# Patient Record
Sex: FEMALE | Race: WHITE | NOT HISPANIC OR LATINO | Employment: UNEMPLOYED | URBAN - METROPOLITAN AREA
[De-identification: names, ages, dates, MRNs, and addresses within clinical notes are randomized per-mention and may not be internally consistent; named-entity substitution may affect disease eponyms.]

---

## 2017-05-08 ENCOUNTER — ALLSCRIPTS OFFICE VISIT (OUTPATIENT)
Dept: OTHER | Facility: OTHER | Age: 57
End: 2017-05-08

## 2017-05-08 DIAGNOSIS — Z12.31 ENCOUNTER FOR SCREENING MAMMOGRAM FOR MALIGNANT NEOPLASM OF BREAST: ICD-10-CM

## 2017-05-08 DIAGNOSIS — R74.8 ABNORMAL LEVELS OF OTHER SERUM ENZYMES: ICD-10-CM

## 2017-05-08 DIAGNOSIS — R73.01 IMPAIRED FASTING GLUCOSE: ICD-10-CM

## 2017-05-09 ENCOUNTER — ALLSCRIPTS OFFICE VISIT (OUTPATIENT)
Dept: OTHER | Facility: OTHER | Age: 57
End: 2017-05-09

## 2017-05-11 ENCOUNTER — GENERIC CONVERSION - ENCOUNTER (OUTPATIENT)
Dept: OTHER | Facility: OTHER | Age: 57
End: 2017-05-11

## 2017-06-19 ENCOUNTER — HOSPITAL ENCOUNTER (OUTPATIENT)
Dept: MAMMOGRAPHY | Facility: MEDICAL CENTER | Age: 57
Discharge: HOME | End: 2017-06-19
Payer: COMMERCIAL

## 2017-06-19 DIAGNOSIS — Z12.31 ENCOUNTER FOR SCREENING MAMMOGRAM FOR MALIGNANT NEOPLASM OF BREAST: ICD-10-CM

## 2017-06-19 PROCEDURE — 77063 BREAST TOMOSYNTHESIS BI: CPT

## 2017-06-19 PROCEDURE — G0202 SCR MAMMO BI INCL CAD: HCPCS

## 2017-06-20 ENCOUNTER — GENERIC CONVERSION - ENCOUNTER (OUTPATIENT)
Dept: OTHER | Facility: OTHER | Age: 57
End: 2017-06-20

## 2017-06-29 ENCOUNTER — ALLSCRIPTS OFFICE VISIT (OUTPATIENT)
Dept: OTHER | Facility: OTHER | Age: 57
End: 2017-06-29

## 2017-08-08 ENCOUNTER — ALLSCRIPTS OFFICE VISIT (OUTPATIENT)
Dept: OTHER | Facility: OTHER | Age: 57
End: 2017-08-08

## 2017-08-08 DIAGNOSIS — R74.8 ABNORMAL LEVELS OF OTHER SERUM ENZYMES: ICD-10-CM

## 2017-08-08 DIAGNOSIS — Z11.59 ENCOUNTER FOR SCREENING FOR OTHER VIRAL DISEASES: ICD-10-CM

## 2017-09-21 ENCOUNTER — GENERIC CONVERSION - ENCOUNTER (OUTPATIENT)
Dept: OTHER | Facility: OTHER | Age: 57
End: 2017-09-21

## 2018-01-12 VITALS
DIASTOLIC BLOOD PRESSURE: 78 MMHG | SYSTOLIC BLOOD PRESSURE: 142 MMHG | HEIGHT: 64 IN | BODY MASS INDEX: 27.85 KG/M2 | WEIGHT: 163.13 LBS

## 2018-01-14 VITALS
HEIGHT: 64 IN | OXYGEN SATURATION: 98 % | SYSTOLIC BLOOD PRESSURE: 110 MMHG | HEART RATE: 64 BPM | BODY MASS INDEX: 27.87 KG/M2 | TEMPERATURE: 98.7 F | WEIGHT: 163.25 LBS | DIASTOLIC BLOOD PRESSURE: 78 MMHG

## 2018-01-15 VITALS
TEMPERATURE: 98.1 F | BODY MASS INDEX: 26.89 KG/M2 | HEIGHT: 64 IN | SYSTOLIC BLOOD PRESSURE: 128 MMHG | DIASTOLIC BLOOD PRESSURE: 82 MMHG | WEIGHT: 157.5 LBS | HEART RATE: 62 BPM | OXYGEN SATURATION: 98 %

## 2018-01-15 NOTE — MISCELLANEOUS
Message     Recorded as Task   Date: 09/20/2017 01:27 PM, Created By: Candi Bland   Task Name: Call Back   Assigned To: Zenobia Bri   Regarding Patient: Neena Parrish, Status: Active   Comment:    Aniyah Naqvi - 20 Sep 2017 1:27 PM     TASK CREATED  please call pt to go over labs that were done 09/12/2017  thanks!!    [phone] it is ok to leave a msg   Brooke Galvez - 21 Sep 2017 4:13 PM     TASK EDITED  I called and reviewed the hepatic function panel results with her and the non reactive hepatitis C results  She was informed that the liver testing results were all within normal limits and the hepatitis C testing was negative  She had no questions at the end of the call  Active Problems    1  BMI 28 0-28 9,adult (V85 24) (Z68 28)   2  Dacryocystitis of left lacrimal sac (375 30) (H04 302)   3  Diastolic dysfunction (683 8) (I51 9)   4  Elevated liver enzymes (790 5) (R74 8)   5  Exercise-induced asthma (493 81) (J45 990)   6  Impaired fasting glucose (790 21) (R73 01)   7  Low serum high density lipoprotein (HDL) (272 9) (R74 8)   8  Mastalgia (611 71) (N64 4)   9  Menopausal symptoms (627 2) (N95 1)   10  Need for hepatitis C screening test (V73 89) (Z11 59)   11  Need for influenza vaccination (V04 81) (Z23)   12  Nevus, atypical (216 9) (D22 9)   13  Positive depression screening (796 4) (Z13 89)   14  Systolic murmur (712 6) (O92 5)   15  Yeast dermatitis (112 3) (B37 2)    Current Meds   1  Calcium 600 MG Oral Tablet; take 1 tablet every other day; Therapy: 44VUV5670 to Recorded   2  Estrace 0 5 MG Oral Tablet; TAKE 1 TABLET DAILY AS DIRECTED; Therapy: 97XLI8842 to (Evaluate:23Jan2018)  Requested for: 78Snq9084; Last   Rx:06Zad2346 Ordered   3  Nystatin 773016 UNIT/GM External Powder; APPLY SPARINGLY TO AFFECTED   AREA(S) TWICE DAILY; Therapy: 13QBJ8656 to (Last Rx:13Jun2017)  Requested for: 13Jun2017 Ordered   4   Vitamin D3 5000 UNIT Oral Capsule; take 1 capsule daily; Therapy: 24THL9350 to Recorded    Allergies    1  Floxin TABS   2   Penicillins    Signatures   Electronically signed by : Shimon Funes DO; Sep 22 2017  8:59AM EST                       (Author)

## 2018-01-17 NOTE — RESULT NOTES
Verified Results  MAMMO SCREENING BILATERAL W 3D & CAD 19Jun2017 02:07PM Real [facility]o Order Number: [id number]    - Patient Instructions: To schedule this appointment, please contact Central Scheduling at [phone]  Do not wear any perfume, powder, lotion or deodorant on breast or underarm area  Please bring your doctors order, referral (if needed) and insurance information with you on the day of the test  Failure to bring this information may result in this test being rescheduled  Arrive 15 minutes prior to your appointment time to register  On the day of your test, please bring any prior mammogram or breast studies with you that were not performed at a [facility]  Failure to bring prior exams may result in your test needing to be rescheduled  Test Name Result Flag Reference   MAMMO SCREENING BILATERAL W 3D & CAD (Report)     Patient History:   Patient had tested negative for BRCA1  Family history of breast cancer at age 28 in mother, breast    cancer at age 54 in paternal aunt  2 benign excisional biopsies of the left breast    Patient has never smoked  Patient's BMI is 28 3  Reason for exam: screening, asymptomatic  Mammo Screening Bilateral W DBT and CAD: June 19, 2017 - Check In   #: [de-identified]   2D/3D Procedure   3D views: Bilateral MLO view(s) were taken  2D views: Bilateral CC view(s) were taken  Technologist: Nils Skiff, R T (R)(M)   Prior study comparison: April 20, 2016, mammo screening bilateral   W DBT and CAD performed at [address] [place] [address]  May 15, 2015, mammo screening bilateral, performed at    [facility]  June 16, 2014, mammo screening bilateral, performed at    [facility]  There are scattered fibroglandular densities  No dominant soft tissue mass, architectural distortion or    suspicious calcifications are noted in either breast  The skin    and nipple contours are within normal limits     No significant changes when compared with prior studies  ACR BI-RADSï¾® Assessments: BiRad:1 - Negative     Recommendation:   Routine screening mammogram of both breasts in 1 year  A    reminder letter will be scheduled  8-10% of cancers will be missed on mammography  Management of a    palpable abnormality must be based on clinical grounds  Patients    will be notified of their results via letter from our facility  Accredited by [employer] of [employer] and FDA       Transcription Location: [facility] 98: [id number]     Risk Value(s):   Tyrer-Cuzick 10 Year: 8 200%, Tyrer-Cuzick Lifetime: 25 600%,    Myriad Table: 2 6%, ELVIRA 5 Year: 3 7%, NCI Lifetime: 23 7%

## 2018-03-02 ENCOUNTER — OFFICE VISIT (OUTPATIENT)
Dept: INTERNAL MEDICINE CLINIC | Facility: CLINIC | Age: 58
End: 2018-03-02
Payer: COMMERCIAL

## 2018-03-02 VITALS
BODY MASS INDEX: 26.98 KG/M2 | DIASTOLIC BLOOD PRESSURE: 84 MMHG | HEART RATE: 68 BPM | OXYGEN SATURATION: 98 % | SYSTOLIC BLOOD PRESSURE: 138 MMHG | TEMPERATURE: 97.7 F | WEIGHT: 158 LBS | HEIGHT: 64 IN

## 2018-03-02 DIAGNOSIS — R35.0 FREQUENCY OF URINATION: Primary | ICD-10-CM

## 2018-03-02 DIAGNOSIS — M25.551 RIGHT HIP PAIN: ICD-10-CM

## 2018-03-02 PROBLEM — N30.00 ACUTE CYSTITIS: Status: ACTIVE | Noted: 2018-03-02

## 2018-03-02 LAB
SL AMB  POCT GLUCOSE, UA: NEGATIVE
SL AMB LEUKOCYTE ESTERASE,UA: NEGATIVE
SL AMB POCT BILIRUBIN,UA: NEGATIVE
SL AMB POCT BLOOD,UA: NEGATIVE
SL AMB POCT CLARITY,UA: CLEAR
SL AMB POCT COLOR,UA: YELLOW
SL AMB POCT KETONES,UA: NEGATIVE
SL AMB POCT NITRITE,UA: NEGATIVE
SL AMB POCT PH,UA: 6
SL AMB POCT SPECIFIC GRAVITY,UA: 1.02
SL AMB POCT URINE PROTEIN: NEGATIVE
SL AMB POCT UROBILINOGEN: 0.2

## 2018-03-02 PROCEDURE — 81003 URINALYSIS AUTO W/O SCOPE: CPT | Performed by: NURSE PRACTITIONER

## 2018-03-02 PROCEDURE — 99213 OFFICE O/P EST LOW 20 MIN: CPT | Performed by: NURSE PRACTITIONER

## 2018-03-02 RX ORDER — ESTRADIOL 0.5 MG/1
1 TABLET ORAL DAILY
COMMUNITY
Start: 2017-07-27 | End: 2018-05-10

## 2018-03-02 RX ORDER — NYSTATIN 100000 [USP'U]/G
POWDER TOPICAL 2 TIMES DAILY
COMMUNITY
Start: 2017-06-13 | End: 2018-05-10 | Stop reason: SDUPTHER

## 2018-03-02 RX ORDER — NITROFURANTOIN 25; 75 MG/1; MG/1
100 CAPSULE ORAL 2 TIMES DAILY
Qty: 10 CAPSULE | Refills: 0 | Status: CANCELLED | OUTPATIENT
Start: 2018-03-02 | End: 2018-03-07

## 2018-03-02 RX ORDER — BIOTIN 1 MG
1 TABLET ORAL EVERY MORNING
COMMUNITY
Start: 2016-03-23

## 2018-03-02 RX ORDER — PHENAZOPYRIDINE HYDROCHLORIDE 200 MG/1
200 TABLET, FILM COATED ORAL
Qty: 10 TABLET | Refills: 0 | Status: SHIPPED | OUTPATIENT
Start: 2018-03-02 | End: 2018-08-07 | Stop reason: ALTCHOICE

## 2018-03-02 RX ORDER — IBUPROFEN 200 MG
1 CAPSULE ORAL EVERY MORNING
COMMUNITY
Start: 2016-03-23

## 2018-03-02 NOTE — ASSESSMENT & PLAN NOTE
Referral to Physical therapy, continue to take ibuprofen as needed  Was offered Mobic but the patient did not want to take this medication, stating "I do not like to take medicine"

## 2018-03-02 NOTE — PATIENT INSTRUCTIONS
Will give you Pyridium for discomfort, if you wear contacts do not wear while taking this medication as it will turn them red, also your urine will be redish orange this is not of concern, however this may stain your clothing  Drink lots of water, can come back to the office if your symptoms worsen and/or you develop fevers  Will give you a script for Physical therapy for your back pain  Dysuria   WHAT YOU NEED TO KNOW:   Dysuria is difficulty urinating, or pain, burning, or discomfort with urination  Dysuria is usually a symptom of another problem  DISCHARGE INSTRUCTIONS:   Return to the emergency department if:   · You have severe back, side, or abdominal pain  · You have fever and shaking chills  · You vomit several times in a row  Contact your healthcare provider if:   · Your symptoms do not go away, even after treatment  · You have questions or concerns about your condition or care  Medicines:   · Medicines  may be given to help treat a bacterial infection or help decrease bladder spasms  · Take your medicine as directed  Contact your healthcare provider if you think your medicine is not helping or if you have side effects  Tell him of her if you are allergic to any medicine  Keep a list of the medicines, vitamins, and herbs you take  Include the amounts, and when and why you take them  Bring the list or the pill bottles to follow-up visits  Carry your medicine list with you in case of an emergency  Follow up with your healthcare provider as directed: Your healthcare provider may also refer you to a urologist or nephrologist to have additional testing  Write down your questions so you remember to ask them during your visits  Manage your dysuria:   · Drink more liquids  Liquids help flush out bacteria that may be causing an infection  Ask your healthcare provider how much liquid to drink each day and which liquids are best for you  · Take sitz baths as directed    Fill a bathtub with 4 to 6 inches of warm water  You may also use a sitz bath pan that fits over a toilet  Sit in the sitz bath for 20 minutes  Do this 2 to 3 times a day, or as directed  The warm water can help decrease pain and swelling  © 2017 [address] [employer] Information is for End User's use only and may not be sold, redistributed or otherwise used for commercial purposes  All illustrations and images included in CareNotes® are the copyrighted property of A D A M , Inc  or Rodrigo Campos  The above information is an  only  It is not intended as medical advice for individual conditions or treatments  Talk to your doctor, nurse or pharmacist before following any medical regimen to see if it is safe and effective for you

## 2018-03-02 NOTE — ASSESSMENT & PLAN NOTE
Will give you Pyridium for discomfort, if you wear contacts do not wear while taking this medication as it will turn them red, also your urine will be redish orange this is not of concern, however this may stain your clothing  Drink lots of water, can come back to the office if your symptoms worsen and/or you develop fevers

## 2018-03-02 NOTE — PROGRESS NOTES
Assessment/Plan:    Right hip pain  Referral to Physical therapy, continue to take ibuprofen as needed  Was offered Mobic but the patient did not want to take this medication, stating "I do not like to take medicine"  Frequency of urination  Will give you Pyridium for discomfort, if you wear contacts do not wear while taking this medication as it will turn them red, also your urine will be redish orange this is not of concern, however this may stain your clothing  Drink lots of water, can come back to the office if your symptoms worsen and/or you develop fevers  Diagnoses and all orders for this visit:    Frequency of urination  -     phenazopyridine (PYRIDIUM) 200 mg tablet; Take 1 tablet (200 mg total) by mouth 3 (three) times a day with meals    Right hip pain  -     Ambulatory referral to Physical Therapy; Future    Other orders  -     Cancel: nitrofurantoin (MACROBID) 100 mg capsule; Take 1 capsule (100 mg total) by mouth 2 (two) times a day for 5 days  -     Calcium 600 MG tablet; Take 1 tablet by mouth every other day  -     estradiol (ESTRACE) 0 5 MG tablet; Take 1 tablet by mouth daily  -     nystatin (MYCOSTATIN) powder; Apply topically 2 (two) times a day  -     Cholecalciferol (VITAMIN D3) 5000 units CAPS; Take 1 capsule by mouth daily          Subjective:      Patient ID: Burgess Arriaza is a 65 y o  female  Pt  Presents today with possiable UTI, with frequency and back pain (denies recent injury)  She is currently being treated for yeast infection under her breast, she was prescribed nystatin powder  Urinary Tract Infection    This is a new problem  The current episode started yesterday  The problem occurs every urination  The problem has been gradually worsening  Quality: no pain just frquency  The patient is experiencing no pain  There has been no fever  She is sexually active (no recent sexual intercourse)  There is no history of pyelonephritis (has had UTI before)  Associated symptoms include frequency, hesitancy (she has had this for a long time, she feels she does not always empty her bladder, has seen urologist years ago for this years ago) and urgency  Pertinent negatives include no chills, discharge, flank pain, hematuria, nausea, possible pregnancy or sweats  She has tried NSAIDs (ibuprofen to help with the back pain) for the symptoms  The treatment provided no relief  Her past medical history is significant for recurrent UTIs  Back Pain   This is a new problem  Episode onset: last evening  The problem occurs constantly  The problem is unchanged  Pain location: right hip, was at the chiropractor two weeks ago  The quality of the pain is described as aching  The pain radiates to the right knee and right thigh  The pain is at a severity of 3/10  The pain is mild  The pain is the same all the time  Exacerbated by: when she gets up and gets moving in the morning  Stiffness is present in the morning  Associated symptoms include dysuria  Pertinent negatives include no abdominal pain, bladder incontinence, bowel incontinence, chest pain, fever, headaches, leg pain, numbness, paresis, paresthesias, pelvic pain, perianal numbness, tingling, weakness or weight loss  She has tried chiropractic manipulation and NSAIDs for the symptoms  The treatment provided no relief  The following portions of the patient's history were reviewed and updated as appropriate: allergies, current medications, past family history, past medical history, past social history, past surgical history and problem list     Review of Systems   Constitutional: Negative for chills, fever and weight loss  Cardiovascular: Negative for chest pain  Gastrointestinal: Negative for abdominal pain, bowel incontinence and nausea     Genitourinary: Positive for dysuria, frequency, hesitancy (she has had this for a long time, she feels she does not always empty her bladder, has seen urologist years ago for this years ago) and urgency  Negative for bladder incontinence, decreased urine volume, difficulty urinating, flank pain, hematuria and pelvic pain  Musculoskeletal: Positive for back pain  Neurological: Negative for tingling, weakness, numbness, headaches and paresthesias  Past Medical History:   Diagnosis Date    Asthma     Cataract          Current Outpatient Prescriptions:     Calcium 600 MG tablet, Take 1 tablet by mouth every other day, Disp: , Rfl:     Cholecalciferol (VITAMIN D3) 5000 units CAPS, Take 1 capsule by mouth daily, Disp: , Rfl:     estradiol (ESTRACE) 0 5 MG tablet, Take 1 tablet by mouth daily, Disp: , Rfl:     nystatin (MYCOSTATIN) powder, Apply topically 2 (two) times a day, Disp: , Rfl:     phenazopyridine (PYRIDIUM) 200 mg tablet, Take 1 tablet (200 mg total) by mouth 3 (three) times a day with meals, Disp: 10 tablet, Rfl: 0    Allergies   Allergen Reactions    Ofloxacin Hives    Penicillins        Social History   Past Surgical History:   Procedure Laterality Date    BLADDER SURGERY      BREAST SURGERY      HYSTERECTOMY      MOUTH SURGERY      TUBAL LIGATION       Family History   Problem Relation Age of Onset    Hypertension Mother     Hypertension Father        Objective:  /84 (BP Location: Left arm, Patient Position: Sitting, Cuff Size: Adult)   Pulse 68   Temp 97 7 °F (36 5 °C) (Oral)   Ht 5' 4" (1 626 m)   Wt 71 7 kg (158 lb)   SpO2 98%   BMI 27 12 kg/m²     No results found for this or any previous visit (from the past 1344 hour(s))  Physical Exam   Constitutional: She is oriented to person, place, and time  She appears well-developed and well-nourished  No distress  HENT:   Head: Atraumatic  Right Ear: External ear normal    Left Ear: External ear normal    Nose: Nose normal    Mouth/Throat: Oropharynx is clear and moist  No oropharyngeal exudate  Eyes: Conjunctivae and EOM are normal  Pupils are equal, round, and reactive to light  Right eye exhibits no discharge  Left eye exhibits no discharge  Neck: Normal range of motion  Neck supple  No thyromegaly present  Cardiovascular: Normal rate, regular rhythm, normal heart sounds and intact distal pulses  Exam reveals no gallop and no friction rub  No murmur heard  Pulmonary/Chest: Effort normal and breath sounds normal  No stridor  No respiratory distress  She has no wheezes  She has no rales  She exhibits no tenderness  Abdominal: Soft  Bowel sounds are normal  She exhibits no distension  There is no tenderness  Musculoskeletal:        Arms:  Neurological: She is alert and oriented to person, place, and time  Skin: Skin is warm and dry  She is not diaphoretic  No erythema  Psychiatric: She has a normal mood and affect   Her behavior is normal  Judgment and thought content normal

## 2018-05-08 NOTE — PROGRESS NOTES
Assessment/Plan:    Calcium 1200-1500mg + 600-1000 IU Vit D daily  Annual mammogram  Arnold Renteria next year and encouraged to schedule colonoscopy this year  Monthly BSE  Exercise 150 minutes per week minimum  Kegels 20 times twice daily  Silicone based lubricant with sex  Estrace vaginal cream rx sent to pharmacy on file and instructions given to patient  Discontinue oral form of estrace  Black cohosh may be continued if desired  Neg Depression screen  Diagnoses and all orders for this visit:    Encntr for gyn exam (general) (routine) w/o abn findings    Encounter for screening mammogram for breast cancer  -     Mammo screening bilateral w 3d & cad; Future    Vaginal atrophy  -     estradiol (ESTRACE) 0 1 mg/g vaginal cream; Use 1 gm intravaginally nightly x 14 nights then twice weekly    BMI 26 0-26 9,adult    Screening for colon cancer  -     Ambulatory referral to Gastroenterology; Future    Yeast infection of the skin  -     nystatin (MYCOSTATIN) powder; Apply topically 2 (two) times a day    Other orders  -     Cholecalciferol (VITAMIN D3) 400 units CAPS; Take by mouth  -     Calcium Carb-Cholecalciferol (CALCIUM 1000 + D PO); Take by mouth          Subjective:      Patient ID: Anjali Dotson is a 65 y o  female  Patient here for annual visit  No current health concerns  Has a new sensitivity to eating spinach-causes vomiting and diarrhea  Denies vaginal bleeding  Hx of partial hysterectomy due to menorrhagia  Lost 6 lbs since last visit  Believes its due to dietary changes   had open heart surgery 4/17  He is doing really well  She walk and rides her bike regularly  Hx of osteopenia  Declines DEXA this year  Leaving next week for one month in [place] to staying with her mom and daughter  Will babysit most of the time for 3 yo granddaughter  Relieved that her son is doing well with his move to [place]   is 77years old  They have sex approx once per month  Denies lubrication concerns   Slight insertional dyspareunia that relieves with time  Hot flashes have resolved on HRT  Also taking black cohosh  The following portions of the patient's history were reviewed and updated as appropriate: allergies, current medications, past family history, past medical history, past social history, past surgical history and problem list     Review of Systems   Constitutional: Negative  Negative for activity change, appetite change, chills, diaphoresis, fatigue, fever and unexpected weight change  HENT: Negative for congestion, dental problem, sneezing, sore throat and trouble swallowing  Eyes: Negative for visual disturbance  Respiratory: Negative for chest tightness and shortness of breath  Cardiovascular: Negative for chest pain and leg swelling  Gastrointestinal: Negative for abdominal pain, constipation, diarrhea, nausea and vomiting  Genitourinary: Negative for difficulty urinating, dyspareunia, dysuria, frequency, hematuria, menstrual problem, pelvic pain, urgency, vaginal bleeding, vaginal discharge and vaginal pain  Musculoskeletal: Negative for back pain and neck pain  Skin: Negative  Allergic/Immunologic: Negative  Neurological: Negative for weakness and headaches  Hematological: Negative for adenopathy  Psychiatric/Behavioral: Negative  Objective: There were no vitals taken for this visit  Physical Exam   Constitutional: She is oriented to person, place, and time  She appears well-developed and well-nourished  HENT:   Head: Normocephalic and atraumatic  Eyes: Right eye exhibits no discharge  Left eye exhibits no discharge  Neck: Normal range of motion  Neck supple  Cardiovascular: Normal rate, regular rhythm, normal heart sounds and intact distal pulses  Pulmonary/Chest: Effort normal and breath sounds normal    Abdominal: Soft  Genitourinary: Vagina normal  Rectal exam shows no external hemorrhoid   No breast swelling, tenderness, discharge or bleeding  No labial fusion  There is no rash, tenderness, lesion or injury on the right labia  There is no rash, tenderness, lesion or injury on the left labia  Cervix exhibits no discharge  Right adnexum displays no mass (non tender non palpable), no tenderness and no fullness  Left adnexum displays no mass, no tenderness and no fullness  No erythema, tenderness or bleeding in the vagina  No foreign body in the vagina  No signs of injury around the vagina  No vaginal discharge found  Genitourinary Comments: Absent uterus and cervix   Musculoskeletal: Normal range of motion  Lymphadenopathy:     She has no cervical adenopathy  Right: No inguinal adenopathy present  Left: No inguinal adenopathy present  Neurological: She is alert and oriented to person, place, and time  Skin: Skin is warm and dry  Psychiatric: She has a normal mood and affect  Nursing note and vitals reviewed

## 2018-05-10 ENCOUNTER — ANNUAL EXAM (OUTPATIENT)
Dept: GYNECOLOGY | Facility: CLINIC | Age: 58
End: 2018-05-10
Payer: COMMERCIAL

## 2018-05-10 VITALS
BODY MASS INDEX: 26.8 KG/M2 | SYSTOLIC BLOOD PRESSURE: 132 MMHG | DIASTOLIC BLOOD PRESSURE: 78 MMHG | HEART RATE: 62 BPM | HEIGHT: 64 IN | WEIGHT: 157 LBS

## 2018-05-10 DIAGNOSIS — B37.2 YEAST INFECTION OF THE SKIN: ICD-10-CM

## 2018-05-10 DIAGNOSIS — Z12.31 ENCOUNTER FOR SCREENING MAMMOGRAM FOR BREAST CANCER: ICD-10-CM

## 2018-05-10 DIAGNOSIS — Z01.419 ENCNTR FOR GYN EXAM (GENERAL) (ROUTINE) W/O ABN FINDINGS: Primary | ICD-10-CM

## 2018-05-10 DIAGNOSIS — Z12.11 SCREENING FOR COLON CANCER: ICD-10-CM

## 2018-05-10 DIAGNOSIS — N95.2 VAGINAL ATROPHY: ICD-10-CM

## 2018-05-10 PROCEDURE — 3725F SCREEN DEPRESSION PERFORMED: CPT | Performed by: NURSE PRACTITIONER

## 2018-05-10 PROCEDURE — 99396 PREV VISIT EST AGE 40-64: CPT | Performed by: NURSE PRACTITIONER

## 2018-05-10 RX ORDER — ESTRADIOL 0.1 MG/G
CREAM VAGINAL
Qty: 42.5 G | Refills: 1 | Status: SHIPPED | OUTPATIENT
Start: 2018-05-10 | End: 2018-08-07 | Stop reason: ALTCHOICE

## 2018-05-10 RX ORDER — IBUPROFEN 800 MG
TABLET ORAL
COMMUNITY
End: 2018-08-07 | Stop reason: ALTCHOICE

## 2018-05-10 RX ORDER — NYSTATIN 100000 [USP'U]/G
POWDER TOPICAL 2 TIMES DAILY
Qty: 15 G | Refills: 1 | Status: SHIPPED | OUTPATIENT
Start: 2018-05-10 | End: 2019-11-12

## 2018-05-11 ENCOUNTER — TELEPHONE (OUTPATIENT)
Dept: GYNECOLOGY | Facility: CLINIC | Age: 58
End: 2018-05-11

## 2018-05-11 NOTE — TELEPHONE ENCOUNTER
Please ask her to call her insurance to find out what they cover and let me know  I'm happy to call something different in

## 2018-05-16 ENCOUNTER — TELEPHONE (OUTPATIENT)
Dept: GYNECOLOGY | Facility: CLINIC | Age: 58
End: 2018-05-16

## 2018-05-16 DIAGNOSIS — N95.2 ATROPHIC VAGINITIS: Primary | ICD-10-CM

## 2018-05-16 NOTE — TELEPHONE ENCOUNTER
Karen patient, was seen recently and the vaginal cream that Karen called in wasn't covered  The pharmacist said that Premarin Cream would be covered  Can this be put through?

## 2018-06-20 ENCOUNTER — HOSPITAL ENCOUNTER (OUTPATIENT)
Dept: MAMMOGRAPHY | Facility: MEDICAL CENTER | Age: 58
Discharge: HOME | End: 2018-06-20
Payer: COMMERCIAL

## 2018-06-20 DIAGNOSIS — Z12.31 ENCOUNTER FOR SCREENING MAMMOGRAM FOR BREAST CANCER: ICD-10-CM

## 2018-06-20 PROCEDURE — 77063 BREAST TOMOSYNTHESIS BI: CPT

## 2018-06-20 PROCEDURE — 77067 SCR MAMMO BI INCL CAD: CPT

## 2018-08-07 ENCOUNTER — OFFICE VISIT (OUTPATIENT)
Dept: INTERNAL MEDICINE CLINIC | Age: 58
End: 2018-08-07
Payer: COMMERCIAL

## 2018-08-07 VITALS
HEART RATE: 59 BPM | HEIGHT: 63 IN | OXYGEN SATURATION: 98 % | SYSTOLIC BLOOD PRESSURE: 120 MMHG | WEIGHT: 152.2 LBS | BODY MASS INDEX: 26.97 KG/M2 | TEMPERATURE: 97.6 F | DIASTOLIC BLOOD PRESSURE: 70 MMHG

## 2018-08-07 DIAGNOSIS — Z00.00 HEALTHCARE MAINTENANCE: ICD-10-CM

## 2018-08-07 DIAGNOSIS — Z13.220 SCREENING CHOLESTEROL LEVEL: ICD-10-CM

## 2018-08-07 DIAGNOSIS — E55.9 VITAMIN D DEFICIENCY: Primary | ICD-10-CM

## 2018-08-07 PROCEDURE — 99396 PREV VISIT EST AGE 40-64: CPT | Performed by: FAMILY MEDICINE

## 2018-08-07 NOTE — PROGRESS NOTES
Assessment/Plan:    No problem-specific Assessment & Plan notes found for this encounter  Problem List Items Addressed This Visit        Other    Screening cholesterol level    Relevant Orders    Lipid panel    Vitamin D deficiency - Primary    Relevant Orders    Vitamin D 25 hydroxy    Healthcare maintenance    Relevant Orders    CBC and differential    TSH baseline    Comprehensive metabolic panel    Lipid panel    Vitamin D 25 hydroxy            Subjective:  yearly f/up       Patient ID: Moose Yeboah is a 65 y o  female  HPI  No complaints or concerns, feeling well  UTD on mammo, colo, dental and eye exam   Pap UTD, preventative guidelines reviewed         The following portions of the patient's history were reviewed and updated as appropriate: allergies, current medications, past family history, past medical history, past social history, past surgical history and problem list     Review of Systems    Constitutional:  Denies fever or chills   Eyes:  Denies change in visual acuity   HENT:  Denies nasal congestion or sore throat   Respiratory:  Denies cough or shortness of breath or wheezing  Cardiovascular:  Denies palpitations or chest pain  GI:  Denies abdominal pain, nausea, or vomiting  Integument:  Denies rash   Neurologic:  Denies headache or focal weakness        Objective:      /70 (BP Location: Left arm, Patient Position: Sitting, Cuff Size: Standard)   Pulse 59   Temp 97 6 °F (36 4 °C) (Tympanic)   Ht 5' 3 19" (1 605 m)   Wt 69 kg (152 lb 3 2 oz)   SpO2 98%   BMI 26 80 kg/m²          Physical Exam      Constitutional:  Well developed, well nourished, no acute distress, non-toxic appearance   Eyes:  PERRL, conjunctiva normal , non icteric sclera  HENT:  Atraumatic, oropharynx moist  Neck-  supple   Respiratory:  CTA b/l, normal breath sounds, no rales, no wheezing   Cardiovascular:  RRR, no murmurs, no LE edema b/l  GI:  Soft, nondistended, normal bowel sounds x 4, nontender, no organomegaly, no mass, no rebound, no guarding   Neurologic:  no focal deficits noted   Psychiatric:  Speech and behavior appropriate , AAO x 3

## 2018-08-08 ENCOUNTER — CLINICAL SUPPORT (OUTPATIENT)
Dept: INTERNAL MEDICINE CLINIC | Facility: CLINIC | Age: 58
End: 2018-08-08
Payer: COMMERCIAL

## 2018-08-08 DIAGNOSIS — E55.9 VITAMIN D DEFICIENCY: Primary | ICD-10-CM

## 2018-08-08 DIAGNOSIS — Z00.00 HEALTHCARE MAINTENANCE: ICD-10-CM

## 2018-08-08 DIAGNOSIS — Z13.220 SCREENING CHOLESTEROL LEVEL: ICD-10-CM

## 2018-08-08 PROCEDURE — 36415 COLL VENOUS BLD VENIPUNCTURE: CPT

## 2018-08-09 LAB
25(OH)D3 SERPL-MCNC: 87 NG/ML (ref 30–100)
ALBUMIN SERPL-MCNC: 4.2 G/DL (ref 3.6–5.1)
ALBUMIN/GLOB SERPL: 1.4 (CALC) (ref 1–2.5)
ALP SERPL-CCNC: 90 U/L (ref 33–130)
ALT SERPL-CCNC: 13 U/L (ref 6–29)
AST SERPL-CCNC: 15 U/L (ref 10–35)
BASOPHILS # BLD AUTO: 38 CELLS/UL (ref 0–200)
BASOPHILS NFR BLD AUTO: 0.5 %
BILIRUB SERPL-MCNC: 1.3 MG/DL (ref 0.2–1.2)
BUN SERPL-MCNC: 15 MG/DL (ref 7–25)
BUN/CREAT SERPL: ABNORMAL (CALC) (ref 6–22)
CALCIUM SERPL-MCNC: 9.4 MG/DL (ref 8.6–10.4)
CHLORIDE SERPL-SCNC: 103 MMOL/L (ref 98–110)
CHOLEST SERPL-MCNC: 153 MG/DL
CHOLEST/HDLC SERPL: 2.5 (CALC)
CO2 SERPL-SCNC: 27 MMOL/L (ref 20–32)
CREAT SERPL-MCNC: 0.86 MG/DL (ref 0.5–1.05)
EOSINOPHIL # BLD AUTO: 122 CELLS/UL (ref 15–500)
EOSINOPHIL NFR BLD AUTO: 1.6 %
ERYTHROCYTE [DISTWIDTH] IN BLOOD BY AUTOMATED COUNT: 12.5 % (ref 11–15)
GLOBULIN SER CALC-MCNC: 3 G/DL (CALC) (ref 1.9–3.7)
GLUCOSE SERPL-MCNC: 93 MG/DL (ref 65–99)
HCT VFR BLD AUTO: 42.1 % (ref 35–45)
HDLC SERPL-MCNC: 61 MG/DL
HGB BLD-MCNC: 14 G/DL (ref 11.7–15.5)
LDLC SERPL CALC-MCNC: 76 MG/DL (CALC)
LYMPHOCYTES # BLD AUTO: 3063 CELLS/UL (ref 850–3900)
LYMPHOCYTES NFR BLD AUTO: 40.3 %
MCH RBC QN AUTO: 29.5 PG (ref 27–33)
MCHC RBC AUTO-ENTMCNC: 33.3 G/DL (ref 32–36)
MCV RBC AUTO: 88.6 FL (ref 80–100)
MONOCYTES # BLD AUTO: 486 CELLS/UL (ref 200–950)
MONOCYTES NFR BLD AUTO: 6.4 %
NEUTROPHILS # BLD AUTO: 3891 CELLS/UL (ref 1500–7800)
NEUTROPHILS NFR BLD AUTO: 51.2 %
NONHDLC SERPL-MCNC: 92 MG/DL (CALC)
PLATELET # BLD AUTO: 253 THOUSAND/UL (ref 140–400)
PMV BLD REES-ECKER: 9.3 FL (ref 7.5–12.5)
POTASSIUM SERPL-SCNC: 4.2 MMOL/L (ref 3.5–5.3)
PROT SERPL-MCNC: 7.2 G/DL (ref 6.1–8.1)
RBC # BLD AUTO: 4.75 MILLION/UL (ref 3.8–5.1)
SL AMB EGFR AFRICAN AMERICAN: 88 ML/MIN/1.73M2
SL AMB EGFR NON AFRICAN AMERICAN: 76 ML/MIN/1.73M2
SODIUM SERPL-SCNC: 139 MMOL/L (ref 135–146)
TRIGL SERPL-MCNC: 84 MG/DL
TSH SERPL-ACNC: 1.04 MIU/L (ref 0.4–4.5)
WBC # BLD AUTO: 7.6 THOUSAND/UL (ref 3.8–10.8)

## 2018-08-15 ENCOUNTER — TELEPHONE (OUTPATIENT)
Dept: INTERNAL MEDICINE CLINIC | Facility: CLINIC | Age: 58
End: 2018-08-15

## 2018-08-15 DIAGNOSIS — Z00.00 HEALTHCARE MAINTENANCE: Primary | ICD-10-CM

## 2018-08-15 NOTE — TELEPHONE ENCOUNTER
Patient stopped looking for a referral for ENT to have hearing test completed  [facility] cannot make appt until referral is entered  Thank you    Confirmed patient will be contacted by Mireille Merino once the referral is entered

## 2018-08-21 NOTE — TELEPHONE ENCOUNTER
Patient needs paper, EPIC referral for [facility] and is looking to review results of labs from 8/8/18     [phone]  Best phone to call for today    Please advise when referral is complete so I can contact patient if it is at a time other than when you call with the lab results      Thank you

## 2018-08-21 NOTE — TELEPHONE ENCOUNTER
Referral to ENT placed for this patient (this is for a hearing screening)  Reviewed her blood-work results with her, patient had no further questions  Please fax referral to [phone]

## 2018-09-08 ENCOUNTER — CLINICAL SUPPORT (OUTPATIENT)
Dept: INTERNAL MEDICINE CLINIC | Facility: CLINIC | Age: 58
End: 2018-09-08
Payer: COMMERCIAL

## 2018-09-08 DIAGNOSIS — Z23 NEED FOR INFLUENZA VACCINATION: Primary | ICD-10-CM

## 2018-09-08 PROCEDURE — 90471 IMMUNIZATION ADMIN: CPT

## 2018-09-08 PROCEDURE — 90686 IIV4 VACC NO PRSV 0.5 ML IM: CPT

## 2018-10-11 ENCOUNTER — OFFICE VISIT (OUTPATIENT)
Dept: OTOLARYNGOLOGY | Facility: CLINIC | Age: 58
End: 2018-10-11
Payer: COMMERCIAL

## 2018-10-11 VITALS
HEART RATE: 65 BPM | WEIGHT: 153.6 LBS | SYSTOLIC BLOOD PRESSURE: 142 MMHG | HEIGHT: 64 IN | BODY MASS INDEX: 26.22 KG/M2 | DIASTOLIC BLOOD PRESSURE: 85 MMHG

## 2018-10-11 DIAGNOSIS — Z86.69 HISTORY OF MENIERE'S DISEASE: Primary | ICD-10-CM

## 2018-10-11 PROCEDURE — 69209 REMOVE IMPACTED EAR WAX UNI: CPT | Performed by: OTOLARYNGOLOGY

## 2018-10-11 PROCEDURE — 99203 OFFICE O/P NEW LOW 30 MIN: CPT | Performed by: OTOLARYNGOLOGY

## 2018-10-11 NOTE — PROGRESS NOTES
Barbie Mesa 63 y o  female MRN: [id number]  Unit/Bed#:  Encounter: [id number]            History of Present Illness     Reason for Visit[de-identified]  Office visit  HPI: Barbie Mesa is a 63y o  year old female who presents with history of Meniere's back 25 years ago  At that point she was having recurrent episodes of vertigo  It has not been active in the last 20 years  Last audiometry was approximately 20 years ago  At that point her hearing was within normal limits according to the patient  Patient lived in [place] and those records are not available right now  Review of Systems  Revision of Systems:    Complete review done, only positive for the symptoms described in the H&P section above      Historical Information   Past Medical History:   Diagnosis Date    Cataract      Past Surgical History:   Procedure Laterality Date    BLADDER SURGERY      BREAST SURGERY      HYSTERECTOMY      MOUTH SURGERY      TUBAL LIGATION      WISDOM TOOTH EXTRACTION       Social History   History   Alcohol Use No     History   Drug Use No     History   Smoking Status    Never Smoker   Smokeless Tobacco    Never Used     Family History:   Family History   Problem Relation Age of Onset    Hypertension Mother     Heart disease Mother         cardiac disorder    Breast cancer Mother         dx age early 19's   Kat Rizvi Glaucoma Mother     Hypertension Father     Heart disease Father         cardiac disorder    Cancer Father     No Known Problems Sister     Multiple sclerosis Daughter    Kat Diones Melanoma Daughter     No Known Problems Son        Meds/Allergies   No current facility-administered medications for this visit  Allergies   Allergen Reactions    Ofloxacin Hives    Penicillins        Objective       Physical Exam   Blood pressure 142/85, pulse 65, height 5' 4" (1 626 m), weight 69 7 kg (153 lb 9 6 oz)  Constitutional: Oriented to person, place, and time   Well-developed and well-nourished, no apparent distress, non-toxic appearance  Cooperative, able to hear and answer questions without difficulty  Voice: Normal voice quality  Head: Normocephalic, atraumatic  No scars, masses or lesions  Face: Symmetric, no edema, no sinus tenderness  Eyes: Vision grossly intact, extra-ocular movement intact  Right Ear: External ear normal   Auditory canal with partial wax impaction, removed with suction until canal clear  Tympanic membrane well-appearing, without retraction or scarring  No fluid present  No post-auricular erythema or tenderness  Left Ear: External ear normal   Auditory canal clear  Tympanic membrane well-appearing, without retraction or scarring  No fluid present  No post-auricular erythema or tenderness  Nose: Septum with mild left deviation  Mucosa moist, turbinates normal size, no edema  No polyps or masses, no discharge evident  Oral cavity:  Lips normal, no mucosal lesions  Dentition intact, gingiva normal in appearance  Mucosa moist,  Tongue mobile, floor of mouth normal   Hard palate unremarkable  No masses or lesions  Oropharynx: Uvula is midline, sot palate normal   Unremarkable oropharyngeal inlet  Tonsils unremarkable, 2+  Posterior pharyngeal wall clear  No masses or lesions  Salivary glands:  Parotid glands and submandibular glands symmetric, no enlargement or tenderness  Neck: Normal laryngeal elevation with swallow  Trachea midline  No masses or lesions  No palpable adenopathy  Thyroid: normal in size, and consistency, unremarkable without tenderness or palpable nodules  Pulmonary/Chest: Normal effort and rate  No respiratory distress  Musculoskeletal: Normal range of motion  Neurological: Cranial nerves 2-12 intact  Skin: Skin is warm and dry  Psychiatric: Normal mood and affect        Lab Results: CBC: No results found for: WBC, HGB, HCT, MCV, PLT, ADJUSTEDWBC, MCH, MCHC, RDW, MPV, NRBC, CMP: No results found for: NA, K, CL, CO2, ANIONGAP, BUN, CREATININE, GLUCOSE, CALCIUM, AST, ALT, ALKPHOS, PROT, ALBUMIN, BILITOT, EGFR  Imaging Studies: I have personally reviewed images on the PACS system and :  Noncontributory  EKG, Pathology, and   Other Studies: I have personally reviewed pertinent reports and noncontributory    Assessment  patient with remote history of possible Meniere's  She also mentions that her dad had relatively it early in life hearing loss that required the use of hearing aids  Plan: Will request audiometry  Follow-up with results

## 2018-10-18 ENCOUNTER — OFFICE VISIT (OUTPATIENT)
Dept: AUDIOLOGY | Age: 58
End: 2018-10-18
Payer: COMMERCIAL

## 2018-10-18 DIAGNOSIS — H90.3 SENSORINEURAL HEARING LOSS, BILATERAL: Primary | ICD-10-CM

## 2018-10-18 PROCEDURE — 92567 TYMPANOMETRY: CPT | Performed by: AUDIOLOGIST

## 2018-10-18 PROCEDURE — 92557 COMPREHENSIVE HEARING TEST: CPT | Performed by: AUDIOLOGIST

## 2018-10-18 NOTE — LETTER
2018     Ashia Delaney [facility] [address]  [address]    Patient: Coral Stapleton   YOB: 1960   Date of Visit: 10/18/2018       Dear Dr Misty Damon: Thank you for referring Coral Stapleton to me for evaluation  Below are my notes for this consultation  If you have questions, please do not hesitate to call me  I look forward to following your patient along with you  Sincerely,        Bobbi        CC: No Recipients  Bobbi  10/18/2018 12:02 PM  Sign at close encounter  HEARING EVALUATION    Name:  Coral Stapleton  :  1960  Age:  63 y o  Date of Evaluation: 10/18/18     History: Tinnitus  Reason for visit: Coral Stapleton is being seen today at the request of Dr Misty Damon for an evaluation of hearing  Patient reports she was diagnosed with Meniere's Disease, noted her last episode was approximately 10 years ago  EVALUATION:    Otoscopic Evaluation:   Right Ear: Clear and healthy ear canal and tympanic membrane   Left Ear: Clear and healthy ear canal and tympanic membrane    Tympanometry:   Right: Type A - normal middle ear pressure and compliance   Left: Type A - normal middle ear pressure and compliance    Audiogram Results:  Pure tone testing revealed normal hearing sensitivity bilaterally  SRT and PTA are in agreement indicating good test reliability  Word recognition scores were  excellent bilaterally  *see attached audiogram      RECOMMENDATIONS:  Annual hearing eval and Copy to Patient/Caregiver    PATIENT EDUCATION:   Discussed results and recommendations with patient  Questions were addressed and the patient was encouraged to contact our department should concerns arise        Leola Martines , CCC-A  Clinical Audiologist

## 2018-10-18 NOTE — PROGRESS NOTES
HEARING EVALUATION    Name:  Jenette Apgar  :  1960  Age:  63 y o  Date of Evaluation: 10/18/18     History: Tinnitus  Reason for visit: Jenette Apgar is being seen today at the request of Dr Rosalina Patino for an evaluation of hearing  Patient reports she was diagnosed with Meniere's Disease, noted her last episode was approximately 10 years ago  EVALUATION:    Otoscopic Evaluation:   Right Ear: Clear and healthy ear canal and tympanic membrane   Left Ear: Clear and healthy ear canal and tympanic membrane    Tympanometry:   Right: Type A - normal middle ear pressure and compliance   Left: Type A - normal middle ear pressure and compliance    Audiogram Results:  Pure tone testing revealed normal hearing sensitivity bilaterally  SRT and PTA are in agreement indicating good test reliability  Word recognition scores were  excellent bilaterally  *see attached audiogram      RECOMMENDATIONS:  Annual hearing eval and Copy to Patient/Caregiver    PATIENT EDUCATION:   Discussed results and recommendations with patient  Questions were addressed and the patient was encouraged to contact our department should concerns arise        Leola Harris , CCC-A  Clinical Audiologist

## 2018-12-07 ENCOUNTER — OFFICE VISIT (OUTPATIENT)
Dept: INTERNAL MEDICINE CLINIC | Facility: CLINIC | Age: 58
End: 2018-12-07
Payer: COMMERCIAL

## 2018-12-07 VITALS
HEIGHT: 64 IN | OXYGEN SATURATION: 99 % | WEIGHT: 152.4 LBS | SYSTOLIC BLOOD PRESSURE: 138 MMHG | HEART RATE: 60 BPM | TEMPERATURE: 98.9 F | BODY MASS INDEX: 26.02 KG/M2 | DIASTOLIC BLOOD PRESSURE: 90 MMHG

## 2018-12-07 DIAGNOSIS — Z86.69 HISTORY OF MENIERE'S DISEASE: Primary | ICD-10-CM

## 2018-12-07 DIAGNOSIS — R03.0 ELEVATED BLOOD PRESSURE READING WITHOUT DIAGNOSIS OF HYPERTENSION: ICD-10-CM

## 2018-12-07 DIAGNOSIS — H81.11 BENIGN PAROXYSMAL VERTIGO OF RIGHT EAR: ICD-10-CM

## 2018-12-07 PROBLEM — Z12.31 ENCOUNTER FOR SCREENING MAMMOGRAM FOR BREAST CANCER: Status: RESOLVED | Noted: 2018-05-10 | Resolved: 2018-12-07

## 2018-12-07 PROBLEM — R35.0 FREQUENCY OF URINATION: Status: RESOLVED | Noted: 2018-03-02 | Resolved: 2018-12-07

## 2018-12-07 PROBLEM — Z12.11 SCREENING FOR COLON CANCER: Status: RESOLVED | Noted: 2018-05-10 | Resolved: 2018-12-07

## 2018-12-07 PROBLEM — N95.1 MENOPAUSAL SYMPTOMS: Status: ACTIVE | Noted: 2017-07-27

## 2018-12-07 PROBLEM — Z01.419 ENCNTR FOR GYN EXAM (GENERAL) (ROUTINE) W/O ABN FINDINGS: Status: RESOLVED | Noted: 2018-05-10 | Resolved: 2018-12-07

## 2018-12-07 PROBLEM — Z13.220 SCREENING CHOLESTEROL LEVEL: Status: RESOLVED | Noted: 2018-08-07 | Resolved: 2018-12-07

## 2018-12-07 PROBLEM — D22.9 NEVUS, ATYPICAL: Status: ACTIVE | Noted: 2017-05-08

## 2018-12-07 PROBLEM — Z13.31 POSITIVE DEPRESSION SCREENING: Status: ACTIVE | Noted: 2017-08-08

## 2018-12-07 PROBLEM — H81.10 BENIGN PAROXYSMAL VERTIGO: Status: ACTIVE | Noted: 2018-12-07

## 2018-12-07 PROCEDURE — 99213 OFFICE O/P EST LOW 20 MIN: CPT | Performed by: INTERNAL MEDICINE

## 2018-12-07 PROCEDURE — 3008F BODY MASS INDEX DOCD: CPT | Performed by: INTERNAL MEDICINE

## 2018-12-07 RX ORDER — CHLORPHENIRAMINE MALEATE 4 MG/1
4 TABLET ORAL EVERY 6 HOURS PRN
Qty: 30 TABLET | Refills: 0 | Status: SHIPPED | OUTPATIENT
Start: 2018-12-07 | End: 2019-06-13 | Stop reason: ALTCHOICE

## 2018-12-07 RX ORDER — MECLIZINE HYDROCHLORIDE 25 MG/1
25 TABLET ORAL 3 TIMES DAILY PRN
Qty: 60 TABLET | Refills: 2 | Status: SHIPPED | OUTPATIENT
Start: 2018-12-07 | End: 2019-05-30 | Stop reason: ALTCHOICE

## 2018-12-07 NOTE — ASSESSMENT & PLAN NOTE
Nausea without vomiting associated with her vertigo  This does not seem consistent with many years disease as the onset does not seem as abrupt or severe  Will treat conservatively and re-evaluate as needed

## 2018-12-07 NOTE — ASSESSMENT & PLAN NOTE
Blood pressure is mildly elevated on repeat 150/82 within normal cuff while the patient with sitting  Recheck in 3 to 4 months

## 2018-12-07 NOTE — PROGRESS NOTES
Assessment/Plan:    History of Meniere's disease  Nausea without vomiting associated with her vertigo  This does not seem consistent with many years disease as the onset does not seem as abrupt or severe  Will treat conservatively and re-evaluate as needed  Elevated blood pressure reading without diagnosis of hypertension  Blood pressure is mildly elevated on repeat 150/82 within normal cuff while the patient with sitting  Recheck in 3 to 4 months  Benign paroxysmal vertigo  Will treat conservatively meclizine 25 mg t i d  Along with chlorpheniramine t i d  Diagnoses and all orders for this visit:    History of Meniere's disease  -     meclizine (ANTIVERT) 25 mg tablet; Take 1 tablet (25 mg total) by mouth 3 (three) times a day as needed for dizziness for up to 20 days  -     chlorpheniramine (CHLOR-TRIMETON) 4 MG tablet; Take 1 tablet (4 mg total) by mouth every 6 (six) hours as needed for allergies    Benign paroxysmal vertigo of right ear  -     meclizine (ANTIVERT) 25 mg tablet; Take 1 tablet (25 mg total) by mouth 3 (three) times a day as needed for dizziness for up to 20 days  -     chlorpheniramine (CHLOR-TRIMETON) 4 MG tablet; Take 1 tablet (4 mg total) by mouth every 6 (six) hours as needed for allergies    Elevated blood pressure reading without diagnosis of hypertension          Subjective:      Patient ID: Madhuri Cowart is a 63 y o  female  Patient presents to the office with recent onset of some sudden vertigo  Episodes occur in paroxysms and her usually associated with some mild nausea but no vomiting  She denies any headaches  Episodes do not seem to be precipitated by any head motions but symptoms are made worse by positioning her head  Usually if she lies down for several hours the episodes will resolve  She denies any visual symptoms  She has had some nausea but no vomiting as previously noted    She has not had any recent headaches, she denies any recent upper respiratory infections  She has had no earaches        Family History   Problem Relation Age of Onset    Hypertension Mother     Heart disease Mother         cardiac disorder    Breast cancer Mother         dx age early 19's   Mallika Mare Glaucoma Mother     Hypertension Father     Heart disease Father         cardiac disorder    Cancer Father     No Known Problems Sister     Multiple sclerosis Daughter    Mallika Mare Melanoma Daughter     No Known Problems Son      Social History     Social History    Marital status: /Civil Union     Spouse name: N/A    Number of children: 2    Years of education: N/A     Occupational History    Retired      Social History Main Topics    Smoking status: Never Smoker    Smokeless tobacco: Never Used    Alcohol use No    Drug use: No    Sexual activity: Yes     Partners: Male      Comment:  x 9 years, Olen August     Other Topics Concern    Not on file     Social History Narrative    Daily caffeine consumption, 1 serving a day    Exercises 3-4 times per week     x 8 years         Past Medical History:   Diagnosis Date    Cataract     Exercise-induced asthma 11/3/2016       Current Outpatient Prescriptions:     Calcium 600 MG tablet, Take 1 tablet by mouth every other day, Disp: , Rfl:     Cholecalciferol (VITAMIN D3) 1000 units CAPS, Take 1 capsule by mouth daily, Disp: , Rfl:     nystatin (MYCOSTATIN) powder, Apply topically 2 (two) times a day, Disp: 15 g, Rfl: 1    BLACK COHOSH PO, Take 1 tablet by mouth daily, Disp: , Rfl:     chlorpheniramine (CHLOR-TRIMETON) 4 MG tablet, Take 1 tablet (4 mg total) by mouth every 6 (six) hours as needed for allergies, Disp: 30 tablet, Rfl: 0    conjugated estrogens (PREMARIN) vaginal cream, Insert 0 5 g into the vagina 2 (two) times a week for 90 days Initially use nightly for 2 weeks  , Disp: 30 g, Rfl: 3    meclizine (ANTIVERT) 25 mg tablet, Take 1 tablet (25 mg total) by mouth 3 (three) times a day as needed for dizziness for up to 20 days, Disp: 60 tablet, Rfl: 2  Allergies   Allergen Reactions    Ofloxacin Hives    Penicillins      Past Surgical History:   Procedure Laterality Date    BLADDER SURGERY      BREAST SURGERY      HYSTERECTOMY      MOUTH SURGERY      TUBAL LIGATION      WISDOM TOOTH EXTRACTION           Review of Systems   Constitutional: Positive for activity change (Because of the dizziness)  Negative for appetite change, chills, diaphoresis, fatigue and fever  HENT: Negative for congestion, ear discharge, ear pain, hearing loss, postnasal drip, sinus pain, sinus pressure, tinnitus and voice change  Eyes: Negative  Respiratory: Negative  Cardiovascular: Negative  Gastrointestinal: Positive for nausea  Negative for diarrhea and vomiting  Endocrine: Negative  Genitourinary: Negative  Musculoskeletal: Negative  Skin: Negative  Neurological: Negative  Hematological: Negative  Psychiatric/Behavioral: Negative  Objective:      /90 (BP Location: Left arm, Patient Position: Sitting, Cuff Size: Adult)   Pulse 60   Temp 98 9 °F (37 2 °C) (Oral)   Ht 5' 3 5" (1 613 m)   Wt 69 1 kg (152 lb 6 4 oz)   SpO2 99% Comment: rooma ir  BMI 26 57 kg/m²          Physical Exam   Constitutional: She is oriented to person, place, and time  She appears well-developed and well-nourished  HENT:   Head: Normocephalic and atraumatic  Right Ear: External ear normal    Left Ear: External ear normal    Nose: Nose normal    Mouth/Throat: Oropharynx is clear and moist  No oropharyngeal exudate  Eyes: Pupils are equal, round, and reactive to light  Conjunctivae are normal  Right eye exhibits no discharge  Left eye exhibits no discharge  No scleral icterus  Neck: Normal range of motion  Neck supple  No JVD present  No tracheal deviation present  Cardiovascular: Normal rate, regular rhythm and normal heart sounds      Pulmonary/Chest: Effort normal and breath sounds normal  No respiratory distress  She has no wheezes  She has no rales  Abdominal: She exhibits no distension  Musculoskeletal: Normal range of motion  She exhibits no edema or deformity  Lymphadenopathy:     She has no cervical adenopathy  Neurological: She is alert and oriented to person, place, and time  No cranial nerve deficit  Coordination normal    Grossly, no focal motor deficits   Skin: Skin is warm and dry  She is not diaphoretic  Psychiatric: She has a normal mood and affect  Thought content normal    Vitals reviewed

## 2019-02-02 ENCOUNTER — OFFICE VISIT (OUTPATIENT)
Dept: URGENT CARE | Facility: CLINIC | Age: 59
End: 2019-02-02
Payer: COMMERCIAL

## 2019-02-02 VITALS
DIASTOLIC BLOOD PRESSURE: 72 MMHG | WEIGHT: 152 LBS | TEMPERATURE: 98.6 F | HEART RATE: 86 BPM | BODY MASS INDEX: 25.95 KG/M2 | OXYGEN SATURATION: 100 % | SYSTOLIC BLOOD PRESSURE: 140 MMHG | RESPIRATION RATE: 16 BRPM | HEIGHT: 64 IN

## 2019-02-02 DIAGNOSIS — J01.00 ACUTE MAXILLARY SINUSITIS, RECURRENCE NOT SPECIFIED: Primary | ICD-10-CM

## 2019-02-02 PROCEDURE — S9088 SERVICES PROVIDED IN URGENT: HCPCS | Performed by: PHYSICIAN ASSISTANT

## 2019-02-02 PROCEDURE — 99213 OFFICE O/P EST LOW 20 MIN: CPT | Performed by: PHYSICIAN ASSISTANT

## 2019-02-02 RX ORDER — METHYLPREDNISOLONE 4 MG/1
TABLET ORAL
Qty: 21 TABLET | Refills: 0 | Status: SHIPPED | OUTPATIENT
Start: 2019-02-02 | End: 2019-02-05

## 2019-02-02 NOTE — PROGRESS NOTES
[address]           NAME: Mervat Cole is a 63 y o  female  : 1960    MRN: [id number]  DATE: 2019  TIME: 4:52 PM    Assessment and Plan   Acute maxillary sinusitis, recurrence not specified [J01 00]  1  Acute maxillary sinusitis, recurrence not specified  methylprednisolone (MEDROL) 4 mg tablet       Patient Instructions   Infection appears viral   Recommend symptomatic treatment  Can take ibuprofen or tylenol as needed for pain or fever  Over the counter cough and cold medications to help with symptoms  Use salt water gargles for sore throat and throat lozenges  Sudafed and nasal sprays  Cough drops as needed  Wash hands frequently to prevent the spread of infection  If not improving over the next 7-10 days, follow up with PCP  Symptoms may persist for 10-14 days  To present to the ER if symptoms worsen  Chief Complaint     Chief Complaint   Patient presents with    Sinusitis     x 3 days         History of Present Illness   Mervat Cole presents to the clinic c/o    Sinusitis   This is a new problem  The current episode started in the past 7 days  The problem is unchanged  There has been no fever  The pain is moderate  Associated symptoms include congestion, ear pain, headaches, sinus pressure, a sore throat and swollen glands  Pertinent negatives include no chills, coughing, diaphoresis, hoarse voice, neck pain, shortness of breath or sneezing  Past treatments include oral decongestants  The treatment provided mild relief  Review of Systems   Review of Systems   Constitutional: Negative for activity change, appetite change, chills, diaphoresis, fatigue and fever  HENT: Positive for congestion, ear pain, sinus pressure and sore throat  Negative for ear discharge, facial swelling, hoarse voice, rhinorrhea, sinus pain and sneezing  Eyes: Negative for photophobia, pain, discharge, redness, itching and visual disturbance     Respiratory: Negative for apnea, cough, chest tightness, shortness of breath and wheezing  Cardiovascular: Negative for chest pain  Gastrointestinal: Negative for abdominal distention, abdominal pain, anal bleeding, blood in stool, constipation, diarrhea, nausea and vomiting  Genitourinary: Negative for dysuria, flank pain, frequency, hematuria and urgency  Musculoskeletal: Negative for arthralgias, back pain, gait problem, joint swelling, myalgias, neck pain and neck stiffness  Skin: Negative for color change, rash and wound  Allergic/Immunologic: Negative for immunocompromised state  Neurological: Positive for headaches  Negative for dizziness and facial asymmetry  Hematological: Negative for adenopathy  Psychiatric/Behavioral: Negative for confusion and decreased concentration  Current Medications     Long-Term Prescriptions   Medication Sig Dispense Refill    Calcium 600 MG tablet Take 1 tablet by mouth every other day      chlorpheniramine (CHLOR-TRIMETON) 4 MG tablet Take 1 tablet (4 mg total) by mouth every 6 (six) hours as needed for allergies 30 tablet 0    Cholecalciferol (VITAMIN D3) 1000 units CAPS Take 1 capsule by mouth daily      conjugated estrogens (PREMARIN) 0 45 mg tablet Take 0 45 mg by mouth daily Take daily for 21 days then do not take for 7 days   conjugated estrogens (PREMARIN) vaginal cream Insert 0 5 g into the vagina 2 (two) times a week for 90 days Initially use nightly for 2 weeks   30 g 3    meclizine (ANTIVERT) 25 mg tablet Take 1 tablet (25 mg total) by mouth 3 (three) times a day as needed for dizziness for up to 20 days 60 tablet 2    nystatin (MYCOSTATIN) powder Apply topically 2 (two) times a day 15 g 1    [DISCONTINUED] estradiol (ESTRACE) 0 5 MG tablet Take 1 tablet by mouth daily         Current Allergies     Allergies as of 02/02/2019 - Reviewed 02/02/2019   Allergen Reaction Noted    Ofloxacin Hives 03/23/2016    Penicillins  03/23/2016            The following portions of the patient's history were reviewed and updated as appropriate: allergies, current medications, past family history, past medical history, past social history, past surgical history and problem list   Past Medical History:   Diagnosis Date    Cataract     Exercise-induced asthma 11/3/2016     Past Surgical History:   Procedure Laterality Date    BLADDER SURGERY      BREAST SURGERY      HYSTERECTOMY      MOUTH SURGERY      TUBAL LIGATION      WISDOM TOOTH EXTRACTION       Social History     Social History    Marital status: /Civil Union     Spouse name: N/A    Number of children: 2    Years of education: N/A     Occupational History    Retired      Social History Main Topics    Smoking status: Never Smoker    Smokeless tobacco: Never Used    Alcohol use No    Drug use: No    Sexual activity: Yes     Partners: Male      Comment:  x 9 years, Conway Cola     Other Topics Concern    Not on file     Social History Narrative    Daily caffeine consumption, 1 serving a day    Exercises 3-4 times per week     x 8 years           Objective   /72   Pulse 86   Temp 98 6 °F (37 °C)   Resp 16   Ht 5' 4" (1 626 m)   Wt 68 9 kg (152 lb)   SpO2 100%   BMI 26 09 kg/m²      Physical Exam     Physical Exam   Constitutional: She is oriented to person, place, and time  She appears well-developed and well-nourished  No distress  HENT:   Head: Normocephalic and atraumatic  Right Ear: Tympanic membrane and external ear normal    Left Ear: Tympanic membrane and external ear normal    Nose: Right sinus exhibits maxillary sinus tenderness  Right sinus exhibits no frontal sinus tenderness  Left sinus exhibits maxillary sinus tenderness  Left sinus exhibits no frontal sinus tenderness  Mouth/Throat: Oropharynx is clear and moist  No oropharyngeal exudate or posterior oropharyngeal erythema  Eyes: Pupils are equal, round, and reactive to light   Conjunctivae and EOM are normal  Right eye exhibits no discharge  Left eye exhibits no discharge  No scleral icterus  Neck: Normal range of motion  Neck supple  No JVD present  No tracheal deviation present  No thyromegaly present  Cardiovascular: Normal rate, regular rhythm and normal heart sounds  Exam reveals no gallop and no friction rub  No murmur heard  Pulmonary/Chest: Effort normal and breath sounds normal  No stridor  No respiratory distress  She has no decreased breath sounds  She has no wheezes  She has no rhonchi  She has no rales  She exhibits no tenderness  Musculoskeletal: Normal range of motion  She exhibits no tenderness or deformity  Lymphadenopathy:     She has no cervical adenopathy  Neurological: She is alert and oriented to person, place, and time  She has normal reflexes  Coordination normal    Skin: Skin is warm and dry  No rash noted  She is not diaphoretic  No erythema  No pallor  Psychiatric: She has a normal mood and affect  Her behavior is normal  Judgment and thought content normal    Nursing note and vitals reviewed        Greg Banks PA-C

## 2019-02-05 ENCOUNTER — APPOINTMENT (OUTPATIENT)
Dept: RADIOLOGY | Facility: CLINIC | Age: 59
End: 2019-02-05
Payer: COMMERCIAL

## 2019-02-05 ENCOUNTER — OFFICE VISIT (OUTPATIENT)
Dept: URGENT CARE | Facility: CLINIC | Age: 59
End: 2019-02-05
Payer: COMMERCIAL

## 2019-02-05 VITALS
HEART RATE: 114 BPM | RESPIRATION RATE: 16 BRPM | SYSTOLIC BLOOD PRESSURE: 162 MMHG | HEIGHT: 64 IN | TEMPERATURE: 101.2 F | OXYGEN SATURATION: 99 % | BODY MASS INDEX: 25.95 KG/M2 | DIASTOLIC BLOOD PRESSURE: 72 MMHG | WEIGHT: 152 LBS

## 2019-02-05 DIAGNOSIS — R05.9 COUGH: ICD-10-CM

## 2019-02-05 DIAGNOSIS — J20.8 ACUTE BACTERIAL BRONCHITIS: Primary | ICD-10-CM

## 2019-02-05 DIAGNOSIS — B96.89 ACUTE BACTERIAL BRONCHITIS: Primary | ICD-10-CM

## 2019-02-05 PROCEDURE — S9088 SERVICES PROVIDED IN URGENT: HCPCS | Performed by: PHYSICIAN ASSISTANT

## 2019-02-05 PROCEDURE — 94640 AIRWAY INHALATION TREATMENT: CPT | Performed by: PHYSICIAN ASSISTANT

## 2019-02-05 PROCEDURE — 99214 OFFICE O/P EST MOD 30 MIN: CPT | Performed by: PHYSICIAN ASSISTANT

## 2019-02-05 PROCEDURE — 71046 X-RAY EXAM CHEST 2 VIEWS: CPT

## 2019-02-05 RX ORDER — PROMETHAZINE HYDROCHLORIDE AND CODEINE PHOSPHATE 6.25; 1 MG/5ML; MG/5ML
5 SYRUP ORAL EVERY 4 HOURS PRN
Qty: 120 ML | Refills: 0 | Status: SHIPPED | OUTPATIENT
Start: 2019-02-05 | End: 2019-06-13 | Stop reason: ALTCHOICE

## 2019-02-05 RX ORDER — PREDNISONE 20 MG/1
TABLET ORAL
Qty: 12 TABLET | Refills: 0 | Status: SHIPPED | OUTPATIENT
Start: 2019-02-05 | End: 2019-06-13 | Stop reason: ALTCHOICE

## 2019-02-05 RX ORDER — ALBUTEROL SULFATE 90 UG/1
2 AEROSOL, METERED RESPIRATORY (INHALATION) EVERY 4 HOURS PRN
Qty: 18 G | Refills: 0 | Status: SHIPPED | OUTPATIENT
Start: 2019-02-05 | End: 2019-11-12

## 2019-02-05 RX ORDER — IPRATROPIUM BROMIDE AND ALBUTEROL SULFATE 2.5; .5 MG/3ML; MG/3ML
3 SOLUTION RESPIRATORY (INHALATION) ONCE
Status: COMPLETED | OUTPATIENT
Start: 2019-02-05 | End: 2019-02-05

## 2019-02-05 RX ORDER — AZITHROMYCIN 250 MG/1
TABLET, FILM COATED ORAL
Qty: 6 TABLET | Refills: 0 | Status: SHIPPED | OUTPATIENT
Start: 2019-02-05 | End: 2019-02-10

## 2019-02-05 RX ADMIN — IPRATROPIUM BROMIDE AND ALBUTEROL SULFATE 3 ML: 2.5; .5 SOLUTION RESPIRATORY (INHALATION) at 18:48

## 2019-02-05 NOTE — PROGRESS NOTES
[employer] [facility]    NAME: Liana Mayer is a 63 y o  female  : 1960    MRN: [id number]  DATE: 2019  TIME: 7:23 PM    Assessment and Plan   Acute bacterial bronchitis [J20 8, B96 89]  1  Acute bacterial bronchitis  azithromycin (ZITHROMAX) 250 mg tablet    albuterol (VENTOLIN HFA) 90 mcg/act inhaler    predniSONE 20 mg tablet    promethazine-codeine (PHENERGAN WITH CODEINE) 6 25-10 mg/5 mL syrup   2  Cough  ipratropium-albuterol (DUO-NEB) 0 5-2 5 mg/3 mL inhalation solution 3 mL    XR chest pa & lateral   Mini neb  Performed by: Eduarda Mckee  Authorized by: Eduarda Mckee     Number of treatments:  1  Treatment 1:   Pre-Procedure     Symptoms:  Shortness of breath and cough    Lung Sounds:  CTA    HR:  114    RR:  18    SP02:  99    Medication Administered:  Duoneb - Albuterol 2 5 mg/Atrovent 0 5 mg  Post-Procedure     Symptoms:  Cough    Lung sounds:  CTA    HR:  114    RR:  18    SP02:  99        Patient Instructions   Patient Instructions   I have prescribed an antibiotic for the infection  Please take the antibiotic as prescribed and finish the entire prescription  I recommend that the patient takes an over the counter probiotic or eats yogurt with live cultures in it [place]) to keep good bacteria in the gut and help prevent diarrhea  Wash hands frequently to prevent the spread of infection  Can use over the counter cough and cold medications to help with symptoms  Ibuprofen and/or tylenol as needed for pain or fever  If not improving over the next 7-10 days, follow up with PCP  Use albuterol inhaler 4-6 hours as needed for chest tightness, wheezing or cough  Recommend patient start prednisone and take as prescribed  If worsening go to the ER  Chief Complaint     Chief Complaint   Patient presents with    Cough     x 1 week    Fever       History of Present Illness   52-year-old female here with complaint of cough and chest tightness    Symptoms have been present for the last week  Has been getting progressively worse  Had more sinus congestion before now it seems to have moved to her chest   Has had fever and chills  Review of Systems   Review of Systems   Constitutional: Positive for chills and fever  Negative for activity change, appetite change, diaphoresis, fatigue and unexpected weight change  HENT: Positive for congestion  Negative for dental problem, hearing loss, sinus pressure, sneezing, sore throat, tinnitus, trouble swallowing and voice change  Eyes: Negative for photophobia, redness and visual disturbance  Respiratory: Positive for cough and chest tightness  Negative for apnea, shortness of breath, wheezing and stridor  Cardiovascular: Negative for chest pain, palpitations and leg swelling  Gastrointestinal: Negative for abdominal distention, abdominal pain, blood in stool, constipation, diarrhea, nausea and vomiting  Endocrine: Negative for cold intolerance, heat intolerance, polydipsia, polyphagia and polyuria  Genitourinary: Negative for difficulty urinating, dysuria, flank pain, frequency, hematuria and urgency  Musculoskeletal: Negative for arthralgias, back pain, gait problem, joint swelling, myalgias, neck pain and neck stiffness  Skin: Negative for pallor, rash and wound  Neurological: Negative for dizziness, tremors, seizures, speech difficulty, weakness and headaches  Hematological: Negative for adenopathy  Does not bruise/bleed easily  Psychiatric/Behavioral: Negative for agitation, confusion, dysphoric mood and sleep disturbance  The patient is not nervous/anxious  All other systems reviewed and are negative        Current Medications     Current Outpatient Prescriptions:     BLACK COHOSH PO, Take 1 tablet by mouth daily, Disp: , Rfl:     Calcium 600 MG tablet, Take 1 tablet by mouth every other day, Disp: , Rfl:     chlorpheniramine (CHLOR-TRIMETON) 4 MG tablet, Take 1 tablet (4 mg total) by mouth every 6 (six) hours as needed for allergies, Disp: 30 tablet, Rfl: 0    Cholecalciferol (VITAMIN D3) 1000 units CAPS, Take 1 capsule by mouth daily, Disp: , Rfl:     conjugated estrogens (PREMARIN) 0 45 mg tablet, Take 0 45 mg by mouth daily Take daily for 21 days then do not take for 7 days  , Disp: , Rfl:     nystatin (MYCOSTATIN) powder, Apply topically 2 (two) times a day, Disp: 15 g, Rfl: 1    albuterol (VENTOLIN HFA) 90 mcg/act inhaler, Inhale 2 puffs every 4 (four) hours as needed for wheezing or shortness of breath, Disp: 18 g, Rfl: 0    azithromycin (ZITHROMAX) 250 mg tablet, Take 2 tablets today then 1 tablet daily for 4 days, Disp: 6 tablet, Rfl: 0    conjugated estrogens (PREMARIN) vaginal cream, Insert 0 5 g into the vagina 2 (two) times a week for 90 days Initially use nightly for 2 weeks  , Disp: 30 g, Rfl: 3    meclizine (ANTIVERT) 25 mg tablet, Take 1 tablet (25 mg total) by mouth 3 (three) times a day as needed for dizziness for up to 20 days, Disp: 60 tablet, Rfl: 2    predniSONE 20 mg tablet, Take 3 tabs daily for 2 days then 2 tabs daily for 2 days then 1 tab daily for 2 days, Disp: 12 tablet, Rfl: 0    promethazine-codeine (PHENERGAN WITH CODEINE) 6 25-10 mg/5 mL syrup, Take 5 mL by mouth every 4 (four) hours as needed for cough, Disp: 120 mL, Rfl: 0  No current facility-administered medications for this visit       Current Allergies     Allergies as of 02/05/2019 - Reviewed 02/05/2019   Allergen Reaction Noted    Ofloxacin Hives 03/23/2016    Penicillins  03/23/2016          The following portions of the patient's history were reviewed and updated as appropriate: allergies, current medications, past family history, past medical history, past social history, past surgical history and problem list    Past Medical History:   Diagnosis Date    Cataract     Exercise-induced asthma 11/3/2016     Past Surgical History:   Procedure Laterality Date    BLADDER SURGERY      BREAST SURGERY  HYSTERECTOMY      MOUTH SURGERY      TUBAL LIGATION      WISDOM TOOTH EXTRACTION       Family History   Problem Relation Age of Onset    Hypertension Mother     Heart disease Mother         cardiac disorder    Breast cancer Mother         dx age early 19's   [facility] Glaucoma Mother     Hypertension Father     Heart disease Father         cardiac disorder    Cancer Father     No Known Problems Sister     Multiple sclerosis Daughter    [facility] Melanoma Daughter     No Known Problems Son      Social History     Social History    Marital status: /Civil Union     Spouse name: N/A    Number of children: 2    Years of education: N/A     Occupational History    Retired      Social History Main Topics    Smoking status: Never Smoker    Smokeless tobacco: Never Used    Alcohol use No    Drug use: No    Sexual activity: Yes     Partners: Male      Comment:  x 9 years, Evaline Huge     Other Topics Concern    Not on file     Social History Narrative    Daily caffeine consumption, 1 serving a day    Exercises 3-4 times per week     x 8 years         Medications have been verified  Objective   /72   Pulse (!) 114   Temp (!) 101 2 °F (38 4 °C)   Resp 16   Ht 5' 4" (1 626 m)   Wt 68 9 kg (152 lb)   SpO2 99%   BMI 26 09 kg/m²      Physical Exam   Physical Exam   Constitutional: She appears well-developed and well-nourished  No distress  HENT:   Head: Normocephalic  Right Ear: Tympanic membrane and external ear normal    Left Ear: Tympanic membrane and external ear normal    Nose: Mucosal edema present  Mouth/Throat: Posterior oropharyngeal erythema present  No oropharyngeal exudate  Neck: Normal range of motion  Neck supple  Cardiovascular: Normal rate, regular rhythm and normal heart sounds  No murmur heard  Pulmonary/Chest: Effort normal  No respiratory distress  She has decreased breath sounds  She has no wheezes  She has no rhonchi  She has no rales  Abdominal: Soft  Bowel sounds are normal  There is no tenderness  Musculoskeletal: Normal range of motion  Lymphadenopathy:     She has no cervical adenopathy  Skin: Skin is warm  No rash noted  Nursing note and vitals reviewed

## 2019-02-06 NOTE — PATIENT INSTRUCTIONS
I have prescribed an antibiotic for the infection  Please take the antibiotic as prescribed and finish the entire prescription  I recommend that the patient takes an over the counter probiotic or eats yogurt with live cultures in it [place]) to keep good bacteria in the gut and help prevent diarrhea  Wash hands frequently to prevent the spread of infection  Can use over the counter cough and cold medications to help with symptoms  Ibuprofen and/or tylenol as needed for pain or fever  If not improving over the next 7-10 days, follow up with PCP  Use albuterol inhaler 4-6 hours as needed for chest tightness, wheezing or cough  Recommend patient start prednisone and take as prescribed  If worsening go to the ER

## 2019-05-30 ENCOUNTER — ANNUAL EXAM (OUTPATIENT)
Dept: GYNECOLOGY | Facility: CLINIC | Age: 59
End: 2019-05-30
Payer: COMMERCIAL

## 2019-05-30 VITALS
WEIGHT: 156.4 LBS | HEART RATE: 76 BPM | SYSTOLIC BLOOD PRESSURE: 120 MMHG | DIASTOLIC BLOOD PRESSURE: 78 MMHG | BODY MASS INDEX: 26.7 KG/M2 | HEIGHT: 64 IN

## 2019-05-30 DIAGNOSIS — N95.1 MENOPAUSAL SYMPTOMS: ICD-10-CM

## 2019-05-30 DIAGNOSIS — Z12.31 ENCOUNTER FOR SCREENING MAMMOGRAM FOR BREAST CANCER: ICD-10-CM

## 2019-05-30 DIAGNOSIS — Z91.89 AT HIGH RISK FOR BREAST CANCER: ICD-10-CM

## 2019-05-30 DIAGNOSIS — N95.2 VAGINAL ATROPHY: ICD-10-CM

## 2019-05-30 DIAGNOSIS — N39.3 SUI (STRESS URINARY INCONTINENCE, FEMALE): ICD-10-CM

## 2019-05-30 DIAGNOSIS — M85.80 OSTEOPENIA, UNSPECIFIED LOCATION: ICD-10-CM

## 2019-05-30 DIAGNOSIS — Z01.411 ENCNTR FOR GYN EXAM (GENERAL) (ROUTINE) W ABNORMAL FINDINGS: Primary | ICD-10-CM

## 2019-05-30 DIAGNOSIS — N94.11 INTROITAL DYSPAREUNIA: ICD-10-CM

## 2019-05-30 PROCEDURE — 99396 PREV VISIT EST AGE 40-64: CPT | Performed by: NURSE PRACTITIONER

## 2019-06-13 ENCOUNTER — OFFICE VISIT (OUTPATIENT)
Dept: FAMILY MEDICINE CLINIC | Facility: CLINIC | Age: 59
End: 2019-06-13
Payer: COMMERCIAL

## 2019-06-13 VITALS
DIASTOLIC BLOOD PRESSURE: 82 MMHG | SYSTOLIC BLOOD PRESSURE: 124 MMHG | HEIGHT: 64 IN | OXYGEN SATURATION: 100 % | BODY MASS INDEX: 26.67 KG/M2 | HEART RATE: 60 BPM | WEIGHT: 156.2 LBS | TEMPERATURE: 96.9 F | RESPIRATION RATE: 16 BRPM

## 2019-06-13 DIAGNOSIS — Z13.29 SCREENING FOR THYROID DISORDER: ICD-10-CM

## 2019-06-13 DIAGNOSIS — M85.80 OSTEOPENIA, UNSPECIFIED LOCATION: ICD-10-CM

## 2019-06-13 DIAGNOSIS — Z12.11 SCREENING FOR COLON CANCER: ICD-10-CM

## 2019-06-13 DIAGNOSIS — Z00.00 HEALTHCARE MAINTENANCE: ICD-10-CM

## 2019-06-13 DIAGNOSIS — Z13.220 SCREENING FOR LIPID DISORDERS: ICD-10-CM

## 2019-06-13 DIAGNOSIS — Z23 NEED FOR TDAP VACCINATION: ICD-10-CM

## 2019-06-13 DIAGNOSIS — Z76.89 ENCOUNTER TO ESTABLISH CARE: Primary | ICD-10-CM

## 2019-06-13 PROBLEM — R03.0 ELEVATED BLOOD PRESSURE READING WITHOUT DIAGNOSIS OF HYPERTENSION: Status: RESOLVED | Noted: 2018-12-07 | Resolved: 2019-06-13

## 2019-06-13 PROCEDURE — 90723 DTAP-HEP B-IPV VACCINE IM: CPT

## 2019-06-13 PROCEDURE — 99204 OFFICE O/P NEW MOD 45 MIN: CPT | Performed by: FAMILY MEDICINE

## 2019-06-13 PROCEDURE — 90471 IMMUNIZATION ADMIN: CPT | Performed by: FAMILY MEDICINE

## 2019-06-17 DIAGNOSIS — Z00.00 HEALTHCARE MAINTENANCE: ICD-10-CM

## 2019-06-17 DIAGNOSIS — M85.80 OSTEOPENIA, UNSPECIFIED LOCATION: ICD-10-CM

## 2019-06-17 DIAGNOSIS — Z76.89 ENCOUNTER TO ESTABLISH CARE: ICD-10-CM

## 2019-06-17 LAB
ALBUMIN SERPL BCP-MCNC: 3.9 G/DL (ref 3.5–5)
ALP SERPL-CCNC: 101 U/L (ref 46–116)
ALT SERPL W P-5'-P-CCNC: 26 U/L (ref 12–78)
ANION GAP SERPL CALCULATED.3IONS-SCNC: 2 MMOL/L (ref 4–13)
AST SERPL W P-5'-P-CCNC: 17 U/L (ref 5–45)
BILIRUB SERPL-MCNC: 1.11 MG/DL (ref 0.2–1)
BUN SERPL-MCNC: 12 MG/DL (ref 5–25)
CALCIUM SERPL-MCNC: 9.4 MG/DL (ref 8.3–10.1)
CHLORIDE SERPL-SCNC: 107 MMOL/L (ref 100–108)
CHOLEST SERPL-MCNC: 146 MG/DL (ref 50–200)
CO2 SERPL-SCNC: 31 MMOL/L (ref 21–32)
CREAT SERPL-MCNC: 0.82 MG/DL (ref 0.6–1.3)
GFR SERPL CREATININE-BSD FRML MDRD: 80 ML/MIN/1.73SQ M
GLUCOSE P FAST SERPL-MCNC: 94 MG/DL (ref 65–99)
HDLC SERPL-MCNC: 65 MG/DL (ref 40–60)
LDLC SERPL CALC-MCNC: 66 MG/DL (ref 0–100)
POTASSIUM SERPL-SCNC: 4.3 MMOL/L (ref 3.5–5.3)
PROT SERPL-MCNC: 7.7 G/DL (ref 6.4–8.2)
SODIUM SERPL-SCNC: 140 MMOL/L (ref 136–145)
TRIGL SERPL-MCNC: 75 MG/DL

## 2019-06-17 PROCEDURE — 80061 LIPID PANEL: CPT | Performed by: FAMILY MEDICINE

## 2019-06-17 PROCEDURE — 80053 COMPREHEN METABOLIC PANEL: CPT | Performed by: FAMILY MEDICINE

## 2019-06-24 ENCOUNTER — TELEPHONE (OUTPATIENT)
Dept: GASTROENTEROLOGY | Facility: CLINIC | Age: 59
End: 2019-06-24

## 2019-06-25 ENCOUNTER — TELEPHONE (OUTPATIENT)
Dept: FAMILY MEDICINE CLINIC | Facility: CLINIC | Age: 59
End: 2019-06-25

## 2019-06-25 ENCOUNTER — HOSPITAL ENCOUNTER (OUTPATIENT)
Dept: BONE DENSITY | Facility: HOSPITAL | Age: 59
Discharge: HOME | End: 2019-06-25
Payer: COMMERCIAL

## 2019-06-25 ENCOUNTER — HOSPITAL ENCOUNTER (OUTPATIENT)
Dept: MAMMOGRAPHY | Facility: HOSPITAL | Age: 59
Discharge: HOME | End: 2019-06-25
Payer: COMMERCIAL

## 2019-06-25 VITALS — HEIGHT: 64 IN | BODY MASS INDEX: 26.46 KG/M2 | WEIGHT: 155 LBS

## 2019-06-25 DIAGNOSIS — Z91.89 AT HIGH RISK FOR BREAST CANCER: ICD-10-CM

## 2019-06-25 DIAGNOSIS — Z12.31 ENCOUNTER FOR SCREENING MAMMOGRAM FOR BREAST CANCER: ICD-10-CM

## 2019-06-25 DIAGNOSIS — M85.80 OSTEOPENIA, UNSPECIFIED LOCATION: ICD-10-CM

## 2019-06-25 PROCEDURE — 77080 DXA BONE DENSITY AXIAL: CPT

## 2019-06-25 PROCEDURE — 77067 SCR MAMMO BI INCL CAD: CPT

## 2019-06-25 PROCEDURE — 77063 BREAST TOMOSYNTHESIS BI: CPT

## 2019-06-26 DIAGNOSIS — Z80.0 FAMILY HISTORY OF COLON CANCER: Primary | ICD-10-CM

## 2019-06-27 ENCOUNTER — TELEPHONE (OUTPATIENT)
Dept: GYNECOLOGY | Facility: CLINIC | Age: 59
End: 2019-06-27

## 2019-06-28 ENCOUNTER — TELEPHONE (OUTPATIENT)
Dept: GASTROENTEROLOGY | Facility: AMBULARY SURGERY CENTER | Age: 59
End: 2019-06-28

## 2019-07-01 DIAGNOSIS — R17 SERUM TOTAL BILIRUBIN ELEVATED: Primary | ICD-10-CM

## 2019-07-09 ENCOUNTER — HOSPITAL ENCOUNTER (EMERGENCY)
Facility: HOSPITAL | Age: 59
Discharge: HOME | End: 2019-07-09
Attending: EMERGENCY MEDICINE | Admitting: EMERGENCY MEDICINE
Payer: COMMERCIAL

## 2019-07-09 ENCOUNTER — APPOINTMENT (EMERGENCY)
Dept: RADIOLOGY | Facility: HOSPITAL | Age: 59
End: 2019-07-09
Payer: COMMERCIAL

## 2019-07-09 ENCOUNTER — APPOINTMENT (EMERGENCY)
Dept: CT IMAGING | Facility: HOSPITAL | Age: 59
End: 2019-07-09
Payer: COMMERCIAL

## 2019-07-09 VITALS
OXYGEN SATURATION: 99 % | TEMPERATURE: 97.8 F | DIASTOLIC BLOOD PRESSURE: 83 MMHG | RESPIRATION RATE: 16 BRPM | SYSTOLIC BLOOD PRESSURE: 180 MMHG | HEART RATE: 75 BPM

## 2019-07-09 DIAGNOSIS — R07.9 CHEST PAIN, UNSPECIFIED TYPE: Primary | ICD-10-CM

## 2019-07-09 LAB
ALBUMIN SERPL BCP-MCNC: 4.3 G/DL (ref 3.5–5.7)
ALP SERPL-CCNC: 90 U/L (ref 40–150)
ALT SERPL W P-5'-P-CCNC: 19 U/L (ref 7–52)
ANION GAP SERPL CALCULATED.3IONS-SCNC: 8 MMOL/L (ref 4–13)
APTT PPP: 33 SECONDS (ref 23–37)
AST SERPL W P-5'-P-CCNC: 19 U/L (ref 13–39)
ATRIAL RATE: 72 BPM
BASOPHILS # BLD AUTO: 0 THOUSANDS/ΜL (ref 0–0.1)
BASOPHILS NFR BLD AUTO: 1 % (ref 0–2)
BILIRUB SERPL-MCNC: 1 MG/DL (ref 0.2–1)
BILIRUB UR QL STRIP: NEGATIVE
BNP SERPL-MCNC: 18 PG/ML (ref 1–100)
BUN SERPL-MCNC: 12 MG/DL (ref 7–25)
CALCIUM SERPL-MCNC: 10.1 MG/DL (ref 8.6–10.5)
CHLORIDE SERPL-SCNC: 103 MMOL/L (ref 98–107)
CK SERPL-CCNC: 38 U/L (ref 30–192)
CLARITY UR: CLEAR
CO2 SERPL-SCNC: 28 MMOL/L (ref 21–31)
COLOR UR: YELLOW
CREAT SERPL-MCNC: 0.86 MG/DL (ref 0.6–1.2)
DEPRECATED D DIMER PPP: <150 NG/ML (FEU)
EOSINOPHIL # BLD AUTO: 0.1 THOUSAND/ΜL (ref 0–0.61)
EOSINOPHIL NFR BLD AUTO: 1 % (ref 0–5)
ERYTHROCYTE [DISTWIDTH] IN BLOOD BY AUTOMATED COUNT: 13.6 % (ref 11.5–14.5)
GFR SERPL CREATININE-BSD FRML MDRD: 75 ML/MIN/1.73SQ M
GLUCOSE SERPL-MCNC: 206 MG/DL (ref 65–99)
GLUCOSE UR STRIP-MCNC: ABNORMAL MG/DL
HCT VFR BLD AUTO: 40.9 % (ref 42–47)
HGB BLD-MCNC: 13.9 G/DL (ref 12–16)
HGB UR QL STRIP.AUTO: NEGATIVE
INR PPP: 1.06 (ref 0.9–1.5)
KETONES UR STRIP-MCNC: NEGATIVE MG/DL
LEUKOCYTE ESTERASE UR QL STRIP: NEGATIVE
LIPASE SERPL-CCNC: 28 U/L (ref 11–82)
LYMPHOCYTES # BLD AUTO: 2.1 THOUSANDS/ΜL (ref 0.6–4.47)
LYMPHOCYTES NFR BLD AUTO: 30 % (ref 21–51)
MAGNESIUM SERPL-MCNC: 2.1 MG/DL (ref 1.9–2.7)
MCH RBC QN AUTO: 29.5 PG (ref 26–34)
MCHC RBC AUTO-ENTMCNC: 34.1 G/DL (ref 31–37)
MCV RBC AUTO: 87 FL (ref 81–99)
MONOCYTES # BLD AUTO: 0.3 THOUSAND/ΜL (ref 0.17–1.22)
MONOCYTES NFR BLD AUTO: 5 % (ref 2–12)
NEUTROPHILS # BLD AUTO: 4.4 THOUSANDS/ΜL (ref 1.4–6.5)
NEUTS SEG NFR BLD AUTO: 63 % (ref 42–75)
NITRITE UR QL STRIP: NEGATIVE
P AXIS: 57 DEGREES
PH UR STRIP.AUTO: 7 [PH]
PLATELET # BLD AUTO: 233 THOUSANDS/UL (ref 149–390)
PMV BLD AUTO: 7.5 FL (ref 8.6–11.7)
POTASSIUM SERPL-SCNC: 3.5 MMOL/L (ref 3.5–5.5)
PR INTERVAL: 156 MS
PROT SERPL-MCNC: 7.5 G/DL (ref 6.4–8.9)
PROT UR STRIP-MCNC: NEGATIVE MG/DL
PROTHROMBIN TIME: 12.3 SECONDS (ref 10.2–13)
QRS AXIS: 23 DEGREES
QRSD INTERVAL: 70 MS
QT INTERVAL: 378 MS
QTC INTERVAL: 413 MS
RBC # BLD AUTO: 4.72 MILLION/UL (ref 3.9–5.2)
SODIUM SERPL-SCNC: 139 MMOL/L (ref 134–143)
SP GR UR STRIP.AUTO: 1.01 (ref 1–1.03)
T WAVE AXIS: 45 DEGREES
TROPONIN I SERPL-MCNC: <0.03 NG/ML
TROPONIN I SERPL-MCNC: <0.03 NG/ML
TSH SERPL DL<=0.05 MIU/L-ACNC: 0.66 UIU/ML (ref 0.45–5.33)
UROBILINOGEN UR QL STRIP.AUTO: 0.2 E.U./DL
VENTRICULAR RATE: 72 BPM
WBC # BLD AUTO: 7 THOUSAND/UL (ref 4.8–10.8)

## 2019-07-09 PROCEDURE — 85379 FIBRIN DEGRADATION QUANT: CPT | Performed by: EMERGENCY MEDICINE

## 2019-07-09 PROCEDURE — 96375 TX/PRO/DX INJ NEW DRUG ADDON: CPT

## 2019-07-09 PROCEDURE — 85610 PROTHROMBIN TIME: CPT | Performed by: EMERGENCY MEDICINE

## 2019-07-09 PROCEDURE — 93010 ELECTROCARDIOGRAM REPORT: CPT | Performed by: INTERNAL MEDICINE

## 2019-07-09 PROCEDURE — 96374 THER/PROPH/DIAG INJ IV PUSH: CPT

## 2019-07-09 PROCEDURE — 85730 THROMBOPLASTIN TIME PARTIAL: CPT | Performed by: EMERGENCY MEDICINE

## 2019-07-09 PROCEDURE — 83735 ASSAY OF MAGNESIUM: CPT | Performed by: EMERGENCY MEDICINE

## 2019-07-09 PROCEDURE — 83880 ASSAY OF NATRIURETIC PEPTIDE: CPT | Performed by: EMERGENCY MEDICINE

## 2019-07-09 PROCEDURE — 96361 HYDRATE IV INFUSION ADD-ON: CPT

## 2019-07-09 PROCEDURE — 74177 CT ABD & PELVIS W/CONTRAST: CPT

## 2019-07-09 PROCEDURE — 93005 ELECTROCARDIOGRAM TRACING: CPT

## 2019-07-09 PROCEDURE — 84443 ASSAY THYROID STIM HORMONE: CPT | Performed by: EMERGENCY MEDICINE

## 2019-07-09 PROCEDURE — 80053 COMPREHEN METABOLIC PANEL: CPT | Performed by: EMERGENCY MEDICINE

## 2019-07-09 PROCEDURE — 82550 ASSAY OF CK (CPK): CPT | Performed by: EMERGENCY MEDICINE

## 2019-07-09 PROCEDURE — 99285 EMERGENCY DEPT VISIT HI MDM: CPT

## 2019-07-09 PROCEDURE — 84484 ASSAY OF TROPONIN QUANT: CPT | Performed by: EMERGENCY MEDICINE

## 2019-07-09 PROCEDURE — 83690 ASSAY OF LIPASE: CPT | Performed by: EMERGENCY MEDICINE

## 2019-07-09 PROCEDURE — 71046 X-RAY EXAM CHEST 2 VIEWS: CPT

## 2019-07-09 PROCEDURE — 81003 URINALYSIS AUTO W/O SCOPE: CPT | Performed by: EMERGENCY MEDICINE

## 2019-07-09 PROCEDURE — 36415 COLL VENOUS BLD VENIPUNCTURE: CPT | Performed by: EMERGENCY MEDICINE

## 2019-07-09 PROCEDURE — 85025 COMPLETE CBC W/AUTO DIFF WBC: CPT | Performed by: EMERGENCY MEDICINE

## 2019-07-09 RX ORDER — ONDANSETRON 2 MG/ML
4 INJECTION INTRAMUSCULAR; INTRAVENOUS ONCE
Status: COMPLETED | OUTPATIENT
Start: 2019-07-09 | End: 2019-07-09

## 2019-07-09 RX ORDER — KETOROLAC TROMETHAMINE 30 MG/ML
15 INJECTION, SOLUTION INTRAMUSCULAR; INTRAVENOUS ONCE
Status: COMPLETED | OUTPATIENT
Start: 2019-07-09 | End: 2019-07-09

## 2019-07-09 RX ADMIN — IOHEXOL 100 ML: 350 INJECTION, SOLUTION INTRAVENOUS at 15:44

## 2019-07-09 RX ADMIN — KETOROLAC TROMETHAMINE 15 MG: 30 INJECTION, SOLUTION INTRAMUSCULAR; INTRAVENOUS at 15:03

## 2019-07-09 RX ADMIN — FAMOTIDINE 20 MG: 10 INJECTION, SOLUTION INTRAVENOUS at 15:05

## 2019-07-09 RX ADMIN — SODIUM CHLORIDE 1000 ML: 0.9 INJECTION, SOLUTION INTRAVENOUS at 14:59

## 2019-07-09 RX ADMIN — ONDANSETRON 4 MG: 2 INJECTION INTRAMUSCULAR; INTRAVENOUS at 15:02

## 2019-07-09 NOTE — ED PROVIDER NOTES
History  Chief Complaint   Patient presents with    Chest Pain     started with abdominal pain last night  52-year-old female presents for evaluation of abdominal pain and chest pain starting this a m  Sherre Soulier Pain initially began in the a m  As epigastric abdominal pain, this is now migrated to the anterior chest wall with radiation to the back  Pain is described as pressure, currently 5/10 with no aggravating or relieving factors  Patient reports mild nausea but denies vomiting and diarrhea  Otherwise patient no fevers, chills, shortness of breath, dysuria or discharge  Patient with no recent travel or history of DVT or PE  History provided by:  Patient   used: No    Chest Pain   Associated symptoms: abdominal pain and nausea    Associated symptoms: no back pain, no cough, no dizziness, no fever, no headache, no shortness of breath and not vomiting        Prior to Admission Medications   Prescriptions Last Dose Informant Patient Reported? Taking?    Calcium 600 MG tablet 7/9/2019 at Unknown time Self Yes Yes   Sig: Take 1 tablet by mouth every other day   Cholecalciferol (VITAMIN D3) 1000 units CAPS 7/9/2019 at Unknown time Self Yes Yes   Sig: Take 1 capsule by mouth daily   albuterol (VENTOLIN HFA) 90 mcg/act inhaler Not Taking at Unknown time  No No   Sig: Inhale 2 puffs every 4 (four) hours as needed for wheezing or shortness of breath   Patient not taking: Reported on 7/9/2019   conjugated estrogens (PREMARIN) vaginal cream   No No   Sig: Insert 0 5 g into the vagina 2 (two) times a week   nystatin (MYCOSTATIN) powder   No No   Sig: Apply topically 2 (two) times a day      Facility-Administered Medications: None       Past Medical History:   Diagnosis Date    BRCA1 negative     Cataract     Exercise-induced asthma 11/3/2016       Past Surgical History:   Procedure Laterality Date    BREAST CYST EXCISION Left     benign    BREAST CYST EXCISION Left     benign    BREAST SURGERY  CHOLECYSTECTOMY      HYSTERECTOMY      MOUTH SURGERY      TUBAL LIGATION      WISDOM TOOTH EXTRACTION         Family History   Problem Relation Age of Onset    Hypertension Mother     Heart disease Mother         cardiac disorder    Breast cancer Mother         dx age early 19's    Glaucoma Mother     Hypertension Father     Heart disease Father         cardiac disorder    Cancer Father     No Known Problems Sister     Multiple sclerosis Daughter    Kenny Jorge Melanoma Daughter     No Known Problems Son     No Known Problems Paternal Aunt      I have reviewed and agree with the history as documented  Social History     Tobacco Use    Smoking status: Never Smoker    Smokeless tobacco: Never Used   Substance Use Topics    Alcohol use: No    Drug use: No        Review of Systems   Constitutional: Negative for chills and fever  HENT: Negative for rhinorrhea and sore throat  Eyes: Negative for visual disturbance  Respiratory: Negative for cough and shortness of breath  Cardiovascular: Positive for chest pain  Negative for leg swelling  Gastrointestinal: Positive for abdominal pain, constipation and nausea  Negative for diarrhea and vomiting  Genitourinary: Negative for dysuria  Musculoskeletal: Negative for back pain and myalgias  Skin: Negative for rash  Neurological: Negative for dizziness and headaches  Psychiatric/Behavioral: Negative for confusion  All other systems reviewed and are negative  Physical Exam  Physical Exam   Constitutional: She is oriented to person, place, and time  She appears well-developed and well-nourished  HENT:   Nose: Nose normal    Mouth/Throat: Oropharynx is clear and moist  No oropharyngeal exudate  Eyes: Pupils are equal, round, and reactive to light  Conjunctivae and EOM are normal  No scleral icterus  Neck: Normal range of motion  Neck supple  No JVD present  No tracheal deviation present     Cardiovascular: Normal rate, regular rhythm and normal heart sounds  No murmur heard  Pulmonary/Chest: Effort normal and breath sounds normal  No respiratory distress  She has no wheezes  She has no rales  Abdominal: Soft  Bowel sounds are normal  There is tenderness in the epigastric area  There is no rigidity, no rebound, no guarding, no CVA tenderness, no tenderness at McBurney's point and negative Meng's sign  Musculoskeletal: Normal range of motion  She exhibits no edema or tenderness  Neurological: She is alert and oriented to person, place, and time  No cranial nerve deficit or sensory deficit  She exhibits normal muscle tone  5/5 motor, nl sens   Skin: Skin is warm and dry  Psychiatric: She has a normal mood and affect  Her behavior is normal    Nursing note and vitals reviewed        Vital Signs  ED Triage Vitals [07/09/19 1413]   Temperature Pulse Respirations Blood Pressure SpO2   97 8 °F (36 6 °C) 86 18 (!) 180/83 100 %      Temp Source Heart Rate Source Patient Position - Orthostatic VS BP Location FiO2 (%)   Temporal Monitor Lying Left arm --      Pain Score       5           Vitals:    07/09/19 1413 07/09/19 1445   BP: (!) 180/83    Pulse: 86 75   Patient Position - Orthostatic VS: Lying          Visual Acuity      ED Medications  Medications   sodium chloride 0 9 % bolus 1,000 mL (0 mL Intravenous Stopped 7/9/19 1654)   ondansetron (ZOFRAN) injection 4 mg (4 mg Intravenous Given 7/9/19 1502)   famotidine (PEPCID) injection 20 mg (20 mg Intravenous Given 7/9/19 1505)   ketorolac (TORADOL) injection 15 mg (15 mg Intravenous Given 7/9/19 1503)   iohexol (OMNIPAQUE) 350 MG/ML injection (MULTI-DOSE) 100 mL (100 mL Intravenous Given 7/9/19 1544)       Diagnostic Studies  Results Reviewed     Procedure Component Value Units Date/Time    Troponin I [[id number]]  (Normal) Collected:  07/09/19 1653    Lab Status:  Final result Specimen:  Blood from Arm, Left Updated:  07/09/19 1753     Troponin I <0 03 ng/mL     UA w Reflex to Microscopic w Reflex to Culture [[id number]]  (Abnormal) Collected:  07/09/19 1508    Lab Status:  Final result Specimen:  Urine, Clean Catch Updated:  07/09/19 1521     Color, UA Yellow     Clarity, UA Clear     Specific Gravity, UA 1 010     pH, UA 7 0     Leukocytes, UA Negative     Nitrite, UA Negative     Protein, UA Negative mg/dl      Glucose, UA 1+ mg/dl      Ketones, UA Negative mg/dl      Urobilinogen, UA 0 2 E U /dl      Bilirubin, UA Negative     Blood, UA Negative    TSH [[id number]]  (Normal) Collected:  07/09/19 1427    Lab Status:  Final result Specimen:  Blood from Arm, Left Updated:  07/09/19 1520     TSH 3RD GENERATON 0 660 uIU/mL     Narrative:       Patients undergoing fluorescein dye angiography may retain small amounts of fluorescein in the body for 48-72 hours post procedure  Samples containing fluorescein can produce falsely depressed TSH values  If the patient had this procedure,a specimen should be resubmitted post fluorescein clearance        B-Type Natriuretic Peptide [facility] ONLY) [[id number]]  (Normal) Collected:  07/09/19 1427    Lab Status:  Final result Specimen:  Blood from Arm, Left Updated:  07/09/19 1504     BNP 18 pg/mL     Troponin I [[id number]]  (Normal) Collected:  07/09/19 1427    Lab Status:  Final result Specimen:  Blood from Arm, Left Updated:  07/09/19 1504     Troponin I <0 03 ng/mL     Magnesium [[id number]]  (Normal) Collected:  07/09/19 1427    Lab Status:  Final result Specimen:  Blood from Arm, Left Updated:  07/09/19 1504     Magnesium 2 1 mg/dL     Lipase [[id number]]  (Normal) Collected:  07/09/19 1427    Lab Status:  Final result Specimen:  Blood from Arm, Left Updated:  07/09/19 1504     Lipase 28 u/L     CK Total with Reflex CKMB [[id number]]  (Normal) Collected:  07/09/19 1427    Lab Status:  Final result Specimen:  Blood from Arm, Left Updated:  07/09/19 1504     Total CK 38 U/L     Comprehensive metabolic panel [[id number]]  (Abnormal) Collected:  07/09/19 1427 Lab Status:  Final result Specimen:  Blood from Arm, Left Updated:  07/09/19 1504     Sodium 139 mmol/L      Potassium 3 5 mmol/L      Chloride 103 mmol/L      CO2 28 mmol/L      ANION GAP 8 mmol/L      BUN 12 mg/dL      Creatinine 0 86 mg/dL      Glucose 206 mg/dL      Calcium 10 1 mg/dL      AST 19 U/L      ALT 19 U/L      Alkaline Phosphatase 90 U/L      Total Protein 7 5 g/dL      Albumin 4 3 g/dL      Total Bilirubin 1 00 mg/dL      eGFR 75 ml/min/1 73sq m     Narrative:       [facility] guidelines for Chronic Kidney Disease (CKD):     Stage 1 with normal or high GFR (GFR > 90 mL/min/1 73 square meters)    Stage 2 Mild CKD (GFR = 60-89 mL/min/1 73 square meters)    Stage 3A Moderate CKD (GFR = 45-59 mL/min/1 73 square meters)    Stage 3B Moderate CKD (GFR = 30-44 mL/min/1 73 square meters)    Stage 4 Severe CKD (GFR = 15-29 mL/min/1 73 square meters)    Stage 5 End Stage CKD (GFR <15 mL/min/1 73 square meters)  Note: GFR calculation is accurate only with a steady state creatinine    Protime-INR [[id number]]  (Normal) Collected:  07/09/19 1427    Lab Status:  Final result Specimen:  Blood from Arm, Left Updated:  07/09/19 1447     Protime 12 3 seconds      INR 1 06    APTT [[id number]]  (Normal) Collected:  07/09/19 1427    Lab Status:  Final result Specimen:  Blood from Arm, Left Updated:  07/09/19 1447     PTT 33 seconds     D-Dimer [[id number]]  (Normal) Collected:  07/09/19 1427    Lab Status:  Final result Specimen:  Blood from Arm, Left Updated:  07/09/19 1447     D-Dimer, Quant <150 ng/ml (FEU)     CBC and differential [[id number]]  (Abnormal) Collected:  07/09/19 1427    Lab Status:  Final result Specimen:  Blood from Arm, Left Updated:  07/09/19 1446     WBC 7 00 Thousand/uL      RBC 4 72 Million/uL      Hemoglobin 13 9 g/dL      Hematocrit 40 9 %      MCV 87 fL      MCH 29 5 pg      MCHC 34 1 g/dL      RDW 13 6 %      MPV 7 5 fL      Platelets 407 Thousands/uL      Neutrophils Relative 63 %      Lymphocytes Relative 30 %      Monocytes Relative 5 %      Eosinophils Relative 1 %      Basophils Relative 1 %      Neutrophils Absolute 4 40 Thousands/µL      Lymphocytes Absolute 2 10 Thousands/µL      Monocytes Absolute 0 30 Thousand/µL      Eosinophils Absolute 0 10 Thousand/µL      Basophils Absolute 0 00 Thousands/µL                  XR chest 2 views   ED Interpretation by Morena Nieto DO (07/09 1609)   No consolidation no effusion      Final Result by Alissa Whitmore MD (07/09 1635)      No acute cardiopulmonary disease  Workstation performed: [id number]         CT abdomen pelvis with contrast   Final Result by Carlos Garcaí MD (07/09 1708)      1  No acute findings with a normal appendix  2  Mild retroperitoneal and left iliac chain lymphadenopathy, nonspecific  Workstation performed: [id number]                    Procedures  ECG 12 Lead Documentation Only  Date/Time: 7/9/2019 2:17 PM  Performed by: Morena Nieto DO  Authorized by: Morena Nieto DO     Indications / Diagnosis:    Chest pain  ECG reviewed by me, the ED Provider: yes    Patient location:  ED  Previous ECG:     Previous ECG:  Unavailable    Comparison to cardiac monitor: Yes    Interpretation:     Interpretation: normal    Rate:     ECG rate:   72 beats per minute    ECG rate assessment: normal    Rhythm:     Rhythm: sinus rhythm    Ectopy:     Ectopy: none    QRS:     QRS axis:  Normal    QRS intervals:  Normal  Conduction:     Conduction: normal    ST segments:     ST segments:  Normal  T waves:     T waves: normal    Q waves:     Q waves:  AVR and V1  Comments:       No prior EKG for comparison           ED Course  ED Course as of Jul 09 1757   Tue Jul 09, 2019   1415  Patient seen and examined at bedside  Labs, imaging and EKG ordered  EKG  with no ischemic changes  1533 Initial labs reviewed, D-dimer and troponin negative    No other acute abilities noted      [phone] Discussed results of labs and imaging with patient  Imaging with no acute abnormalities  Plan to obtain 2nd troponin prior to final disposition  1754 Repeat troponin negative  Discussed with patient discharge plan and instructions  Patient to follow up with primary care physician as an outpatient  Patient to return to ED if any change or worsening condition: increased pain, new onset fever or bleeding  HEART Risk Score      Most Recent Value   History  0 Filed at: 07/09/2019 1548   ECG  0 Filed at: 07/09/2019 1548   Age  1 Filed at: 07/09/2019 1548   Risk Factors  0 Filed at: 07/09/2019 1548   Troponin  0 Filed at: 07/09/2019 1548   Heart Score Risk Calculator   History  0 Filed at: 07/09/2019 1548   ECG  0 Filed at: 07/09/2019 1548   Age  1 Filed at: 07/09/2019 1548   Risk Factors  0 Filed at: 07/09/2019 1548   Troponin  0 Filed at: 07/09/2019 1548   HEART Score  1 Filed at: 07/09/2019 1548   HEART Score  1 Filed at: 07/09/2019 1548              MDM    Disposition  Final diagnoses:   Chest pain, unspecified type     Time reflects when diagnosis was documented in both MDM as applicable and the Disposition within this note     Time User Action Codes Description Comment    7/9/2019  5:55 PM Wayne Lanes [R07 9] Chest pain, unspecified type       ED Disposition     ED Disposition Condition Date/Time Comment    Discharge Stable Tue Jul 9, 2019  5:55 PM Alissa Beal discharge to home/self care  Follow-up Information     Follow up With Specialties Details Why [address] SHRAVAN Yousif Physician Assistant In 3 days  [address] [facility] [address]  [address] [place] [address]  Emergency Department Emergency Medicine  As needed, If symptoms worsen [address] [facility] [address] [phone]          Patient's Medications   Discharge Prescriptions    No medications on file     No discharge procedures on file      ED Provider  Electronically Signed by           Sandi Escobedo,   07/09/19 6401 PRASHANT Yousif, DO  07/09/19 2460

## 2019-08-09 DIAGNOSIS — D22.9 NUMEROUS MOLES: Primary | ICD-10-CM

## 2019-09-24 ENCOUNTER — TELEPHONE (OUTPATIENT)
Dept: GASTROENTEROLOGY | Facility: MEDICAL CENTER | Age: 59
End: 2019-09-24

## 2019-09-24 NOTE — TELEPHONE ENCOUNTER
Pt [facility] colon [facility] dr [facility] she had a [facility] emergency and will call back to reschedule

## 2019-10-22 ENCOUNTER — OFFICE VISIT (OUTPATIENT)
Dept: URGENT CARE | Facility: CLINIC | Age: 59
End: 2019-10-22
Payer: COMMERCIAL

## 2019-10-22 VITALS
HEIGHT: 64 IN | RESPIRATION RATE: 18 BRPM | SYSTOLIC BLOOD PRESSURE: 128 MMHG | HEART RATE: 78 BPM | TEMPERATURE: 97.7 F | OXYGEN SATURATION: 100 % | WEIGHT: 155 LBS | DIASTOLIC BLOOD PRESSURE: 88 MMHG | BODY MASS INDEX: 26.46 KG/M2

## 2019-10-22 DIAGNOSIS — N30.01 ACUTE CYSTITIS WITH HEMATURIA: Primary | ICD-10-CM

## 2019-10-22 DIAGNOSIS — R39.9 LOWER URINARY TRACT SYMPTOMS: ICD-10-CM

## 2019-10-22 LAB
SL AMB  POCT GLUCOSE, UA: ABNORMAL
SL AMB LEUKOCYTE ESTERASE,UA: ABNORMAL
SL AMB POCT BILIRUBIN,UA: ABNORMAL
SL AMB POCT BLOOD,UA: ABNORMAL
SL AMB POCT CLARITY,UA: ABNORMAL
SL AMB POCT COLOR,UA: YELLOW
SL AMB POCT KETONES,UA: ABNORMAL
SL AMB POCT NITRITE,UA: ABNORMAL
SL AMB POCT PH,UA: 6
SL AMB POCT SPECIFIC GRAVITY,UA: 1.02
SL AMB POCT URINE PROTEIN: ABNORMAL
SL AMB POCT UROBILINOGEN: 0.2

## 2019-10-22 PROCEDURE — 99213 OFFICE O/P EST LOW 20 MIN: CPT | Performed by: PHYSICIAN ASSISTANT

## 2019-10-22 PROCEDURE — 87077 CULTURE AEROBIC IDENTIFY: CPT | Performed by: PHYSICIAN ASSISTANT

## 2019-10-22 PROCEDURE — S9088 SERVICES PROVIDED IN URGENT: HCPCS | Performed by: PHYSICIAN ASSISTANT

## 2019-10-22 PROCEDURE — 81002 URINALYSIS NONAUTO W/O SCOPE: CPT | Performed by: PHYSICIAN ASSISTANT

## 2019-10-22 PROCEDURE — 87086 URINE CULTURE/COLONY COUNT: CPT | Performed by: PHYSICIAN ASSISTANT

## 2019-10-22 PROCEDURE — 87186 SC STD MICRODIL/AGAR DIL: CPT | Performed by: PHYSICIAN ASSISTANT

## 2019-10-22 RX ORDER — NITROFURANTOIN 25; 75 MG/1; MG/1
100 CAPSULE ORAL 2 TIMES DAILY
Qty: 14 CAPSULE | Refills: 0 | Status: SHIPPED | OUTPATIENT
Start: 2019-10-22 | End: 2019-10-29

## 2019-10-22 NOTE — PROGRESS NOTES
[facility]        NAME: Obdulio Law is a 63 y o  female  : 1960    MRN: [id number]  DATE: 2019  TIME: 2:20 PM    Assessment and Plan   Acute cystitis with hematuria [N30 01]  1  Acute cystitis with hematuria  nitrofurantoin (MACROBID) 100 mg capsule   2  Lower urinary tract symptoms  Urine culture    POCT urine dip         Patient Instructions     POCT urine positive for leukocytes - will treat with antibiotics and send urine for culture  Also recommend increasing fluids and may use OTC Azo for symptomatic relief  Follow up with PCP in 3-5 days  Proceed to  ER if symptoms worsen  Chief Complaint     Chief Complaint   Patient presents with    Possible UTI     C/O frequency of urination, blood noted in urine and pressure on the left side today  History of Present Illness       64 y/o F presents for eval of dysuria onset today with associated hematuria, LLQ pain/spasm, urinary urgency and frequency  Pt has had h/o UTI in the past and states this feels similar  No fever, chills, N/V, back pain  Has not taken anything OTC for this  Review of Systems   Review of Systems   All other systems reviewed and are negative          Current Medications       Current Outpatient Medications:     Calcium 600 MG tablet, Take 1 tablet by mouth every other day, Disp: , Rfl:     Cholecalciferol (VITAMIN D3) 1000 units CAPS, Take 1 capsule by mouth daily, Disp: , Rfl:     nystatin (MYCOSTATIN) powder, Apply topically 2 (two) times a day, Disp: 15 g, Rfl: 1    albuterol (VENTOLIN HFA) 90 mcg/act inhaler, Inhale 2 puffs every 4 (four) hours as needed for wheezing or shortness of breath (Patient not taking: Reported on 2019), Disp: 18 g, Rfl: 0    conjugated estrogens (PREMARIN) vaginal cream, Insert 0 5 g into the vagina 2 (two) times a week (Patient not taking: Reported on 10/22/2019), Disp: 30 g, Rfl: 1    nitrofurantoin (MACROBID) 100 mg capsule, Take 1 capsule (100 mg total) by mouth 2 (two) times a day for 7 days, Disp: 14 capsule, Rfl: 0    Current Allergies     Allergies as of 10/22/2019 - Reviewed 10/22/2019   Allergen Reaction Noted    Ofloxacin Hives 03/23/2016    Penicillins  03/23/2016            The following portions of the patient's history were reviewed and updated as appropriate: allergies, current medications, past family history, past medical history, past social history, past surgical history and problem list      Past Medical History:   Diagnosis Date    BRCA1 negative     Cataract     Exercise-induced asthma 11/3/2016       Past Surgical History:   Procedure Laterality Date    BREAST CYST EXCISION Left     benign    BREAST CYST EXCISION Left     benign    BREAST SURGERY      CHOLECYSTECTOMY      HYSTERECTOMY      MOUTH SURGERY      TUBAL LIGATION      WISDOM TOOTH EXTRACTION         Family History   Problem Relation Age of Onset    Hypertension Mother     Heart disease Mother         cardiac disorder    Breast cancer Mother         dx age early 19's   Edward Spinner Glaucoma Mother     Hypertension Father     Heart disease Father         cardiac disorder    Cancer Father     No Known Problems Sister     Multiple sclerosis Daughter     Melanoma Daughter     No Known Problems Son     No Known Problems Paternal Aunt          Medications have been verified  Objective   /88   Pulse 78   Temp 97 7 °F (36 5 °C) (Tympanic)   Resp 18   Ht 5' 4" (1 626 m)   Wt 70 3 kg (155 lb)   LMP  (LMP Unknown)   SpO2 100%   BMI 26 61 kg/m²        Physical Exam     Physical Exam   Constitutional: She is oriented to person, place, and time  She appears well-developed and well-nourished  No distress  Cardiovascular: Normal rate, regular rhythm and normal heart sounds  Exam reveals no gallop and no friction rub  No murmur heard  Pulmonary/Chest: Effort normal and breath sounds normal  No respiratory distress  She has no wheezes  She has no rales  Abdominal: Soft  Normal appearance and bowel sounds are normal  There is no tenderness  There is no rigidity, no rebound, no guarding and no CVA tenderness  Neurological: She is alert and oriented to person, place, and time  Skin: Skin is warm and dry  She is not diaphoretic  Psychiatric: She has a normal mood and affect   Her behavior is normal

## 2019-10-24 LAB — BACTERIA UR CULT: ABNORMAL

## 2019-10-28 ENCOUNTER — OFFICE VISIT (OUTPATIENT)
Dept: URGENT CARE | Facility: CLINIC | Age: 59
End: 2019-10-28
Payer: COMMERCIAL

## 2019-10-28 VITALS
OXYGEN SATURATION: 96 % | DIASTOLIC BLOOD PRESSURE: 60 MMHG | HEART RATE: 65 BPM | HEIGHT: 64 IN | BODY MASS INDEX: 26.46 KG/M2 | SYSTOLIC BLOOD PRESSURE: 125 MMHG | RESPIRATION RATE: 18 BRPM | TEMPERATURE: 98 F | WEIGHT: 155 LBS

## 2019-10-28 DIAGNOSIS — H10.31 ACUTE CONJUNCTIVITIS OF RIGHT EYE, UNSPECIFIED ACUTE CONJUNCTIVITIS TYPE: Primary | ICD-10-CM

## 2019-10-28 PROCEDURE — S9088 SERVICES PROVIDED IN URGENT: HCPCS | Performed by: PHYSICIAN ASSISTANT

## 2019-10-28 PROCEDURE — 99213 OFFICE O/P EST LOW 20 MIN: CPT | Performed by: PHYSICIAN ASSISTANT

## 2019-10-28 RX ORDER — TOBRAMYCIN 3 MG/ML
1 SOLUTION/ DROPS OPHTHALMIC
Qty: 2.5 ML | Refills: 0 | Status: SHIPPED | OUTPATIENT
Start: 2019-10-28 | End: 2019-11-07

## 2019-10-28 NOTE — PROGRESS NOTES
[facility]        NAME: Barby Biggs is a 63 y o  female  : 1960    MRN: [id number]  DATE: 2019  TIME: 9:01 AM    Assessment and Plan   Acute conjunctivitis of right eye, unspecified acute conjunctivitis type [H10 31]  1  Acute conjunctivitis of right eye, unspecified acute conjunctivitis type  tobramycin (TOBREX) 0 3 % SOLN         Patient Instructions       Follow up with PCP in 3-5 days  Proceed to  ER if symptoms worsen  Chief Complaint     Chief Complaint   Patient presents with    Eye Problem     ight eye redness and discharge started this am         History of Present Illness       64 y/o F presents for eval of R eye erythema, itching, purulent discharge onset this AM upon waking  She denies cold or flu sxs, no fever, no vision changes  Review of Systems   Review of Systems   All other systems reviewed and are negative          Current Medications       Current Outpatient Medications:     Calcium 600 MG tablet, Take 1 tablet by mouth every other day, Disp: , Rfl:     Cholecalciferol (VITAMIN D3) 1000 units CAPS, Take 1 capsule by mouth daily, Disp: , Rfl:     nitrofurantoin (MACROBID) 100 mg capsule, Take 1 capsule (100 mg total) by mouth 2 (two) times a day for 7 days, Disp: 14 capsule, Rfl: 0    albuterol (VENTOLIN HFA) 90 mcg/act inhaler, Inhale 2 puffs every 4 (four) hours as needed for wheezing or shortness of breath (Patient not taking: Reported on 2019), Disp: 18 g, Rfl: 0    conjugated estrogens (PREMARIN) vaginal cream, Insert 0 5 g into the vagina 2 (two) times a week (Patient not taking: Reported on 10/22/2019), Disp: 30 g, Rfl: 1    nystatin (MYCOSTATIN) powder, Apply topically 2 (two) times a day (Patient not taking: Reported on 10/28/2019), Disp: 15 g, Rfl: 1    tobramycin (TOBREX) 0 3 % SOLN, Administer 1 drop to the right eye every 4 (four) hours while awake for 10 days, Disp: 2 5 mL, Rfl: 0    Current Allergies     Allergies as of 10/28/2019 - Reviewed 10/28/2019   Allergen Reaction Noted    Ofloxacin Hives 03/23/2016    Penicillins  03/23/2016            The following portions of the patient's history were reviewed and updated as appropriate: allergies, current medications, past family history, past medical history, past social history, past surgical history and problem list      Past Medical History:   Diagnosis Date    BRCA1 negative     Cataract     Exercise-induced asthma 11/3/2016       Past Surgical History:   Procedure Laterality Date    BREAST CYST EXCISION Left     benign    BREAST CYST EXCISION Left     benign    BREAST SURGERY      CHOLECYSTECTOMY      HYSTERECTOMY      MOUTH SURGERY      TUBAL LIGATION      WISDOM TOOTH EXTRACTION         Family History   Problem Relation Age of Onset    Hypertension Mother     Heart disease Mother         cardiac disorder    Breast cancer Mother         dx age early 19's   Mariela Lazar Glaucoma Mother     Hypertension Father     Heart disease Father         cardiac disorder    Cancer Father     No Known Problems Sister     Multiple sclerosis Daughter     Melanoma Daughter     No Known Problems Son     No Known Problems Paternal Aunt          Medications have been verified  Objective   /60   Pulse 65   Temp 98 °F (36 7 °C) (Tympanic)   Resp 18   Ht 5' 4" (1 626 m)   Wt 70 3 kg (155 lb)   LMP  (LMP Unknown)   SpO2 96%   BMI 26 61 kg/m²        Physical Exam     Physical Exam   Constitutional: She is oriented to person, place, and time  She appears well-developed and well-nourished  No distress  HENT:   Head: Normocephalic and atraumatic  Eyes: Pupils are equal, round, and reactive to light  EOM and lids are normal  Right eye exhibits discharge  Right conjunctiva is injected  Left conjunctiva is not injected  Cardiovascular: Normal rate, regular rhythm and normal heart sounds  Exam reveals no gallop and no friction rub  No murmur heard    Pulmonary/Chest: Effort normal and breath sounds normal  She has no wheezes  She has no rales  Neurological: She is alert and oriented to person, place, and time  Psychiatric: She has a normal mood and affect  Vitals reviewed

## 2019-11-07 ENCOUNTER — TELEPHONE (OUTPATIENT)
Dept: GASTROENTEROLOGY | Facility: CLINIC | Age: 59
End: 2019-11-07

## 2019-11-12 ENCOUNTER — ANESTHESIA EVENT (OUTPATIENT)
Dept: PERIOP | Facility: HOSPITAL | Age: 59
End: 2019-11-12

## 2019-11-12 ENCOUNTER — HOSPITAL ENCOUNTER (OUTPATIENT)
Dept: PERIOP | Facility: HOSPITAL | Age: 59
Setting detail: OUTPATIENT SURGERY
Discharge: HOME | End: 2019-11-12
Attending: INTERNAL MEDICINE
Payer: COMMERCIAL

## 2019-11-12 ENCOUNTER — ANESTHESIA (OUTPATIENT)
Dept: PERIOP | Facility: HOSPITAL | Age: 59
End: 2019-11-12

## 2019-11-12 VITALS
TEMPERATURE: 97.6 F | SYSTOLIC BLOOD PRESSURE: 133 MMHG | RESPIRATION RATE: 15 BRPM | HEART RATE: 62 BPM | DIASTOLIC BLOOD PRESSURE: 67 MMHG | OXYGEN SATURATION: 99 %

## 2019-11-12 DIAGNOSIS — Z12.11 COLON CANCER SCREENING: ICD-10-CM

## 2019-11-12 PROCEDURE — G0121 COLON CA SCRN NOT HI RSK IND: HCPCS | Performed by: INTERNAL MEDICINE

## 2019-11-12 RX ORDER — LIDOCAINE HYDROCHLORIDE 20 MG/ML
INJECTION, SOLUTION EPIDURAL; INFILTRATION; INTRACAUDAL; PERINEURAL AS NEEDED
Status: DISCONTINUED | OUTPATIENT
Start: 2019-11-12 | End: 2019-11-12 | Stop reason: SURG

## 2019-11-12 RX ORDER — SODIUM CHLORIDE, SODIUM LACTATE, POTASSIUM CHLORIDE, CALCIUM CHLORIDE 600; 310; 30; 20 MG/100ML; MG/100ML; MG/100ML; MG/100ML
125 INJECTION, SOLUTION INTRAVENOUS CONTINUOUS
Status: DISCONTINUED | OUTPATIENT
Start: 2019-11-12 | End: 2019-11-16 | Stop reason: HOSPADM

## 2019-11-12 RX ORDER — PROPOFOL 10 MG/ML
INJECTION, EMULSION INTRAVENOUS AS NEEDED
Status: DISCONTINUED | OUTPATIENT
Start: 2019-11-12 | End: 2019-11-12 | Stop reason: SURG

## 2019-11-12 RX ADMIN — LIDOCAINE HYDROCHLORIDE 100 MG: 20 INJECTION, SOLUTION EPIDURAL; INFILTRATION; INTRACAUDAL; PERINEURAL at 12:01

## 2019-11-12 RX ADMIN — PROPOFOL 50 MG: 10 INJECTION, EMULSION INTRAVENOUS at 12:09

## 2019-11-12 RX ADMIN — PROPOFOL 120 MG: 10 INJECTION, EMULSION INTRAVENOUS at 12:01

## 2019-11-12 RX ADMIN — SODIUM CHLORIDE, SODIUM LACTATE, POTASSIUM CHLORIDE, AND CALCIUM CHLORIDE 125 ML/HR: .6; .31; .03; .02 INJECTION, SOLUTION INTRAVENOUS at 10:30

## 2019-11-12 NOTE — ANESTHESIA POSTPROCEDURE EVALUATION
Post-Op Assessment Note    CV Status:  Stable  Pain Score: 0    Pain management: adequate     Mental Status:  Sleepy and arousable   Hydration Status:  Stable   PONV Controlled:  None   Airway Patency:  Patent    Staff: Anesthesiologist, with CRNAs           BP      Temp     Pulse     Resp      SpO2

## 2019-11-12 NOTE — H&P
History and Physical -  [facility]  Andrew Quevedo 63 y o  female MRN: [id number]                  HPI: Andrew Quevedo is a 63y o  year old female who presents for colonoscopy      REVIEW OF SYSTEMS: Per the HPI, and otherwise unremarkable  Historical Information   Past Medical History:   Diagnosis Date    BRCA1 negative     Cataract     Exercise-induced asthma 11/3/2016     Past Surgical History:   Procedure Laterality Date    BREAST CYST EXCISION Left     benign    BREAST CYST EXCISION Left     benign    BREAST SURGERY      CHOLECYSTECTOMY      HYSTERECTOMY      MOUTH SURGERY      TUBAL LIGATION      WISDOM TOOTH EXTRACTION       Social History   Social History     Substance and Sexual Activity   Alcohol Use No     Social History     Substance and Sexual Activity   Drug Use No     Social History     Tobacco Use   Smoking Status Never Smoker   Smokeless Tobacco Never Used     Family History   Problem Relation Age of Onset    Hypertension Mother     Heart disease Mother         cardiac disorder    Breast cancer Mother         dx age early 19's    Glaucoma Mother     Hypertension Father     Heart disease Father         cardiac disorder    Cancer Father     No Known Problems Sister     Multiple sclerosis Daughter    Bell Eduardo Melanoma Daughter     No Known Problems Son     No Known Problems Paternal Aunt        Meds/Allergies       (Not in a hospital admission)    Allergies   Allergen Reactions    Ofloxacin Hives    Penicillins Hives       Objective     /65   Pulse 74   Temp (!) 97 °F (36 1 °C) (Tympanic)   Resp 18   LMP  (LMP Unknown)   SpO2 100%       PHYSICAL EXAM    Gen: NAD  CV: RRR  CHEST: Clear  ABD: soft, NT/ND  EXT: no edema      ASSESSMENT/PLAN:  This is a 63y o  year old female here for colonoscopy, and she is stable and optimized for her procedure

## 2020-03-21 NOTE — ANESTHESIA PREPROCEDURE EVALUATION
Review of Systems/Medical History  Patient summary reviewed  Chart reviewed  No history of anesthetic complications     Cardiovascular  Exercise tolerance (METS): >4,     Pulmonary  Asthma , seasonal/exercise induced Last rescue: > 1 year ago ,        GI/Hepatic            Endo/Other     GYN       Hematology   Musculoskeletal       Neurology   Psychology           Physical Exam    Airway    Mallampati score: II  TM Distance: >3 FB  Neck ROM: full     Dental   No notable dental hx     Cardiovascular      Pulmonary      Other Findings        Anesthesia Plan  ASA Score- 2     Anesthesia Type- IV sedation with anesthesia with ASA Monitors  Additional Monitors:   Airway Plan:         Plan Factors-    Induction- intravenous  Postoperative Plan-     Informed Consent- Anesthetic plan and risks discussed with patient  I personally reviewed this patient with the CRNA  Discussed and agreed on the Anesthesia Plan with the CRNA  Brock Chowdhury
[id number] [facility]

## 2020-04-11 ENCOUNTER — OFFICE VISIT (OUTPATIENT)
Dept: URGENT CARE | Facility: CLINIC | Age: 60
End: 2020-04-11
Payer: COMMERCIAL

## 2020-04-11 VITALS
DIASTOLIC BLOOD PRESSURE: 72 MMHG | WEIGHT: 155 LBS | RESPIRATION RATE: 16 BRPM | BODY MASS INDEX: 26.46 KG/M2 | TEMPERATURE: 97.4 F | OXYGEN SATURATION: 100 % | SYSTOLIC BLOOD PRESSURE: 168 MMHG | HEIGHT: 64 IN | HEART RATE: 74 BPM

## 2020-04-11 DIAGNOSIS — N39.0 URINARY TRACT INFECTION WITH HEMATURIA, SITE UNSPECIFIED: Primary | ICD-10-CM

## 2020-04-11 DIAGNOSIS — R31.9 URINARY TRACT INFECTION WITH HEMATURIA, SITE UNSPECIFIED: Primary | ICD-10-CM

## 2020-04-11 DIAGNOSIS — R30.0 DYSURIA: ICD-10-CM

## 2020-04-11 LAB
SL AMB  POCT GLUCOSE, UA: ABNORMAL
SL AMB LEUKOCYTE ESTERASE,UA: ABNORMAL
SL AMB POCT BILIRUBIN,UA: ABNORMAL
SL AMB POCT BLOOD,UA: ABNORMAL
SL AMB POCT CLARITY,UA: CLEAR
SL AMB POCT COLOR,UA: YELLOW
SL AMB POCT KETONES,UA: ABNORMAL
SL AMB POCT NITRITE,UA: ABNORMAL
SL AMB POCT PH,UA: 8.5
SL AMB POCT SPECIFIC GRAVITY,UA: 1.01
SL AMB POCT URINE PROTEIN: ABNORMAL
SL AMB POCT UROBILINOGEN: 0.2

## 2020-04-11 PROCEDURE — 87186 SC STD MICRODIL/AGAR DIL: CPT | Performed by: NURSE PRACTITIONER

## 2020-04-11 PROCEDURE — 99213 OFFICE O/P EST LOW 20 MIN: CPT | Performed by: NURSE PRACTITIONER

## 2020-04-11 PROCEDURE — 87077 CULTURE AEROBIC IDENTIFY: CPT | Performed by: NURSE PRACTITIONER

## 2020-04-11 PROCEDURE — 87086 URINE CULTURE/COLONY COUNT: CPT | Performed by: NURSE PRACTITIONER

## 2020-04-11 RX ORDER — LIDOCAINE HCL 4 %
1 CREAM (GRAM) TOPICAL DAILY
Qty: 90 EACH | Refills: 0 | Status: SHIPPED | OUTPATIENT
Start: 2020-04-11 | End: 2020-07-10

## 2020-04-11 RX ORDER — NITROFURANTOIN 25; 75 MG/1; MG/1
100 CAPSULE ORAL 2 TIMES DAILY
Qty: 14 CAPSULE | Refills: 0 | Status: SHIPPED | OUTPATIENT
Start: 2020-04-11 | End: 2020-04-18

## 2020-04-14 LAB — BACTERIA UR CULT: ABNORMAL

## 2020-07-02 ENCOUNTER — OFFICE VISIT (OUTPATIENT)
Dept: URGENT CARE | Facility: CLINIC | Age: 60
End: 2020-07-02
Payer: COMMERCIAL

## 2020-07-02 VITALS
DIASTOLIC BLOOD PRESSURE: 84 MMHG | HEART RATE: 71 BPM | BODY MASS INDEX: 26.46 KG/M2 | WEIGHT: 155 LBS | SYSTOLIC BLOOD PRESSURE: 136 MMHG | OXYGEN SATURATION: 100 % | RESPIRATION RATE: 18 BRPM | HEIGHT: 64 IN | TEMPERATURE: 98 F

## 2020-07-02 DIAGNOSIS — R30.0 DYSURIA: Primary | ICD-10-CM

## 2020-07-02 LAB
SL AMB  POCT GLUCOSE, UA: ABNORMAL
SL AMB LEUKOCYTE ESTERASE,UA: ABNORMAL
SL AMB POCT BILIRUBIN,UA: ABNORMAL
SL AMB POCT BLOOD,UA: ABNORMAL
SL AMB POCT CLARITY,UA: ABNORMAL
SL AMB POCT COLOR,UA: YELLOW
SL AMB POCT KETONES,UA: ABNORMAL
SL AMB POCT NITRITE,UA: ABNORMAL
SL AMB POCT PH,UA: 8.5
SL AMB POCT SPECIFIC GRAVITY,UA: 1
SL AMB POCT URINE PROTEIN: 30
SL AMB POCT UROBILINOGEN: 0.2

## 2020-07-02 PROCEDURE — 99213 OFFICE O/P EST LOW 20 MIN: CPT | Performed by: PHYSICIAN ASSISTANT

## 2020-07-02 PROCEDURE — 81002 URINALYSIS NONAUTO W/O SCOPE: CPT | Performed by: PHYSICIAN ASSISTANT

## 2020-07-02 PROCEDURE — 87086 URINE CULTURE/COLONY COUNT: CPT | Performed by: PHYSICIAN ASSISTANT

## 2020-07-02 RX ORDER — NITROFURANTOIN 25; 75 MG/1; MG/1
100 CAPSULE ORAL 2 TIMES DAILY
Qty: 14 CAPSULE | Refills: 0 | Status: SHIPPED | OUTPATIENT
Start: 2020-07-02 | End: 2020-07-09

## 2020-07-02 NOTE — PROGRESS NOTES
[facility]        NAME: Kandy Dewitt is a 63 y o  female  : 1960    MRN: [id number]  DATE: 2020  TIME: 3:29 PM    Assessment and Plan   Dysuria [R30 0]  1  Dysuria  POCT urine dip    Urine culture    nitrofurantoin (MACROBID) 100 mg capsule         Patient Instructions     Patient Instructions   Dysuria   WHAT YOU NEED TO KNOW:   Dysuria is difficulty urinating, or pain, burning, or discomfort with urination  Dysuria is usually a symptom of another problem  DISCHARGE INSTRUCTIONS:   Return to the emergency department if:   · You have severe back, side, or abdominal pain  · You have fever and shaking chills  · You vomit several times in a row  Contact your healthcare provider if:   · Your symptoms do not go away, even after treatment  · You have questions or concerns about your condition or care  Medicines:   · Medicines  may be given to help treat a bacterial infection or help decrease bladder spasms  · Take your medicine as directed  Contact your healthcare provider if you think your medicine is not helping or if you have side effects  Tell him of her if you are allergic to any medicine  Keep a list of the medicines, vitamins, and herbs you take  Include the amounts, and when and why you take them  Bring the list or the pill bottles to follow-up visits  Carry your medicine list with you in case of an emergency  Follow up with your healthcare provider as directed: Your healthcare provider may also refer you to a urologist or nephrologist to have additional testing  Write down your questions so you remember to ask them during your visits  Manage your dysuria:   · Drink more liquids  Liquids help flush out bacteria that may be causing an infection  Ask your healthcare provider how much liquid to drink each day and which liquids are best for you  · Take sitz baths as directed  Fill a bathtub with 4 to 6 inches of warm water   You may also use a sitz bath pan that fits over a toilet  Sit in the sitz bath for 20 minutes  Do this 2 to 3 times a day, or as directed  The warm water can help decrease pain and swelling  © 2017 [employer] Information is for End User's use only and may not be sold, redistributed or otherwise used for commercial purposes  All illustrations and images included in CareNotes® are the copyrighted property of [employer]  or [employer] Campos  The above information is an  only  It is not intended as medical advice for individual conditions or treatments  Talk to your doctor, nurse or pharmacist before following any medical regimen to see if it is safe and effective for you  Follow up with PCP in 3-5 days  Proceed to  ER if symptoms worsen  Chief Complaint     Chief Complaint   Patient presents with    Possible UTI     burning and frequency of urination since am         History of Present Illness         64-year-old female complains of 1 day of urinary burning and frequency  She has some urgency and feels like she has to go soon after she voids  History of frequent UTIs  No nausea vomiting  No back pain  No fever  No blood in her urine  No meds over-the-counter for this  Review of Systems   Review of Systems   Constitutional: Negative for chills, fatigue and fever  HENT: Negative  Eyes: Negative for visual disturbance  Respiratory: Negative for chest tightness and shortness of breath  Cardiovascular: Negative for chest pain and palpitations  Gastrointestinal: Negative for diarrhea, nausea and vomiting  Genitourinary: Positive for dysuria, frequency and urgency  Negative for flank pain and hematuria  Neurological: Negative for dizziness           Current Medications       Current Outpatient Medications:     Calcium 600 MG tablet, Take 1 tablet by mouth every other day, Disp: , Rfl:     Cholecalciferol (VITAMIN D3) 1000 units CAPS, Take 1 capsule by mouth daily, Disp: , Rfl:     Cranberry-Vitamin C (AZO Cranberry Urinary Tract) 250-60 MG CAPS, Take 1 caplet by mouth daily, Disp: 90 each, Rfl: 0    nitrofurantoin (MACROBID) 100 mg capsule, Take 1 capsule (100 mg total) by mouth 2 (two) times a day for 7 days, Disp: 14 capsule, Rfl: 0    Current Allergies     Allergies as of 07/02/2020 - Reviewed 07/02/2020   Allergen Reaction Noted    Ofloxacin Hives 03/23/2016    Penicillins Hives 03/23/2016            The following portions of the patient's history were reviewed and updated as appropriate: allergies, current medications, past family history, past medical history, past social history, past surgical history and problem list      Past Medical History:   Diagnosis Date    BRCA1 negative     Cataract     Exercise-induced asthma 11/3/2016       Past Surgical History:   Procedure Laterality Date    BREAST CYST EXCISION Left     benign    BREAST CYST EXCISION Left     benign    BREAST SURGERY      CHOLECYSTECTOMY      HYSTERECTOMY      MOUTH SURGERY      TUBAL LIGATION      WISDOM TOOTH EXTRACTION         Family History   Problem Relation Age of Onset    Hypertension Mother     Heart disease Mother         cardiac disorder    Breast cancer Mother         dx age early 19's   Ghosh Glaucoma Mother     Hypertension Father     Heart disease Father         cardiac disorder    Cancer Father     No Known Problems Sister     Multiple sclerosis Daughter     Melanoma Daughter     No Known Problems Son     No Known Problems Paternal Aunt          Medications have been verified  Objective   /84   Pulse 71   Temp 98 °F (36 7 °C) (Tympanic)   Resp 18   Ht 5' 4" (1 626 m)   Wt 70 3 kg (155 lb)   LMP  (LMP Unknown)   SpO2 100%   BMI 26 61 kg/m²        Physical Exam     Physical Exam   Constitutional: She is oriented to person, place, and time  She appears well-developed and well-nourished  Cardiovascular: Normal rate and regular rhythm     Pulmonary/Chest: Effort normal and breath sounds normal    Abdominal: Soft  Bowel sounds are normal  She exhibits no distension  There is tenderness in the suprapubic area  There is no rigidity, no rebound, no guarding and no CVA tenderness  Neurological: She is alert and oriented to person, place, and time  Skin: Skin is warm and dry

## 2020-07-02 NOTE — PATIENT INSTRUCTIONS

## 2020-07-03 LAB — BACTERIA UR CULT: NORMAL

## 2020-08-07 ENCOUNTER — OFFICE VISIT (OUTPATIENT)
Dept: OBGYN CLINIC | Facility: CLINIC | Age: 60
End: 2020-08-07
Payer: COMMERCIAL

## 2020-08-07 VITALS — DIASTOLIC BLOOD PRESSURE: 70 MMHG | SYSTOLIC BLOOD PRESSURE: 120 MMHG | TEMPERATURE: 97.7 F

## 2020-08-07 DIAGNOSIS — Z01.419 ENCOUNTER FOR GYNECOLOGICAL EXAMINATION (GENERAL) (ROUTINE) WITHOUT ABNORMAL FINDINGS: Primary | ICD-10-CM

## 2020-08-07 DIAGNOSIS — Z12.31 ENCOUNTER FOR SCREENING MAMMOGRAM FOR MALIGNANT NEOPLASM OF BREAST: ICD-10-CM

## 2020-08-07 PROCEDURE — S0610 ANNUAL GYNECOLOGICAL EXAMINA: HCPCS | Performed by: OBSTETRICS & GYNECOLOGY

## 2020-08-07 RX ORDER — NYSTATIN 100000 [USP'U]/G
POWDER TOPICAL 4 TIMES DAILY PRN
COMMUNITY
End: 2020-10-26 | Stop reason: ALTCHOICE

## 2020-08-07 NOTE — PATIENT INSTRUCTIONS
Thank you for your confidence in our team    We appreciate you and welcome your feedback  If you receive a survey from us, please take a few moments to let us know how we are doing     Sincerely,   Galdino Zavala MD

## 2020-08-07 NOTE — PROGRESS NOTES
ASSESSMENT & PLAN: Niecy Meek was seen today for gynecologic exam     Diagnoses and all orders for this visit:    Encounter for gynecological examination (general) (routine) without abnormal findings    Encounter for screening mammogram for malignant neoplasm of breast  -     Mammo screening bilateral w 3d & cad; Future    Other orders  -     Cancel: Liquid-based pap, screening        1  Routine well woman exam done today  2  Pap and HPV:  The patient's pap is up to date  Pap and cotesting was not done today  Current ASCCP Guidelines reviewed  3   Mammogram was ordered  4  Colorectal cancer screening is up to date  4  The following were reviewed in today's visit: adequate intake of calcium and vitamin D, exercise and healthy diet  5  F/u 1 year for next routine gyn exam       CC:  Annual Gynecologic Examination    HPI: Mervat Cole is a 63 y o  who presents for annual gynecologic examination  She has the following concerns:  Pt c/o vaginal dryness and uses Premarin cream   Pt reports that it was gotten very expensive  Is using KY jelly instead  Health Maintenance:    Patient describes her health as excellent  Patient does not have weight concerns  She exercises 3 days per week with water aerobics, walking and kayaking  She does wear her seatbelt routinely  She does not perform regular monthly self breast exams  She does feel safe at home  Patients does follow a no red meat or no salt diet  Patient gets 1 servings of dairy or calcium rich foods a day      Last pap: n/a  Last mammogram: 2019  Last colorectal cancer screenin  Last DEXA: 2019    Patient Active Problem List   Diagnosis    Acute cystitis    Right hip pain    Vaginal atrophy    BMI 26 0-26 9,adult    Vitamin D deficiency    Healthcare maintenance    History of Meniere's disease    Exercise-induced asthma    Impaired fasting glucose    Low serum high density lipoprotein (HDL)    Menopausal symptoms    Nevus, atypical    Positive depression screening    Systolic murmur    Benign paroxysmal vertigo    Encntr for gyn exam (general) (routine) w abnormal findings    At high risk for breast cancer    Encounter for screening mammogram for breast cancer    Introital dyspareunia    KEIKO (stress urinary incontinence, female)    Osteopenia       Past Medical History:   Diagnosis Date    BRCA1 negative     Cataract     Exercise-induced asthma 11/3/2016       Past Surgical History:   Procedure Laterality Date    BREAST CYST EXCISION Left     benign    BREAST CYST EXCISION Left     benign    BREAST SURGERY      CHOLECYSTECTOMY      HYSTERECTOMY      MOUTH SURGERY      TUBAL LIGATION      WISDOM TOOTH EXTRACTION         Past OB/Gyn History:    History of abnormal pap smears: No    Patient is currently sexually active  heterosexual  Patient's family planning method is status post hysterectomy and post menopausal status      Family History   Problem Relation Age of Onset    Hypertension Mother     Heart disease Mother         cardiac disorder    Breast cancer Mother         dx age early 19's   Adal Abt Glaucoma Mother     Hypertension Father     Heart disease Father         cardiac disorder    Cancer Father     No Known Problems Sister     Multiple sclerosis Daughter    Adal Abt Melanoma Daughter     No Known Problems Son     Breast cancer Paternal Aunt        Social History:  Social History     Socioeconomic History    Marital status: /Civil Union     Spouse name: Not on file    Number of children: 2    Years of education: Not on file    Highest education level: Not on file   Occupational History    Occupation: Retired   Social Needs    Financial resource strain: Not on file    Food insecurity     Worry: Not on file     Inability: Not on file   [employer] needs     Medical: Not on file     Non-medical: Not on file   Tobacco Use    Smoking status: Never Smoker    Smokeless tobacco: Never Used Substance and Sexual Activity    Alcohol use: No    Drug use: No    Sexual activity: Yes     Partners: Male     Birth control/protection: Female Sterilization     Comment:  x 9 years, Ollie   Lifestyle    Physical activity     Days per week: Not on file     Minutes per session: Not on file    Stress: Not on file   Relationships    Social connections     Talks on phone: Not on file     Gets together: Not on file     Attends [employer] service: Not on file     Active member of club or organization: Not on file     Attends meetings of clubs or organizations: Not on file     Relationship status: Not on file    Intimate partner violence     Fear of current or ex partner: Not on file     Emotionally abused: Not on file     Physically abused: Not on file     Forced sexual activity: Not on file   Other Topics Concern    Not on file   Social History Narrative    Daily caffeine consumption, 1 serving a day    Exercises 3-4 times per week     x 8 years     Presently lives with   Patient is currently retired  Allergies   Allergen Reactions    Ofloxacin Hives    Penicillins Hives         Current Outpatient Medications:     Calcium 600 MG tablet, Take 1 tablet by mouth every other day, Disp: , Rfl:     Cholecalciferol (VITAMIN D3) 1000 units CAPS, Take 1 capsule by mouth daily, Disp: , Rfl:     Cranberry 1000 MG CAPS, Take by mouth, Disp: , Rfl:     nystatin (MYCOSTATIN) powder, Apply topically 4 (four) times a day, Disp: , Rfl:     Review of Systems  Constitutional :no fever, feels well, no tiredness, no recent weight gain or loss  ENT: no ear ache, no loss of hearing, no nosebleeds or nasal discharge, no sore throat or hoarseness  Cardiovascular: no complaints of slow or fast heart beat, no chest pain, no palpitations, no leg claudication or lower extremity edema    Respiratory: no complaints of shortness of shortness of breath, no NAYAK  Breasts:no complaints of breast pain, breast lump, or nipple discharge  Gastrointestinal: no complaints of abdominal pain, constipation, nausea, vomiting, or diarrhea or bloody stools  Genitourinary : no complaints of dysuria, incontinence, pelvic pain, no dysmenorrhea, vaginal discharge or abnormal vaginal bleeding and as noted in HPI  Musculoskeletal: no complaints of arthralgia, no myalgia, no joint swelling or stiffness, no limb pain or swelling  Integumentary: no complaints of skin rash or lesion, itching or dry skin  Neurological: no complaints of headache, no confusion, no numbness or tingling, no dizziness or fainting    Physical Exam:     /70   Temp 97 7 °F (36 5 °C)   LMP  (LMP Unknown)     General: appears stated age, cooperative, alert normal mood and affect   Psychiatric oriented to person, place and time  Mood and affect normal   Neck: normal, supple,trachea midline, no masses  Thyroid: normal, no thyromegaly   Heart: regular rate and rhythm, S1, S2 normal, no murmur, click, rub or gallop   Lungs: clear to auscultation bilaterally, no increased work of breathing or signs of respiratory distress   Breasts: normal, no dimpling or skin changes noted   Abdomen: soft, non-tender, without masses or organomegaly   Vulva: normal , no lesions   Vagina: normal , no lesions or atrophy   Urethra: normal   Urethal meatus normal   Bladder Normal, soft, non-tender and no prolapse or masses appreciated   Cervix: Surgically absent    Uterus: Surgically absent   Adnexa: normal, non-tender without fullness or masses   Lymphatic Palpation of lymph nodes in neck, axilla, groin and/or other locations: no lymphadenopathy or masses noted   Skin Normal skin turgor and no rashes    Palpation of skin and subcutaneous tissue normal

## 2020-08-12 ENCOUNTER — HOSPITAL ENCOUNTER (OUTPATIENT)
Dept: MAMMOGRAPHY | Facility: HOSPITAL | Age: 60
Discharge: HOME | End: 2020-08-12
Payer: COMMERCIAL

## 2020-08-12 VITALS — WEIGHT: 155 LBS | BODY MASS INDEX: 26.46 KG/M2 | HEIGHT: 64 IN

## 2020-08-12 DIAGNOSIS — Z12.31 ENCOUNTER FOR SCREENING MAMMOGRAM FOR MALIGNANT NEOPLASM OF BREAST: ICD-10-CM

## 2020-08-12 PROCEDURE — 77067 SCR MAMMO BI INCL CAD: CPT

## 2020-08-12 PROCEDURE — 77063 BREAST TOMOSYNTHESIS BI: CPT

## 2020-09-04 ENCOUNTER — HOSPITAL ENCOUNTER (OUTPATIENT)
Dept: ULTRASOUND IMAGING | Facility: HOSPITAL | Age: 60
Discharge: HOME | End: 2020-09-04
Payer: COMMERCIAL

## 2020-09-04 ENCOUNTER — HOSPITAL ENCOUNTER (OUTPATIENT)
Dept: MAMMOGRAPHY | Facility: HOSPITAL | Age: 60
Discharge: HOME | End: 2020-09-04
Payer: COMMERCIAL

## 2020-09-04 DIAGNOSIS — R92.8 ABNORMAL MAMMOGRAM: ICD-10-CM

## 2020-09-04 PROCEDURE — G0279 TOMOSYNTHESIS, MAMMO: HCPCS

## 2020-09-04 PROCEDURE — 77065 DX MAMMO INCL CAD UNI: CPT

## 2020-09-04 PROCEDURE — 76642 ULTRASOUND BREAST LIMITED: CPT

## 2020-09-08 NOTE — PROGRESS NOTES
Call placed to patient regarding recommendation for;    _____ RIGHT ___X___LEFT      __X___Ultrasound guided  ______Stereotactic breast biopsy  Pt states that procedure was explained to her, additional questions answered at this time    __X___Verbalized understanding        Blood thinners:  _____yes __X___no    Date stopped: ___N/A____    Biopsy teaching sheet given:  _____yes __X__no (All teaching points discussed during call, pt with no questions at this time, pt adv to arrive at 0800 for 0830 biopsy)    Pt given name/# for any further questions/needs    Pt agreeable to a post procedure call and states we can give her biopsy results to her over the phone

## 2020-09-11 ENCOUNTER — HOSPITAL ENCOUNTER (OUTPATIENT)
Dept: MAMMOGRAPHY | Facility: HOSPITAL | Age: 60
Discharge: HOME | End: 2020-09-11

## 2020-09-11 ENCOUNTER — HOSPITAL ENCOUNTER (OUTPATIENT)
Dept: ULTRASOUND IMAGING | Facility: HOSPITAL | Age: 60
Discharge: HOME | End: 2020-09-11
Admitting: RADIOLOGY
Payer: COMMERCIAL

## 2020-09-11 VITALS — SYSTOLIC BLOOD PRESSURE: 145 MMHG | DIASTOLIC BLOOD PRESSURE: 71 MMHG | HEART RATE: 67 BPM | OXYGEN SATURATION: 100 %

## 2020-09-11 VITALS — BODY MASS INDEX: 26.46 KG/M2 | HEIGHT: 64 IN | WEIGHT: 155 LBS

## 2020-09-11 DIAGNOSIS — R92.8 ABNORMAL MAMMOGRAM: ICD-10-CM

## 2020-09-11 PROCEDURE — 88305 TISSUE EXAM BY PATHOLOGIST: CPT | Performed by: PATHOLOGY

## 2020-09-11 PROCEDURE — 88361 TUMOR IMMUNOHISTOCHEM/COMPUT: CPT | Performed by: PATHOLOGY

## 2020-09-11 PROCEDURE — 19083 BX BREAST 1ST LESION US IMAG: CPT

## 2020-09-11 RX ORDER — LIDOCAINE HYDROCHLORIDE 10 MG/ML
5 INJECTION, SOLUTION EPIDURAL; INFILTRATION; INTRACAUDAL; PERINEURAL ONCE
Status: COMPLETED | OUTPATIENT
Start: 2020-09-11 | End: 2020-09-11

## 2020-09-11 RX ADMIN — LIDOCAINE HYDROCHLORIDE 5 ML: 10 INJECTION, SOLUTION EPIDURAL; INFILTRATION; INTRACAUDAL; PERINEURAL at 08:59

## 2020-09-11 NOTE — PROGRESS NOTES
Procedure type:  X  _____ultrasound guided _____stereotactic    Breast:    __X___Left _____Right    Location: 1 OCLOCK 12 CMFN    Needle: 12 G  CARMEN    # of passes: 5    Clip: RIBBON    Performed by: DR SELINA SIMS    Pressure held for 5 minutes by: Charmaine MOE    Steri Strips:    ___X__yes _____no    Band aid:    __X___yes_____no    Tolerated procedure:    __X___yes _____no

## 2020-09-15 ENCOUNTER — TELEPHONE (OUTPATIENT)
Dept: MAMMOGRAPHY | Facility: CLINIC | Age: 60
End: 2020-09-15

## 2020-09-15 NOTE — TELEPHONE ENCOUNTER
Call made to patient, adv appt made with Dr Ti Magana's office on Wednesday September 16th at 0800, arrive by 0745, directions given to office, adv to call from parking lot to check in, pt with no questions at this time, pt has name/# for contact, adv to contact me anytime for questions/concerns/help

## 2020-09-15 NOTE — TELEPHONE ENCOUNTER
Call placed to patient after pt given biopsy results from radiologist, Dr Cindi Colon, questions answered, adv next step is to set patient up with surgeon, options discussed and patient wanted to have appt made with Meg Cordova, determined pt availability and adv pt I would make appt for her and call her back

## 2020-09-16 ENCOUNTER — OFFICE VISIT (OUTPATIENT)
Dept: SURGERY | Facility: CLINIC | Age: 60
End: 2020-09-16
Payer: COMMERCIAL

## 2020-09-16 VITALS
WEIGHT: 162 LBS | HEIGHT: 64 IN | RESPIRATION RATE: 18 BRPM | SYSTOLIC BLOOD PRESSURE: 132 MMHG | BODY MASS INDEX: 27.66 KG/M2 | HEART RATE: 75 BPM | DIASTOLIC BLOOD PRESSURE: 70 MMHG | TEMPERATURE: 98 F

## 2020-09-16 DIAGNOSIS — C50.412 PRIMARY CANCER OF UPPER OUTER QUADRANT OF LEFT FEMALE BREAST (HCC): Primary | ICD-10-CM

## 2020-09-16 PROCEDURE — 99204 OFFICE O/P NEW MOD 45 MIN: CPT | Performed by: SURGERY

## 2020-09-16 NOTE — ASSESSMENT & PLAN NOTE
The patient is a pleasant 71-year-old female with a family history for breast cancer presenting with a newly diagnosed upper outer quadrant left breast invasive mammary carcinoma for surgical treatment recommendations  Patient is a pleasant 71-year-old female presenting with a strong family history for breast carcinoma in her mother who is still living but originally diagnosed with breast cancer at the age of 23  The patient has been bracket tested and is negative  The patient reports 2 previous breast biopsies for benign disease most recently in 2004  She undergoes yearly mammography  Most recently she underwent mammography followed by ultrasound and subsequent percutaneous biopsy which confirmed the presence of an invasive mammary carcinoma in the left breast 1 o'clock position 12 cm from nipple  Ultrasound of the axilla is negative for lymph nodes suspicious for metastatic disease  Hormone markers are pending  Menarche:  Age 7  First child:  Age 35  Menopause:  Age 40    On physical examination the patient is well-appearing female  She is in no acute distress  Awake alert oriented  Pleasant competent reliable as a historian  She has no cervical supraclavicular or axillary lymphadenopathy bilaterally  The breasts are symmetric and pendulous  No dominant lumps masses skin changes or nipple retraction bilaterally  She has postoperative changes with skin bruising and ecchymosis on the upper outer quadrant of the left breast with a well-healed incision post percutaneous biopsy  The diagnosis of invasive mammary carcinoma of no special type of the left breast upper outer quadrant was explained  Standard treatment recommendations including breast conserving therapy with postoperative radiation was discussed  Additionally we will present the patient's case at the next multi disciplinary breast tumor conference  Finally we will order Mammaprint testing      Look for to seeing her back in a week's time to formulated definitive treatment plan

## 2020-09-16 NOTE — PROGRESS NOTES
Assessment/Plan:    Primary cancer of upper outer quadrant of left female breast [facility])  The patient is a pleasant 67-year-old female with a family history for breast cancer presenting with a newly diagnosed upper outer quadrant left breast invasive mammary carcinoma for surgical treatment recommendations  Patient is a pleasant 67-year-old female presenting with a strong family history for breast carcinoma in her mother who is still living but originally diagnosed with breast cancer at the age of 23  The patient has been bracket tested and is negative  The patient reports 2 previous breast biopsies for benign disease most recently in 2004  She undergoes yearly mammography  Most recently she underwent mammography followed by ultrasound and subsequent percutaneous biopsy which confirmed the presence of an invasive mammary carcinoma in the left breast 1 o'clock position 12 cm from nipple  Ultrasound of the axilla is negative for lymph nodes suspicious for metastatic disease  Hormone markers are pending  Menarche:  Age 7  First child:  Age 35  Menopause:  Age 40    On physical examination the patient is well-appearing female  She is in no acute distress  Awake alert oriented  Pleasant competent reliable as a historian  She has no cervical supraclavicular or axillary lymphadenopathy bilaterally  The breasts are symmetric and pendulous  No dominant lumps masses skin changes or nipple retraction bilaterally  She has postoperative changes with skin bruising and ecchymosis on the upper outer quadrant of the left breast with a well-healed incision post percutaneous biopsy  The diagnosis of invasive mammary carcinoma of no special type of the left breast upper outer quadrant was explained  Standard treatment recommendations including breast conserving therapy with postoperative radiation was discussed      Additionally we will present the patient's case at the next multi disciplinary breast tumor conference  Finally we will order Mammaprint testing  Look for to seeing her back in a week's time to formulated definitive treatment plan  Subjective:      Patient ID: Barbie Mesa is a 62 y o  female  Patient is here today for left invasive ductal breast carcinoma left breast   Patient denies bilateral breast pain, lumps or nipple discharge  Lashonda Thomson MA          Review of Systems   Constitutional: Negative  HENT: Negative  Eyes: Negative  Respiratory: Negative  Cardiovascular: Negative  Gastrointestinal: Negative  Endocrine: Negative  Genitourinary: Negative  Musculoskeletal: Negative  Skin: Negative  Allergic/Immunologic: Negative  Neurological: Negative  Hematological: Negative  Psychiatric/Behavioral: Negative  Objective:      /70 (BP Location: Left arm, Patient Position: Sitting, Cuff Size: Standard)   Pulse 75   Temp 98 °F (36 7 °C) (Temporal)   Resp 18   Ht 5' 4" (1 626 m)   Wt 73 5 kg (162 lb)   LMP  (LMP Unknown)   BMI 27 81 kg/m²          Physical Exam  Constitutional:       Appearance: She is well-developed  HENT:      Head: Normocephalic and atraumatic  Eyes:      Conjunctiva/sclera: Conjunctivae normal       Pupils: Pupils are equal, round, and reactive to light  Neck:      Musculoskeletal: Normal range of motion and neck supple  Cardiovascular:      Rate and Rhythm: Normal rate and regular rhythm  Pulmonary:      Effort: Pulmonary effort is normal       Breath sounds: Normal breath sounds  Abdominal:      General: Bowel sounds are normal       Palpations: Abdomen is soft  Musculoskeletal: Normal range of motion  Skin:     General: Skin is warm and dry  Comments: Except as described above   Neurological:      Mental Status: She is alert and oriented to person, place, and time  Psychiatric:         Behavior: Behavior normal          Thought Content:  Thought content normal  Judgment: Judgment normal

## 2020-09-18 ENCOUNTER — TELEPHONE (OUTPATIENT)
Dept: SURGERY | Facility: CLINIC | Age: 60
End: 2020-09-18

## 2020-09-21 ENCOUNTER — APPOINTMENT (OUTPATIENT)
Dept: RADIOLOGY | Facility: CLINIC | Age: 60
End: 2020-09-21
Payer: COMMERCIAL

## 2020-09-21 ENCOUNTER — OFFICE VISIT (OUTPATIENT)
Dept: SURGERY | Facility: CLINIC | Age: 60
End: 2020-09-21
Payer: COMMERCIAL

## 2020-09-21 ENCOUNTER — CLINICAL SUPPORT (OUTPATIENT)
Dept: URGENT CARE | Facility: CLINIC | Age: 60
End: 2020-09-21
Payer: COMMERCIAL

## 2020-09-21 ENCOUNTER — LAB (OUTPATIENT)
Dept: LAB | Facility: CLINIC | Age: 60
End: 2020-09-21
Payer: COMMERCIAL

## 2020-09-21 VITALS
BODY MASS INDEX: 27.66 KG/M2 | WEIGHT: 162 LBS | DIASTOLIC BLOOD PRESSURE: 90 MMHG | HEART RATE: 76 BPM | TEMPERATURE: 97.6 F | HEIGHT: 64 IN | RESPIRATION RATE: 18 BRPM | SYSTOLIC BLOOD PRESSURE: 160 MMHG

## 2020-09-21 DIAGNOSIS — C50.412 PRIMARY CANCER OF UPPER OUTER QUADRANT OF LEFT FEMALE BREAST (HCC): Primary | ICD-10-CM

## 2020-09-21 DIAGNOSIS — C50.412 PRIMARY CANCER OF UPPER OUTER QUADRANT OF LEFT FEMALE BREAST (HCC): ICD-10-CM

## 2020-09-21 LAB
ANION GAP SERPL CALCULATED.3IONS-SCNC: 4 MMOL/L (ref 4–13)
BUN SERPL-MCNC: 11 MG/DL (ref 5–25)
CALCIUM SERPL-MCNC: 10.2 MG/DL (ref 8.3–10.1)
CHLORIDE SERPL-SCNC: 105 MMOL/L (ref 100–108)
CO2 SERPL-SCNC: 29 MMOL/L (ref 21–32)
CREAT SERPL-MCNC: 0.79 MG/DL (ref 0.6–1.3)
ERYTHROCYTE [DISTWIDTH] IN BLOOD BY AUTOMATED COUNT: 13.2 % (ref 11.6–15.1)
GFR SERPL CREATININE-BSD FRML MDRD: 82 ML/MIN/1.73SQ M
GLUCOSE SERPL-MCNC: 124 MG/DL (ref 65–140)
HCT VFR BLD AUTO: 44.7 % (ref 34.8–46.1)
HGB BLD-MCNC: 14.1 G/DL (ref 11.5–15.4)
MCH RBC QN AUTO: 29.2 PG (ref 26.8–34.3)
MCHC RBC AUTO-ENTMCNC: 31.5 G/DL (ref 31.4–37.4)
MCV RBC AUTO: 93 FL (ref 82–98)
PLATELET # BLD AUTO: 269 THOUSANDS/UL (ref 149–390)
PMV BLD AUTO: 9.9 FL (ref 8.9–12.7)
POTASSIUM SERPL-SCNC: 4.1 MMOL/L (ref 3.5–5.3)
RBC # BLD AUTO: 4.83 MILLION/UL (ref 3.81–5.12)
SODIUM SERPL-SCNC: 138 MMOL/L (ref 136–145)
WBC # BLD AUTO: 8.79 THOUSAND/UL (ref 4.31–10.16)

## 2020-09-21 PROCEDURE — 36415 COLL VENOUS BLD VENIPUNCTURE: CPT

## 2020-09-21 PROCEDURE — 93005 ELECTROCARDIOGRAM TRACING: CPT

## 2020-09-21 PROCEDURE — 80048 BASIC METABOLIC PNL TOTAL CA: CPT

## 2020-09-21 PROCEDURE — 85027 COMPLETE CBC AUTOMATED: CPT

## 2020-09-21 PROCEDURE — 71046 X-RAY EXAM CHEST 2 VIEWS: CPT

## 2020-09-21 PROCEDURE — 99214 OFFICE O/P EST MOD 30 MIN: CPT | Performed by: SURGERY

## 2020-09-21 RX ORDER — ONDANSETRON 2 MG/ML
4 INJECTION INTRAMUSCULAR; INTRAVENOUS ONCE
Status: CANCELLED | OUTPATIENT
Start: 2020-09-21 | End: 2020-09-21

## 2020-09-21 RX ORDER — SODIUM CHLORIDE, SODIUM LACTATE, POTASSIUM CHLORIDE, CALCIUM CHLORIDE 600; 310; 30; 20 MG/100ML; MG/100ML; MG/100ML; MG/100ML
125 INJECTION, SOLUTION INTRAVENOUS CONTINUOUS
Status: CANCELLED | OUTPATIENT
Start: 2020-09-21

## 2020-09-21 NOTE — PROGRESS NOTES
Assessment/Plan:    Primary cancer of upper outer quadrant of left female breast [facility])  The patient returns to the office formalized her surgical treatment plan following the completed pathology report  I was happy to report to the patient that she is hormone receptive positive and HER2 negative  With this added information I have advised moving forward with definitive surgical treatment of her upper outer quadrant left breast carcinoma by needle-localization excisional left breast biopsy and left axillary sentinel lymph node biopsy be followed by adjuvant radiation therapy and hormonal supplementation  The technical details of the surgery as well as the benefits and risks were reviewed with the patient the presence of her   Specific risks related to anesthesia, bleeding, infection, neurovascular injury and 5-8% risk of postoperative lymphedema were all explained  All questions answered to the satisfaction of the patient and informed consent taken to proceed          Subjective:      Patient ID: Karmen Koeing is a 62 y o  female  Patient is here today for in follow left invasive ductal breast cancer, pre op  Patient reports that she is doing well, however has some questions for Dr Ceci Thomson MA          Review of Systems   Constitutional: Negative  HENT: Negative  Eyes: Negative  Respiratory: Negative  Cardiovascular: Negative  Gastrointestinal: Negative  Endocrine: Negative  Genitourinary: Negative  Musculoskeletal: Negative  Skin: Negative  Allergic/Immunologic: Negative  Neurological: Negative  Hematological: Negative  Psychiatric/Behavioral: Negative            Objective:      /90 (BP Location: Left arm, Patient Position: Sitting, Cuff Size: Standard)   Pulse 76   Temp 97 6 °F (36 4 °C) (Temporal)   Resp 18   Ht 5' 4" (1 626 m)   Wt 73 5 kg (162 lb)   LMP  (LMP Unknown)   BMI 27 81 kg/m²          Physical Exam  Constitutional:       Appearance: She is well-developed  HENT:      Head: Normocephalic and atraumatic  Eyes:      Conjunctiva/sclera: Conjunctivae normal       Pupils: Pupils are equal, round, and reactive to light  Neck:      Musculoskeletal: Normal range of motion and neck supple  Cardiovascular:      Rate and Rhythm: Normal rate and regular rhythm  Pulmonary:      Effort: Pulmonary effort is normal       Breath sounds: Normal breath sounds  Abdominal:      General: Bowel sounds are normal       Palpations: Abdomen is soft  Musculoskeletal: Normal range of motion  Skin:     General: Skin is warm and dry  Comments: The patient has no palpable cervical, supraclavicular, or axillary lymphadenopathy bilaterally  The breasts are symmetric  There are no dominant lumps, masses, skin changes or nipple retraction bilaterally  Neurological:      Mental Status: She is alert and oriented to person, place, and time  Psychiatric:         Behavior: Behavior normal          Thought Content:  Thought content normal          Judgment: Judgment normal

## 2020-09-21 NOTE — ASSESSMENT & PLAN NOTE
The patient returns to the office formalized her surgical treatment plan following the completed pathology report  I was happy to report to the patient that she is hormone receptive positive and HER2 negative  With this added information I have advised moving forward with definitive surgical treatment of her upper outer quadrant left breast carcinoma by needle-localization excisional left breast biopsy and left axillary sentinel lymph node biopsy be followed by adjuvant radiation therapy and hormonal supplementation  The technical details of the surgery as well as the benefits and risks were reviewed with the patient the presence of her   Specific risks related to anesthesia, bleeding, infection, neurovascular injury and 5-8% risk of postoperative lymphedema were all explained    All questions answered to the satisfaction of the patient and informed consent taken to proceed

## 2020-09-21 NOTE — H&P
Assessment/Plan:    Primary cancer of upper outer quadrant of left female breast [facility])  The patient returns to the office formalized her surgical treatment plan following the completed pathology report  I was happy to report to the patient that she is hormone receptive positive and HER2 negative  With this added information I have advised moving forward with definitive surgical treatment of her upper outer quadrant left breast carcinoma by needle-localization excisional left breast biopsy and left axillary sentinel lymph node biopsy be followed by adjuvant radiation therapy and hormonal supplementation  The technical details of the surgery as well as the benefits and risks were reviewed with the patient the presence of her   Specific risks related to anesthesia, bleeding, infection, neurovascular injury and 5-8% risk of postoperative lymphedema were all explained  All questions answered to the satisfaction of the patient and informed consent taken to proceed          Subjective:      Patient ID: Derek Vivar is a 62 y o  female  Patient is here today for in follow left invasive ductal breast cancer, pre op  Patient reports that she is doing well, however has some questions for Dr Marlen Thomson MA          Review of Systems   Constitutional: Negative  HENT: Negative  Eyes: Negative  Respiratory: Negative  Cardiovascular: Negative  Gastrointestinal: Negative  Endocrine: Negative  Genitourinary: Negative  Musculoskeletal: Negative  Skin: Negative  Allergic/Immunologic: Negative  Neurological: Negative  Hematological: Negative  Psychiatric/Behavioral: Negative            Objective:      /90 (BP Location: Left arm, Patient Position: Sitting, Cuff Size: Standard)   Pulse 76   Temp 97 6 °F (36 4 °C) (Temporal)   Resp 18   Ht 5' 4" (1 626 m)   Wt 73 5 kg (162 lb)   LMP  (LMP Unknown)   BMI 27 81 kg/m²          Physical Exam  Constitutional:       Appearance: She is well-developed  HENT:      Head: Normocephalic and atraumatic  Eyes:      Conjunctiva/sclera: Conjunctivae normal       Pupils: Pupils are equal, round, and reactive to light  Neck:      Musculoskeletal: Normal range of motion and neck supple  Cardiovascular:      Rate and Rhythm: Normal rate and regular rhythm  Pulmonary:      Effort: Pulmonary effort is normal       Breath sounds: Normal breath sounds  Abdominal:      General: Bowel sounds are normal       Palpations: Abdomen is soft  Musculoskeletal: Normal range of motion  Skin:     General: Skin is warm and dry  Comments: The patient has no palpable cervical, supraclavicular, or axillary lymphadenopathy bilaterally  The breasts are symmetric  There are no dominant lumps, masses, skin changes or nipple retraction bilaterally  Neurological:      Mental Status: She is alert and oriented to person, place, and time  Psychiatric:         Behavior: Behavior normal          Thought Content:  Thought content normal          Judgment: Judgment normal

## 2020-09-22 ENCOUNTER — TELEPHONE (OUTPATIENT)
Dept: SURGERY | Facility: CLINIC | Age: 60
End: 2020-09-22

## 2020-09-22 LAB
ATRIAL RATE: 67 BPM
P AXIS: 32 DEGREES
PR INTERVAL: 144 MS
QRS AXIS: 32 DEGREES
QRSD INTERVAL: 66 MS
QT INTERVAL: 416 MS
QTC INTERVAL: 439 MS
T WAVE AXIS: 44 DEGREES
VENTRICULAR RATE: 67 BPM

## 2020-09-22 PROCEDURE — 93010 ELECTROCARDIOGRAM REPORT: CPT | Performed by: INTERNAL MEDICINE

## 2020-09-22 NOTE — TELEPHONE ENCOUNTER
Winsome Guzman with [facility] called regarding there is not enough specimen to accommodate the test for Agendia for the [facility]  I did [facility] Dr Brayan Wilder and he stated that we will resend the test after her excision on 9-  Winsome Guzman is aware and is calling [facility] to also make them aware of the plans

## 2020-09-28 ENCOUNTER — DOCUMENTATION (OUTPATIENT)
Dept: SURGICAL ONCOLOGY | Facility: CLINIC | Age: 60
End: 2020-09-28

## 2020-09-28 DIAGNOSIS — C50.412 PRIMARY CANCER OF UPPER OUTER QUADRANT OF LEFT FEMALE BREAST (HCC): Primary | ICD-10-CM

## 2020-09-28 NOTE — PROGRESS NOTES
On the recommendation of the [facility] multi disciplinary breast tumor conference a referral is being made to genetic counseling

## 2020-09-29 NOTE — PROGRESS NOTES
09/28/2020 Breast Working Group Recommended Treatment Plan to be considered:    Lumpectomy with SLNB followed by post operative radiation therapy      Genetic testing consult

## 2020-09-30 ENCOUNTER — TELEPHONE (OUTPATIENT)
Dept: SURGERY | Facility: CLINIC | Age: 60
End: 2020-09-30

## 2020-09-30 NOTE — TELEPHONE ENCOUNTER
Spoke with the patient that Dr Lula Long stated that he would like the [facility] 1 & 2 testing repeated and still would like her to see the genetic counselor this per recommendations at tumor board  Karlos Brito

## 2020-10-02 ENCOUNTER — HOSPITAL ENCOUNTER (OUTPATIENT)
Dept: MAMMOGRAPHY | Facility: HOSPITAL | Age: 60
Discharge: HOME | End: 2020-10-02
Payer: COMMERCIAL

## 2020-10-02 ENCOUNTER — ANESTHESIA (OUTPATIENT)
Dept: PERIOP | Facility: HOSPITAL | Age: 60
End: 2020-10-02
Payer: COMMERCIAL

## 2020-10-02 ENCOUNTER — HOSPITAL ENCOUNTER (OUTPATIENT)
Dept: NUCLEAR MEDICINE | Facility: HOSPITAL | Age: 60
Discharge: HOME | End: 2020-10-02
Attending: SURGERY
Payer: COMMERCIAL

## 2020-10-02 ENCOUNTER — HOSPITAL ENCOUNTER (OUTPATIENT)
Facility: HOSPITAL | Age: 60
Setting detail: OUTPATIENT SURGERY
Discharge: HOME | End: 2020-10-02
Attending: SURGERY | Admitting: SURGERY
Payer: COMMERCIAL

## 2020-10-02 ENCOUNTER — APPOINTMENT (OUTPATIENT)
Dept: MAMMOGRAPHY | Facility: HOSPITAL | Age: 60
End: 2020-10-02
Payer: COMMERCIAL

## 2020-10-02 ENCOUNTER — ANESTHESIA EVENT (OUTPATIENT)
Dept: PERIOP | Facility: HOSPITAL | Age: 60
End: 2020-10-02
Payer: COMMERCIAL

## 2020-10-02 VITALS
WEIGHT: 162 LBS | HEIGHT: 64 IN | HEART RATE: 71 BPM | TEMPERATURE: 96.8 F | BODY MASS INDEX: 27.66 KG/M2 | SYSTOLIC BLOOD PRESSURE: 151 MMHG | DIASTOLIC BLOOD PRESSURE: 77 MMHG | RESPIRATION RATE: 16 BRPM | OXYGEN SATURATION: 100 %

## 2020-10-02 VITALS — HEART RATE: 90 BPM

## 2020-10-02 DIAGNOSIS — C50.412 PRIMARY CANCER OF UPPER OUTER QUADRANT OF LEFT FEMALE BREAST (HCC): ICD-10-CM

## 2020-10-02 PROCEDURE — A9541 TC99M SULFUR COLLOID: HCPCS

## 2020-10-02 PROCEDURE — 19125 EXCISION BREAST LESION: CPT | Performed by: SURGERY

## 2020-10-02 PROCEDURE — 88342 IMHCHEM/IMCYTCHM 1ST ANTB: CPT | Performed by: PATHOLOGY

## 2020-10-02 PROCEDURE — 88341 IMHCHEM/IMCYTCHM EA ADD ANTB: CPT | Performed by: PATHOLOGY

## 2020-10-02 PROCEDURE — 88307 TISSUE EXAM BY PATHOLOGIST: CPT | Performed by: PATHOLOGY

## 2020-10-02 PROCEDURE — 19281 PERQ DEVICE BREAST 1ST IMAG: CPT

## 2020-10-02 PROCEDURE — 76098 X-RAY EXAM SURGICAL SPECIMEN: CPT

## 2020-10-02 PROCEDURE — G1004 CDSM NDSC: HCPCS

## 2020-10-02 PROCEDURE — NC001 PR NO CHARGE: Performed by: PHYSICIAN ASSISTANT

## 2020-10-02 PROCEDURE — 78195 LYMPH SYSTEM IMAGING: CPT

## 2020-10-02 PROCEDURE — 38525 BIOPSY/REMOVAL LYMPH NODES: CPT | Performed by: SURGERY

## 2020-10-02 RX ORDER — MIDAZOLAM HYDROCHLORIDE 2 MG/2ML
INJECTION, SOLUTION INTRAMUSCULAR; INTRAVENOUS AS NEEDED
Status: DISCONTINUED | OUTPATIENT
Start: 2020-10-02 | End: 2020-10-02

## 2020-10-02 RX ORDER — LIDOCAINE HYDROCHLORIDE 10 MG/ML
INJECTION, SOLUTION EPIDURAL; INFILTRATION; INTRACAUDAL; PERINEURAL AS NEEDED
Status: DISCONTINUED | OUTPATIENT
Start: 2020-10-02 | End: 2020-10-02

## 2020-10-02 RX ORDER — HYDROMORPHONE HCL/PF 1 MG/ML
0.5 SYRINGE (ML) INJECTION
Status: DISCONTINUED | OUTPATIENT
Start: 2020-10-02 | End: 2020-10-02 | Stop reason: HOSPADM

## 2020-10-02 RX ORDER — MAGNESIUM HYDROXIDE 1200 MG/15ML
LIQUID ORAL AS NEEDED
Status: DISCONTINUED | OUTPATIENT
Start: 2020-10-02 | End: 2020-10-02 | Stop reason: HOSPADM

## 2020-10-02 RX ORDER — METOCLOPRAMIDE HYDROCHLORIDE 5 MG/ML
10 INJECTION INTRAMUSCULAR; INTRAVENOUS ONCE AS NEEDED
Status: DISCONTINUED | OUTPATIENT
Start: 2020-10-02 | End: 2020-10-02 | Stop reason: HOSPADM

## 2020-10-02 RX ORDER — OXYCODONE HYDROCHLORIDE AND ACETAMINOPHEN 5; 325 MG/1; MG/1
2 TABLET ORAL EVERY 6 HOURS PRN
Status: DISCONTINUED | OUTPATIENT
Start: 2020-10-02 | End: 2020-10-02 | Stop reason: HOSPADM

## 2020-10-02 RX ORDER — CALCIUM CARBONATE 200(500)MG
500 TABLET,CHEWABLE ORAL 2 TIMES DAILY PRN
Status: DISCONTINUED | OUTPATIENT
Start: 2020-10-02 | End: 2020-10-02 | Stop reason: HOSPADM

## 2020-10-02 RX ORDER — ONDANSETRON 2 MG/ML
4 INJECTION INTRAMUSCULAR; INTRAVENOUS EVERY 6 HOURS PRN
Status: DISCONTINUED | OUTPATIENT
Start: 2020-10-02 | End: 2020-10-02 | Stop reason: HOSPADM

## 2020-10-02 RX ORDER — SODIUM CHLORIDE, SODIUM LACTATE, POTASSIUM CHLORIDE, CALCIUM CHLORIDE 600; 310; 30; 20 MG/100ML; MG/100ML; MG/100ML; MG/100ML
100 INJECTION, SOLUTION INTRAVENOUS CONTINUOUS
Status: DISCONTINUED | OUTPATIENT
Start: 2020-10-02 | End: 2020-10-02 | Stop reason: HOSPADM

## 2020-10-02 RX ORDER — SODIUM CHLORIDE, SODIUM LACTATE, POTASSIUM CHLORIDE, CALCIUM CHLORIDE 600; 310; 30; 20 MG/100ML; MG/100ML; MG/100ML; MG/100ML
125 INJECTION, SOLUTION INTRAVENOUS CONTINUOUS
Status: DISCONTINUED | OUTPATIENT
Start: 2020-10-02 | End: 2020-10-02 | Stop reason: HOSPADM

## 2020-10-02 RX ORDER — PROPOFOL 10 MG/ML
INJECTION, EMULSION INTRAVENOUS AS NEEDED
Status: DISCONTINUED | OUTPATIENT
Start: 2020-10-02 | End: 2020-10-02

## 2020-10-02 RX ORDER — ACETAMINOPHEN 325 MG/1
650 TABLET ORAL EVERY 6 HOURS PRN
Status: DISCONTINUED | OUTPATIENT
Start: 2020-10-02 | End: 2020-10-02 | Stop reason: HOSPADM

## 2020-10-02 RX ORDER — LIDOCAINE HYDROCHLORIDE AND EPINEPHRINE 10; 10 MG/ML; UG/ML
INJECTION, SOLUTION INFILTRATION; PERINEURAL AS NEEDED
Status: DISCONTINUED | OUTPATIENT
Start: 2020-10-02 | End: 2020-10-02 | Stop reason: HOSPADM

## 2020-10-02 RX ORDER — DEXAMETHASONE SODIUM PHOSPHATE 10 MG/ML
INJECTION, SOLUTION INTRAMUSCULAR; INTRAVENOUS AS NEEDED
Status: DISCONTINUED | OUTPATIENT
Start: 2020-10-02 | End: 2020-10-02

## 2020-10-02 RX ORDER — CLINDAMYCIN PHOSPHATE 600 MG/50ML
600 INJECTION INTRAVENOUS ONCE
Status: DISCONTINUED | OUTPATIENT
Start: 2020-10-02 | End: 2020-10-02

## 2020-10-02 RX ORDER — IBUPROFEN 600 MG/1
600 TABLET ORAL
Qty: 15 TABLET | Refills: 0 | Status: SHIPPED | OUTPATIENT
Start: 2020-10-02 | End: 2020-10-23

## 2020-10-02 RX ORDER — PROMETHAZINE HYDROCHLORIDE 25 MG/ML
12.5 INJECTION, SOLUTION INTRAMUSCULAR; INTRAVENOUS
Status: DISCONTINUED | OUTPATIENT
Start: 2020-10-02 | End: 2020-10-02 | Stop reason: HOSPADM

## 2020-10-02 RX ORDER — ONDANSETRON 2 MG/ML
4 INJECTION INTRAMUSCULAR; INTRAVENOUS ONCE
Status: DISCONTINUED | OUTPATIENT
Start: 2020-10-02 | End: 2020-10-02

## 2020-10-02 RX ORDER — BUPIVACAINE HYDROCHLORIDE 5 MG/ML
INJECTION, SOLUTION PERINEURAL AS NEEDED
Status: DISCONTINUED | OUTPATIENT
Start: 2020-10-02 | End: 2020-10-02 | Stop reason: HOSPADM

## 2020-10-02 RX ORDER — FENTANYL CITRATE/PF 50 MCG/ML
50 SYRINGE (ML) INJECTION
Status: DISCONTINUED | OUTPATIENT
Start: 2020-10-02 | End: 2020-10-02 | Stop reason: HOSPADM

## 2020-10-02 RX ORDER — OXYCODONE HYDROCHLORIDE AND ACETAMINOPHEN 5; 325 MG/1; MG/1
1 TABLET ORAL EVERY 6 HOURS PRN
Status: DISCONTINUED | OUTPATIENT
Start: 2020-10-02 | End: 2020-10-02 | Stop reason: HOSPADM

## 2020-10-02 RX ORDER — EPHEDRINE SULFATE 50 MG/ML
INJECTION INTRAVENOUS AS NEEDED
Status: DISCONTINUED | OUTPATIENT
Start: 2020-10-02 | End: 2020-10-02

## 2020-10-02 RX ORDER — LIDOCAINE HYDROCHLORIDE 10 MG/ML
10 INJECTION, SOLUTION EPIDURAL; INFILTRATION; INTRACAUDAL; PERINEURAL ONCE
Status: COMPLETED | OUTPATIENT
Start: 2020-10-02 | End: 2020-10-02

## 2020-10-02 RX ORDER — FENTANYL CITRATE 50 UG/ML
INJECTION, SOLUTION INTRAMUSCULAR; INTRAVENOUS AS NEEDED
Status: DISCONTINUED | OUTPATIENT
Start: 2020-10-02 | End: 2020-10-02

## 2020-10-02 RX ORDER — ONDANSETRON 2 MG/ML
INJECTION INTRAMUSCULAR; INTRAVENOUS AS NEEDED
Status: DISCONTINUED | OUTPATIENT
Start: 2020-10-02 | End: 2020-10-02

## 2020-10-02 RX ORDER — LEVALBUTEROL INHALATION SOLUTION 0.63 MG/3ML
0.63 SOLUTION RESPIRATORY (INHALATION) EVERY 8 HOURS PRN
Status: DISCONTINUED | OUTPATIENT
Start: 2020-10-02 | End: 2020-10-02 | Stop reason: HOSPADM

## 2020-10-02 RX ORDER — ONDANSETRON 2 MG/ML
4 INJECTION INTRAMUSCULAR; INTRAVENOUS ONCE AS NEEDED
Status: DISCONTINUED | OUTPATIENT
Start: 2020-10-02 | End: 2020-10-02 | Stop reason: HOSPADM

## 2020-10-02 RX ORDER — SODIUM CHLORIDE, SODIUM LACTATE, POTASSIUM CHLORIDE, CALCIUM CHLORIDE 600; 310; 30; 20 MG/100ML; MG/100ML; MG/100ML; MG/100ML
75 INJECTION, SOLUTION INTRAVENOUS CONTINUOUS
Status: DISCONTINUED | OUTPATIENT
Start: 2020-10-02 | End: 2020-10-02 | Stop reason: HOSPADM

## 2020-10-02 RX ORDER — KETOROLAC TROMETHAMINE 30 MG/ML
INJECTION, SOLUTION INTRAMUSCULAR; INTRAVENOUS AS NEEDED
Status: DISCONTINUED | OUTPATIENT
Start: 2020-10-02 | End: 2020-10-02

## 2020-10-02 RX ORDER — OXYCODONE HYDROCHLORIDE AND ACETAMINOPHEN 5; 325 MG/1; MG/1
1 TABLET ORAL EVERY 6 HOURS PRN
Qty: 15 TABLET | Refills: 0 | Status: SHIPPED | OUTPATIENT
Start: 2020-10-02 | End: 2020-10-12

## 2020-10-02 RX ORDER — CLINDAMYCIN PHOSPHATE 600 MG/50ML
INJECTION INTRAVENOUS AS NEEDED
Status: DISCONTINUED | OUTPATIENT
Start: 2020-10-02 | End: 2020-10-02

## 2020-10-02 RX ADMIN — ONDANSETRON 4 MG: 2 INJECTION INTRAMUSCULAR; INTRAVENOUS at 12:40

## 2020-10-02 RX ADMIN — EPHEDRINE SULFATE 5 MG: 50 INJECTION, SOLUTION INTRAVENOUS at 12:33

## 2020-10-02 RX ADMIN — KETOROLAC TROMETHAMINE 30 MG: 30 INJECTION, SOLUTION INTRAMUSCULAR at 13:28

## 2020-10-02 RX ADMIN — FENTANYL CITRATE 25 MCG: 50 INJECTION INTRAMUSCULAR; INTRAVENOUS at 12:29

## 2020-10-02 RX ADMIN — MIDAZOLAM HYDROCHLORIDE 2 MG: 1 INJECTION, SOLUTION INTRAMUSCULAR; INTRAVENOUS at 12:15

## 2020-10-02 RX ADMIN — LIDOCAINE HYDROCHLORIDE 10 ML: 10 INJECTION, SOLUTION EPIDURAL; INFILTRATION; INTRACAUDAL; PERINEURAL at 08:24

## 2020-10-02 RX ADMIN — CLINDAMYCIN IN 5 PERCENT DEXTROSE 600 MG: 12 INJECTION, SOLUTION INTRAVENOUS at 12:13

## 2020-10-02 RX ADMIN — LIDOCAINE HYDROCHLORIDE 100 MG: 10 INJECTION, SOLUTION EPIDURAL; INFILTRATION; INTRACAUDAL; PERINEURAL at 12:21

## 2020-10-02 RX ADMIN — FENTANYL CITRATE 25 MCG: 50 INJECTION INTRAMUSCULAR; INTRAVENOUS at 12:21

## 2020-10-02 RX ADMIN — SODIUM CHLORIDE, SODIUM LACTATE, POTASSIUM CHLORIDE, AND CALCIUM CHLORIDE: .6; .31; .03; .02 INJECTION, SOLUTION INTRAVENOUS at 12:13

## 2020-10-02 RX ADMIN — FENTANYL CITRATE 50 MCG: 50 INJECTION INTRAMUSCULAR; INTRAVENOUS at 13:25

## 2020-10-02 RX ADMIN — FENTANYL CITRATE 50 MCG: 50 INJECTION INTRAMUSCULAR; INTRAVENOUS at 12:35

## 2020-10-02 RX ADMIN — PROPOFOL 150 MG: 10 INJECTION, EMULSION INTRAVENOUS at 12:21

## 2020-10-02 RX ADMIN — DEXAMETHASONE SODIUM PHOSPHATE 4 MG: 10 INJECTION, SOLUTION INTRAMUSCULAR; INTRAVENOUS at 12:40

## 2020-10-07 ENCOUNTER — TELEPHONE (OUTPATIENT)
Dept: HEMATOLOGY ONCOLOGY | Facility: CLINIC | Age: 60
End: 2020-10-07

## 2020-10-09 ENCOUNTER — TELEPHONE (OUTPATIENT)
Dept: SURGICAL ONCOLOGY | Facility: CLINIC | Age: 60
End: 2020-10-09

## 2020-10-09 ENCOUNTER — OFFICE VISIT (OUTPATIENT)
Dept: SURGERY | Facility: CLINIC | Age: 60
End: 2020-10-09

## 2020-10-09 VITALS — TEMPERATURE: 97.6 F

## 2020-10-09 DIAGNOSIS — C50.412 PRIMARY CANCER OF UPPER OUTER QUADRANT OF LEFT FEMALE BREAST (HCC): Primary | ICD-10-CM

## 2020-10-09 PROCEDURE — 99024 POSTOP FOLLOW-UP VISIT: CPT | Performed by: SURGERY

## 2020-10-12 ENCOUNTER — DOCUMENTATION (OUTPATIENT)
Dept: HEMATOLOGY ONCOLOGY | Facility: CLINIC | Age: 60
End: 2020-10-12

## 2020-10-12 ENCOUNTER — PREP FOR PROCEDURE (OUTPATIENT)
Dept: INTERVENTIONAL RADIOLOGY/VASCULAR | Facility: CLINIC | Age: 60
End: 2020-10-12

## 2020-10-12 ENCOUNTER — CONSULT (OUTPATIENT)
Dept: HEMATOLOGY ONCOLOGY | Facility: CLINIC | Age: 60
End: 2020-10-12
Payer: COMMERCIAL

## 2020-10-12 VITALS
WEIGHT: 161.2 LBS | DIASTOLIC BLOOD PRESSURE: 86 MMHG | OXYGEN SATURATION: 98 % | HEIGHT: 64 IN | BODY MASS INDEX: 27.52 KG/M2 | SYSTOLIC BLOOD PRESSURE: 130 MMHG | TEMPERATURE: 96.9 F | HEART RATE: 86 BPM | RESPIRATION RATE: 16 BRPM

## 2020-10-12 DIAGNOSIS — D70.1 CHEMOTHERAPY INDUCED NEUTROPENIA (HCC): ICD-10-CM

## 2020-10-12 DIAGNOSIS — C50.919 CARCINOMA OF FEMALE BREAST, UNSPECIFIED ESTROGEN RECEPTOR STATUS, UNSPECIFIED LATERALITY, UNSPECIFIED SITE OF BREAST (HCC): Primary | ICD-10-CM

## 2020-10-12 DIAGNOSIS — T45.1X5A CHEMOTHERAPY INDUCED NEUTROPENIA (HCC): ICD-10-CM

## 2020-10-12 DIAGNOSIS — C50.412 PRIMARY CANCER OF UPPER OUTER QUADRANT OF LEFT FEMALE BREAST (HCC): Primary | ICD-10-CM

## 2020-10-12 PROCEDURE — 99245 OFF/OP CONSLTJ NEW/EST HI 55: CPT | Performed by: INTERNAL MEDICINE

## 2020-10-12 RX ORDER — ASCORBIC ACID 500 MG
500 TABLET ORAL DAILY
COMMUNITY
End: 2021-02-04 | Stop reason: ALTCHOICE

## 2020-10-12 RX ORDER — SODIUM CHLORIDE 9 MG/ML
75 INJECTION, SOLUTION INTRAVENOUS CONTINUOUS
Status: CANCELLED | OUTPATIENT
Start: 2020-10-12

## 2020-10-12 RX ORDER — ONDANSETRON HYDROCHLORIDE 8 MG/1
8 TABLET, FILM COATED ORAL EVERY 8 HOURS PRN
Qty: 20 TABLET | Refills: 0 | Status: SHIPPED | OUTPATIENT
Start: 2020-10-12 | End: 2020-11-09

## 2020-10-13 ENCOUNTER — DOCUMENTATION (OUTPATIENT)
Dept: HEMATOLOGY ONCOLOGY | Facility: CLINIC | Age: 60
End: 2020-10-13

## 2020-10-13 ENCOUNTER — TELEPHONE (OUTPATIENT)
Dept: INTERVENTIONAL RADIOLOGY/VASCULAR | Facility: HOSPITAL | Age: 60
End: 2020-10-13

## 2020-10-13 ENCOUNTER — TELEPHONE (OUTPATIENT)
Dept: HEMATOLOGY ONCOLOGY | Facility: CLINIC | Age: 60
End: 2020-10-13

## 2020-10-14 ENCOUNTER — TELEMEDICINE (OUTPATIENT)
Dept: RADIATION ONCOLOGY | Facility: HOSPITAL | Age: 60
End: 2020-10-14
Attending: RADIOLOGY

## 2020-10-14 ENCOUNTER — CLINICAL SUPPORT (OUTPATIENT)
Dept: RADIATION ONCOLOGY | Facility: HOSPITAL | Age: 60
End: 2020-10-14
Attending: RADIOLOGY

## 2020-10-14 DIAGNOSIS — C50.412 PRIMARY CANCER OF UPPER OUTER QUADRANT OF LEFT FEMALE BREAST (HCC): Primary | ICD-10-CM

## 2020-10-16 ENCOUNTER — TELEPHONE (OUTPATIENT)
Dept: HEMATOLOGY ONCOLOGY | Facility: CLINIC | Age: 60
End: 2020-10-16

## 2020-10-19 ENCOUNTER — TELEPHONE (OUTPATIENT)
Dept: HEMATOLOGY ONCOLOGY | Facility: CLINIC | Age: 60
End: 2020-10-19

## 2020-10-19 DIAGNOSIS — C50.412 PRIMARY CANCER OF UPPER OUTER QUADRANT OF LEFT FEMALE BREAST (HCC): Primary | ICD-10-CM

## 2020-10-20 ENCOUNTER — TELEPHONE (OUTPATIENT)
Dept: HEMATOLOGY ONCOLOGY | Facility: CLINIC | Age: 60
End: 2020-10-20

## 2020-10-20 DIAGNOSIS — C50.412 PRIMARY CANCER OF UPPER OUTER QUADRANT OF LEFT FEMALE BREAST (HCC): Primary | ICD-10-CM

## 2020-10-20 DIAGNOSIS — L65.9 ALOPECIA: Primary | ICD-10-CM

## 2020-10-20 DIAGNOSIS — L65.9 ALOPECIA: ICD-10-CM

## 2020-10-23 ENCOUNTER — CONSULT (OUTPATIENT)
Dept: GYNECOLOGIC ONCOLOGY | Facility: CLINIC | Age: 60
End: 2020-10-23

## 2020-10-23 ENCOUNTER — OFFICE VISIT (OUTPATIENT)
Dept: SURGERY | Facility: CLINIC | Age: 60
End: 2020-10-23
Payer: COMMERCIAL

## 2020-10-23 VITALS — TEMPERATURE: 97.6 F

## 2020-10-23 DIAGNOSIS — D48.5 NEOPLASM OF UNCERTAIN BEHAVIOR OF SKIN OF NECK: ICD-10-CM

## 2020-10-23 DIAGNOSIS — C50.412 PRIMARY CANCER OF UPPER OUTER QUADRANT OF LEFT FEMALE BREAST (HCC): Primary | ICD-10-CM

## 2020-10-23 DIAGNOSIS — Z80.3 FAMILY HISTORY OF BREAST CANCER: ICD-10-CM

## 2020-10-23 PROCEDURE — 88305 TISSUE EXAM BY PATHOLOGIST: CPT | Performed by: PATHOLOGY

## 2020-10-23 PROCEDURE — 11421 EXC H-F-NK-SP B9+MARG 0.6-1: CPT | Performed by: SURGERY

## 2020-10-23 PROCEDURE — 99213 OFFICE O/P EST LOW 20 MIN: CPT | Performed by: SURGERY

## 2020-10-23 PROCEDURE — NC001 PR NO CHARGE: Performed by: GENETIC COUNSELOR, MS

## 2020-10-26 ENCOUNTER — HOSPITAL ENCOUNTER (OUTPATIENT)
Dept: INTERVENTIONAL RADIOLOGY/VASCULAR | Facility: HOSPITAL | Age: 60
Discharge: HOME | End: 2020-10-26
Attending: RADIOLOGY
Payer: COMMERCIAL

## 2020-10-26 VITALS
SYSTOLIC BLOOD PRESSURE: 124 MMHG | RESPIRATION RATE: 18 BRPM | WEIGHT: 161 LBS | BODY MASS INDEX: 27.49 KG/M2 | HEIGHT: 64 IN | HEART RATE: 83 BPM | TEMPERATURE: 96 F | OXYGEN SATURATION: 98 % | DIASTOLIC BLOOD PRESSURE: 58 MMHG

## 2020-10-26 DIAGNOSIS — C50.919 CARCINOMA OF FEMALE BREAST, UNSPECIFIED ESTROGEN RECEPTOR STATUS, UNSPECIFIED LATERALITY, UNSPECIFIED SITE OF BREAST (HCC): ICD-10-CM

## 2020-10-26 DIAGNOSIS — C50.919 CARCINOMA OF FEMALE BREAST, UNSPECIFIED ESTROGEN RECEPTOR STATUS, UNSPECIFIED LATERALITY, UNSPECIFIED SITE OF BREAST (HCC): Primary | ICD-10-CM

## 2020-10-26 LAB
ALBUMIN SERPL BCP-MCNC: 3.9 G/DL (ref 3.5–5.7)
ALP SERPL-CCNC: 75 U/L (ref 40–150)
ALT SERPL W P-5'-P-CCNC: 16 U/L (ref 7–52)
ANION GAP SERPL CALCULATED.3IONS-SCNC: 5 MMOL/L (ref 4–13)
AST SERPL W P-5'-P-CCNC: 15 U/L (ref 13–39)
BASOPHILS # BLD AUTO: 0 THOUSANDS/ΜL (ref 0–0.1)
BASOPHILS NFR BLD AUTO: 1 % (ref 0–2)
BILIRUB SERPL-MCNC: 0.8 MG/DL (ref 0.2–1)
BUN SERPL-MCNC: 12 MG/DL (ref 7–25)
CALCIUM SERPL-MCNC: 8.5 MG/DL (ref 8.6–10.5)
CHLORIDE SERPL-SCNC: 106 MMOL/L (ref 98–107)
CO2 SERPL-SCNC: 29 MMOL/L (ref 21–31)
CREAT SERPL-MCNC: 0.76 MG/DL (ref 0.6–1.2)
EOSINOPHIL # BLD AUTO: 0.1 THOUSAND/ΜL (ref 0–0.61)
EOSINOPHIL NFR BLD AUTO: 2 % (ref 0–5)
ERYTHROCYTE [DISTWIDTH] IN BLOOD BY AUTOMATED COUNT: 13.8 % (ref 11.5–14.5)
GFR SERPL CREATININE-BSD FRML MDRD: 86 ML/MIN/1.73SQ M
GLUCOSE P FAST SERPL-MCNC: 100 MG/DL (ref 65–99)
GLUCOSE SERPL-MCNC: 100 MG/DL (ref 65–99)
HCT VFR BLD AUTO: 36.7 % (ref 42–47)
HGB BLD-MCNC: 12.5 G/DL (ref 12–16)
LYMPHOCYTES # BLD AUTO: 2.9 THOUSANDS/ΜL (ref 0.6–4.47)
LYMPHOCYTES NFR BLD AUTO: 33 % (ref 21–51)
MCH RBC QN AUTO: 29.7 PG (ref 26–34)
MCHC RBC AUTO-ENTMCNC: 34.1 G/DL (ref 31–37)
MCV RBC AUTO: 87 FL (ref 81–99)
MONOCYTES # BLD AUTO: 0.5 THOUSAND/ΜL (ref 0.17–1.22)
MONOCYTES NFR BLD AUTO: 6 % (ref 2–12)
NEUTROPHILS # BLD AUTO: 5 THOUSANDS/ΜL (ref 1.4–6.5)
NEUTS SEG NFR BLD AUTO: 58 % (ref 42–75)
PLATELET # BLD AUTO: 237 THOUSANDS/UL (ref 149–390)
PMV BLD AUTO: 7.7 FL (ref 8.6–11.7)
POTASSIUM SERPL-SCNC: 3.8 MMOL/L (ref 3.5–5.5)
PROT SERPL-MCNC: 6.4 G/DL (ref 6.4–8.9)
RBC # BLD AUTO: 4.21 MILLION/UL (ref 3.9–5.2)
SODIUM SERPL-SCNC: 140 MMOL/L (ref 134–143)
WBC # BLD AUTO: 8.5 THOUSAND/UL (ref 4.8–10.8)

## 2020-10-26 PROCEDURE — 99152 MOD SED SAME PHYS/QHP 5/>YRS: CPT

## 2020-10-26 PROCEDURE — 85025 COMPLETE CBC W/AUTO DIFF WBC: CPT | Performed by: INTERNAL MEDICINE

## 2020-10-26 PROCEDURE — 99152 MOD SED SAME PHYS/QHP 5/>YRS: CPT | Performed by: RADIOLOGY

## 2020-10-26 PROCEDURE — 77001 FLUOROGUIDE FOR VEIN DEVICE: CPT | Performed by: RADIOLOGY

## 2020-10-26 PROCEDURE — 36561 INSERT TUNNELED CV CATH: CPT

## 2020-10-26 PROCEDURE — 77001 FLUOROGUIDE FOR VEIN DEVICE: CPT

## 2020-10-26 PROCEDURE — 99153 MOD SED SAME PHYS/QHP EA: CPT

## 2020-10-26 PROCEDURE — 36561 INSERT TUNNELED CV CATH: CPT | Performed by: RADIOLOGY

## 2020-10-26 PROCEDURE — 76937 US GUIDE VASCULAR ACCESS: CPT

## 2020-10-26 PROCEDURE — 76937 US GUIDE VASCULAR ACCESS: CPT | Performed by: RADIOLOGY

## 2020-10-26 PROCEDURE — C1788 PORT, INDWELLING, IMP: HCPCS

## 2020-10-26 PROCEDURE — 80053 COMPREHEN METABOLIC PANEL: CPT | Performed by: INTERNAL MEDICINE

## 2020-10-26 RX ORDER — LIDOCAINE HYDROCHLORIDE AND EPINEPHRINE 10; 10 MG/ML; UG/ML
INJECTION, SOLUTION INFILTRATION; PERINEURAL CODE/TRAUMA/SEDATION MEDICATION
Status: COMPLETED | OUTPATIENT
Start: 2020-10-26 | End: 2020-10-26

## 2020-10-26 RX ORDER — CEFAZOLIN SODIUM 1 G/50ML
1000 SOLUTION INTRAVENOUS ONCE
Status: COMPLETED | OUTPATIENT
Start: 2020-10-26 | End: 2020-10-26

## 2020-10-26 RX ORDER — MIDAZOLAM HYDROCHLORIDE 2 MG/2ML
INJECTION, SOLUTION INTRAMUSCULAR; INTRAVENOUS CODE/TRAUMA/SEDATION MEDICATION
Status: COMPLETED | OUTPATIENT
Start: 2020-10-26 | End: 2020-10-26

## 2020-10-26 RX ORDER — VANCOMYCIN HYDROCHLORIDE 1 G/200ML
1000 INJECTION, SOLUTION INTRAVENOUS ONCE
Status: DISCONTINUED | OUTPATIENT
Start: 2020-10-26 | End: 2020-10-26

## 2020-10-26 RX ORDER — HEPARIN SODIUM (PORCINE) LOCK FLUSH IV SOLN 100 UNIT/ML 100 UNIT/ML
SOLUTION INTRAVENOUS CODE/TRAUMA/SEDATION MEDICATION
Status: COMPLETED | OUTPATIENT
Start: 2020-10-26 | End: 2020-10-26

## 2020-10-26 RX ORDER — FENTANYL CITRATE 50 UG/ML
INJECTION, SOLUTION INTRAMUSCULAR; INTRAVENOUS CODE/TRAUMA/SEDATION MEDICATION
Status: COMPLETED | OUTPATIENT
Start: 2020-10-26 | End: 2020-10-26

## 2020-10-26 RX ORDER — SODIUM CHLORIDE 9 MG/ML
75 INJECTION, SOLUTION INTRAVENOUS CONTINUOUS
Status: DISCONTINUED | OUTPATIENT
Start: 2020-10-26 | End: 2020-10-30 | Stop reason: HOSPADM

## 2020-10-26 RX ORDER — LIDOCAINE HYDROCHLORIDE 10 MG/ML
INJECTION, SOLUTION EPIDURAL; INFILTRATION; INTRACAUDAL; PERINEURAL CODE/TRAUMA/SEDATION MEDICATION
Status: COMPLETED | OUTPATIENT
Start: 2020-10-26 | End: 2020-10-26

## 2020-10-26 RX ADMIN — CEFAZOLIN SODIUM 1000 MG: 1 SOLUTION INTRAVENOUS at 10:57

## 2020-10-26 RX ADMIN — FENTANYL CITRATE 50 MCG: 50 INJECTION INTRAMUSCULAR; INTRAVENOUS at 11:36

## 2020-10-26 RX ADMIN — FENTANYL CITRATE 50 MCG: 50 INJECTION INTRAMUSCULAR; INTRAVENOUS at 11:57

## 2020-10-26 RX ADMIN — MIDAZOLAM HYDROCHLORIDE 1 MG: 1 INJECTION, SOLUTION INTRAMUSCULAR; INTRAVENOUS at 11:50

## 2020-10-26 RX ADMIN — MIDAZOLAM HYDROCHLORIDE 1 MG: 1 INJECTION, SOLUTION INTRAMUSCULAR; INTRAVENOUS at 11:36

## 2020-10-26 RX ADMIN — FENTANYL CITRATE 50 MCG: 50 INJECTION INTRAMUSCULAR; INTRAVENOUS at 11:26

## 2020-10-26 RX ADMIN — SODIUM CHLORIDE 75 ML/HR: 9 INJECTION, SOLUTION INTRAVENOUS at 10:47

## 2020-10-26 RX ADMIN — HEPARIN SODIUM (PORCINE) LOCK FLUSH IV SOLN 100 UNIT/ML 500 UNITS: 100 SOLUTION at 12:08

## 2020-10-26 RX ADMIN — LIDOCAINE HYDROCHLORIDE 10 ML: 10 INJECTION, SOLUTION EPIDURAL; INFILTRATION; INTRACAUDAL; PERINEURAL at 11:53

## 2020-10-26 RX ADMIN — LIDOCAINE HYDROCHLORIDE AND EPINEPHRINE 10 ML: 10; 10 INJECTION, SOLUTION INFILTRATION; PERINEURAL at 11:57

## 2020-10-26 RX ADMIN — LIDOCAINE HYDROCHLORIDE AND EPINEPHRINE 10 ML: 10; 10 INJECTION, SOLUTION INFILTRATION; PERINEURAL at 12:03

## 2020-10-26 RX ADMIN — FENTANYL CITRATE 50 MCG: 50 INJECTION INTRAMUSCULAR; INTRAVENOUS at 11:50

## 2020-10-26 RX ADMIN — MIDAZOLAM HYDROCHLORIDE 1 MG: 1 INJECTION, SOLUTION INTRAMUSCULAR; INTRAVENOUS at 11:56

## 2020-10-26 RX ADMIN — MIDAZOLAM HYDROCHLORIDE 1 MG: 1 INJECTION, SOLUTION INTRAMUSCULAR; INTRAVENOUS at 11:26

## 2020-10-27 ENCOUNTER — TELEPHONE (OUTPATIENT)
Dept: HEMATOLOGY ONCOLOGY | Facility: CLINIC | Age: 60
End: 2020-10-27

## 2020-10-28 ENCOUNTER — TELEPHONE (OUTPATIENT)
Dept: SURGERY | Facility: CLINIC | Age: 60
End: 2020-10-28

## 2020-10-30 PROBLEM — T45.1X5A CHEMOTHERAPY INDUCED NEUTROPENIA (HCC): Status: ACTIVE | Noted: 2020-10-30

## 2020-10-30 PROBLEM — D70.1 CHEMOTHERAPY INDUCED NEUTROPENIA (HCC): Status: ACTIVE | Noted: 2020-10-30

## 2020-10-30 LAB — SCAN RESULT: NORMAL

## 2020-10-30 RX ORDER — SODIUM CHLORIDE 9 MG/ML
20 INJECTION, SOLUTION INTRAVENOUS ONCE
Status: CANCELLED | OUTPATIENT
Start: 2020-11-04

## 2020-10-31 ENCOUNTER — TELEPHONE (OUTPATIENT)
Dept: INTERVENTIONAL RADIOLOGY/VASCULAR | Facility: CLINIC | Age: 60
End: 2020-10-31

## 2020-10-31 ENCOUNTER — TELEPHONE (OUTPATIENT)
Dept: OTHER | Facility: OTHER | Age: 60
End: 2020-10-31

## 2020-10-31 DIAGNOSIS — R21 RASH AT APPLICATION SITE: Primary | ICD-10-CM

## 2020-10-31 RX ORDER — METHYLPREDNISOLONE 4 MG/1
TABLET ORAL
Qty: 1 EACH | Refills: 0 | Status: SHIPPED | OUTPATIENT
Start: 2020-10-31 | End: 2021-01-12 | Stop reason: ALTCHOICE

## 2020-11-02 ENCOUNTER — OFFICE VISIT (OUTPATIENT)
Dept: SURGERY | Facility: CLINIC | Age: 60
End: 2020-11-02

## 2020-11-02 VITALS — TEMPERATURE: 96.8 F

## 2020-11-02 DIAGNOSIS — L82.0 KERATOSIS, INFLAMED SEBORRHEIC: Primary | ICD-10-CM

## 2020-11-02 PROCEDURE — 99024 POSTOP FOLLOW-UP VISIT: CPT | Performed by: SURGERY

## 2020-11-04 ENCOUNTER — TELEPHONE (OUTPATIENT)
Dept: HEMATOLOGY ONCOLOGY | Facility: CLINIC | Age: 60
End: 2020-11-04

## 2020-11-04 ENCOUNTER — HOSPITAL ENCOUNTER (OUTPATIENT)
Dept: INFUSION CENTER | Facility: HOSPITAL | Age: 60
Discharge: HOME | End: 2020-11-04
Attending: INTERNAL MEDICINE
Payer: COMMERCIAL

## 2020-11-04 VITALS
HEART RATE: 72 BPM | RESPIRATION RATE: 16 BRPM | WEIGHT: 160.94 LBS | DIASTOLIC BLOOD PRESSURE: 66 MMHG | BODY MASS INDEX: 27.48 KG/M2 | TEMPERATURE: 97.7 F | OXYGEN SATURATION: 97 % | HEIGHT: 64 IN | SYSTOLIC BLOOD PRESSURE: 144 MMHG

## 2020-11-04 DIAGNOSIS — D70.1 CHEMOTHERAPY INDUCED NEUTROPENIA (HCC): Primary | ICD-10-CM

## 2020-11-04 DIAGNOSIS — C50.412 PRIMARY CANCER OF UPPER OUTER QUADRANT OF LEFT FEMALE BREAST (HCC): ICD-10-CM

## 2020-11-04 DIAGNOSIS — T45.1X5A CHEMOTHERAPY INDUCED NEUTROPENIA (HCC): Primary | ICD-10-CM

## 2020-11-04 PROCEDURE — 96413 CHEMO IV INFUSION 1 HR: CPT

## 2020-11-04 PROCEDURE — 96417 CHEMO IV INFUS EACH ADDL SEQ: CPT

## 2020-11-04 PROCEDURE — 96367 TX/PROPH/DG ADDL SEQ IV INF: CPT

## 2020-11-04 RX ORDER — SODIUM CHLORIDE 9 MG/ML
20 INJECTION, SOLUTION INTRAVENOUS ONCE
Status: COMPLETED | OUTPATIENT
Start: 2020-11-04 | End: 2020-11-04

## 2020-11-04 RX ADMIN — CYCLOPHOSPHAMIDE 1068 MG: 500 INJECTION, POWDER, FOR SOLUTION INTRAVENOUS; ORAL at 10:50

## 2020-11-04 RX ADMIN — SODIUM CHLORIDE 20 ML/HR: 0.9 INJECTION, SOLUTION INTRAVENOUS at 08:21

## 2020-11-04 RX ADMIN — DEXAMETHASONE SODIUM PHOSPHATE: 10 INJECTION, SOLUTION INTRAMUSCULAR; INTRAVENOUS at 08:52

## 2020-11-04 RX ADMIN — DOCETAXEL 140 MG: 20 INJECTION, SOLUTION, CONCENTRATE INTRAVENOUS at 09:40

## 2020-11-05 ENCOUNTER — OFFICE VISIT (OUTPATIENT)
Dept: URGENT CARE | Facility: CLINIC | Age: 60
End: 2020-11-05
Payer: COMMERCIAL

## 2020-11-05 ENCOUNTER — HOSPITAL ENCOUNTER (OUTPATIENT)
Dept: INFUSION CENTER | Facility: HOSPITAL | Age: 60
Discharge: HOME | End: 2020-11-05
Attending: INTERNAL MEDICINE
Payer: COMMERCIAL

## 2020-11-05 VITALS
HEIGHT: 64 IN | DIASTOLIC BLOOD PRESSURE: 72 MMHG | OXYGEN SATURATION: 99 % | RESPIRATION RATE: 18 BRPM | SYSTOLIC BLOOD PRESSURE: 165 MMHG | TEMPERATURE: 97.7 F | WEIGHT: 160 LBS | HEART RATE: 68 BPM | BODY MASS INDEX: 27.31 KG/M2

## 2020-11-05 VITALS — TEMPERATURE: 98.1 F

## 2020-11-05 DIAGNOSIS — R31.9 URINARY TRACT INFECTION WITH HEMATURIA, SITE UNSPECIFIED: Primary | ICD-10-CM

## 2020-11-05 DIAGNOSIS — R30.0 DYSURIA: Primary | ICD-10-CM

## 2020-11-05 DIAGNOSIS — C50.412 PRIMARY CANCER OF UPPER OUTER QUADRANT OF LEFT FEMALE BREAST (HCC): ICD-10-CM

## 2020-11-05 DIAGNOSIS — T45.1X5A CHEMOTHERAPY INDUCED NEUTROPENIA (HCC): Primary | ICD-10-CM

## 2020-11-05 DIAGNOSIS — N39.0 URINARY TRACT INFECTION WITH HEMATURIA, SITE UNSPECIFIED: Primary | ICD-10-CM

## 2020-11-05 DIAGNOSIS — R30.0 DYSURIA: ICD-10-CM

## 2020-11-05 DIAGNOSIS — D70.1 CHEMOTHERAPY INDUCED NEUTROPENIA (HCC): Primary | ICD-10-CM

## 2020-11-05 LAB
BACTERIA UR QL AUTO: ABNORMAL /HPF
BILIRUB UR QL STRIP: NEGATIVE
CLARITY UR: ABNORMAL
COLOR UR: ABNORMAL
GLUCOSE UR STRIP-MCNC: NEGATIVE MG/DL
HGB UR QL STRIP.AUTO: ABNORMAL
KETONES UR STRIP-MCNC: NEGATIVE MG/DL
LEUKOCYTE ESTERASE UR QL STRIP: ABNORMAL
NITRITE UR QL STRIP: NEGATIVE
NON-SQ EPI CELLS URNS QL MICRO: ABNORMAL /HPF
PH UR STRIP.AUTO: 7 [PH]
PROT UR STRIP-MCNC: ABNORMAL MG/DL
RBC #/AREA URNS AUTO: ABNORMAL /HPF
SL AMB  POCT GLUCOSE, UA: ABNORMAL
SL AMB LEUKOCYTE ESTERASE,UA: ABNORMAL
SL AMB POCT BILIRUBIN,UA: ABNORMAL
SL AMB POCT BLOOD,UA: ABNORMAL
SL AMB POCT CLARITY,UA: ABNORMAL
SL AMB POCT COLOR,UA: ABNORMAL
SL AMB POCT KETONES,UA: ABNORMAL
SL AMB POCT NITRITE,UA: ABNORMAL
SL AMB POCT PH,UA: 6.5
SL AMB POCT SPECIFIC GRAVITY,UA: 1.01
SL AMB POCT URINE PROTEIN: ABNORMAL
SL AMB POCT UROBILINOGEN: 0.2
SP GR UR STRIP.AUTO: 1.02 (ref 1–1.03)
UROBILINOGEN UR QL STRIP.AUTO: 0.2 E.U./DL
WBC #/AREA URNS AUTO: ABNORMAL /HPF

## 2020-11-05 PROCEDURE — 81002 URINALYSIS NONAUTO W/O SCOPE: CPT | Performed by: PHYSICIAN ASSISTANT

## 2020-11-05 PROCEDURE — 99213 OFFICE O/P EST LOW 20 MIN: CPT | Performed by: PHYSICIAN ASSISTANT

## 2020-11-05 PROCEDURE — 81003 URINALYSIS AUTO W/O SCOPE: CPT | Performed by: INTERNAL MEDICINE

## 2020-11-05 PROCEDURE — 87086 URINE CULTURE/COLONY COUNT: CPT | Performed by: PHYSICIAN ASSISTANT

## 2020-11-05 PROCEDURE — 96372 THER/PROPH/DIAG INJ SC/IM: CPT

## 2020-11-05 PROCEDURE — 87186 SC STD MICRODIL/AGAR DIL: CPT | Performed by: PHYSICIAN ASSISTANT

## 2020-11-05 PROCEDURE — 87077 CULTURE AEROBIC IDENTIFY: CPT | Performed by: PHYSICIAN ASSISTANT

## 2020-11-05 PROCEDURE — 81001 URINALYSIS AUTO W/SCOPE: CPT | Performed by: INTERNAL MEDICINE

## 2020-11-05 RX ORDER — SULFAMETHOXAZOLE AND TRIMETHOPRIM 800; 160 MG/1; MG/1
1 TABLET ORAL 2 TIMES DAILY
Qty: 14 TABLET | Refills: 0 | Status: SHIPPED | OUTPATIENT
Start: 2020-11-05 | End: 2020-11-12

## 2020-11-05 RX ADMIN — PEGFILGRASTIM 6 MG: 6 INJECTION SUBCUTANEOUS at 13:45

## 2020-11-08 LAB — BACTERIA UR CULT: ABNORMAL

## 2020-11-09 DIAGNOSIS — C50.412 PRIMARY CANCER OF UPPER OUTER QUADRANT OF LEFT FEMALE BREAST (HCC): ICD-10-CM

## 2020-11-09 RX ORDER — ONDANSETRON HYDROCHLORIDE 8 MG/1
TABLET, FILM COATED ORAL
Qty: 20 TABLET | Refills: 0 | Status: SHIPPED | OUTPATIENT
Start: 2020-11-09 | End: 2020-11-30

## 2020-11-17 ENCOUNTER — PATIENT OUTREACH (OUTPATIENT)
Dept: SURGICAL ONCOLOGY | Facility: CLINIC | Age: 60
End: 2020-11-17

## 2020-11-18 ENCOUNTER — OFFICE VISIT (OUTPATIENT)
Dept: HEMATOLOGY ONCOLOGY | Facility: CLINIC | Age: 60
End: 2020-11-18
Payer: COMMERCIAL

## 2020-11-18 VITALS
OXYGEN SATURATION: 98 % | DIASTOLIC BLOOD PRESSURE: 84 MMHG | TEMPERATURE: 98.6 F | HEIGHT: 64 IN | WEIGHT: 162.6 LBS | BODY MASS INDEX: 27.76 KG/M2 | HEART RATE: 84 BPM | SYSTOLIC BLOOD PRESSURE: 144 MMHG | RESPIRATION RATE: 16 BRPM

## 2020-11-18 DIAGNOSIS — C50.412 PRIMARY CANCER OF UPPER OUTER QUADRANT OF LEFT FEMALE BREAST (HCC): Primary | ICD-10-CM

## 2020-11-18 DIAGNOSIS — T45.1X5A CHEMOTHERAPY INDUCED NEUTROPENIA (HCC): ICD-10-CM

## 2020-11-18 DIAGNOSIS — C50.412 PRIMARY CANCER OF UPPER OUTER QUADRANT OF LEFT FEMALE BREAST (HCC): ICD-10-CM

## 2020-11-18 DIAGNOSIS — Z51.11 ENCOUNTER FOR CHEMOTHERAPY MANAGEMENT: Primary | ICD-10-CM

## 2020-11-18 DIAGNOSIS — D70.1 CHEMOTHERAPY INDUCED NEUTROPENIA (HCC): ICD-10-CM

## 2020-11-18 PROCEDURE — 99214 OFFICE O/P EST MOD 30 MIN: CPT | Performed by: INTERNAL MEDICINE

## 2020-11-18 RX ORDER — SODIUM CHLORIDE 9 MG/ML
20 INJECTION, SOLUTION INTRAVENOUS ONCE
Status: CANCELLED | OUTPATIENT
Start: 2020-11-24

## 2020-11-18 RX ORDER — DEXAMETHASONE 4 MG/1
8 TABLET ORAL 2 TIMES DAILY WITH MEALS
Qty: 12 TABLET | Refills: 2 | Status: ON HOLD | OUTPATIENT
Start: 2020-11-18 | End: 2021-02-23 | Stop reason: ALTCHOICE

## 2020-11-19 ENCOUNTER — PATIENT OUTREACH (OUTPATIENT)
Dept: CASE MANAGEMENT | Facility: HOSPITAL | Age: 60
End: 2020-11-19

## 2020-11-23 ENCOUNTER — TRANSCRIBE ORDERS (OUTPATIENT)
Dept: LAB | Facility: CLINIC | Age: 60
End: 2020-11-23

## 2020-11-23 ENCOUNTER — LAB (OUTPATIENT)
Dept: LAB | Facility: CLINIC | Age: 60
End: 2020-11-23
Payer: COMMERCIAL

## 2020-11-23 ENCOUNTER — TELEPHONE (OUTPATIENT)
Dept: GENETICS | Facility: CLINIC | Age: 60
End: 2020-11-23

## 2020-11-23 DIAGNOSIS — C50.412 PRIMARY CANCER OF UPPER OUTER QUADRANT OF LEFT FEMALE BREAST (HCC): ICD-10-CM

## 2020-11-23 LAB
ALBUMIN SERPL BCP-MCNC: 3.9 G/DL (ref 3.5–5)
ALP SERPL-CCNC: 103 U/L (ref 46–116)
ALT SERPL W P-5'-P-CCNC: 29 U/L (ref 12–78)
ANION GAP SERPL CALCULATED.3IONS-SCNC: 4 MMOL/L (ref 4–13)
AST SERPL W P-5'-P-CCNC: 17 U/L (ref 5–45)
BASOPHILS # BLD AUTO: 0.06 THOUSANDS/ΜL (ref 0–0.1)
BASOPHILS NFR BLD AUTO: 1 % (ref 0–1)
BILIRUB SERPL-MCNC: 0.52 MG/DL (ref 0.2–1)
BUN SERPL-MCNC: 15 MG/DL (ref 5–25)
CALCIUM SERPL-MCNC: 9.2 MG/DL (ref 8.3–10.1)
CHLORIDE SERPL-SCNC: 111 MMOL/L (ref 100–108)
CO2 SERPL-SCNC: 27 MMOL/L (ref 21–32)
CREAT SERPL-MCNC: 0.73 MG/DL (ref 0.6–1.3)
EOSINOPHIL # BLD AUTO: 0 THOUSAND/ΜL (ref 0–0.61)
EOSINOPHIL NFR BLD AUTO: 0 % (ref 0–6)
ERYTHROCYTE [DISTWIDTH] IN BLOOD BY AUTOMATED COUNT: 14 % (ref 11.6–15.1)
GFR SERPL CREATININE-BSD FRML MDRD: 90 ML/MIN/1.73SQ M
GLUCOSE SERPL-MCNC: 119 MG/DL (ref 65–140)
HCT VFR BLD AUTO: 40.3 % (ref 34.8–46.1)
HGB BLD-MCNC: 12.6 G/DL (ref 11.5–15.4)
IMM GRANULOCYTES # BLD AUTO: 0.03 THOUSAND/UL (ref 0–0.2)
IMM GRANULOCYTES NFR BLD AUTO: 0 % (ref 0–2)
LYMPHOCYTES # BLD AUTO: 0.73 THOUSANDS/ΜL (ref 0.6–4.47)
LYMPHOCYTES NFR BLD AUTO: 8 % (ref 14–44)
MAGNESIUM SERPL-MCNC: 2.5 MG/DL (ref 1.6–2.6)
MCH RBC QN AUTO: 28.8 PG (ref 26.8–34.3)
MCHC RBC AUTO-ENTMCNC: 31.3 G/DL (ref 31.4–37.4)
MCV RBC AUTO: 92 FL (ref 82–98)
MONOCYTES # BLD AUTO: 0.13 THOUSAND/ΜL (ref 0.17–1.22)
MONOCYTES NFR BLD AUTO: 2 % (ref 4–12)
NEUTROPHILS # BLD AUTO: 7.71 THOUSANDS/ΜL (ref 1.85–7.62)
NEUTS SEG NFR BLD AUTO: 89 % (ref 43–75)
NRBC BLD AUTO-RTO: 0 /100 WBCS
PLATELET # BLD AUTO: 292 THOUSANDS/UL (ref 149–390)
PMV BLD AUTO: 9.5 FL (ref 8.9–12.7)
POTASSIUM SERPL-SCNC: 4.2 MMOL/L (ref 3.5–5.3)
PROT SERPL-MCNC: 7.4 G/DL (ref 6.4–8.2)
RBC # BLD AUTO: 4.38 MILLION/UL (ref 3.81–5.12)
SODIUM SERPL-SCNC: 142 MMOL/L (ref 136–145)
WBC # BLD AUTO: 8.66 THOUSAND/UL (ref 4.31–10.16)

## 2020-11-23 PROCEDURE — 80053 COMPREHEN METABOLIC PANEL: CPT

## 2020-11-23 PROCEDURE — 36415 COLL VENOUS BLD VENIPUNCTURE: CPT

## 2020-11-23 PROCEDURE — 85025 COMPLETE CBC W/AUTO DIFF WBC: CPT

## 2020-11-23 PROCEDURE — 83735 ASSAY OF MAGNESIUM: CPT

## 2020-11-24 ENCOUNTER — HOSPITAL ENCOUNTER (OUTPATIENT)
Dept: INFUSION CENTER | Facility: HOSPITAL | Age: 60
Discharge: HOME | End: 2020-11-24
Attending: INTERNAL MEDICINE
Payer: COMMERCIAL

## 2020-11-24 VITALS
HEIGHT: 64 IN | OXYGEN SATURATION: 99 % | HEART RATE: 76 BPM | BODY MASS INDEX: 27.59 KG/M2 | DIASTOLIC BLOOD PRESSURE: 63 MMHG | RESPIRATION RATE: 18 BRPM | WEIGHT: 161.6 LBS | TEMPERATURE: 98 F | SYSTOLIC BLOOD PRESSURE: 131 MMHG

## 2020-11-24 DIAGNOSIS — T45.1X5A CHEMOTHERAPY INDUCED NEUTROPENIA (HCC): Primary | ICD-10-CM

## 2020-11-24 DIAGNOSIS — C50.412 PRIMARY CANCER OF UPPER OUTER QUADRANT OF LEFT FEMALE BREAST (HCC): ICD-10-CM

## 2020-11-24 DIAGNOSIS — D70.1 CHEMOTHERAPY INDUCED NEUTROPENIA (HCC): Primary | ICD-10-CM

## 2020-11-24 PROCEDURE — 96415 CHEMO IV INFUSION ADDL HR: CPT

## 2020-11-24 PROCEDURE — 96375 TX/PRO/DX INJ NEW DRUG ADDON: CPT

## 2020-11-24 PROCEDURE — 96361 HYDRATE IV INFUSION ADD-ON: CPT

## 2020-11-24 PROCEDURE — 96417 CHEMO IV INFUS EACH ADDL SEQ: CPT

## 2020-11-24 PROCEDURE — 96367 TX/PROPH/DG ADDL SEQ IV INF: CPT

## 2020-11-24 PROCEDURE — 96413 CHEMO IV INFUSION 1 HR: CPT

## 2020-11-24 RX ORDER — SODIUM CHLORIDE 9 MG/ML
20 INJECTION, SOLUTION INTRAVENOUS ONCE
Status: COMPLETED | OUTPATIENT
Start: 2020-11-24 | End: 2020-11-24

## 2020-11-24 RX ORDER — FAMOTIDINE 20 MG/50ML
20 INJECTION, SOLUTION INTRAVENOUS ONCE
Status: COMPLETED | OUTPATIENT
Start: 2020-11-24 | End: 2020-11-24

## 2020-11-24 RX ORDER — DIPHENHYDRAMINE HYDROCHLORIDE 50 MG/ML
25 INJECTION INTRAMUSCULAR; INTRAVENOUS
Status: COMPLETED | OUTPATIENT
Start: 2020-11-24 | End: 2020-11-24

## 2020-11-24 RX ADMIN — HYDROCORTISONE SODIUM SUCCINATE 50 MG: 100 INJECTION, POWDER, FOR SOLUTION INTRAMUSCULAR; INTRAVENOUS at 11:31

## 2020-11-24 RX ADMIN — HYDROCORTISONE SODIUM SUCCINATE 50 MG: 100 INJECTION, POWDER, FOR SOLUTION INTRAMUSCULAR; INTRAVENOUS at 11:23

## 2020-11-24 RX ADMIN — DIPHENHYDRAMINE HYDROCHLORIDE 25 MG: 50 INJECTION INTRAMUSCULAR; INTRAVENOUS at 11:30

## 2020-11-24 RX ADMIN — SODIUM CHLORIDE 500 ML: 0.9 INJECTION, SOLUTION INTRAVENOUS at 11:25

## 2020-11-24 RX ADMIN — DOCETAXEL 140 MG: 20 INJECTION INTRAVENOUS at 11:09

## 2020-11-24 RX ADMIN — SODIUM CHLORIDE 20 ML/HR: 0.9 INJECTION, SOLUTION INTRAVENOUS at 10:33

## 2020-11-24 RX ADMIN — DIPHENHYDRAMINE HYDROCHLORIDE 25 MG: 50 INJECTION INTRAMUSCULAR; INTRAVENOUS at 11:22

## 2020-11-24 RX ADMIN — DEXAMETHASONE SODIUM PHOSPHATE: 10 INJECTION, SOLUTION INTRAMUSCULAR; INTRAVENOUS at 10:33

## 2020-11-24 RX ADMIN — FAMOTIDINE 20 MG: 20 INJECTION, SOLUTION INTRAVENOUS at 11:24

## 2020-11-24 RX ADMIN — CYCLOPHOSPHAMIDE 1068 MG: 500 INJECTION, POWDER, FOR SOLUTION INTRAVENOUS; ORAL at 15:03

## 2020-11-25 ENCOUNTER — HOSPITAL ENCOUNTER (OUTPATIENT)
Dept: INFUSION CENTER | Facility: HOSPITAL | Age: 60
Discharge: HOME | End: 2020-11-25
Attending: INTERNAL MEDICINE
Payer: COMMERCIAL

## 2020-11-25 VITALS — TEMPERATURE: 98.2 F

## 2020-11-25 DIAGNOSIS — T45.1X5A CHEMOTHERAPY INDUCED NEUTROPENIA (HCC): Primary | ICD-10-CM

## 2020-11-25 DIAGNOSIS — C50.412 PRIMARY CANCER OF UPPER OUTER QUADRANT OF LEFT FEMALE BREAST (HCC): ICD-10-CM

## 2020-11-25 DIAGNOSIS — D70.1 CHEMOTHERAPY INDUCED NEUTROPENIA (HCC): Primary | ICD-10-CM

## 2020-11-25 PROCEDURE — 96372 THER/PROPH/DIAG INJ SC/IM: CPT

## 2020-11-25 RX ADMIN — PEGFILGRASTIM 6 MG: 6 INJECTION SUBCUTANEOUS at 15:26

## 2020-11-28 ENCOUNTER — DOCUMENTATION (OUTPATIENT)
Dept: HEMATOLOGY ONCOLOGY | Facility: CLINIC | Age: 60
End: 2020-11-28

## 2020-11-28 DIAGNOSIS — C50.412 PRIMARY CANCER OF UPPER OUTER QUADRANT OF LEFT FEMALE BREAST (HCC): ICD-10-CM

## 2020-11-28 RX ORDER — ONDANSETRON HYDROCHLORIDE 8 MG/1
8 TABLET, FILM COATED ORAL EVERY 8 HOURS PRN
Qty: 30 TABLET | Refills: 2 | Status: CANCELLED | OUTPATIENT
Start: 2020-11-28

## 2020-11-30 DIAGNOSIS — Z51.11 ENCOUNTER FOR CHEMOTHERAPY MANAGEMENT: Primary | ICD-10-CM

## 2020-11-30 DIAGNOSIS — C50.412 PRIMARY CANCER OF UPPER OUTER QUADRANT OF LEFT FEMALE BREAST (HCC): ICD-10-CM

## 2020-11-30 RX ORDER — ONDANSETRON HYDROCHLORIDE 8 MG/1
8 TABLET, FILM COATED ORAL EVERY 8 HOURS PRN
Qty: 20 TABLET | Refills: 3 | Status: CANCELLED | OUTPATIENT
Start: 2020-11-30

## 2020-11-30 RX ORDER — ONDANSETRON HYDROCHLORIDE 8 MG/1
TABLET, FILM COATED ORAL
Qty: 20 TABLET | Refills: 0 | Status: ON HOLD | OUTPATIENT
Start: 2020-11-30 | End: 2021-02-23 | Stop reason: ALTCHOICE

## 2020-12-09 RX ORDER — SODIUM CHLORIDE 9 MG/ML
20 INJECTION, SOLUTION INTRAVENOUS ONCE
Status: CANCELLED | OUTPATIENT
Start: 2020-12-15

## 2020-12-14 ENCOUNTER — TRANSCRIBE ORDERS (OUTPATIENT)
Dept: LAB | Facility: CLINIC | Age: 60
End: 2020-12-14

## 2020-12-14 ENCOUNTER — LAB (OUTPATIENT)
Dept: LAB | Facility: CLINIC | Age: 60
End: 2020-12-14
Payer: COMMERCIAL

## 2020-12-14 ENCOUNTER — TELEPHONE (OUTPATIENT)
Dept: HEMATOLOGY ONCOLOGY | Facility: CLINIC | Age: 60
End: 2020-12-14

## 2020-12-14 DIAGNOSIS — C50.412 PRIMARY CANCER OF UPPER OUTER QUADRANT OF LEFT FEMALE BREAST (HCC): ICD-10-CM

## 2020-12-14 LAB
ALBUMIN SERPL BCP-MCNC: 3.8 G/DL (ref 3.5–5)
ALP SERPL-CCNC: 104 U/L (ref 46–116)
ALT SERPL W P-5'-P-CCNC: 30 U/L (ref 12–78)
ANION GAP SERPL CALCULATED.3IONS-SCNC: 8 MMOL/L (ref 4–13)
ANISOCYTOSIS BLD QL SMEAR: PRESENT
AST SERPL W P-5'-P-CCNC: 16 U/L (ref 5–45)
BASOPHILS # BLD MANUAL: 0 THOUSAND/UL (ref 0–0.1)
BASOPHILS NFR MAR MANUAL: 0 % (ref 0–1)
BILIRUB SERPL-MCNC: 0.76 MG/DL (ref 0.2–1)
BUN SERPL-MCNC: 16 MG/DL (ref 5–25)
CALCIUM SERPL-MCNC: 9.6 MG/DL (ref 8.3–10.1)
CHLORIDE SERPL-SCNC: 110 MMOL/L (ref 100–108)
CO2 SERPL-SCNC: 25 MMOL/L (ref 21–32)
CREAT SERPL-MCNC: 0.76 MG/DL (ref 0.6–1.3)
EOSINOPHIL # BLD MANUAL: 0 THOUSAND/UL (ref 0–0.4)
EOSINOPHIL NFR BLD MANUAL: 0 % (ref 0–6)
ERYTHROCYTE [DISTWIDTH] IN BLOOD BY AUTOMATED COUNT: 15.7 % (ref 11.6–15.1)
GFR SERPL CREATININE-BSD FRML MDRD: 86 ML/MIN/1.73SQ M
GLUCOSE SERPL-MCNC: 136 MG/DL (ref 65–140)
HCT VFR BLD AUTO: 39 % (ref 34.8–46.1)
HGB BLD-MCNC: 12.7 G/DL (ref 11.5–15.4)
LYMPHOCYTES # BLD AUTO: 0.64 THOUSAND/UL (ref 0.6–4.47)
LYMPHOCYTES # BLD AUTO: 6 % (ref 14–44)
MAGNESIUM SERPL-MCNC: 2.6 MG/DL (ref 1.6–2.6)
MCH RBC QN AUTO: 29.4 PG (ref 26.8–34.3)
MCHC RBC AUTO-ENTMCNC: 32.6 G/DL (ref 31.4–37.4)
MCV RBC AUTO: 90 FL (ref 82–98)
METAMYELOCYTES NFR BLD MANUAL: 1 % (ref 0–1)
MICROCYTES BLD QL AUTO: PRESENT
MONOCYTES # BLD AUTO: 0.42 THOUSAND/UL (ref 0–1.22)
MONOCYTES NFR BLD: 4 % (ref 4–12)
NEUTROPHILS # BLD MANUAL: 9.11 THOUSAND/UL (ref 1.85–7.62)
NEUTS SEG NFR BLD AUTO: 86 % (ref 43–75)
NRBC BLD AUTO-RTO: 0 /100 WBCS
PLATELET # BLD AUTO: 325 THOUSANDS/UL (ref 149–390)
PLATELET BLD QL SMEAR: ADEQUATE
PMV BLD AUTO: 9.5 FL (ref 8.9–12.7)
POLYCHROMASIA BLD QL SMEAR: PRESENT
POTASSIUM SERPL-SCNC: 4.4 MMOL/L (ref 3.5–5.3)
PROT SERPL-MCNC: 7.4 G/DL (ref 6.4–8.2)
RBC # BLD AUTO: 4.32 MILLION/UL (ref 3.81–5.12)
RBC MORPH BLD: PRESENT
SODIUM SERPL-SCNC: 143 MMOL/L (ref 136–145)
VARIANT LYMPHS # BLD AUTO: 3 %
WBC # BLD AUTO: 10.59 THOUSAND/UL (ref 4.31–10.16)

## 2020-12-14 PROCEDURE — 83735 ASSAY OF MAGNESIUM: CPT

## 2020-12-14 PROCEDURE — 80053 COMPREHEN METABOLIC PANEL: CPT

## 2020-12-14 PROCEDURE — 85007 BL SMEAR W/DIFF WBC COUNT: CPT

## 2020-12-14 PROCEDURE — 85027 COMPLETE CBC AUTOMATED: CPT

## 2020-12-14 PROCEDURE — 36415 COLL VENOUS BLD VENIPUNCTURE: CPT

## 2020-12-15 ENCOUNTER — TELEPHONE (OUTPATIENT)
Dept: HEMATOLOGY ONCOLOGY | Facility: CLINIC | Age: 60
End: 2020-12-15

## 2020-12-15 ENCOUNTER — OFFICE VISIT (OUTPATIENT)
Dept: HEMATOLOGY ONCOLOGY | Facility: CLINIC | Age: 60
End: 2020-12-15
Payer: COMMERCIAL

## 2020-12-15 ENCOUNTER — HOSPITAL ENCOUNTER (OUTPATIENT)
Dept: INFUSION CENTER | Facility: HOSPITAL | Age: 60
Discharge: HOME | End: 2020-12-15
Attending: INTERNAL MEDICINE
Payer: COMMERCIAL

## 2020-12-15 VITALS — BODY MASS INDEX: 28.15 KG/M2 | WEIGHT: 164.9 LBS | HEIGHT: 64 IN

## 2020-12-15 VITALS
HEART RATE: 74 BPM | DIASTOLIC BLOOD PRESSURE: 80 MMHG | RESPIRATION RATE: 16 BRPM | OXYGEN SATURATION: 99 % | SYSTOLIC BLOOD PRESSURE: 128 MMHG | WEIGHT: 165 LBS | HEIGHT: 64 IN | BODY MASS INDEX: 28.17 KG/M2 | TEMPERATURE: 97.5 F

## 2020-12-15 DIAGNOSIS — T45.1X5A CHEMOTHERAPY INDUCED NEUTROPENIA (HCC): ICD-10-CM

## 2020-12-15 DIAGNOSIS — C50.412 PRIMARY CANCER OF UPPER OUTER QUADRANT OF LEFT FEMALE BREAST (HCC): ICD-10-CM

## 2020-12-15 DIAGNOSIS — T45.1X5A CHEMOTHERAPY INDUCED NEUTROPENIA (HCC): Primary | ICD-10-CM

## 2020-12-15 DIAGNOSIS — D70.1 CHEMOTHERAPY INDUCED NEUTROPENIA (HCC): Primary | ICD-10-CM

## 2020-12-15 DIAGNOSIS — C50.412 PRIMARY CANCER OF UPPER OUTER QUADRANT OF LEFT FEMALE BREAST (HCC): Primary | ICD-10-CM

## 2020-12-15 DIAGNOSIS — D70.1 CHEMOTHERAPY INDUCED NEUTROPENIA (HCC): ICD-10-CM

## 2020-12-15 PROCEDURE — 96417 CHEMO IV INFUS EACH ADDL SEQ: CPT

## 2020-12-15 PROCEDURE — 96413 CHEMO IV INFUSION 1 HR: CPT

## 2020-12-15 PROCEDURE — 96367 TX/PROPH/DG ADDL SEQ IV INF: CPT

## 2020-12-15 PROCEDURE — 99214 OFFICE O/P EST MOD 30 MIN: CPT | Performed by: INTERNAL MEDICINE

## 2020-12-15 PROCEDURE — 96377 APPLICATON ON-BODY INJECTOR: CPT

## 2020-12-15 RX ORDER — SODIUM CHLORIDE 9 MG/ML
20 INJECTION, SOLUTION INTRAVENOUS ONCE
Status: COMPLETED | OUTPATIENT
Start: 2020-12-15 | End: 2020-12-15

## 2020-12-15 RX ADMIN — SODIUM CHLORIDE 20 ML/HR: 0.9 INJECTION, SOLUTION INTRAVENOUS at 10:19

## 2020-12-15 RX ADMIN — CYCLOPHOSPHAMIDE 1068 MG: 500 INJECTION, POWDER, FOR SOLUTION INTRAVENOUS; ORAL at 12:11

## 2020-12-15 RX ADMIN — PEGFILGRASTIM 6 MG: KIT SUBCUTANEOUS at 12:43

## 2020-12-15 RX ADMIN — DOCETAXEL 140 MG: 20 INJECTION INTRAVENOUS at 11:02

## 2020-12-15 RX ADMIN — DEXAMETHASONE SODIUM PHOSPHATE: 10 INJECTION, SOLUTION INTRAMUSCULAR; INTRAVENOUS at 10:19

## 2020-12-16 ENCOUNTER — HOSPITAL ENCOUNTER (OUTPATIENT)
Dept: INFUSION CENTER | Facility: HOSPITAL | Age: 60
Discharge: HOME | End: 2020-12-16
Attending: INTERNAL MEDICINE

## 2020-12-28 ENCOUNTER — TELEPHONE (OUTPATIENT)
Dept: HEMATOLOGY ONCOLOGY | Facility: CLINIC | Age: 60
End: 2020-12-28

## 2020-12-30 RX ORDER — SODIUM CHLORIDE 9 MG/ML
20 INJECTION, SOLUTION INTRAVENOUS ONCE
Status: CANCELLED | OUTPATIENT
Start: 2021-01-05

## 2020-12-31 ENCOUNTER — TELEPHONE (OUTPATIENT)
Dept: HEMATOLOGY ONCOLOGY | Facility: CLINIC | Age: 60
End: 2020-12-31

## 2021-01-04 ENCOUNTER — LAB (OUTPATIENT)
Dept: LAB | Facility: CLINIC | Age: 61
End: 2021-01-04
Payer: COMMERCIAL

## 2021-01-04 DIAGNOSIS — C50.412 PRIMARY CANCER OF UPPER OUTER QUADRANT OF LEFT FEMALE BREAST (HCC): ICD-10-CM

## 2021-01-04 LAB
ALBUMIN SERPL BCP-MCNC: 3.6 G/DL (ref 3.5–5)
ALP SERPL-CCNC: 97 U/L (ref 46–116)
ALT SERPL W P-5'-P-CCNC: 41 U/L (ref 12–78)
ANION GAP SERPL CALCULATED.3IONS-SCNC: 5 MMOL/L (ref 4–13)
AST SERPL W P-5'-P-CCNC: 23 U/L (ref 5–45)
BASOPHILS # BLD AUTO: 0.04 THOUSANDS/ΜL (ref 0–0.1)
BASOPHILS NFR BLD AUTO: 1 % (ref 0–1)
BILIRUB SERPL-MCNC: 0.62 MG/DL (ref 0.2–1)
BUN SERPL-MCNC: 11 MG/DL (ref 5–25)
CALCIUM SERPL-MCNC: 9.5 MG/DL (ref 8.3–10.1)
CHLORIDE SERPL-SCNC: 112 MMOL/L (ref 100–108)
CO2 SERPL-SCNC: 26 MMOL/L (ref 21–32)
CREAT SERPL-MCNC: 0.74 MG/DL (ref 0.6–1.3)
EOSINOPHIL # BLD AUTO: 0 THOUSAND/ΜL (ref 0–0.61)
EOSINOPHIL NFR BLD AUTO: 0 % (ref 0–6)
ERYTHROCYTE [DISTWIDTH] IN BLOOD BY AUTOMATED COUNT: 17.6 % (ref 11.6–15.1)
GFR SERPL CREATININE-BSD FRML MDRD: 89 ML/MIN/1.73SQ M
GLUCOSE SERPL-MCNC: 160 MG/DL (ref 65–140)
HCT VFR BLD AUTO: 37.1 % (ref 34.8–46.1)
HGB BLD-MCNC: 11.9 G/DL (ref 11.5–15.4)
IMM GRANULOCYTES # BLD AUTO: 0.03 THOUSAND/UL (ref 0–0.2)
IMM GRANULOCYTES NFR BLD AUTO: 0 % (ref 0–2)
LYMPHOCYTES # BLD AUTO: 0.4 THOUSANDS/ΜL (ref 0.6–4.47)
LYMPHOCYTES NFR BLD AUTO: 5 % (ref 14–44)
MAGNESIUM SERPL-MCNC: 2.2 MG/DL (ref 1.6–2.6)
MCH RBC QN AUTO: 29.5 PG (ref 26.8–34.3)
MCHC RBC AUTO-ENTMCNC: 32.1 G/DL (ref 31.4–37.4)
MCV RBC AUTO: 92 FL (ref 82–98)
MONOCYTES # BLD AUTO: 0.07 THOUSAND/ΜL (ref 0.17–1.22)
MONOCYTES NFR BLD AUTO: 1 % (ref 4–12)
NEUTROPHILS # BLD AUTO: 7.09 THOUSANDS/ΜL (ref 1.85–7.62)
NEUTS SEG NFR BLD AUTO: 93 % (ref 43–75)
NRBC BLD AUTO-RTO: 0 /100 WBCS
PLATELET # BLD AUTO: 277 THOUSANDS/UL (ref 149–390)
PMV BLD AUTO: 9.3 FL (ref 8.9–12.7)
POTASSIUM SERPL-SCNC: 4.3 MMOL/L (ref 3.5–5.3)
PROT SERPL-MCNC: 6.7 G/DL (ref 6.4–8.2)
RBC # BLD AUTO: 4.03 MILLION/UL (ref 3.81–5.12)
SODIUM SERPL-SCNC: 143 MMOL/L (ref 136–145)
WBC # BLD AUTO: 7.63 THOUSAND/UL (ref 4.31–10.16)

## 2021-01-04 PROCEDURE — 80053 COMPREHEN METABOLIC PANEL: CPT

## 2021-01-04 PROCEDURE — 36415 COLL VENOUS BLD VENIPUNCTURE: CPT

## 2021-01-04 PROCEDURE — 85025 COMPLETE CBC W/AUTO DIFF WBC: CPT

## 2021-01-04 PROCEDURE — 83735 ASSAY OF MAGNESIUM: CPT

## 2021-01-05 ENCOUNTER — OFFICE VISIT (OUTPATIENT)
Dept: HEMATOLOGY ONCOLOGY | Facility: CLINIC | Age: 61
End: 2021-01-05
Payer: COMMERCIAL

## 2021-01-05 ENCOUNTER — HOSPITAL ENCOUNTER (OUTPATIENT)
Dept: INFUSION CENTER | Facility: HOSPITAL | Age: 61
Discharge: HOME | End: 2021-01-05
Attending: INTERNAL MEDICINE
Payer: COMMERCIAL

## 2021-01-05 VITALS
TEMPERATURE: 97.9 F | HEART RATE: 104 BPM | DIASTOLIC BLOOD PRESSURE: 92 MMHG | OXYGEN SATURATION: 97 % | RESPIRATION RATE: 16 BRPM | HEIGHT: 64 IN | BODY MASS INDEX: 28.71 KG/M2 | WEIGHT: 168.2 LBS | SYSTOLIC BLOOD PRESSURE: 168 MMHG

## 2021-01-05 VITALS
DIASTOLIC BLOOD PRESSURE: 82 MMHG | BODY MASS INDEX: 28.57 KG/M2 | TEMPERATURE: 99 F | HEART RATE: 96 BPM | SYSTOLIC BLOOD PRESSURE: 148 MMHG | RESPIRATION RATE: 16 BRPM | WEIGHT: 167.33 LBS | HEIGHT: 64 IN

## 2021-01-05 DIAGNOSIS — C50.412 PRIMARY CANCER OF UPPER OUTER QUADRANT OF LEFT FEMALE BREAST (HCC): Primary | ICD-10-CM

## 2021-01-05 DIAGNOSIS — C50.412 PRIMARY CANCER OF UPPER OUTER QUADRANT OF LEFT FEMALE BREAST (HCC): ICD-10-CM

## 2021-01-05 DIAGNOSIS — D70.1 CHEMOTHERAPY INDUCED NEUTROPENIA (HCC): Primary | ICD-10-CM

## 2021-01-05 DIAGNOSIS — T45.1X5A CHEMOTHERAPY INDUCED NEUTROPENIA (HCC): Primary | ICD-10-CM

## 2021-01-05 PROCEDURE — 96413 CHEMO IV INFUSION 1 HR: CPT

## 2021-01-05 PROCEDURE — 99214 OFFICE O/P EST MOD 30 MIN: CPT | Performed by: NURSE PRACTITIONER

## 2021-01-05 PROCEDURE — 96417 CHEMO IV INFUS EACH ADDL SEQ: CPT

## 2021-01-05 PROCEDURE — 96367 TX/PROPH/DG ADDL SEQ IV INF: CPT

## 2021-01-05 RX ORDER — SODIUM CHLORIDE 9 MG/ML
20 INJECTION, SOLUTION INTRAVENOUS ONCE
Status: COMPLETED | OUTPATIENT
Start: 2021-01-05 | End: 2021-01-05

## 2021-01-05 RX ADMIN — SODIUM CHLORIDE 20 ML/HR: 0.9 INJECTION, SOLUTION INTRAVENOUS at 09:30

## 2021-01-05 RX ADMIN — DOCETAXEL 140 MG: 20 INJECTION INTRAVENOUS at 10:16

## 2021-01-05 RX ADMIN — CYCLOPHOSPHAMIDE 1068 MG: 2 INJECTION, POWDER, FOR SOLUTION INTRAVENOUS; ORAL at 11:29

## 2021-01-05 RX ADMIN — DEXAMETHASONE SODIUM PHOSPHATE: 10 INJECTION, SOLUTION INTRAMUSCULAR; INTRAVENOUS at 09:30

## 2021-01-05 NOTE — PROGRESS NOTES
Hematology/Oncology Outpatient Follow-up  Obdulio Law 62 y o  female 1960 [id number]    Date:  1/5/2021      Assessment and Plan:  1  Primary cancer of upper outer quadrant of left female breast [facility])  The patient will be completing her adjuvant chemotherapy with Taxotere and Cytoxan today #4, she will continue to receive G-CSF support with the Neulasta on pro with her final dose  The patient is interested in having her Port-A-Cath removed as soon as possible we will schedule her with the IR department for removal in about 3 weeks after recovering from her final treatment  She is already scheduled to see radiation oncology 01/12/2020 to start planning for her adjuvant radiation  The patient will follow up again in about 4 weeks with repeat labs to ensure she is recovered from her adjuvant chemotherapy  Will also likely start her on her endocrine treatment at that time with an aromatase inhibitor  She seems to have pre-existing osteopenia according to her DEXA scan from June 2019 and will likely benefit from Prolia injections verses oral bisphosphonate  We did briefly discuss this as well the endocrine treatment and common adverse effects      - CBC and differential; Future  - Comprehensive metabolic panel; Future  - Magnesium; Future  - Vitamin D 25 hydroxy; Future  - Iron Panel (Includes Ferritin, Iron Sat%, Iron, and TIBC); Future  - Ferritin; Future  - Vitamin B12; Future  - Folate; Future  - Ambulatory referral to Interventional Radiology; Future  - Lipid panel; Future    HPI:  Patient presents today for a follow-up visit accompanied by her   She states that she tolerated her 3rd cycle of TC much better than the 1st 2  She has no new complaints today and is doing well  States that she already has follow-up appointment with radiation oncology 01/12/2020 to discuss/plan her adjuvant radiation       Her laboratory studies from yesterday 01/04/2021 showed normal WBC 7 63, she has low normal H&H 11 9/37 1, MCV 92 with platelet count 551  Glucose 160 remaining metabolic panel is appropriate for patient  Oncology History Overview Note   Patient with a family history of breast cancer in her mother  She had two previous benign breast biopsies  She was going for yearly mammograms and most recent in August showed focal asymmetry in the upper outer posterior left breast  A biopsy on 9/11/20 revealed left invasive ductal breast carcinoma  She saw General Surgeon, Dr Panda Camilo in September for surgical evaluation  8/12/20 Screening Mammogram bilateral w 3d & cad   IMPRESSION:  1  Upper outer posterior left breast focal asymmetry  Diagnostic mammography, with possible ultrasound, recommended  2  Stable right mammogram      9/4/20 Mammo diagnostic left w 3d & cad   US breast left limited (diagnostic)   FINDINGS:   LEFT  1) MASS  Mammo diagnostic left w 3d & cad: There is a 43 mm transverse x 26 mm AP x 23 mm craniocaudal high density, irregularly shaped mass seen in the upper outer quadrant of the left breast at 1 o'clock in the posterior depth  There is a possible satellite lesions slightly inferior to the mass  If measured as 1 finding, it measures 32 mm craniocaudal   The mass correlates with the prior mammogram finding  US breast left limited (diagnostic): There is and irregularly shaped, hypoechoic mass with indistinct margins with shadowing seen in the upper outer quadrant of the left breast at 1 o'clock in the posterior depth  The mass measures at least 23 x 11 x 10 mm and the size may be underestimated on ultrasound  The mass correlates with the prior mammogram finding  This is highly suspicious and ultrasound-guided core needle biopsy is recommended  Targeted ultrasound of the left axilla was performed  At least 1 morphologically benign lymph node was visualized  No morphologically abnormal lymph nodes were visualized       ASSESSMENT/BI-RADS CATEGORY:  Left: 4C - High Suspicion for Malignancy  Overall: 4 - Suspicious     9/11/20 Left breast US guided biopsy, at 1:00 12cmfn  Invasive mammary carcinoma of no special type Grade 3  ER/IA 90-95% positive, HER2 negative  Lymphovascular invasion present     9/28/20 Breast working group recommended treatment plan: Lumpectomy with SLNB followed by adjuvant radiation therapy  Genetic testing consult  10/2/20 Left breast Lumpectomy  Invasive breast carcinoma of no special type, 21 mm focus  Grade 3  DCIS present in 2 out of 17 blocks  Margins negative for invasive carcinoma, 1 mm from the closest posterior margin  Negative for DCIS 1 mm from the closest anterior margin  Lymph-vascular invasion present     Lymph nodes:  Left sentinel lymph node excision: 1 lymph node positive for carcinoma  Left axillary LN excision: 1 reactive lymph node          Primary cancer of upper outer quadrant of left female breast ([facility] [address] )   9/2020 Initial Diagnosis    Primary cancer of upper outer quadrant of left female breast ([facility] [address] )  Stage IIB     9/11/2020 Biopsy    Breast, left at 1:00 12cmfn, US guided biopsy, 5 passes:  - Invasive mammary carcinoma of no special type (ductal, not otherwise specified)  -- [place] histologic grade 3 of 3 (total score: 8 of 9)        * Glandular (acinar) Tubular Differentiation < 10%, score 3        * Nuclear Pleomorphism 3 of 3, score 3        * Mitotic Rate 3-4/mm2, score 2     -- Invasive carcinoma involves 5 of 5 submitted core biopsies, max  Dimension= 14 mm     -- Estrogen, Progesterone 90-95% positive HER2 negative  - Ductal carcinoma in-situ (DCIS): Not identified  - Lymphovascular invasion: Present  - Microcalcifications: Absent  - Best representative tumor block:  A2    -- Sufficient tumor present for        [place]end[place] Mammaprint/Blueprint (1 cm2 of invasive tumor in aggregate): No         MI Profile/Foundation One (at least 5 x 5 mm of tumor): Yes  10/2/2020 Surgery    A   Breast, Left, Lumpectomy:  - Invasive breast carcinoma of no special type (ductal NST/invasive ductal carcinoma), 21 mm focus  * [place] grade 3 of 3 (total score: 9 of 9)    -- tubule formation < 10%, score 3    -- nuclear grade 3 of 3, score 3    -- mitoses, score 3    * Ductal carcinoma in situ (DCIS): Present in 2 out of 17 blocks  -- [place], solid and cribriform architectural pattern, nuclear grade 3 of 3     * Margins: Negative for invasive carcinoma, 1 mm from the closest posterior margin; Negative for DCIS, 1 mm from the closest anterior margin  * Skin: Negative for carcinoma  * Skeletal muscle: Negative for carcinoma  * Lymph-vascular invasion: Present  * Pathologic stage (AJCC 8th ed ): pT2, pN1a (sn)  * See synoptic report  B  [place] Lymph Node, Left # 1, Excision:  - One lymph node positive for carcinoma (1/1)  C  Axillary contents, Left, Excision:  - One reactive lymph node (0/1)       10/2/2020 -  Cancer Staged    Staging form: Breast, AJCC 8th Edition  - Clinical stage from 10/2/2020: Stage IIB (cT2, cN1(sn), cM0, G3, ER+, AR+, HER2-) - Signed by Coretta Robbins MD on 10/9/2020  Stage prefix: Initial diagnosis  Laterality: Left  Tumor size (mm): 21  Method of lymph node assessment: [place] lymph node biopsy  Nuclear grade: G3  Mitotic count score: Score 3  Percentage of tumor with tubule formation: 10  Tubule formation score: Score 3  Scarff-Bloom-Cevallos score: 9  Histologic grading system: 3 grade system  Lymph-vascular invasion (LVI): LVI present/identified, NOS  Specimen type: Excision  National guidelines used in treatment planning: Yes  Type of national guideline used in treatment planning: NCCN       11/4/2020 -  Chemotherapy    pegfilgrastim (NEULASTA) subcutaneous injection 6 mg, 6 mg, Subcutaneous, Once, 2 of 3 cycles  Administration: 6 mg (11/5/2020), 6 mg (11/25/2020)  pegfilgrastim (NEULASTA ONPRO) subcutaneous injection kit 6 mg, 6 mg, Subcutaneous, Once, 1 of 1 cycle  Administration: 6 mg (12/15/2020)  cyclophosphamide (CYTOXAN) 1,068 mg in sodium chloride 0 9 % 250 mL IVPB, 600 mg/m2 = 1,068 mg, Intravenous, Once, 3 of 4 cycles  Administration: 1,068 mg (11/4/2020), 1,068 mg (11/24/2020), 1,068 mg (12/15/2020)  DOCEtaxel (TAXOTERE) 140 mg in sodium chloride 0 9 % 250 mL chemo infusion, 133 6 mg, Intravenous, Once, 3 of 4 cycles  Administration: 140 mg (11/4/2020), 140 mg (11/24/2020), 140 mg (12/15/2020)     11/2020 Genetic Testing    Patient has genetic testing done for breast cancer  Results revealed patient has the following mutation(s):  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), Heterozygous         Interval history:    ROS: Review of Systems   Constitutional: Positive for fatigue (mild)  Negative for activity change, appetite change, chills, fever and unexpected weight change  Reports hot flashes typically 1 per day   HENT: Negative for congestion, mouth sores, nosebleeds, sore throat and trouble swallowing  Eyes: Negative  Respiratory: Negative for cough, chest tightness and shortness of breath  Cardiovascular: Negative for chest pain, palpitations and leg swelling  Gastrointestinal: Negative for abdominal distention, abdominal pain, blood in stool, constipation, diarrhea, nausea and vomiting  Genitourinary: Negative for difficulty urinating, dysuria, frequency, hematuria and urgency  Musculoskeletal: Negative for arthralgias, back pain, gait problem, joint swelling and myalgias  Skin: Negative for color change, pallor and rash  Neurological: Positive for numbness (mild)  Negative for dizziness, weakness, light-headedness and headaches  Hematological: Negative for adenopathy  Does not bruise/bleed easily  Psychiatric/Behavioral: Positive for dysphoric mood  Negative for sleep disturbance         Past Medical History:   Diagnosis Date    Asthma     has not been treated for seveeral years    BRCA1 negative     Breast cancer ([facility] [place] [address] )     [facility]     breast  9/20    Exercise-induced asthma 11/3/2016       Past Surgical History:   Procedure Laterality Date    BREAST CYST EXCISION Left     benign    BREAST CYST EXCISION Left     benign    BREAST SURGERY      CHOLECYSTECTOMY      HYSTERECTOMY      IR PORT PLACEMENT  10/26/2020    MAMMO NEEDLE LOCALIZATION LEFT (ALL INC) Left 10/2/2020    MOUTH SURGERY      MA MASTECTOMY, PARTIAL Left 10/2/2020    Procedure: BREAST NEEDLE LOCALIZED LUMPECTOMY, SENTINEL LYMPH NODE BIOPSY (Bracketed U/S guided needle loc @ 8:00, INJECT AT 1045);   Surgeon: Arti Beebe MD;  Location: [address] [place] [address] OR;  Service: General    TUBAL LIGATION      US GUIDED BREAST BIOPSY LEFT COMPLETE Left 9/11/2020    WISDOM TOOTH EXTRACTION         Social History     Socioeconomic History    Marital status: /Civil Union     Spouse name: None    Number of children: 2    Years of education: None    Highest education level: None   Occupational History    Occupation: Retired   Social Needs    Financial resource strain: None    Food insecurity     Worry: None     Inability: None    Transportation needs     Medical: None     Non-medical: None   Tobacco Use    Smoking status: Never Smoker    Smokeless tobacco: Never Used   Substance and Sexual Activity    Alcohol use: No    Drug use: No    Sexual activity: Yes     Partners: Male     Birth control/protection: Female Sterilization     Comment:  x 9 years, [address]    Physical activity     Days per week: None     Minutes per session: None    Stress: None   Relationships    Social connections     Talks on phone: None     Gets together: None     Attends Voodoo service: None     Active member of club or organization: None     Attends meetings of clubs or organizations: None     Relationship status: None    Intimate partner violence     Fear of current or ex partner: None     Emotionally abused: None     Physically abused: None     Forced sexual activity: None   Other Topics Concern  None   Social History Narrative    Daily caffeine consumption, 1 serving a day    Exercises 3-4 times per week     x 8 years       Family History   Problem Relation Age of Onset    Hypertension Mother     Heart disease Mother         cardiac disorder    Breast cancer Mother         dx age early 19's   Ghosh Glaucoma Mother     Hypertension Father     Heart disease Father         cardiac disorder    Cancer Father     No Known Problems Sister     Multiple sclerosis Daughter     Melanoma Daughter     No Known Problems Son     Breast cancer Paternal Aunt        Allergies   Allergen Reactions    Ofloxacin Hives    Penicillins Hives    Chlorhexidine Rash         Current Outpatient Medications:     ascorbic acid (VITAMIN C) 500 mg tablet, Take 500 mg by mouth daily, Disp: , Rfl:     Calcium 600 MG tablet, Take 1 tablet by mouth daily , Disp: , Rfl:     Cholecalciferol (VITAMIN D3) 1000 units CAPS, Take 1 capsule by mouth daily, Disp: , Rfl:     Cranberry 1000 MG CAPS, Take 1 capsule by mouth , Disp: , Rfl:     dexamethasone (DECADRON) 4 mg tablet, Take 2 tablets (8 mg total) by mouth 2 (two) times a day with meals Day before chemo day of chemo day after chemo, Disp: 12 tablet, Rfl: 2    ondansetron (ZOFRAN) 8 mg tablet, take 1 tablet by mouth every 8 hours if needed for nausea and vomiting, Disp: 20 tablet, Rfl: 0    methylPREDNISolone 4 MG tablet therapy pack, Use as directed on package (Patient not taking: Reported on 1/5/2021), Disp: 1 each, Rfl: 0      Physical Exam:  /92 (BP Location: Left arm, Patient Position: Sitting, Cuff Size: Standard)   Pulse 104   Temp 97 9 °F (36 6 °C) (Tympanic)   Resp 16   Ht 5' 4" (1 626 m)   Wt 76 3 kg (168 lb 3 2 oz)   LMP  (LMP Unknown)   SpO2 97%   BMI 28 87 kg/m²     Physical Exam  Vitals signs reviewed  Constitutional:       General: She is not in acute distress  Appearance: She is well-developed  She is not diaphoretic     HENT: Head: Normocephalic and atraumatic  Eyes:      General: No scleral icterus  Conjunctiva/sclera: Conjunctivae normal       Pupils: Pupils are equal, round, and reactive to light  Neck:      Musculoskeletal: Normal range of motion and neck supple  Thyroid: No thyromegaly  Cardiovascular:      Rate and Rhythm: Normal rate and regular rhythm  Heart sounds: Normal heart sounds  No murmur  Pulmonary:      Effort: Pulmonary effort is normal  No respiratory distress  Breath sounds: Normal breath sounds  Abdominal:      General: There is no distension  Palpations: Abdomen is soft  There is no hepatomegaly or splenomegaly  Tenderness: There is no abdominal tenderness  Musculoskeletal: Normal range of motion  General: No swelling  Lymphadenopathy:      Cervical: No cervical adenopathy  Upper Body:      Right upper body: No axillary adenopathy  Left upper body: No axillary adenopathy  Skin:     General: Skin is warm and dry  Findings: No erythema or rash  Neurological:      General: No focal deficit present  Mental Status: She is alert and oriented to person, place, and time  Psychiatric:         Mood and Affect: Mood and affect normal          Behavior: Behavior normal  Behavior is cooperative  Thought Content: Thought content normal          Judgment: Judgment normal            Labs:  Lab Results   Component Value Date    WBC 7 63 01/04/2021    HGB 11 9 01/04/2021    HCT 37 1 01/04/2021    MCV 92 01/04/2021     01/04/2021     Lab Results   Component Value Date    K 4 3 01/04/2021     (H) 01/04/2021    CO2 26 01/04/2021    BUN 11 01/04/2021    CREATININE 0 74 01/04/2021    GLUF 100 (H) 10/26/2020    CALCIUM 9 5 01/04/2021    AST 23 01/04/2021    ALT 41 01/04/2021    ALKPHOS 97 01/04/2021    EGFR 89 01/04/2021     Patient voiced understanding and agreement in the above discussion   Aware to contact our office with questions/symptoms in the interim  This note has been generated by voice recognition software system  Therefore, there may be spelling, grammar, and or syntax errors  Please contact if questions arise

## 2021-01-05 NOTE — PROGRESS NOTES
Patient to the unit for chemotherapy  Tolerated Taxotere and Cytoxan infusions without adverse reaction  AVS given, aware of future appointments  Voices no questions or concerns at discharge

## 2021-01-05 NOTE — PLAN OF CARE
Problem: PAIN - ADULT  Goal: Verbalizes/displays adequate comfort level or baseline comfort level  Description: Interventions:  - Encourage patient to monitor pain and request assistance  - Assess pain using appropriate pain scale  - Administer analgesics based on type and severity of pain and evaluate response  - Implement non-pharmacological measures as appropriate and evaluate response  - Consider cultural and social influences on pain and pain management  - Notify physician/advanced practitioner if interventions unsuccessful or patient reports new pain  Outcome: Progressing     Problem: SAFETY ADULT  Goal: Patient will remain free of falls  Description: INTERVENTIONS:  - Assess patient frequently for physical needs  -  Identify cognitive and physical deficits and behaviors that affect risk of falls    -  [place] fall precautions as indicated by assessment   - Educate patient/family on patient safety including physical limitations  - Instruct patient to call for assistance with activity based on assessment  - Modify environment to reduce risk of injury  - Consider OT/PT consult to assist with strengthening/mobility  Outcome: Progressing     Problem: DISCHARGE PLANNING  Goal: Discharge to home or other facility with appropriate resources  Description: INTERVENTIONS:  - Identify barriers to discharge w/patient and caregiver  - Arrange for needed discharge resources and transportation as appropriate  - Identify discharge learning needs (meds, wound care, etc )  - Arrange for interpretive services to assist at discharge as needed  - Refer to Case Management Department for coordinating discharge planning if the patient needs post-hospital services based on physician/advanced practitioner order or complex needs related to functional status, cognitive ability, or social support system  Outcome: Progressing

## 2021-01-06 ENCOUNTER — HOSPITAL ENCOUNTER (OUTPATIENT)
Dept: INFUSION CENTER | Facility: HOSPITAL | Age: 61
Discharge: HOME | End: 2021-01-06
Attending: INTERNAL MEDICINE
Payer: COMMERCIAL

## 2021-01-06 VITALS — TEMPERATURE: 98.3 F

## 2021-01-06 DIAGNOSIS — T45.1X5A CHEMOTHERAPY INDUCED NEUTROPENIA (HCC): Primary | ICD-10-CM

## 2021-01-06 DIAGNOSIS — D70.1 CHEMOTHERAPY INDUCED NEUTROPENIA (HCC): Primary | ICD-10-CM

## 2021-01-06 DIAGNOSIS — C50.412 PRIMARY CANCER OF UPPER OUTER QUADRANT OF LEFT FEMALE BREAST (HCC): ICD-10-CM

## 2021-01-06 PROCEDURE — 96372 THER/PROPH/DIAG INJ SC/IM: CPT

## 2021-01-06 RX ADMIN — PEGFILGRASTIM 6 MG: 6 INJECTION SUBCUTANEOUS at 13:04

## 2021-01-06 NOTE — PROGRESS NOTES
Pt arrived to department today afebrile  Tolerated neulasta injection to left arm without incident   Discharged ambulatory with avs

## 2021-01-07 ENCOUNTER — TELEPHONE (OUTPATIENT)
Dept: INTERVENTIONAL RADIOLOGY/VASCULAR | Facility: HOSPITAL | Age: 61
End: 2021-01-07

## 2021-01-07 NOTE — PROGRESS NOTES
Spoke with patient to set up appointment to have port removed  Appointment was made for 2/1/20 @ the [email or web address]  Plan to arrive for 0900, have a ride to and from, and NPO after midnight the night prior

## 2021-01-12 ENCOUNTER — APPOINTMENT (OUTPATIENT)
Dept: RADIATION ONCOLOGY | Facility: CLINIC | Age: 61
End: 2021-01-12
Attending: RADIOLOGY
Payer: COMMERCIAL

## 2021-01-12 VITALS
HEIGHT: 64 IN | TEMPERATURE: 97.8 F | BODY MASS INDEX: 28.51 KG/M2 | SYSTOLIC BLOOD PRESSURE: 122 MMHG | DIASTOLIC BLOOD PRESSURE: 80 MMHG | WEIGHT: 167 LBS | HEART RATE: 96 BPM | RESPIRATION RATE: 16 BRPM

## 2021-01-12 DIAGNOSIS — C50.412 PRIMARY CANCER OF UPPER OUTER QUADRANT OF LEFT FEMALE BREAST (HCC): Primary | ICD-10-CM

## 2021-01-12 PROCEDURE — 99211 OFF/OP EST MAY X REQ PHY/QHP: CPT | Performed by: RADIOLOGY

## 2021-01-12 PROCEDURE — 77290 THER RAD SIMULAJ FIELD CPLX: CPT | Performed by: RADIOLOGY

## 2021-01-12 PROCEDURE — 77334 RADIATION TREATMENT AID(S): CPT | Performed by: RADIOLOGY

## 2021-01-12 NOTE — PROGRESS NOTES
Ede Philip 1960 is a 62 y o  female     Follow up visit     Oncology History Overview Note   Phone consult 10/14/20   Following completion of adjuvant systemic therapy, we would recommend adjuvant radiation to the entire left breast and regional lymphatics, delivered over the course of approximately 6 weeks  11/4/20 Chemotherapy initiated (Cytoxan/Taxotere) at 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), Heterozygous, Pathogenic, low penetrance     The c 470T>C (p [id number]) variant in the CHEK2 gene is a  allele present in the [facility] population  It is classified as a pathogenic variant with low penetrance in its association with CHEK2-associated cancers  This variant is described as pathogenic with low penetrance because it does not confer the same level of cancer risk as other CHEK2 pathogenic variants  12/15/20 Jareth Polanco MD  discussed the need for adjuvant radiation of the left breast after the chemotherapy is completed and for endocrine therapy with 1 of the aromatase inhibitor subsequently  1/5/21 Final chemotherapy    1/29/21 Jodi Avelar MD  2/1/21 IR port removal  2/3/21 Jareth Polanco MD    Tolerated chemo well except for bone aches from Neulasta     Primary cancer of upper outer quadrant of left female breast ([facility] [address] )   9/2020 Initial Diagnosis    Primary cancer of upper outer quadrant of left female breast ([facility] [address] )  Stage IIB     9/11/2020 Biopsy    Breast, left at 1:00 12cmfn, US guided biopsy, 5 passes:  - Invasive mammary carcinoma of no special type (ductal, not otherwise specified)  -- Ysabel histologic grade 3 of 3 (total score: 8 of 9)       -- Invasive carcinoma involves 5 of 5 submitted core biopsies, max  Dimension= 14 mm     -- Estrogen, Progesterone 90-95% positive HER2 negative  - Lymphovascular invasion: Present  10/2/2020 Surgery    A   Breast, Left, Lumpectomy:  - Invasive breast carcinoma of no special type (ductal NST/invasive ductal carcinoma), 21 mm focus  * Ysabel grade 3 of 3 (total score: 9 of 9)   * Ductal carcinoma in situ (DCIS): Present in 2 out of 17 blocks  -- Comedo, solid and cribriform architectural pattern, nuclear grade 3 of 3     * Margins: Negative for invasive carcinoma, 1 mm from the closest posterior margin; Negative for DCIS, 1 mm from the closest anterior margin  * Skin: Negative for carcinoma  * Skeletal muscle: Negative for carcinoma  * Lymph-vascular invasion: Present  * Pathologic stage (AJCC 8th ed ): pT2, pN1a (sn)  B  [place] Lymph Node, Left # 1, Excision:  - One lymph node positive for carcinoma (1/1)  C  Axillary contents, Left, Excision:  - One reactive lymph node (0/1)       10/2/2020 -  Cancer Staged    Staging form: Breast, AJCC 8th Edition  - Clinical stage from 10/2/2020: Stage IIB (cT2, cN1(sn), cM0, G3, ER+, [place]+, HER2-) - Signed by Gayle Bower MD on 10/9/2020  Stage prefix: Initial diagnosis  Laterality: Left  Tumor size (mm): 21  Method of lymph node assessment: [place] lymph node biopsy  Nuclear grade: G3  Mitotic count score: Score 3  Percentage of tumor with tubule formation: 10  Tubule formation score: Score 3  Scarff-Bloom-Cevallos score: 9  Histologic grading system: 3 grade system  Lymph-vascular invasion (LVI): LVI present/identified, NOS  Specimen type: Excision  National guidelines used in treatment planning: Yes  Type of national guideline used in treatment planning: NCCN       11/4/2020 -  Chemotherapy    pegfilgrastim (NEULASTA) subcutaneous injection 6 mg, 6 mg, Subcutaneous, Once, 3 of 3 cycles  Administration: 6 mg (11/5/2020), 6 mg (11/25/2020), 6 mg (1/6/2021)  pegfilgrastim (NEULASTA ONPRO) subcutaneous injection kit 6 mg, 6 mg, Subcutaneous, Once, 1 of 1 cycle  Administration: 6 mg (12/15/2020)  cyclophosphamide (CYTOXAN) 1,068 mg in sodium chloride 0 9 % 250 mL IVPB, 600 mg/m2 = 1,068 mg, Intravenous, Once, 4 of 4 cycles  Administration: 1,068 mg (11/4/2020), 1,068 mg (11/24/2020), 1,068 mg (12/15/2020), 1,068 mg (1/5/2021)  DOCEtaxel (TAXOTERE) 140 mg in sodium chloride 0 9 % 250 mL chemo infusion, 133 6 mg, Intravenous, Once, 4 of 4 cycles  Administration: 140 mg (11/4/2020), 140 mg (11/24/2020), 140 mg (12/15/2020), 140 mg (1/5/2021)     11/2020 Genetic Testing    Patient has genetic testing done for breast cancer    Results revealed patient has the following mutation(s):  CHEK2 c 470T>C (p [id number]), Heterozygous       Clinical Trial: no    Health Maintenance   Topic Date Due    Hepatitis C Screening  1960    Pneumococcal Vaccine: Pediatrics (0 to 5 Years) and At-Risk Patients (6 to 59 Years) (1 of 3 - PCV13) 08/21/1967    HIV Screening  08/21/1976    BMI: Followup Plan  08/21/1979    Depression Screening PHQ  05/30/2020    Annual Physical  05/30/2020    Influenza Vaccine (1) 09/01/2020    MAMMOGRAM  09/04/2021    BMI: Adult  01/05/2022    DTaP,Tdap,and Td Vaccines (2 - Td) 06/13/2029    Colorectal Cancer Screening  11/12/2029    HIB Vaccine  Aged Out    Hepatitis B Vaccine  Aged Out    IPV Vaccine  Aged Out    Hepatitis A Vaccine  Aged Out    Meningococcal ACWY Vaccine  Aged Out    HPV Vaccine  Aged Out       Patient Active Problem List   Diagnosis    Acute cystitis    Right hip pain    Vaginal atrophy    BMI 26 0-26 9,adult    Vitamin D deficiency    Healthcare maintenance    History of Meniere's disease    Exercise-induced asthma    Impaired fasting glucose    Low serum high density lipoprotein (HDL)    Menopausal symptoms    Nevus, atypical    Positive depression screening    Systolic murmur    Benign paroxysmal vertigo    Encntr for gyn exam (general) (routine) w abnormal findings    At high risk for breast cancer    Encounter for screening mammogram for breast cancer    Introital dyspareunia    KEIKO (stress urinary incontinence, female)    Osteopenia    Primary cancer of upper outer quadrant of left female breast ([facility] [address] )    Keratosis, inflamed seborrheic    Chemotherapy induced neutropenia (HCC)     Past Medical History:   Diagnosis Date    Asthma     has not been treated for seveeral years    BRCA1 negative     Breast cancer ([facility] [address] [facility]     Cancer ([facility] [address] )     breast  9/20    Exercise-induced asthma 11/3/2016     Past Surgical History:   Procedure Laterality Date    BREAST CYST EXCISION Left     benign    BREAST CYST EXCISION Left     benign    BREAST SURGERY      CHOLECYSTECTOMY      HYSTERECTOMY      IR PORT PLACEMENT  10/26/2020    MAMMO NEEDLE LOCALIZATION LEFT (ALL INC) Left 10/2/2020    MOUTH SURGERY      RI MASTECTOMY, PARTIAL Left 10/2/2020    Procedure: BREAST NEEDLE LOCALIZED LUMPECTOMY, SENTINEL LYMPH NODE BIOPSY (Bracketed U/S guided needle loc @ 8:00, INJECT AT 1045);   Surgeon: Narciso Bourne MD;  Location: [address] [place] [address] MAIN OR;  Service: General    TUBAL LIGATION      US GUIDED BREAST BIOPSY LEFT COMPLETE Left 9/11/2020    WISDOM TOOTH EXTRACTION       Family History   Problem Relation Age of Onset    Hypertension Mother     Heart disease Mother         cardiac disorder    Breast cancer Mother         dx age early 19's   Emaline Folds Glaucoma Mother     Hypertension Father     Heart disease Father         cardiac disorder    Cancer Father     No Known Problems Sister     Multiple sclerosis Daughter    Emaline Folds Melanoma Daughter     No Known Problems Son     Breast cancer Paternal Aunt      Social History     Socioeconomic History    Marital status: /Civil Union     Spouse name: Not on file    Number of children: 2    Years of education: Not on file    Highest education level: Not on file   Occupational History    Occupation: Retired   Social Needs    Financial resource strain: Not on file    Food insecurity     Worry: Not on file     Inability: Not on file   [employer] needs     Medical: Not on file     Non-medical: Not on file   Tobacco Use    Smoking status: Never Smoker    Smokeless tobacco: Never Used   Substance and Sexual Activity    Alcohol use: No    Drug use: No    Sexual activity: Yes     Partners: Male     Birth control/protection: Female Sterilization     Comment:  x 9 years, Ollie   Lifestyle    Physical activity     Days per week: Not on file     Minutes per session: Not on file    Stress: Not on file   Relationships    Social connections     Talks on phone: Not on file     Gets together: Not on file     Attends Restorationist service: Not on file     Active member of club or organization: Not on file     Attends meetings of clubs or organizations: Not on file     Relationship status: Not on file    Intimate partner violence     Fear of current or ex partner: Not on file     Emotionally abused: Not on file     Physically abused: Not on file     Forced sexual activity: Not on file   Other Topics Concern    Not on file   Social History Narrative    Daily caffeine consumption, 1 serving a day    Exercises 3-4 times per week     x 8 years       Current Outpatient Medications:     ascorbic acid (VITAMIN C) 500 mg tablet, Take 500 mg by mouth daily, Disp: , Rfl:     Calcium 600 MG tablet, Take 1 tablet by mouth daily , Disp: , Rfl:     Cholecalciferol (VITAMIN D3) 1000 units CAPS, Take 1 capsule by mouth daily, Disp: , Rfl:     Cranberry 1000 MG CAPS, Take 1 capsule by mouth , Disp: , Rfl:     dexamethasone (DECADRON) 4 mg tablet, Take 2 tablets (8 mg total) by mouth 2 (two) times a day with meals Day before chemo day of chemo day after chemo (Patient not taking: Reported on 1/12/2021), Disp: 12 tablet, Rfl: 2    ondansetron (ZOFRAN) 8 mg tablet, take 1 tablet by mouth every 8 hours if needed for nausea and vomiting (Patient not taking: Reported on 1/12/2021), Disp: 20 tablet, Rfl: 0  Allergies   Allergen Reactions    Ofloxacin Hives    Penicillins Hives    Chlorhexidine Rash       Review of Systems:  Review of Systems Constitutional: Negative  HENT: Negative  Eyes: Negative  Respiratory: Negative  Cardiovascular: Negative  Gastrointestinal: Negative  Endocrine: Negative  Genitourinary: Negative  Musculoskeletal: Negative  Skin: Negative  Allergic/Immunologic: Negative  Neurological: Negative  Tingling toes and fingers   Hematological: Negative  Psychiatric/Behavioral: Negative  Vitals:    01/12/21 0943   BP: 122/80   Pulse: 96   Resp: 16   Temp: 97 8 °F (36 6 °C)   Weight: 75 8 kg (167 lb)   Height: 5' 4 02" (1 626 m)    Oxygen 98%    Pain Score: 0-No pain    Imaging:No results found      Teaching

## 2021-01-12 NOTE — PROGRESS NOTES
Follow-up - Radiation Oncology   Florence Liao 1960 62 y o  female [id number]      History of Present Illness   Cancer Staging  Primary cancer of upper outer quadrant of left female breast ([facility] [address] )  Staging form: Breast, AJCC 8th Edition  - Clinical stage from 10/2/2020: Stage IIB (cT2, cN1(sn), cM0, G3, ER+, [place]+, HER2-) - Signed by Killian Mcclelland MD on 10/9/2020  Stage prefix: Initial diagnosis  Method of lymph node assessment: [place] lymph node biopsy  Nuclear grade: G3  Mitotic count score: Score 3  Percentage of tumor with tubule formation: 10  Tubule formation score: Score 3  Scarff-Bloom-Cevallos score: 9  Histologic grading system: 3 grade system  Laterality: Left  Tumor size (mm): 21  Lymph-vascular invasion (LVI): LVI present/identified, NOS  Specimen type: Excision  National guidelines used in treatment planning: Yes  Type of national guideline used in treatment planning: NCCN      Florence Liao has now completed systemic therapy and presents today in order to proceed with adjuvant radiation to the left breast and regional lymphatics  Overall she feels well  She tolerated chemotherapy well  She denies any issues related to the left breast       Interval History:  Phone consult 10/14/20   Following completion of adjuvant systemic therapy, we would recommend adjuvant radiation to the entire left breast and regional lymphatics, delivered over the course of approximately 6 weeks  11/4/20 Chemotherapy initiated (Cytoxan/Taxotere) at [facility]    11/23/20 [facility]  Result: Positive - CHEK2 c 470T>C (p [id number]), Heterozygous, Pathogenic, low penetrance     The c 470T>C (p [id number]) variant in the CHEK2 gene is a  allele present in the [place] population  It is classified as a pathogenic variant with low penetrance in its association with CHEK2-associated cancers    This variant is described as pathogenic with low penetrance because it does not confer the same level of cancer risk as other CHEK2 pathogenic variants  12/15/20 Ruben Mena MD  discussed the need for adjuvant radiation of the left breast after the chemotherapy is completed and for endocrine therapy with 1 of the aromatase inhibitor subsequently  1/5/21 Final chemotherapy    1/29/21 Dayday Camargo MD  2/1/21 IR port removal  2/3/21 Ruben Mena MD    Tolerated chemo well except for bone aches from [id number] [facility]   Oncology History   Primary cancer of upper outer quadrant of left female breast ([facility] [address] )   9/2020 Initial Diagnosis    Primary cancer of upper outer quadrant of left female breast ([facility] [address] )  Stage IIB     9/11/2020 Biopsy    Breast, left at 1:00 12cmfn, US guided biopsy, 5 passes:  - Invasive mammary carcinoma of no special type (ductal, not otherwise specified)  -- Ysabel histologic grade 3 of 3 (total score: 8 of 9)       -- Invasive carcinoma involves 5 of 5 submitted core biopsies, max  Dimension= 14 mm     -- Estrogen, Progesterone 90-95% positive HER2 negative  - Lymphovascular invasion: Present  10/2/2020 Surgery    A  Breast, Left, Lumpectomy:  - Invasive breast carcinoma of no special type (ductal NST/invasive ductal carcinoma), 21 mm focus  * [place] grade 3 of 3 (total score: 9 of 9)   * Ductal carcinoma in situ (DCIS): Present in 2 out of 17 blocks  -- Comedo, solid and cribriform architectural pattern, nuclear grade 3 of 3     * Margins: Negative for invasive carcinoma, 1 mm from the closest posterior margin; Negative for DCIS, 1 mm from the closest anterior margin  * Skin: Negative for carcinoma  * Skeletal muscle: Negative for carcinoma  * Lymph-vascular invasion: Present  * Pathologic stage (AJCC 8th ed ): pT2, pN1a (sn)  B  [place] Lymph Node, Left # 1, Excision:  - One lymph node positive for carcinoma (1/1)  C  Axillary contents, Left, Excision:  - One reactive lymph node (0/1)       10/2/2020 -  Cancer Staged    Staging form: Breast, AJCC 8th Edition  - Clinical stage from 10/2/2020: Stage IIB (cT2, cN1(sn), cM0, G3, ER+, [place]+, HER2-) - Signed by Richa Luz MD on 10/9/2020  Stage prefix: Initial diagnosis  Laterality: Left  Tumor size (mm): 21  Method of lymph node assessment: [place] lymph node biopsy  Nuclear grade: G3  Mitotic count score: Score 3  Percentage of tumor with tubule formation: 10  Tubule formation score: Score 3  Scarff-Bloom-Cevallos score: 9  Histologic grading system: 3 grade system  Lymph-vascular invasion (LVI): LVI present/identified, NOS  Specimen type: Excision  National guidelines used in treatment planning: Yes  Type of national guideline used in treatment planning: NCCN       11/4/2020 -  Chemotherapy    pegfilgrastim (NEULASTA) subcutaneous injection 6 mg, 6 mg, Subcutaneous, Once, 3 of 3 cycles  Administration: 6 mg (11/5/2020), 6 mg (11/25/2020), 6 mg (1/6/2021)  pegfilgrastim (NEULASTA ONPRO) subcutaneous injection kit 6 mg, 6 mg, Subcutaneous, Once, 1 of 1 cycle  Administration: 6 mg (12/15/2020)  cyclophosphamide (CYTOXAN) 1,068 mg in sodium chloride 0 9 % 250 mL IVPB, 600 mg/m2 = 1,068 mg, Intravenous, Once, 4 of 4 cycles  Administration: 1,068 mg (11/4/2020), 1,068 mg (11/24/2020), 1,068 mg (12/15/2020), 1,068 mg (1/5/2021)  DOCEtaxel (TAXOTERE) 140 mg in sodium chloride 0 9 % 250 mL chemo infusion, 133 6 mg, Intravenous, Once, 4 of 4 cycles  Administration: 140 mg (11/4/2020), 140 mg (11/24/2020), 140 mg (12/15/2020), 140 mg (1/5/2021)     11/2020 Genetic Testing    Patient has genetic testing done for breast cancer    Results revealed patient has the following mutation(s):  CHEK2 c 470T>C (p [id number]), Heterozygous         Past Medical History:   Diagnosis Date    Asthma     has not been treated for seveeral years    BRCA1 negative     Breast cancer ([facility] )     Cancer ([facility] )     breast  9/20    Exercise-induced asthma 11/3/2016     Past Surgical History:   Procedure Laterality Date  BREAST CYST EXCISION Left     benign    BREAST CYST EXCISION Left     benign    BREAST SURGERY      CHOLECYSTECTOMY      HYSTERECTOMY      IR PORT PLACEMENT  10/26/2020    MAMMO NEEDLE LOCALIZATION LEFT (ALL INC) Left 10/2/2020    MOUTH SURGERY      [place] MASTECTOMY, PARTIAL Left 10/2/2020    Procedure: BREAST NEEDLE LOCALIZED LUMPECTOMY, SENTINEL LYMPH NODE BIOPSY (Bracketed U/S guided needle loc @ 8:00, INJECT AT 1045); Surgeon: Cornelia Stokes MD;  Location: [facility] MAIN OR;  Service: General    TUBAL LIGATION      US GUIDED BREAST BIOPSY LEFT COMPLETE Left 9/11/2020    WISDOM TOOTH EXTRACTION         Social History   Social History     Substance and Sexual Activity   Alcohol Use No     Social History     Substance and Sexual Activity   Drug Use No     Social History     Tobacco Use   Smoking Status Never Smoker   Smokeless Tobacco Never Used         Meds/Allergies     Current Outpatient Medications:     ascorbic acid (VITAMIN C) 500 mg tablet, Take 500 mg by mouth daily, Disp: , Rfl:     Calcium 600 MG tablet, Take 1 tablet by mouth daily , Disp: , Rfl:     Cholecalciferol (VITAMIN D3) 1000 units CAPS, Take 1 capsule by mouth daily, Disp: , Rfl:     Cranberry 1000 MG CAPS, Take 1 capsule by mouth , Disp: , Rfl:     dexamethasone (DECADRON) 4 mg tablet, Take 2 tablets (8 mg total) by mouth 2 (two) times a day with meals Day before chemo day of chemo day after chemo (Patient not taking: Reported on 1/12/2021), Disp: 12 tablet, Rfl: 2    ondansetron (ZOFRAN) 8 mg tablet, take 1 tablet by mouth every 8 hours if needed for nausea and vomiting (Patient not taking: Reported on 1/12/2021), Disp: 20 tablet, Rfl: 0  Allergies   Allergen Reactions    Ofloxacin Hives    Penicillins Hives    Chlorhexidine Rash         Review of Systems   Review of Systems   Constitutional: Negative  HENT: Negative  Eyes: Negative  Respiratory: Negative  Cardiovascular: Negative  Gastrointestinal: Negative  Endocrine: Negative  Genitourinary: Negative  Musculoskeletal: Negative  Skin: Negative  Allergic/Immunologic: Negative  Neurological: Negative  Tingling toes and fingers   Hematological: Negative  Psychiatric/Behavioral: Negative  OBJECTIVE:   /80   Pulse 96   Temp 97 8 °F (36 6 °C)   Resp 16   Ht 5' 4 02" (1 626 m)   Wt 75 8 kg (167 lb)   LMP  (LMP Unknown)   BMI 28 65 kg/m²   Pain Assessment:  0  Karnofsky: 90 - Able to carry on normal activity; minor signs or symptoms of disease     Physical Exam   The patient presents today no apparent distress  Sclera anicteric  No palpable cervical or supraclavicular lymphadenopathy  Lungs clear to auscultation bilaterally  Normal S1-S2 regular rate and rhythm although the patient does report a history of a mild heart murmur which was not appreciated during today's examination  The right breast within normal limits  The left breast is soft, nontender, with no visible or palpable suspicious findings or persistent seroma  No axillary lymphadenopathy  No lymphedema  Normal speech  Normal affect          RESULTS    Lab Results:   Recent Results (from the past 672 hour(s))   CBC and differential    Collection Time: 01/04/21  9:03 AM   Result Value Ref Range    WBC 7 63 4 31 - 10 16 Thousand/uL    RBC 4 03 3 81 - 5 12 Million/uL    Hemoglobin 11 9 11 5 - 15 4 g/dL    Hematocrit 37 1 34 8 - 46 1 %    MCV 92 82 - 98 fL    MCH 29 5 26 8 - 34 3 pg    MCHC 32 1 31 4 - 37 4 g/dL    RDW 17 6 (H) 11 6 - 15 1 %    MPV 9 3 8 9 - 12 7 fL    Platelets 229 740 - 659 Thousands/uL    nRBC 0 /100 WBCs    Neutrophils Relative 93 (H) 43 - 75 %    Immat GRANS % 0 0 - 2 %    Lymphocytes Relative 5 (L) 14 - 44 %    Monocytes Relative 1 (L) 4 - 12 %    Eosinophils Relative 0 0 - 6 %    Basophils Relative 1 0 - 1 %    Neutrophils Absolute 7 09 1 85 - 7 62 Thousands/µL    Immature Grans Absolute 0 03 0 00 - 0 20 Thousand/uL    Lymphocytes Absolute 0 40 (L) 0 60 - 4 47 Thousands/µL    Monocytes Absolute 0 07 (L) 0 17 - 1 22 Thousand/µL    Eosinophils Absolute 0 00 0 00 - 0 61 Thousand/µL    Basophils Absolute 0 04 0 00 - 0 10 Thousands/µL   Comprehensive metabolic panel    Collection Time: 01/04/21  9:03 AM   Result Value Ref Range    Sodium 143 136 - 145 mmol/L    Potassium 4 3 3 5 - 5 3 mmol/L    Chloride 112 (H) 100 - 108 mmol/L    CO2 26 21 - 32 mmol/L    ANION GAP 5 4 - 13 mmol/L    BUN 11 5 - 25 mg/dL    Creatinine 0 74 0 60 - 1 30 mg/dL    Glucose 160 (H) 65 - 140 mg/dL    Calcium 9 5 8 3 - 10 1 mg/dL    AST 23 5 - 45 U/L    ALT 41 12 - 78 U/L    Alkaline Phosphatase 97 46 - 116 U/L    Total Protein 6 7 6 4 - 8 2 g/dL    Albumin 3 6 3 5 - 5 0 g/dL    Total Bilirubin 0 62 0 20 - 1 00 mg/dL    eGFR 89 ml/min/1 73sq m   Magnesium    Collection Time: 01/04/21  9:03 AM   Result Value Ref Range    Magnesium 2 2 1 6 - 2 6 mg/dL       Imaging Studies:No results found  Assessment/Plan:  No orders of the defined types were placed in this encounter  Sowmya Moore is a 62y o  year old female with recently diagnosed invasive mammary carcinoma of the left breast status post lumpectomy and sentinel node biopsy with negative margins, Stage IIB (cT2, cN1(sn), cM0, G3, ER+, [place]+, HER2-)  She has now completed adjuvant chemotherapy  She will now proceed with CT planning for adjuvant radiation which will be delivered over the course of 6 weeks directed at the breast and regional lymphatics  The associated risks and toxicities of treatment were again discussed with the patient in detail including, but not limited to, fatigue, erythema, hyperpigmentation, desquamation, subcutaneous fibrosis, rib fracture, pneumonitis, lymphedema, secondary malignancy, and cardiac toxicity  Treatment will begin approximately 4 weeks status post completion of chemotherapy      Elizabeth Marley MD  1/12/2021,10:36 AM    Portions of the record may have been created with voice recognition software   Occasional wrong word or "sound a like" substitutions may have occurred due to the inherent limitations of voice recognition software   Read the chart carefully and recognize, using context, where substitutions have occurred

## 2021-01-21 PROCEDURE — 77300 RADIATION THERAPY DOSE PLAN: CPT | Performed by: RADIOLOGY

## 2021-01-21 PROCEDURE — 77334 RADIATION TREATMENT AID(S): CPT | Performed by: RADIOLOGY

## 2021-01-21 PROCEDURE — 77295 3-D RADIOTHERAPY PLAN: CPT | Performed by: RADIOLOGY

## 2021-01-29 ENCOUNTER — TELEPHONE (OUTPATIENT)
Dept: SURGERY | Facility: HOSPITAL | Age: 61
End: 2021-01-29

## 2021-01-29 ENCOUNTER — OFFICE VISIT (OUTPATIENT)
Dept: SURGERY | Facility: CLINIC | Age: 61
End: 2021-01-29
Payer: COMMERCIAL

## 2021-01-29 VITALS
HEIGHT: 64 IN | RESPIRATION RATE: 16 BRPM | WEIGHT: 170 LBS | BODY MASS INDEX: 29.02 KG/M2 | TEMPERATURE: 98.2 F | HEART RATE: 87 BPM

## 2021-01-29 DIAGNOSIS — C50.412 PRIMARY CANCER OF UPPER OUTER QUADRANT OF LEFT FEMALE BREAST (HCC): Primary | ICD-10-CM

## 2021-01-29 PROCEDURE — 99213 OFFICE O/P EST LOW 20 MIN: CPT | Performed by: SURGERY

## 2021-01-29 NOTE — PROGRESS NOTES
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 status post breast conserving therapy on October 2, 2020  Today in the office patient denies all complaints referable to her breasts  She is happy to have completed her chemotherapy  She will be starting her radiation therapy within the next week  Today on physical examination the patient is well appearing  She is in no acute distress  Awake alert oriented  Competent reliable as a historian  She has no cervical supraclavicular or axillary lymphadenopathy bilaterally  The breasts are symmetric  There are no dominant lumps masses skin changes or nipple retraction bilaterally  She has a well-healed scar in the upper outer quadrant of the left breast       The patient appears clinically stable with regards to her history of stage II B left breast carcinoma status post breast conserving therapy in the fall of 2020  I look for to seeing her back in the office after bilateral mammogram scheduled for after August 12, 2021  All questions answered to the satisfaction of the patient who is in agreement with the treatment plan as outlined above  Subjective:      Patient ID: Andrew Quevedo is a 62 y o  female  Patient is here today for a 3 month s/p needle localization,excisional left breast biopsy with left axiallry sln bx 10-  Stated that she is doing well and is done with her chemotherapy  Denied new problems or concerns  Lashonda Thomson MA        Review of Systems   Constitutional: Negative for chills and fever  HENT: Negative for ear pain and sore throat  Eyes: Negative for pain and visual disturbance  Respiratory: Negative for cough and shortness of breath      Cardiovascular: Negative for chest pain and palpitations  Gastrointestinal: Negative for abdominal pain and vomiting  Genitourinary: Negative for dysuria and hematuria  Musculoskeletal: Negative for arthralgias and back pain  Skin: Negative for color change and rash  Neurological: Negative for seizures and syncope  All other systems reviewed and are negative  Objective:      Pulse 87   Temp 98 2 °F (36 8 °C) (Tympanic)   Resp 16   Ht 5' 4" (1 626 m)   Wt 77 1 kg (170 lb)   LMP  (LMP Unknown)   BMI 29 18 kg/m²          Physical Exam  Constitutional:       Appearance: She is well-developed  HENT:      Head: Normocephalic and atraumatic  Eyes:      Conjunctiva/sclera: Conjunctivae normal       Pupils: Pupils are equal, round, and reactive to light  Neck:      Musculoskeletal: Normal range of motion and neck supple  Cardiovascular:      Rate and Rhythm: Normal rate and regular rhythm  Pulmonary:      Effort: Pulmonary effort is normal       Breath sounds: Normal breath sounds  Abdominal:      General: Bowel sounds are normal       Palpations: Abdomen is soft  Musculoskeletal: Normal range of motion  Skin:     General: Skin is warm and dry  Comments: The patient has no palpable cervical, supraclavicular, or axillary lymphadenopathy bilaterally  The breasts are symmetric  There are no dominant lumps, masses, skin changes or nipple retraction bilaterally  Well-healed surgical scar upper outer quadrant left breast without signs of locally recurrent disease  Neurological:      Mental Status: She is alert and oriented to person, place, and time  Psychiatric:         Behavior: Behavior normal          Thought Content:  Thought content normal          Judgment: Judgment normal

## 2021-01-29 NOTE — ASSESSMENT & PLAN NOTE
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 status post breast conserving therapy on October 2, 2020  Today in the office patient denies all complaints referable to her breasts  She is happy to have completed her chemotherapy  She will be starting her radiation therapy within the next week  Today on physical examination the patient is well appearing  She is in no acute distress  Awake alert oriented  Competent reliable as a historian  She has no cervical supraclavicular or axillary lymphadenopathy bilaterally  The breasts are symmetric  There are no dominant lumps masses skin changes or nipple retraction bilaterally  She has a well-healed scar in the upper outer quadrant of the left breast       The patient appears clinically stable with regards to her history of stage II B left breast carcinoma status post breast conserving therapy in the fall of 2020  I look for to seeing her back in the office after bilateral mammogram scheduled for after August 12, 2021  All questions answered to the satisfaction of the patient who is in agreement with the treatment plan as outlined above

## 2021-02-01 PROBLEM — Z45.2 ENCOUNTER FOR CENTRAL LINE CARE: Status: ACTIVE | Noted: 2021-02-01

## 2021-02-02 ENCOUNTER — TELEPHONE (OUTPATIENT)
Dept: HEMATOLOGY ONCOLOGY | Facility: CLINIC | Age: 61
End: 2021-02-02

## 2021-02-02 NOTE — TELEPHONE ENCOUNTER
Reschedule Appointment     Who is calling in Patient    Doctor Appointment Scheduled with Dr Jayce Dorantes date and time 02/03 at 8:40am    New date and time 02/09 at 10am   Location [facility]   Patient verbalized understanding    Yes

## 2021-02-03 ENCOUNTER — TELEPHONE (OUTPATIENT)
Dept: SURGERY | Facility: HOSPITAL | Age: 61
End: 2021-02-03

## 2021-02-04 ENCOUNTER — TELEPHONE (OUTPATIENT)
Dept: SURGERY | Facility: HOSPITAL | Age: 61
End: 2021-02-04

## 2021-02-04 ENCOUNTER — TELEPHONE (OUTPATIENT)
Dept: HEMATOLOGY ONCOLOGY | Facility: CLINIC | Age: 61
End: 2021-02-04

## 2021-02-04 ENCOUNTER — HOSPITAL ENCOUNTER (OUTPATIENT)
Dept: INTERVENTIONAL RADIOLOGY/VASCULAR | Facility: HOSPITAL | Age: 61
Discharge: HOME | End: 2021-02-04
Admitting: RADIOLOGY
Payer: COMMERCIAL

## 2021-02-04 VITALS
SYSTOLIC BLOOD PRESSURE: 116 MMHG | HEIGHT: 64 IN | RESPIRATION RATE: 18 BRPM | HEART RATE: 85 BPM | BODY MASS INDEX: 27.31 KG/M2 | OXYGEN SATURATION: 97 % | DIASTOLIC BLOOD PRESSURE: 59 MMHG | WEIGHT: 160 LBS | TEMPERATURE: 98 F

## 2021-02-04 DIAGNOSIS — C50.412 PRIMARY CANCER OF UPPER OUTER QUADRANT OF LEFT FEMALE BREAST (HCC): ICD-10-CM

## 2021-02-04 LAB
25(OH)D3 SERPL-MCNC: 88.3 NG/ML (ref 30–100)
ALBUMIN SERPL BCP-MCNC: 3.8 G/DL (ref 3.5–5.7)
ALP SERPL-CCNC: 59 U/L (ref 40–150)
ALT SERPL W P-5'-P-CCNC: 19 U/L (ref 7–52)
ANION GAP SERPL CALCULATED.3IONS-SCNC: 6 MMOL/L (ref 4–13)
AST SERPL W P-5'-P-CCNC: 18 U/L (ref 13–39)
BASOPHILS # BLD AUTO: 0.1 THOUSANDS/ΜL (ref 0–0.1)
BASOPHILS NFR BLD AUTO: 1 % (ref 0–2)
BILIRUB SERPL-MCNC: 1.1 MG/DL (ref 0.2–1)
BUN SERPL-MCNC: 13 MG/DL (ref 7–25)
CALCIUM SERPL-MCNC: 8.7 MG/DL (ref 8.6–10.5)
CHLORIDE SERPL-SCNC: 107 MMOL/L (ref 98–107)
CHOLEST SERPL-MCNC: 161 MG/DL (ref 0–200)
CO2 SERPL-SCNC: 26 MMOL/L (ref 21–31)
CREAT SERPL-MCNC: 0.62 MG/DL (ref 0.6–1.2)
EOSINOPHIL # BLD AUTO: 0.2 THOUSAND/ΜL (ref 0–0.61)
EOSINOPHIL NFR BLD AUTO: 3 % (ref 0–5)
ERYTHROCYTE [DISTWIDTH] IN BLOOD BY AUTOMATED COUNT: 17.2 % (ref 11.5–14.5)
FERRITIN SERPL-MCNC: 99 NG/ML (ref 8–388)
FOLATE SERPL-MCNC: 12.8 NG/ML (ref 3.1–17.5)
GFR SERPL CREATININE-BSD FRML MDRD: 99 ML/MIN/1.73SQ M
GLUCOSE P FAST SERPL-MCNC: 103 MG/DL (ref 65–99)
GLUCOSE SERPL-MCNC: 103 MG/DL (ref 65–99)
HCT VFR BLD AUTO: 34.1 % (ref 42–47)
HDLC SERPL-MCNC: 41 MG/DL
HGB BLD-MCNC: 11.3 G/DL (ref 12–16)
IRON SATN MFR SERPL: 13 %
IRON SERPL-MCNC: 37 UG/DL (ref 50–170)
LDLC SERPL CALC-MCNC: 98 MG/DL (ref 0–100)
LYMPHOCYTES # BLD AUTO: 1.3 THOUSANDS/ΜL (ref 0.6–4.47)
LYMPHOCYTES NFR BLD AUTO: 22 % (ref 21–51)
MAGNESIUM SERPL-MCNC: 2 MG/DL (ref 1.9–2.7)
MCH RBC QN AUTO: 29.7 PG (ref 26–34)
MCHC RBC AUTO-ENTMCNC: 33.1 G/DL (ref 31–37)
MCV RBC AUTO: 90 FL (ref 81–99)
MONOCYTES # BLD AUTO: 0.5 THOUSAND/ΜL (ref 0.17–1.22)
MONOCYTES NFR BLD AUTO: 9 % (ref 2–12)
NEUTROPHILS # BLD AUTO: 3.9 THOUSANDS/ΜL (ref 1.4–6.5)
NEUTS SEG NFR BLD AUTO: 65 % (ref 42–75)
NONHDLC SERPL-MCNC: 120 MG/DL
PLATELET # BLD AUTO: 209 THOUSANDS/UL (ref 149–390)
PMV BLD AUTO: 7 FL (ref 8.6–11.7)
POTASSIUM SERPL-SCNC: 3.7 MMOL/L (ref 3.5–5.5)
PROT SERPL-MCNC: 6 G/DL (ref 6.4–8.9)
RBC # BLD AUTO: 3.8 MILLION/UL (ref 3.9–5.2)
SODIUM SERPL-SCNC: 139 MMOL/L (ref 134–143)
TIBC SERPL-MCNC: 293 UG/DL (ref 250–450)
TRIGL SERPL-MCNC: 108 MG/DL (ref 44–166)
VIT B12 SERPL-MCNC: 1178 PG/ML (ref 100–900)
WBC # BLD AUTO: 6 THOUSAND/UL (ref 4.8–10.8)

## 2021-02-04 PROCEDURE — 36590 REMOVAL TUNNELED CV CATH: CPT | Performed by: RADIOLOGY

## 2021-02-04 PROCEDURE — 99152 MOD SED SAME PHYS/QHP 5/>YRS: CPT | Performed by: RADIOLOGY

## 2021-02-04 PROCEDURE — 83540 ASSAY OF IRON: CPT | Performed by: NURSE PRACTITIONER

## 2021-02-04 PROCEDURE — 82746 ASSAY OF FOLIC ACID SERUM: CPT | Performed by: NURSE PRACTITIONER

## 2021-02-04 PROCEDURE — 77001 FLUOROGUIDE FOR VEIN DEVICE: CPT | Performed by: RADIOLOGY

## 2021-02-04 PROCEDURE — 85025 COMPLETE CBC W/AUTO DIFF WBC: CPT | Performed by: NURSE PRACTITIONER

## 2021-02-04 PROCEDURE — 99152 MOD SED SAME PHYS/QHP 5/>YRS: CPT

## 2021-02-04 PROCEDURE — 82306 VITAMIN D 25 HYDROXY: CPT | Performed by: NURSE PRACTITIONER

## 2021-02-04 PROCEDURE — 36590 REMOVAL TUNNELED CV CATH: CPT

## 2021-02-04 PROCEDURE — 99153 MOD SED SAME PHYS/QHP EA: CPT

## 2021-02-04 PROCEDURE — 83735 ASSAY OF MAGNESIUM: CPT | Performed by: NURSE PRACTITIONER

## 2021-02-04 PROCEDURE — 80061 LIPID PANEL: CPT | Performed by: NURSE PRACTITIONER

## 2021-02-04 PROCEDURE — 80053 COMPREHEN METABOLIC PANEL: CPT | Performed by: NURSE PRACTITIONER

## 2021-02-04 PROCEDURE — 82607 VITAMIN B-12: CPT | Performed by: NURSE PRACTITIONER

## 2021-02-04 PROCEDURE — 83550 IRON BINDING TEST: CPT | Performed by: NURSE PRACTITIONER

## 2021-02-04 PROCEDURE — 82728 ASSAY OF FERRITIN: CPT | Performed by: NURSE PRACTITIONER

## 2021-02-04 RX ORDER — MIDAZOLAM HYDROCHLORIDE 2 MG/2ML
INJECTION, SOLUTION INTRAMUSCULAR; INTRAVENOUS CODE/TRAUMA/SEDATION MEDICATION
Status: COMPLETED | OUTPATIENT
Start: 2021-02-04 | End: 2021-02-04

## 2021-02-04 RX ORDER — FENTANYL CITRATE 50 UG/ML
25 INJECTION, SOLUTION INTRAMUSCULAR; INTRAVENOUS EVERY 2 HOUR PRN
Status: CANCELLED | OUTPATIENT
Start: 2021-02-04 | End: 2021-02-06

## 2021-02-04 RX ORDER — FENTANYL CITRATE 50 UG/ML
INJECTION, SOLUTION INTRAMUSCULAR; INTRAVENOUS CODE/TRAUMA/SEDATION MEDICATION
Status: COMPLETED | OUTPATIENT
Start: 2021-02-04 | End: 2021-02-04

## 2021-02-04 RX ORDER — OXYCODONE HYDROCHLORIDE 5 MG/1
10 TABLET ORAL EVERY 4 HOURS PRN
Status: DISCONTINUED | OUTPATIENT
Start: 2021-02-04 | End: 2021-02-08 | Stop reason: HOSPADM

## 2021-02-04 RX ORDER — IBUPROFEN 600 MG/1
600 TABLET ORAL EVERY 6 HOURS PRN
Status: DISCONTINUED | OUTPATIENT
Start: 2021-02-04 | End: 2021-02-08 | Stop reason: HOSPADM

## 2021-02-04 RX ORDER — SODIUM CHLORIDE 9 MG/ML
50 INJECTION, SOLUTION INTRAVENOUS CONTINUOUS
Status: DISCONTINUED | OUTPATIENT
Start: 2021-02-04 | End: 2021-02-08 | Stop reason: HOSPADM

## 2021-02-04 RX ORDER — SODIUM CHLORIDE 9 MG/ML
50 INJECTION, SOLUTION INTRAVENOUS CONTINUOUS
Status: CANCELLED | OUTPATIENT
Start: 2021-02-04

## 2021-02-04 RX ORDER — LIDOCAINE HYDROCHLORIDE AND EPINEPHRINE 10; 10 MG/ML; UG/ML
INJECTION, SOLUTION INFILTRATION; PERINEURAL CODE/TRAUMA/SEDATION MEDICATION
Status: COMPLETED | OUTPATIENT
Start: 2021-02-04 | End: 2021-02-04

## 2021-02-04 RX ORDER — OXYCODONE HYDROCHLORIDE 5 MG/1
5 TABLET ORAL EVERY 4 HOURS PRN
Status: DISCONTINUED | OUTPATIENT
Start: 2021-02-04 | End: 2021-02-08 | Stop reason: HOSPADM

## 2021-02-04 RX ADMIN — LIDOCAINE HYDROCHLORIDE AND EPINEPHRINE 10 ML: 10; 10 INJECTION, SOLUTION INFILTRATION; PERINEURAL at 10:28

## 2021-02-04 RX ADMIN — LIDOCAINE HYDROCHLORIDE AND EPINEPHRINE 10 ML: 10; 10 INJECTION, SOLUTION INFILTRATION; PERINEURAL at 10:32

## 2021-02-04 RX ADMIN — MIDAZOLAM HYDROCHLORIDE 1 MG: 1 INJECTION, SOLUTION INTRAMUSCULAR; INTRAVENOUS at 10:20

## 2021-02-04 RX ADMIN — MIDAZOLAM HYDROCHLORIDE 1 MG: 1 INJECTION, SOLUTION INTRAMUSCULAR; INTRAVENOUS at 10:32

## 2021-02-04 RX ADMIN — FENTANYL CITRATE 50 MCG: 50 INJECTION INTRAMUSCULAR; INTRAVENOUS at 10:32

## 2021-02-04 RX ADMIN — FENTANYL CITRATE 50 MCG: 50 INJECTION INTRAMUSCULAR; INTRAVENOUS at 10:20

## 2021-02-04 RX ADMIN — LIDOCAINE HYDROCHLORIDE AND EPINEPHRINE 10 ML: 10; 10 INJECTION, SOLUTION INFILTRATION; PERINEURAL at 10:30

## 2021-02-04 RX ADMIN — SODIUM CHLORIDE 50 ML/HR: 0.9 INJECTION, SOLUTION INTRAVENOUS at 09:56

## 2021-02-04 RX ADMIN — MIDAZOLAM HYDROCHLORIDE 1 MG: 1 INJECTION, SOLUTION INTRAMUSCULAR; INTRAVENOUS at 10:29

## 2021-02-04 NOTE — DISCHARGE INSTRUCTIONS
[facility]  Interventional Radiology  Dr [address]: [phone] (M-F 7:30am - 4:00pm)  Off hours or no answer: [phone] (Ask for IR on call)      Implanted Venous Access Port Removal    WHAT YOU NEED TO KNOW:   An implanted venous access port is a device used to give treatments and take blood  It may also be called a central venous access device (CVAD)  The port is a small container that is placed under your skin, usually in your upper chest  A port can also be placed in your arm or abdomen  The port is attached to a catheter that enters a large vein  DISCHARGE INSTRUCTIONS:   Resume your normal diet  Small sips of flat soda will help with mild nausea  Prevent an infection:     Wash your hands often  Use soap and water  Clean your hands before and  after you care for your incision  Check your skin for infection every day  Look for redness, swelling, or fluid oozing from the incision site  Dressing may come off in 24 hours  Medical glue will peel off on its own in 5 to 10 days  You may shower 24 hours after procedure  Follow up with your healthcare provider as directed  Write down your questions so you remember to ask them during your visits  Activity:  You may return to your daily activities when the area heals  Contact Interventional Radiology at [phone] [place] PATIENTS: Contact Interventional Radiology at [phone]) Jace Viveros PATIENTS: Contact Interventional Radiology at [phone]) if:     You have a fever  You have persistent nausea  Your inciscion site is red, swollen, or draining pus  You have questions or concerns about your condition or care  Seek care immediately or call 911 if:  Blood soaks through your bandage  The skin over or around your incision breaks open  Your heart is jumping or fluttering  You have a headache, blurred vision, and feel confused      You have pain in your arm, neck, shoulder, or chest     You have trouble breathing that is getting worse over time

## 2021-02-05 ENCOUNTER — APPOINTMENT (OUTPATIENT)
Dept: RADIATION ONCOLOGY | Facility: CLINIC | Age: 61
End: 2021-02-05
Attending: RADIOLOGY
Payer: COMMERCIAL

## 2021-02-05 PROCEDURE — 77280 THER RAD SIMULAJ FIELD SMPL: CPT | Performed by: RADIOLOGY

## 2021-02-06 ENCOUNTER — APPOINTMENT (OUTPATIENT)
Dept: RADIATION ONCOLOGY | Facility: CLINIC | Age: 61
End: 2021-02-06
Payer: COMMERCIAL

## 2021-02-06 PROCEDURE — 77412 RADIATION TX DELIVERY LVL 3: CPT | Performed by: RADIOLOGY

## 2021-02-06 PROCEDURE — 77387 GUIDANCE FOR RADJ TX DLVR: CPT | Performed by: RADIOLOGY

## 2021-02-06 PROCEDURE — 77417 THER RADIOLOGY PORT IMAGE(S): CPT | Performed by: RADIOLOGY

## 2021-02-08 ENCOUNTER — APPOINTMENT (OUTPATIENT)
Dept: RADIATION ONCOLOGY | Facility: CLINIC | Age: 61
End: 2021-02-08
Attending: RADIOLOGY
Payer: COMMERCIAL

## 2021-02-08 DIAGNOSIS — C50.412 PRIMARY CANCER OF UPPER OUTER QUADRANT OF LEFT FEMALE BREAST (HCC): Primary | ICD-10-CM

## 2021-02-08 PROCEDURE — 77387 GUIDANCE FOR RADJ TX DLVR: CPT | Performed by: RADIOLOGY

## 2021-02-08 PROCEDURE — 77412 RADIATION TX DELIVERY LVL 3: CPT | Performed by: RADIOLOGY

## 2021-02-08 PROCEDURE — 77331 SPECIAL RADIATION DOSIMETRY: CPT | Performed by: RADIOLOGY

## 2021-02-09 ENCOUNTER — APPOINTMENT (OUTPATIENT)
Dept: RADIATION ONCOLOGY | Facility: CLINIC | Age: 61
End: 2021-02-09
Attending: RADIOLOGY
Payer: COMMERCIAL

## 2021-02-09 ENCOUNTER — TELEMEDICINE (OUTPATIENT)
Dept: HEMATOLOGY ONCOLOGY | Facility: CLINIC | Age: 61
End: 2021-02-09
Payer: COMMERCIAL

## 2021-02-09 ENCOUNTER — TELEPHONE (OUTPATIENT)
Dept: HEMATOLOGY ONCOLOGY | Facility: CLINIC | Age: 61
End: 2021-02-09

## 2021-02-09 DIAGNOSIS — M85.852 OSTEOPENIA OF LEFT HIP: Primary | ICD-10-CM

## 2021-02-09 DIAGNOSIS — Z79.810 LONG TERM CURRENT USE OF SELECTIVE ESTROGEN RECEPTOR MODULATORS (SERMS): ICD-10-CM

## 2021-02-09 DIAGNOSIS — Z79.811 AROMATASE INHIBITOR USE: ICD-10-CM

## 2021-02-09 DIAGNOSIS — M85.80 OSTEOPENIA, UNSPECIFIED LOCATION: ICD-10-CM

## 2021-02-09 DIAGNOSIS — C50.412 PRIMARY CANCER OF UPPER OUTER QUADRANT OF LEFT FEMALE BREAST (HCC): ICD-10-CM

## 2021-02-09 DIAGNOSIS — C50.412 PRIMARY CANCER OF UPPER OUTER QUADRANT OF LEFT FEMALE BREAST (HCC): Primary | ICD-10-CM

## 2021-02-09 PROCEDURE — 77412 RADIATION TX DELIVERY LVL 3: CPT | Performed by: RADIOLOGY

## 2021-02-09 PROCEDURE — 77387 GUIDANCE FOR RADJ TX DLVR: CPT | Performed by: RADIOLOGY

## 2021-02-09 PROCEDURE — 99214 OFFICE O/P EST MOD 30 MIN: CPT | Performed by: INTERNAL MEDICINE

## 2021-02-09 RX ORDER — ANASTROZOLE 1 MG/1
1 TABLET ORAL DAILY
Qty: 30 TABLET | Refills: 4 | Status: SHIPPED | OUTPATIENT
Start: 2021-02-09 | End: 2021-07-05

## 2021-02-09 NOTE — PROGRESS NOTES
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 [facility])    The patient completed the adjuvant chemotherapy with 4 cycles of TC with out significant side effects  We had an extensive discussion regarding the potential benefit of adjuvant endocrine therapy with 1 of the aromatase inhibitor since she is postmenopausal   We discussed anastrozole in the potential side effects including arthralgia, hot flashes and decreased bone density among other potential side effects  The patient seems to be interested in getting the anastrozole started as soon as possible which is appropriate  We will see her in couple of months from now to see how she is tolerating it     - anastrozole (ARIMIDEX) 1 mg tablet; Take 1 tablet (1 mg total) by mouth daily  Dispense: 30 tablet; Refill: 4  - CBC and differential; Future  - Comprehensive metabolic panel; Future  - Magnesium; Future    2  Osteopenia, unspecified location    We discussed also pursuing Prolia injections since she has osteopenia  The Prolia injection will be started at the next visit  She will be also due for another bone density around the end of June of this year  3  Aromatase inhibitor use    She seems to be taking vitamin-D supplements on a daily basis her vitamin-D was 88  I did advise her also to exercise on regular basis as well  - anastrozole (ARIMIDEX) 1 mg tablet; Take 1 tablet (1 mg total) by mouth daily  Dispense: 30 tablet;  Refill: 4    Problem List Items Addressed This Visit        Musculoskeletal and Integument    Osteopenia       Other    Primary cancer of upper 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 [address] ) - Primary    Relevant Medications    anastrozole (ARIMIDEX) 1 mg tablet    Other Relevant Orders    CBC and differential    Comprehensive metabolic panel    Magnesium      Other Visit Diagnoses     Aromatase inhibitor use        Relevant Medications    anastrozole (ARIMIDEX) 1 mg tablet Reason for visit is   Chief Complaint   Patient presents with    Virtual Brief Visit        Encounter provider Radha Wilhelm MD    Provider located at [address] [address] [phone]    Recent Visits  Date Type Provider Dept   02/04/21 Telephone Gloria Jefferson [facility] [place] [facility]   Showing recent visits within past 7 days and meeting all other requirements     Today's Visits  Date Type Provider Dept   02/09/21 Telephone Brittany Molina [facility]   02/09/21 Sai Barahona MD [facility] [place]   Showing today's visits and meeting all other requirements     Future Appointments  No visits were found meeting these conditions  Showing future appointments within next 150 days and meeting all other requirements        After connecting through telephone, the patient was identified by name and date of birth  Brandon Brooks was informed that this is a telemedicine visit and that the visit is being conducted through telephone  My office door was closed  No one else was in the room  She acknowledged consent and understanding of privacy and security of the platform  The patient has agreed to participate and understands she can discontinue the visit at any time  Patient is aware this is a billable service  Subjective    Brandon Brooks is a 62 y o  female  HPI     The patient completed the adjuvant chemotherapy with 4 cycles of Cytoxan  And Taxotere around the beginning of January  She is currently still getting adjuvant chemotherapy to the left breast which she is tolerating it relatively well  She had blood work on 02/04/2021 which showed hemoglobin of 11 3 with normal white cells and platelets  Creatinine 0 6 with normal liver enzymes  Vitamin-D 88 3  Ferritin 99 with saturation of 13%  Vitamin B12 1178   Lipid panel was normal   Past Medical History:   Diagnosis Date    Asthma     has not been treated for seveeral years  exercise induced    BRCA1 negative     [facility] cancer ([facility] [address] )     Cancer ([facility] [address] )     breast  9/20    Exercise-induced asthma 11/3/2016       Past Surgical History:   Procedure Laterality Date    BREAST CYST EXCISION Left     benign    BREAST CYST EXCISION Left     benign    BREAST SURGERY      CHOLECYSTECTOMY      HYSTERECTOMY      IR PORT PLACEMENT  10/26/2020    IR PORT REMOVAL  2/4/2021    MAMMO NEEDLE LOCALIZATION LEFT (ALL INC) Left 10/2/2020    MOUTH SURGERY      UT MASTECTOMY, PARTIAL Left 10/2/2020    Procedure: BREAST NEEDLE LOCALIZED LUMPECTOMY, SENTINEL LYMPH NODE BIOPSY (Bracketed U/S guided needle loc @ 8:00, INJECT AT 1045); Surgeon: Morgan Morataya MD;  Location: [address] [place] [address] MAIN OR;  Service: General    TUBAL LIGATION      US GUIDED BREAST BIOPSY LEFT COMPLETE Left 9/11/2020    WISDOM TOOTH EXTRACTION         Current Outpatient Medications   Medication Sig Dispense Refill    anastrozole (ARIMIDEX) 1 mg tablet Take 1 tablet (1 mg total) by mouth daily 30 tablet 4    Calcium 600 MG tablet Take 1 tablet by mouth every morning       Cholecalciferol (VITAMIN D3) 1000 units CAPS Take 1 capsule by mouth every morning       Cranberry 1000 MG CAPS Take 1 capsule by mouth every morning       dexamethasone (DECADRON) 4 mg tablet Take 2 tablets (8 mg total) by mouth 2 (two) times a day with meals Day before chemo day of chemo day after chemo 12 tablet 2    ondansetron (ZOFRAN) 8 mg tablet take 1 tablet by mouth every 8 hours if needed for nausea and vomiting 20 tablet 0     No current facility-administered medications for this visit  Allergies   Allergen Reactions    Ofloxacin Hives    Penicillins Hives    Chlorhexidine Rash       Review of Systems   Constitutional: Positive for fatigue  Negative for chills and fever  HENT: Negative for ear pain and sore throat      Eyes: Negative for pain and visual disturbance  Respiratory: Negative for cough and shortness of breath  Cardiovascular: Negative for chest pain and palpitations  Gastrointestinal: Negative for abdominal pain and vomiting  Genitourinary: Negative for dysuria and hematuria  Musculoskeletal: Negative for arthralgias and back pain  Skin: Negative for color change and rash  Neurological: Negative for seizures and syncope  All other systems reviewed and are negative  There were no vitals filed for this visit  I spent 25 minutes directly with the patient during this visit    VIRTUAL VISIT DISCLAIMER    Kasie Melendez acknowledges that she has consented to an online visit or consultation  She understands that the online visit is based solely on information provided by her, and that, in the absence of a face-to-face physical evaluation by the physician, the diagnosis she receives is both limited and provisional in terms of accuracy and completeness  This is not intended to replace a full medical face-to-face evaluation by the physician  Kasie Melendez understands and accepts these terms

## 2021-02-09 NOTE — TELEPHONE ENCOUNTER
Patient had virtual apt today with Dr Blank Carvajal  Called pt and scheduled 8 wk f/u apt for Thursday 04/08/2021 at 9:30 a m  w/Anitra CARDENAS at the [place] office  Informed pt that once we receive an Auth from her insurance we will reach out to her to schedule  Pt confirmed all information

## 2021-02-10 ENCOUNTER — APPOINTMENT (OUTPATIENT)
Dept: RADIATION ONCOLOGY | Facility: CLINIC | Age: 61
End: 2021-02-10
Attending: RADIOLOGY
Payer: COMMERCIAL

## 2021-02-10 PROCEDURE — 77417 THER RADIOLOGY PORT IMAGE(S): CPT | Performed by: RADIOLOGY

## 2021-02-10 PROCEDURE — 77412 RADIATION TX DELIVERY LVL 3: CPT | Performed by: RADIOLOGY

## 2021-02-10 PROCEDURE — 77387 GUIDANCE FOR RADJ TX DLVR: CPT | Performed by: RADIOLOGY

## 2021-02-11 ENCOUNTER — TELEPHONE (OUTPATIENT)
Dept: HEMATOLOGY ONCOLOGY | Facility: CLINIC | Age: 61
End: 2021-02-11

## 2021-02-11 ENCOUNTER — APPOINTMENT (OUTPATIENT)
Dept: RADIATION ONCOLOGY | Facility: CLINIC | Age: 61
End: 2021-02-11
Attending: RADIOLOGY
Payer: COMMERCIAL

## 2021-02-11 PROCEDURE — 77387 GUIDANCE FOR RADJ TX DLVR: CPT | Performed by: RADIOLOGY

## 2021-02-11 PROCEDURE — 77417 THER RADIOLOGY PORT IMAGE(S): CPT | Performed by: RADIOLOGY

## 2021-02-11 PROCEDURE — 77412 RADIATION TX DELIVERY LVL 3: CPT | Performed by: RADIOLOGY

## 2021-02-11 PROCEDURE — 77336 RADIATION PHYSICS CONSULT: CPT | Performed by: RADIOLOGY

## 2021-02-11 NOTE — TELEPHONE ENCOUNTER
Patient is calling in requesting an update with funding for her  Prolia injection, she can be reached back at [phone]

## 2021-02-11 NOTE — TELEPHONE ENCOUNTER
Called patient and advised Prolia has been approved  Asked if she required funding for injection  Pt states she was only looking for the authorization

## 2021-02-12 ENCOUNTER — APPOINTMENT (OUTPATIENT)
Dept: RADIATION ONCOLOGY | Facility: CLINIC | Age: 61
End: 2021-02-12
Attending: RADIOLOGY
Payer: COMMERCIAL

## 2021-02-12 PROCEDURE — 77412 RADIATION TX DELIVERY LVL 3: CPT | Performed by: RADIOLOGY

## 2021-02-12 PROCEDURE — 77387 GUIDANCE FOR RADJ TX DLVR: CPT | Performed by: RADIOLOGY

## 2021-02-15 ENCOUNTER — LAB (OUTPATIENT)
Dept: LAB | Facility: HOSPITAL | Age: 61
End: 2021-02-15
Attending: RADIOLOGY
Payer: COMMERCIAL

## 2021-02-15 ENCOUNTER — APPOINTMENT (OUTPATIENT)
Dept: RADIATION ONCOLOGY | Facility: CLINIC | Age: 61
End: 2021-02-15
Attending: RADIOLOGY
Payer: COMMERCIAL

## 2021-02-15 LAB
BASOPHILS # BLD AUTO: 0.05 THOUSANDS/ΜL (ref 0–0.1)
BASOPHILS NFR BLD AUTO: 1 % (ref 0–1)
EOSINOPHIL # BLD AUTO: 0.19 THOUSAND/ΜL (ref 0–0.61)
EOSINOPHIL NFR BLD AUTO: 3 % (ref 0–6)
ERYTHROCYTE [DISTWIDTH] IN BLOOD BY AUTOMATED COUNT: 14.4 % (ref 11.6–15.1)
HCT VFR BLD AUTO: 38.9 % (ref 34.8–46.1)
HGB BLD-MCNC: 12.4 G/DL (ref 11.5–15.4)
IMM GRANULOCYTES # BLD AUTO: 0.02 THOUSAND/UL (ref 0–0.2)
IMM GRANULOCYTES NFR BLD AUTO: 0 % (ref 0–2)
LYMPHOCYTES # BLD AUTO: 1.2 THOUSANDS/ΜL (ref 0.6–4.47)
LYMPHOCYTES NFR BLD AUTO: 19 % (ref 14–44)
MCH RBC QN AUTO: 29.8 PG (ref 26.8–34.3)
MCHC RBC AUTO-ENTMCNC: 31.9 G/DL (ref 31.4–37.4)
MCV RBC AUTO: 94 FL (ref 82–98)
MONOCYTES # BLD AUTO: 0.42 THOUSAND/ΜL (ref 0.17–1.22)
MONOCYTES NFR BLD AUTO: 7 % (ref 4–12)
NEUTROPHILS # BLD AUTO: 4.47 THOUSANDS/ΜL (ref 1.85–7.62)
NEUTS SEG NFR BLD AUTO: 70 % (ref 43–75)
NRBC BLD AUTO-RTO: 0 /100 WBCS
PLATELET # BLD AUTO: 236 THOUSANDS/UL (ref 149–390)
PMV BLD AUTO: 9 FL (ref 8.9–12.7)
RBC # BLD AUTO: 4.16 MILLION/UL (ref 3.81–5.12)
WBC # BLD AUTO: 6.35 THOUSAND/UL (ref 4.31–10.16)

## 2021-02-15 PROCEDURE — 85025 COMPLETE CBC W/AUTO DIFF WBC: CPT

## 2021-02-15 PROCEDURE — 36415 COLL VENOUS BLD VENIPUNCTURE: CPT

## 2021-02-15 PROCEDURE — 77387 GUIDANCE FOR RADJ TX DLVR: CPT | Performed by: RADIOLOGY

## 2021-02-15 PROCEDURE — 77412 RADIATION TX DELIVERY LVL 3: CPT | Performed by: RADIOLOGY

## 2021-02-16 ENCOUNTER — APPOINTMENT (OUTPATIENT)
Dept: RADIATION ONCOLOGY | Facility: CLINIC | Age: 61
End: 2021-02-16
Attending: RADIOLOGY
Payer: COMMERCIAL

## 2021-02-16 ENCOUNTER — APPOINTMENT (OUTPATIENT)
Dept: RADIATION ONCOLOGY | Facility: CLINIC | Age: 61
End: 2021-02-16
Payer: COMMERCIAL

## 2021-02-16 PROCEDURE — 77412 RADIATION TX DELIVERY LVL 3: CPT | Performed by: RADIOLOGY

## 2021-02-16 PROCEDURE — 77387 GUIDANCE FOR RADJ TX DLVR: CPT | Performed by: RADIOLOGY

## 2021-02-17 ENCOUNTER — TELEPHONE (OUTPATIENT)
Dept: HEMATOLOGY ONCOLOGY | Facility: MEDICAL CENTER | Age: 61
End: 2021-02-17

## 2021-02-17 ENCOUNTER — APPOINTMENT (OUTPATIENT)
Dept: RADIATION ONCOLOGY | Facility: CLINIC | Age: 61
End: 2021-02-17
Attending: RADIOLOGY
Payer: COMMERCIAL

## 2021-02-17 PROCEDURE — 77387 GUIDANCE FOR RADJ TX DLVR: CPT | Performed by: RADIOLOGY

## 2021-02-17 PROCEDURE — 77412 RADIATION TX DELIVERY LVL 3: CPT | Performed by: RADIOLOGY

## 2021-02-17 NOTE — TELEPHONE ENCOUNTER
Spoke to patient and she was contacting the office to schedule her Prolia injection since it has been approved  Patient reports that she is at radiation at the [facility] every day and would prefer if this could be coordinated at the [facility] near her radiation time if possible  Please contact the patient at [phone] to schedule the injection

## 2021-02-17 NOTE — TELEPHONE ENCOUNTER
Called and LVM for patient to return call to the office to discuss specifically what she was looking to follow up on  Provided office phone number for a return call

## 2021-02-18 ENCOUNTER — APPOINTMENT (OUTPATIENT)
Dept: RADIATION ONCOLOGY | Facility: CLINIC | Age: 61
End: 2021-02-18
Attending: RADIOLOGY
Payer: COMMERCIAL

## 2021-02-18 PROCEDURE — 77387 GUIDANCE FOR RADJ TX DLVR: CPT | Performed by: RADIOLOGY

## 2021-02-18 PROCEDURE — 77417 THER RADIOLOGY PORT IMAGE(S): CPT | Performed by: RADIOLOGY

## 2021-02-18 PROCEDURE — 77412 RADIATION TX DELIVERY LVL 3: CPT | Performed by: RADIOLOGY

## 2021-02-18 PROCEDURE — 77336 RADIATION PHYSICS CONSULT: CPT | Performed by: RADIOLOGY

## 2021-02-19 ENCOUNTER — APPOINTMENT (OUTPATIENT)
Dept: RADIATION ONCOLOGY | Facility: CLINIC | Age: 61
End: 2021-02-19
Attending: RADIOLOGY
Payer: COMMERCIAL

## 2021-02-21 ENCOUNTER — OFFICE VISIT (OUTPATIENT)
Dept: URGENT CARE | Facility: CLINIC | Age: 61
End: 2021-02-21
Payer: COMMERCIAL

## 2021-02-21 VITALS
BODY MASS INDEX: 27.31 KG/M2 | RESPIRATION RATE: 18 BRPM | SYSTOLIC BLOOD PRESSURE: 118 MMHG | WEIGHT: 160 LBS | HEIGHT: 64 IN | DIASTOLIC BLOOD PRESSURE: 84 MMHG | TEMPERATURE: 98 F | HEART RATE: 91 BPM | OXYGEN SATURATION: 99 %

## 2021-02-21 DIAGNOSIS — L03.114 LEFT ARM CELLULITIS: Primary | ICD-10-CM

## 2021-02-21 PROCEDURE — 99213 OFFICE O/P EST LOW 20 MIN: CPT | Performed by: PHYSICIAN ASSISTANT

## 2021-02-21 PROCEDURE — S9088 SERVICES PROVIDED IN URGENT: HCPCS | Performed by: PHYSICIAN ASSISTANT

## 2021-02-21 RX ORDER — SULFAMETHOXAZOLE AND TRIMETHOPRIM 800; 160 MG/1; MG/1
1 TABLET ORAL EVERY 12 HOURS SCHEDULED
Qty: 10 TABLET | Refills: 0 | Status: SHIPPED | OUTPATIENT
Start: 2021-02-21 | End: 2021-02-24 | Stop reason: HOSPADM

## 2021-02-21 NOTE — PROGRESS NOTES
[facility]        NAME: Rogelio Dutton is a 62 y o  female  : 1960    MRN: [id number]  DATE: 2021  TIME: 6:48 PM    Assessment and Plan   Left arm cellulitis [P96 [id number]]  1  Left arm cellulitis  sulfamethoxazole-trimethoprim (BACTRIM DS) 800-160 mg per tablet      area of rash outlined with surgical skin marker  Patient Instructions   Patient Instructions     Cellulitis   WHAT YOU NEED TO KNOW:   Cellulitis is a skin infection caused by bacteria  Cellulitis may go away on its own or you may need treatment  Your healthcare provider may draw a [place] around the outside edges of your cellulitis  If your cellulitis spreads, your healthcare provider will see it outside of the [place]  DISCHARGE INSTRUCTIONS:   Call 911 if:   · You have sudden trouble breathing or chest pain  Return to the emergency department if:   · Your wound gets larger and more painful  · You feel a crackling under your skin when you touch it  · You have purple dots or bumps on your skin, or you see bleeding under your skin  · You have new swelling and pain in your legs  · The red, warm, swollen area gets larger  · You see red streaks coming from the infected area  Contact your healthcare provider if:   · You have a fever  · Your fever or pain does not go away or gets worse  · The area does not get smaller after 2 days of antibiotics  · Your skin is flaking or peeling off  · You have questions or concerns about your condition or care  Medicines:   · Antibiotics  help treat the bacterial infection  · NSAIDs , such as ibuprofen, help decrease swelling, pain, and fever  NSAIDs can cause stomach bleeding or kidney problems in certain people  If you take blood thinner medicine, always ask if NSAIDs are safe for you  Always read the medicine label and follow directions   Do not give these medicines to children under 10months of age without direction from your child's healthcare provider  · Acetaminophen  decreases pain and fever  It is available without a doctor's order  Ask how much to take and how often to take it  Follow directions  Read the labels of all other medicines you are using to see if they also contain acetaminophen, or ask your doctor or pharmacist  Acetaminophen can cause liver damage if not taken correctly  Do not use more than 4 grams (4,000 milligrams) total of acetaminophen in one day  · Take your medicine as directed  Contact your healthcare provider if you think your medicine is not helping or if you have side effects  Tell him or her if you are allergic to any medicine  Keep a list of the medicines, vitamins, and herbs you take  Include the amounts, and when and why you take them  Bring the list or the pill bottles to follow-up visits  Carry your medicine list with you in case of an emergency  Self-care:   · Elevate the area above the level of your heart  as often as you can  This will help decrease swelling and pain  Prop the area on pillows or blankets to keep it elevated comfortably  · Clean the area daily until the wound scabs over  Gently wash the area with soap and water  Pat dry  Use dressings as directed  · Place cool or warm, wet cloths on the area as directed  Use clean cloths and clean water  Leave it on the area until the cloth is room temperature  Pat the area dry with a clean, dry cloth  The cloths may help decrease pain  Prevent cellulitis:   · Do not scratch bug bites or areas of injury  You increase your risk for cellulitis by scratching these areas  · Do not share personal items, such as towels, clothing, and razors  · Clean exercise equipment  with germ-killing  before and after you use it  · Wash your hands often  Use soap and water  Wash your hands after you use the bathroom, change a child's diapers, or sneeze  Wash your hands before you prepare or eat food   Use lotion to prevent dry, cracked skin  · Wear pressure stockings as directed  You may be told to wear the stockings if you have peripheral edema  The stockings improve blood flow and decrease swelling  · Treat athlete's foot  This can help prevent the spread of a bacterial skin infection  Follow up with your healthcare provider within 3 days, or as directed: Your healthcare provider will check if your cellulitis is getting better  You may need different medicine  Write down your questions so you remember to ask them during your visits  © Copyright [address] Information is for End User's use only and may not be sold, redistributed or otherwise used for commercial purposes  All illustrations and images included in CareNotes® are the copyrighted property of A D A M , Inc  or [address] [place], [place] [address]   The above information is an  only  It is not intended as medical advice for individual conditions or treatments  Talk to your doctor, nurse or pharmacist before following any medical regimen to see if it is safe and effective for you  Follow up with PCP in 3-5 days  Proceed to  ER if symptoms worsen  Chief Complaint     Chief Complaint   Patient presents with    Rash     itching of left upper arm for 6 days, redness at left upper arm for 6 days  Had first covid on 2/8/21         History of Present Illness        Patient is a 23-year-old female presenting with a new onset of a rash on her left arm  Approximately 2 weeks ago patient received her 1st COVID vaccination in the left arm  Had no problems with it afterwards but over the last 6 days she has had itching over the  Left arm and then today had a red, swollen, warm, painful rash developed on the left upper arm  Denies muscle aches, body aches, fever, or drainage from the area  Has not taking anything for the symptoms      Review of Systems   Review of Systems   Constitutional: Negative for fever     Musculoskeletal: Negative for arthralgias, joint swelling and myalgias  Skin: Positive for rash  Negative for wound           Current Medications       Current Outpatient Medications:     anastrozole (ARIMIDEX) 1 mg tablet, Take 1 tablet (1 mg total) by mouth daily, Disp: 30 tablet, Rfl: 4    Calcium 600 MG tablet, Take 1 tablet by mouth every morning , Disp: , Rfl:     Cholecalciferol (VITAMIN D3) 1000 units CAPS, Take 1 capsule by mouth every morning , Disp: , Rfl:     Cranberry 1000 MG CAPS, Take 1 capsule by mouth every morning , Disp: , Rfl:     dexamethasone (DECADRON) 4 mg tablet, Take 2 tablets (8 mg total) by mouth 2 (two) times a day with meals Day before chemo day of chemo day after chemo, Disp: 12 tablet, Rfl: 2    ondansetron (ZOFRAN) 8 mg tablet, take 1 tablet by mouth every 8 hours if needed for nausea and vomiting, Disp: 20 tablet, Rfl: 0    sulfamethoxazole-trimethoprim (BACTRIM DS) 800-160 mg per tablet, Take 1 tablet by mouth every 12 (twelve) hours for 5 days, Disp: 10 tablet, Rfl: 0    Current Allergies     Allergies as of 02/21/2021 - Reviewed 02/21/2021   Allergen Reaction Noted    Ofloxacin Hives 03/23/2016    Penicillins Hives 03/23/2016    Chlorhexidine Rash 10/31/2020            The following portions of the patient's history were reviewed and updated as appropriate: allergies, current medications, past family history, past medical history, past social history, past surgical history and problem list      Past Medical History:   Diagnosis Date    Asthma     has not been treated for seveeral years  exercise induced    BRCA1 negative     Breast cancer ([address]     Cancer ([address] )     breast  9/20    Exercise-induced asthma 11/3/2016       Past Surgical History:   Procedure Laterality Date    BREAST CYST EXCISION Left     benign    BREAST CYST EXCISION Left     benign    BREAST SURGERY      CHOLECYSTECTOMY      HYSTERECTOMY      IR PORT PLACEMENT  10/26/2020    IR PORT REMOVAL  2/4/2021    MAMMO NEEDLE LOCALIZATION LEFT (ALL INC) Left 10/2/2020    MOUTH SURGERY      NH MASTECTOMY, PARTIAL Left 10/2/2020    Procedure: BREAST NEEDLE LOCALIZED LUMPECTOMY, SENTINEL LYMPH NODE BIOPSY (Bracketed U/S guided needle loc @ 8:00, INJECT AT 1045); Surgeon: Gold Franco MD;  Location: [facility] MAIN OR;  Service: General    TUBAL LIGATION      US GUIDED BREAST BIOPSY LEFT COMPLETE Left 9/11/2020    WISDOM TOOTH EXTRACTION         Family History   Problem Relation Age of Onset    Hypertension Mother     Heart disease Mother         cardiac disorder    Breast cancer Mother         dx age early 19's    Glaucoma Mother     Hypertension Father     Heart disease Father         cardiac disorder    Cancer Father     No Known Problems Sister     Multiple sclerosis Daughter    Laly Mullet Melanoma Daughter     No Known Problems Son     Breast cancer Paternal Aunt          Medications have been verified  Objective   /84   Pulse 91   Temp 98 °F (36 7 °C) (Temporal)   Resp 18   Ht 5' 4" (1 626 m)   Wt 72 6 kg (160 lb)   LMP  (LMP Unknown)   SpO2 99%   BMI 27 46 kg/m²        Physical Exam     Physical Exam  Constitutional:       Appearance: Normal appearance  Musculoskeletal: Normal range of motion  Skin:     Capillary Refill: Capillary refill takes less than 2 seconds  Findings: Erythema and rash present  Comments: There is an approximately 4 x 7 cm area of blanchable erythema and is warm to the touch over the left deltoid  No fluctuance, or drainage noted  Area is very well demarcated  Neurological:      Mental Status: She is alert     Psychiatric:         Mood and Affect: Mood normal          Behavior: Behavior normal

## 2021-02-21 NOTE — PATIENT INSTRUCTIONS
Cellulitis   WHAT YOU NEED TO KNOW:   Cellulitis is a skin infection caused by bacteria  Cellulitis may go away on its own or you may need treatment  Your healthcare provider may draw a [place] around the outside edges of your cellulitis  If your cellulitis spreads, your healthcare provider will see it outside of the [place]  DISCHARGE INSTRUCTIONS:   Call 911 if:   · You have sudden trouble breathing or chest pain  Return to the emergency department if:   · Your wound gets larger and more painful  · You feel a crackling under your skin when you touch it  · You have purple dots or bumps on your skin, or you see bleeding under your skin  · You have new swelling and pain in your legs  · The red, warm, swollen area gets larger  · You see red streaks coming from the infected area  Contact your healthcare provider if:   · You have a fever  · Your fever or pain does not go away or gets worse  · The area does not get smaller after 2 days of antibiotics  · Your skin is flaking or peeling off  · You have questions or concerns about your condition or care  Medicines:   · Antibiotics  help treat the bacterial infection  · NSAIDs , such as ibuprofen, help decrease swelling, pain, and fever  NSAIDs can cause stomach bleeding or kidney problems in certain people  If you take blood thinner medicine, always ask if NSAIDs are safe for you  Always read the medicine label and follow directions  Do not give these medicines to children under 10months of age without direction from your child's healthcare provider  · Acetaminophen  decreases pain and fever  It is available without a doctor's order  Ask how much to take and how often to take it  Follow directions  Read the labels of all other medicines you are using to see if they also contain acetaminophen, or ask your doctor or pharmacist  Acetaminophen can cause liver damage if not taken correctly   Do not use more than 4 grams (4,000 milligrams) total of acetaminophen in one day  · Take your medicine as directed  Contact your healthcare provider if you think your medicine is not helping or if you have side effects  Tell him or her if you are allergic to any medicine  Keep a list of the medicines, vitamins, and herbs you take  Include the amounts, and when and why you take them  Bring the list or the pill bottles to follow-up visits  Carry your medicine list with you in case of an emergency  Self-care:   · Elevate the area above the level of your heart  as often as you can  This will help decrease swelling and pain  Prop the area on pillows or blankets to keep it elevated comfortably  · Clean the area daily until the wound scabs over  Gently wash the area with soap and water  Pat dry  Use dressings as directed  · Place cool or warm, wet cloths on the area as directed  Use clean cloths and clean water  Leave it on the area until the cloth is room temperature  Pat the area dry with a clean, dry cloth  The cloths may help decrease pain  Prevent cellulitis:   · Do not scratch bug bites or areas of injury  You increase your risk for cellulitis by scratching these areas  · Do not share personal items, such as towels, clothing, and razors  · Clean exercise equipment  with germ-killing  before and after you use it  · Wash your hands often  Use soap and water  Wash your hands after you use the bathroom, change a child's diapers, or sneeze  Wash your hands before you prepare or eat food  Use lotion to prevent dry, cracked skin  · Wear pressure stockings as directed  You may be told to wear the stockings if you have peripheral edema  The stockings improve blood flow and decrease swelling  · Treat athlete's foot  This can help prevent the spread of a bacterial skin infection  Follow up with your healthcare provider within 3 days, or as directed:   Your healthcare provider will check if your cellulitis is getting better  You may need different medicine  Write down your questions so you remember to ask them during your visits  © Copyright [address] Information is for End User's use only and may not be sold, redistributed or otherwise used for commercial purposes  All illustrations and images included in CareNotes® are the copyrighted property of A D A M , Inc  or [facility] Madhuri Swain   The above information is an  only  It is not intended as medical advice for individual conditions or treatments  Talk to your doctor, nurse or pharmacist before following any medical regimen to see if it is safe and effective for you

## 2021-02-22 ENCOUNTER — APPOINTMENT (OUTPATIENT)
Dept: RADIATION ONCOLOGY | Facility: CLINIC | Age: 61
End: 2021-02-22
Attending: RADIOLOGY
Payer: COMMERCIAL

## 2021-02-22 ENCOUNTER — TELEPHONE (OUTPATIENT)
Dept: HEMATOLOGY ONCOLOGY | Facility: MEDICAL CENTER | Age: 61
End: 2021-02-22

## 2021-02-22 DIAGNOSIS — C50.412 PRIMARY CANCER OF UPPER OUTER QUADRANT OF LEFT FEMALE BREAST (HCC): Primary | ICD-10-CM

## 2021-02-22 PROCEDURE — 77417 THER RADIOLOGY PORT IMAGE(S): CPT | Performed by: RADIOLOGY

## 2021-02-22 PROCEDURE — 77387 GUIDANCE FOR RADJ TX DLVR: CPT | Performed by: RADIOLOGY

## 2021-02-22 PROCEDURE — 77412 RADIATION TX DELIVERY LVL 3: CPT | Performed by: RADIOLOGY

## 2021-02-22 NOTE — TELEPHONE ENCOUNTER
Raf for pt letting her know that her appt was moved from 4/8 to 3/30 @ 10:30 with Anitra  I asked pt to get labs done prior to her visit  I asked her to call back if this appt does not work for her  This was approved by THERESA Mcneil

## 2021-02-22 NOTE — TELEPHONE ENCOUNTER
Patient called in to reschedule appointment for 04/08/2021 it was 8 week follow up -  I advised that I will have Anitra  team follow up -  Patient would like to be schedule in March patient plans to leave to [place] to take care of her mom - please call patient back to reschedule

## 2021-02-23 ENCOUNTER — HOSPITAL ENCOUNTER (OUTPATIENT)
Facility: HOSPITAL | Age: 61
Setting detail: OBSERVATION
Discharge: HOME | End: 2021-02-24
Attending: EMERGENCY MEDICINE | Admitting: INTERNAL MEDICINE
Payer: COMMERCIAL

## 2021-02-23 ENCOUNTER — APPOINTMENT (OUTPATIENT)
Dept: RADIATION ONCOLOGY | Facility: CLINIC | Age: 61
End: 2021-02-23
Attending: RADIOLOGY
Payer: COMMERCIAL

## 2021-02-23 DIAGNOSIS — L03.114 CELLULITIS OF LEFT UPPER EXTREMITY: ICD-10-CM

## 2021-02-23 DIAGNOSIS — L03.90 CELLULITIS: Primary | ICD-10-CM

## 2021-02-23 LAB
ALBUMIN SERPL BCP-MCNC: 4.4 G/DL (ref 3.5–5.7)
ALP SERPL-CCNC: 76 U/L (ref 40–150)
ALT SERPL W P-5'-P-CCNC: 18 U/L (ref 7–52)
ANION GAP SERPL CALCULATED.3IONS-SCNC: 11 MMOL/L (ref 4–13)
AST SERPL W P-5'-P-CCNC: 17 U/L (ref 13–39)
BASOPHILS # BLD AUTO: 0 THOUSANDS/ΜL (ref 0–0.1)
BASOPHILS NFR BLD AUTO: 1 % (ref 0–2)
BILIRUB SERPL-MCNC: 1 MG/DL (ref 0.2–1)
BILIRUB UR QL STRIP: NEGATIVE
BUN SERPL-MCNC: 8 MG/DL (ref 7–25)
CALCIUM SERPL-MCNC: 9.7 MG/DL (ref 8.6–10.5)
CHLORIDE SERPL-SCNC: 103 MMOL/L (ref 98–107)
CLARITY UR: ABNORMAL
CO2 SERPL-SCNC: 25 MMOL/L (ref 21–31)
COLOR UR: YELLOW
CREAT SERPL-MCNC: 0.8 MG/DL (ref 0.6–1.2)
CRP SERPL QL: 8.6 MG/L
EOSINOPHIL # BLD AUTO: 0.3 THOUSAND/ΜL (ref 0–0.61)
EOSINOPHIL NFR BLD AUTO: 6 % (ref 0–5)
ERYTHROCYTE [DISTWIDTH] IN BLOOD BY AUTOMATED COUNT: 14.3 % (ref 11.5–14.5)
ERYTHROCYTE [SEDIMENTATION RATE] IN BLOOD: 26 MM/HOUR (ref 0–29)
FLUAV RNA RESP QL NAA+PROBE: NEGATIVE
FLUBV RNA RESP QL NAA+PROBE: NEGATIVE
GFR SERPL CREATININE-BSD FRML MDRD: 81 ML/MIN/1.73SQ M
GLUCOSE SERPL-MCNC: 145 MG/DL (ref 65–99)
GLUCOSE UR STRIP-MCNC: NEGATIVE MG/DL
HCT VFR BLD AUTO: 39.2 % (ref 42–47)
HGB BLD-MCNC: 13.2 G/DL (ref 12–16)
HGB UR QL STRIP.AUTO: NEGATIVE
KETONES UR STRIP-MCNC: ABNORMAL MG/DL
LACTATE SERPL-SCNC: 2 MMOL/L (ref 0.5–2)
LEUKOCYTE ESTERASE UR QL STRIP: NEGATIVE
LYMPHOCYTES # BLD AUTO: 0.9 THOUSANDS/ΜL (ref 0.6–4.47)
LYMPHOCYTES NFR BLD AUTO: 16 % (ref 21–51)
MCH RBC QN AUTO: 29.7 PG (ref 26–34)
MCHC RBC AUTO-ENTMCNC: 33.6 G/DL (ref 31–37)
MCV RBC AUTO: 88 FL (ref 81–99)
MONOCYTES # BLD AUTO: 0.4 THOUSAND/ΜL (ref 0.17–1.22)
MONOCYTES NFR BLD AUTO: 7 % (ref 2–12)
NEUTROPHILS # BLD AUTO: 4 THOUSANDS/ΜL (ref 1.4–6.5)
NEUTS SEG NFR BLD AUTO: 70 % (ref 42–75)
NITRITE UR QL STRIP: NEGATIVE
PH UR STRIP.AUTO: 7 [PH]
PLATELET # BLD AUTO: 223 THOUSANDS/UL (ref 149–390)
PMV BLD AUTO: 7 FL (ref 8.6–11.7)
POTASSIUM SERPL-SCNC: 4 MMOL/L (ref 3.5–5.5)
PROCALCITONIN SERPL-MCNC: <0.05 NG/ML
PROT SERPL-MCNC: 7.3 G/DL (ref 6.4–8.9)
PROT UR STRIP-MCNC: NEGATIVE MG/DL
RBC # BLD AUTO: 4.43 MILLION/UL (ref 3.9–5.2)
RSV RNA RESP QL NAA+PROBE: NEGATIVE
SARS-COV-2 RNA RESP QL NAA+PROBE: NEGATIVE
SODIUM SERPL-SCNC: 139 MMOL/L (ref 134–143)
SP GR UR STRIP.AUTO: 1.02 (ref 1–1.03)
UROBILINOGEN UR QL STRIP.AUTO: 0.2 E.U./DL
WBC # BLD AUTO: 5.7 THOUSAND/UL (ref 4.8–10.8)

## 2021-02-23 PROCEDURE — 0241U HB NFCT DS VIR RESP RNA 4 TRGT: CPT | Performed by: EMERGENCY MEDICINE

## 2021-02-23 PROCEDURE — 80053 COMPREHEN METABOLIC PANEL: CPT | Performed by: EMERGENCY MEDICINE

## 2021-02-23 PROCEDURE — 86140 C-REACTIVE PROTEIN: CPT | Performed by: EMERGENCY MEDICINE

## 2021-02-23 PROCEDURE — 36415 COLL VENOUS BLD VENIPUNCTURE: CPT | Performed by: EMERGENCY MEDICINE

## 2021-02-23 PROCEDURE — 83605 ASSAY OF LACTIC ACID: CPT | Performed by: EMERGENCY MEDICINE

## 2021-02-23 PROCEDURE — 77387 GUIDANCE FOR RADJ TX DLVR: CPT | Performed by: RADIOLOGY

## 2021-02-23 PROCEDURE — 84145 PROCALCITONIN (PCT): CPT | Performed by: EMERGENCY MEDICINE

## 2021-02-23 PROCEDURE — 99284 EMERGENCY DEPT VISIT MOD MDM: CPT

## 2021-02-23 PROCEDURE — 81003 URINALYSIS AUTO W/O SCOPE: CPT | Performed by: EMERGENCY MEDICINE

## 2021-02-23 PROCEDURE — 96360 HYDRATION IV INFUSION INIT: CPT

## 2021-02-23 PROCEDURE — 77412 RADIATION TX DELIVERY LVL 3: CPT | Performed by: RADIOLOGY

## 2021-02-23 PROCEDURE — 99220 PR INITIAL OBSERVATION CARE/DAY 70 MINUTES: CPT | Performed by: INTERNAL MEDICINE

## 2021-02-23 PROCEDURE — 99285 EMERGENCY DEPT VISIT HI MDM: CPT | Performed by: EMERGENCY MEDICINE

## 2021-02-23 PROCEDURE — 87040 BLOOD CULTURE FOR BACTERIA: CPT | Performed by: EMERGENCY MEDICINE

## 2021-02-23 PROCEDURE — 85652 RBC SED RATE AUTOMATED: CPT | Performed by: EMERGENCY MEDICINE

## 2021-02-23 PROCEDURE — 85025 COMPLETE CBC W/AUTO DIFF WBC: CPT | Performed by: EMERGENCY MEDICINE

## 2021-02-23 RX ORDER — VANCOMYCIN HYDROCHLORIDE 1 G/200ML
15 INJECTION, SOLUTION INTRAVENOUS ONCE
Status: COMPLETED | OUTPATIENT
Start: 2021-02-23 | End: 2021-02-23

## 2021-02-23 RX ORDER — ACETAMINOPHEN 325 MG/1
650 TABLET ORAL EVERY 6 HOURS PRN
Status: DISCONTINUED | OUTPATIENT
Start: 2021-02-23 | End: 2021-02-24 | Stop reason: HOSPADM

## 2021-02-23 RX ORDER — CALCIUM CARBONATE 500(1250)
1 TABLET ORAL
Status: DISCONTINUED | OUTPATIENT
Start: 2021-02-24 | End: 2021-02-24 | Stop reason: HOSPADM

## 2021-02-23 RX ORDER — ONDANSETRON 2 MG/ML
4 INJECTION INTRAMUSCULAR; INTRAVENOUS EVERY 6 HOURS PRN
Status: DISCONTINUED | OUTPATIENT
Start: 2021-02-23 | End: 2021-02-24 | Stop reason: HOSPADM

## 2021-02-23 RX ORDER — DIPHENHYDRAMINE HCL 25 MG
25 TABLET ORAL EVERY 6 HOURS PRN
Status: DISCONTINUED | OUTPATIENT
Start: 2021-02-23 | End: 2021-02-24 | Stop reason: HOSPADM

## 2021-02-23 RX ORDER — ANASTROZOLE 1 MG/1
1 TABLET ORAL DAILY
Status: DISCONTINUED | OUTPATIENT
Start: 2021-02-24 | End: 2021-02-24 | Stop reason: HOSPADM

## 2021-02-23 RX ORDER — CEFTRIAXONE 1 G/50ML
1000 INJECTION, SOLUTION INTRAVENOUS EVERY 24 HOURS
Status: DISCONTINUED | OUTPATIENT
Start: 2021-02-23 | End: 2021-02-24

## 2021-02-23 RX ORDER — MELATONIN
1000 DAILY
Status: DISCONTINUED | OUTPATIENT
Start: 2021-02-24 | End: 2021-02-24 | Stop reason: HOSPADM

## 2021-02-23 RX ADMIN — VANCOMYCIN HYDROCHLORIDE 1000 MG: 1 INJECTION, SOLUTION INTRAVENOUS at 16:17

## 2021-02-23 RX ADMIN — SODIUM CHLORIDE 1000 ML: 0.9 INJECTION, SOLUTION INTRAVENOUS at 14:59

## 2021-02-23 RX ADMIN — DIPHENHYDRAMINE HCL 25 MG: 25 TABLET ORAL at 17:44

## 2021-02-23 RX ADMIN — CEFTRIAXONE 1000 MG: 1 INJECTION, SOLUTION INTRAVENOUS at 20:16

## 2021-02-23 NOTE — ASSESSMENT & PLAN NOTE
· Patient completed chemotherapy in January of this year  · Patient currently on radiation therapy as outpatient  · Continue Arimidex  · Outpatient follow-up with Oncology

## 2021-02-23 NOTE — NURSING NOTE
Pt received to med surg 122-1 running IV vancomycin  Awake, alert, oriented x4  HRR, no edema  Lungs CTA RA status  Denies cough  Ad mary, gait steady  Observing redness expanding past drawn borders to MISSY, pt states, "That was drawn at urgent care two days ago  They also put me on bactrim "  During admission assessment, pt states, "Im starting to feel very itchy, like one big hive, beto felt this before during a reaction to my chemo, and they gave me benadryl " Vancomycin IV infusion stopped, IV flushed, as per pt request  Attending notified  Pt made comfortable, diet tray ordered  Personal needs and call bell/phone within reach, will continue to monitor

## 2021-02-23 NOTE — PLAN OF CARE
Problem: Potential for Falls  Goal: Patient will remain free of falls  Description: INTERVENTIONS:  - Assess patient frequently for physical needs  -  Identify cognitive and physical deficits and behaviors that affect risk of falls    -  [place] fall precautions as indicated by assessment   - Educate patient/family on patient safety including physical limitations  - Instruct patient to call for assistance with activity based on assessment  - Modify environment to reduce risk of injury  - Consider OT/PT consult to assist with strengthening/mobility  Outcome: Progressing     Problem: PAIN - ADULT  Goal: Verbalizes/displays adequate comfort level or baseline comfort level  Description: Interventions:  - Encourage patient to monitor pain and request assistance  - Assess pain using appropriate pain scale  - Administer analgesics based on type and severity of pain and evaluate response  - Implement non-pharmacological measures as appropriate and evaluate response  - Consider cultural and social influences on pain and pain management  - Notify physician/advanced practitioner if interventions unsuccessful or patient reports new pain  Outcome: Progressing     Problem: INFECTION - ADULT  Goal: Absence or prevention of progression during hospitalization  Description: INTERVENTIONS:  - Assess and monitor for signs and symptoms of infection  - Monitor lab/diagnostic results  - Monitor all insertion sites, i e  indwelling lines, tubes, and drains  - Monitor endotracheal if appropriate and nasal secretions for changes in amount and color  - [place] appropriate cooling/warming therapies per order  - Administer medications as ordered  - Instruct and encourage patient and family to use good hand hygiene technique  - Identify and instruct in appropriate isolation precautions for identified infection/condition  Outcome: Progressing  Goal: Absence of fever/infection during neutropenic period  Description: INTERVENTIONS:  - Monitor WBC    Outcome: Progressing     Problem: SAFETY ADULT  Goal: Patient will remain free of falls  Description: INTERVENTIONS:  - Assess patient frequently for physical needs  -  Identify cognitive and physical deficits and behaviors that affect risk of falls    -  [place] fall precautions as indicated by assessment   - Educate patient/family on patient safety including physical limitations  - Instruct patient to call for assistance with activity based on assessment  - Modify environment to reduce risk of injury  - Consider OT/PT consult to assist with strengthening/mobility  Outcome: Progressing  Goal: Maintain or return to baseline ADL function  Description: INTERVENTIONS:  -  Assess patient's ability to carry out ADLs; assess patient's baseline for ADL function and identify physical deficits which impact ability to perform ADLs (bathing, care of mouth/teeth, toileting, grooming, dressing, etc )  - Assess/evaluate cause of self-care deficits   - Assess range of motion  - Assess patient's mobility; develop plan if impaired  - Assess patient's need for assistive devices and provide as appropriate  - Encourage maximum independence but intervene and supervise when necessary  - Involve family in performance of ADLs  - Assess for home care needs following discharge   - Consider OT consult to assist with ADL evaluation and planning for discharge  - Provide patient education as appropriate  Outcome: Progressing  Goal: Maintain or return mobility status to optimal level  Description: INTERVENTIONS:  - Assess patient's baseline mobility status (ambulation, transfers, stairs, etc )    - Identify cognitive and physical deficits and behaviors that affect mobility  - Identify mobility aids required to assist with transfers and/or ambulation (gait belt, sit-to-stand, lift, walker, cane, etc )  - [place] fall precautions as indicated by assessment  - Record patient progress and toleration of activity level on Mobility SBAR; progress patient to next Phase/Stage  - Instruct patient to call for assistance with activity based on assessment  - Consider rehabilitation consult to assist with strengthening/weightbearing, etc   Outcome: Progressing     Problem: DISCHARGE PLANNING  Goal: Discharge to home or other facility with appropriate resources  Description: INTERVENTIONS:  - Identify barriers to discharge w/patient and caregiver  - Arrange for needed discharge resources and transportation as appropriate  - Identify discharge learning needs (meds, wound care, etc )  - Arrange for interpretive services to assist at discharge as needed  - Refer to Case Management Department for coordinating discharge planning if the patient needs post-hospital services based on physician/advanced practitioner order or complex needs related to functional status, cognitive ability, or social support system  Outcome: Progressing     Problem: Knowledge Deficit  Goal: Patient/family/caregiver demonstrates understanding of disease process, treatment plan, medications, and discharge instructions  Description: Complete learning assessment and assess knowledge base    Interventions:  - Provide teaching at level of understanding  - Provide teaching via preferred learning methods  Outcome: Progressing     Problem: METABOLIC, FLUID AND ELECTROLYTES - ADULT  Goal: Electrolytes maintained within normal limits  Description: INTERVENTIONS:  - Monitor labs and assess patient for signs and symptoms of electrolyte imbalances  - Administer electrolyte replacement as ordered  - Monitor response to electrolyte replacements, including repeat lab results as appropriate  - Instruct patient on fluid and nutrition as appropriate  Outcome: Progressing  Goal: Fluid balance maintained  Description: INTERVENTIONS:  - Monitor labs   - Monitor I/O and WT  - Instruct patient on fluid and nutrition as appropriate  - Assess for signs & symptoms of volume excess or deficit  Outcome: Progressing  Goal: Glucose maintained within target range  Description: INTERVENTIONS:  - Monitor Blood Glucose as ordered  - Assess for signs and symptoms of hyperglycemia and hypoglycemia  - Administer ordered medications to maintain glucose within target range  - Assess nutritional intake and initiate nutrition service referral as needed  Outcome: Progressing     Problem: SKIN/TISSUE INTEGRITY - ADULT  Goal: Skin integrity remains intact  Description: INTERVENTIONS  - Identify patients at risk for skin breakdown  - Assess and monitor skin integrity  - Assess and monitor nutrition and hydration status  - Monitor labs (i e  albumin)  - Assess for incontinence   - Turn and reposition patient  - Assist with mobility/ambulation  - Relieve pressure over bony prominences  - Avoid friction and shearing  - Provide appropriate hygiene as needed including keeping skin clean and dry  - Evaluate need for skin moisturizer/barrier cream  - Collaborate with interdisciplinary team (i e  Nutrition, Rehabilitation, etc )   - Patient/family teaching  Outcome: Progressing  Goal: Incision(s), wounds(s) or drain site(s) healing without S/S of infection  Description: INTERVENTIONS  - Assess and document risk factors for skin impairment   - Assess and document dressing, incision, wound bed, drain sites and surrounding tissue  - Consider nutrition services referral as needed  - Oral mucous membranes remain intact  - Provide patient/ family education  Outcome: Progressing  Goal: Oral mucous membranes remain intact  Description: INTERVENTIONS  - Assess oral mucosa and hygiene practices  - Implement preventative oral hygiene regimen  - Implement oral medicated treatments as ordered  - Initiate Nutrition services referral as needed  Outcome: Progressing

## 2021-02-23 NOTE — H&P
H&P- Zee Hernández 1960, 62 y o  female MRN: [id number]    Unit/Bed#: -01 Encounter: [id number]    Primary Care Provider: No primary care provider on file  Date and time admitted to hospital: 2/23/2021  2:13 PM        * Cellulitis of left upper extremity  Assessment & Plan  · Failed outpatient treatment with Bactrim (patient only had 2 doses as outpatient however came to the hospital due to worsening cellulitis)  · Patient was initially ordered vancomycin by ER however patient had a suspected red man syndrome with pruritus/rash noted after Vanco infusion  Infusion was stopped and patient was given Benadryl which helped with patient's symptoms  · Patient does have a penicillin allergy which she states was a rash in the past   Unsure if she is ever had Keflex or any other cephalosporin  Will attempt ceftriaxone and monitor for any reaction  · If cellulitis is worsening or patient develops signs and symptoms of sepsis will consult Infectious Disease    Primary cancer of upper outer quadrant of left female breast [facility])  Assessment & Plan  · Patient completed chemotherapy in January of this year  · Patient currently on radiation therapy as outpatient  · Continue Arimidex  · Outpatient follow-up with Oncology      VTE Prophylaxis: Enoxaparin (Lovenox)  Code Status:  Full code  POLST: There is no POLST form on file for this patient (pre-hospital)  Discussion with family:  Offered to call family however patient states that she will talk to them herself    Anticipated Length of Stay:  Patient will be admitted on an Observation basis with an anticipated length of stay of  < 2 midnights  Justification for Hospital Stay:  Cellulitis    Chief Complaint:   Worsening rash on left upper extremity    History of Present Illness:    Zee Hernández is a 62 y o  female who presents with worsening cellulitis    Patient states that she was at urgent care on Sunday evening for evaluation of rash on bilateral upper extremities left greater than right  Patient was diagnosed with cellulitis and cellulitic area was outlined on left upper extremity by urgent care center  Patient was discharged on Bactrim  Patient states that she took 2 doses of Bactrim and this morning noticed worsening redness going beyond the outline marked by the urgent care center  Given that patient was recently on chemo and is on radiation patient was concerned and came to the ER for further evaluation  Patient states that it is warm to touch  Patient denies any fever  States she has been having chills  No nausea or vomiting  No pain complaints  Patient was started on vancomycin and while on the medical floor once vancomycin was initiated patient developed a pruritic rash of bilateral upper extremities/scalp/face  Vancomycin was discontinued and patient was given a dose of Benadryl  Symptoms improved  Review of Systems:  Review of Systems   Constitutional: Positive for chills  Negative for fever  Respiratory: Negative for shortness of breath  Gastrointestinal: Negative for abdominal pain  Genitourinary: Negative for dysuria  Skin: Positive for rash  All other systems reviewed and are negative        Past Medical and Surgical History:   Past Medical History:   Diagnosis Date    Asthma     has not been treated for seveeral years  exercise induced    BRCA1 negative     [facility] [address] [facility]     Cancer ([facility] [address] [facility]     breast  9/20    Exercise-induced asthma 11/3/2016       Past Surgical History:   Procedure Laterality Date    BREAST CYST EXCISION Left     benign    BREAST CYST EXCISION Left     benign    BREAST SURGERY      CHOLECYSTECTOMY      HYSTERECTOMY      IR PORT PLACEMENT  10/26/2020    IR PORT REMOVAL  2/4/2021    MAMMO NEEDLE LOCALIZATION LEFT (ALL INC) Left 10/2/2020    MOUTH SURGERY      [place] MASTECTOMY, PARTIAL Left 10/2/2020    Procedure: BREAST NEEDLE LOCALIZED LUMPECTOMY, SENTINEL LYMPH NODE BIOPSY (Bracketed U/S guided needle loc @ 8:00, INJECT AT 1045); Surgeon: Osvaldo Jeter MD;  Location: [address] [place] [address] MAIN OR;  Service: General    TUBAL LIGATION      US GUIDED BREAST BIOPSY LEFT COMPLETE Left 9/11/2020    WISDOM TOOTH EXTRACTION         Meds/Allergies:  Prior to Admission medications    Medication Sig Start Date End Date Taking? Authorizing Provider   anastrozole (ARIMIDEX) 1 mg tablet Take 1 tablet (1 mg total) by mouth daily 2/9/21  Yes Stef Baldwin MD   Calcium 600 MG tablet Take 1 tablet by mouth every morning  3/23/16  Yes Historical Provider, MD   Cholecalciferol (VITAMIN D3) 1000 units CAPS Take 1 capsule by mouth every morning  3/23/16  Yes Historical Provider, MD   Cranberry 1000 MG CAPS Take 1 capsule by mouth every morning    Yes Historical Provider, MD   sulfamethoxazole-trimethoprim (BACTRIM DS) 800-160 mg per tablet Take 1 tablet by mouth every 12 (twelve) hours for 5 days 2/21/21 2/26/21 Yes Vinayak Garcia PA-C   dexamethasone (DECADRON) 4 mg tablet Take 2 tablets (8 mg total) by mouth 2 (two) times a day with meals Day before chemo day of chemo day after chemo 11/18/20 2/23/21  Stef Baldwin MD   estradiol (ESTRACE) 0 5 MG tablet Take 1 tablet by mouth daily 7/27/17 5/10/18  Historical Provider, MD   ondansetron (ZOFRAN) 8 mg tablet take 1 tablet by mouth every 8 hours if needed for nausea and vomiting 11/30/20 2/23/21  Chayo Robles, DO     I have reviewed home medications with patient personally  Allergies:    Allergies   Allergen Reactions    Ofloxacin Hives    Penicillins Hives    Chlorhexidine Rash       Social History:  Marital Status: /Civil Union   Occupation:  None  Patient Pre-hospital Living Situation:  Lives with family  Patient Pre-hospital Level of Mobility:  Ambulatory  Patient Pre-hospital Diet Restrictions:  None  Substance Use History:   Social History     Substance and Sexual Activity   Alcohol Use Never    Frequency: Never     Social History     Tobacco Use   Smoking Status Never Smoker   Smokeless Tobacco Never Used     Social History     Substance and Sexual Activity   Drug Use No       Family History:  I have reviewed the patients family history    Physical Exam:   Vitals:   Blood Pressure: 145/65 (02/23/21 1649)  Pulse: 78 (02/23/21 1649)  Temperature: (!) 97 3 °F (36 3 °C) (02/23/21 1649)  Temp Source: Temporal (02/23/21 1649)  Respirations: 18 (02/23/21 1649)  Height: 5' 4" (162 6 cm) (02/23/21 1649)  Weight - Scale: 75 2 kg (165 lb 12 6 oz) (02/23/21 1649)  SpO2: 100 % (02/23/21 1649)    Physical Exam  Constitutional:       General: She is not in acute distress  HENT:      Head: Normocephalic and atraumatic  Nose: Nose normal       Mouth/Throat:      Mouth: Mucous membranes are moist    Eyes:      Extraocular Movements: Extraocular movements intact  Conjunctiva/sclera: Conjunctivae normal    Neck:      Musculoskeletal: Normal range of motion and neck supple  Cardiovascular:      Rate and Rhythm: Normal rate and regular rhythm  Pulmonary:      Effort: Pulmonary effort is normal  No respiratory distress  Abdominal:      Palpations: Abdomen is soft  Tenderness: There is no abdominal tenderness  Musculoskeletal: Normal range of motion  General: No swelling  Skin:     General: Skin is warm and dry  Findings: Erythema present  Comments: Redness noted of face  Erythema noted on left upper extremity with extension beyond outlined area which was marked from previous evaluation at urgent care  Warm to touch   Neurological:      General: No focal deficit present  Mental Status: She is alert  Cranial Nerves: No cranial nerve deficit  Psychiatric:         Mood and Affect: Mood normal          Behavior: Behavior normal          Additional Data:   Lab Results: I have personally reviewed pertinent reports      Results from last 7 days   Lab Units 02/23/21  1450   WBC Thousand/uL 5 70   HEMOGLOBIN g/dL 13 2   HEMATOCRIT % 39 2*   PLATELETS Thousands/uL 223   NEUTROS PCT % 70   LYMPHS PCT % 16*   MONOS PCT % 7   EOS PCT % 6*     Results from last 7 days   Lab Units 02/23/21  1450   SODIUM mmol/L 139   POTASSIUM mmol/L 4 0   CHLORIDE mmol/L 103   CO2 mmol/L 25   BUN mg/dL 8   CREATININE mg/dL 0 80   CALCIUM mg/dL 9 7   ALK PHOS U/L 76   ALT U/L 18   AST U/L 17                   Imaging: I have personally reviewed pertinent reports  No orders to display     Epic Records Reviewed: Yes     ** Please Note: This note has been constructed using a voice recognition system   **

## 2021-02-23 NOTE — ED PROVIDER NOTES
History  Chief Complaint   Patient presents with    Rash     According to the patient she has had a generalized rash for the last week  Patient seen at urgent care and has no relief  Patient is a 81-year-old female with past medical history of breast cancer status post chemo (finished Jan 2021) on radiation therapy presenting with bilateral upper extremity rash  She describes rash started approximately 1 week ago on the left outer upper arm, and has gradually worsened  She received a COVID booster in the left deltoid approximately 2 weeks ago, but had no symptoms until 1 week ago  Rash  Location:  Shoulder/arm  Shoulder/arm rash location:  L arm  Severity:  Mild  Onset quality:  Gradual  Duration:  6 days  Timing:  Constant  Chronicity:  New  Relieved by:  Nothing  Worsened by:  Nothing  Ineffective treatments: antibiotics  Associated symptoms: no abdominal pain, no diarrhea, no fever, no nausea, no sore throat, not vomiting and not wheezing        Prior to Admission Medications   Prescriptions Last Dose Informant Patient Reported? Taking?    Calcium 600 MG tablet  Self Yes No   Sig: Take 1 tablet by mouth every morning    Cholecalciferol (VITAMIN D3) 1000 units CAPS  Self Yes No   Sig: Take 1 capsule by mouth every morning    Cranberry 1000 MG CAPS  Self Yes No   Sig: Take 1 capsule by mouth every morning    anastrozole (ARIMIDEX) 1 mg tablet   No No   Sig: Take 1 tablet (1 mg total) by mouth daily   dexamethasone (DECADRON) 4 mg tablet   No No   Sig: Take 2 tablets (8 mg total) by mouth 2 (two) times a day with meals Day before chemo day of chemo day after chemo   ondansetron (ZOFRAN) 8 mg tablet   No No   Sig: take 1 tablet by mouth every 8 hours if needed for nausea and vomiting   sulfamethoxazole-trimethoprim (BACTRIM DS) 800-160 mg per tablet   No No   Sig: Take 1 tablet by mouth every 12 (twelve) hours for 5 days      Facility-Administered Medications: None       Past Medical History:   Diagnosis Date    Asthma     has not been treated for seveeral years  exercise induced    BRCA1 negative     [facility] [address] [facility]     Cancer ([facility] [address] [facility]     breast  9/20    Exercise-induced asthma 11/3/2016       Past Surgical History:   Procedure Laterality Date    BREAST CYST EXCISION Left     benign    BREAST CYST EXCISION Left     benign    BREAST SURGERY      CHOLECYSTECTOMY      HYSTERECTOMY      IR PORT PLACEMENT  10/26/2020    IR PORT REMOVAL  2/4/2021    MAMMO NEEDLE LOCALIZATION LEFT (ALL INC) Left 10/2/2020    MOUTH SURGERY      [place] MASTECTOMY, PARTIAL Left 10/2/2020    Procedure: BREAST NEEDLE LOCALIZED LUMPECTOMY, SENTINEL LYMPH NODE BIOPSY (Bracketed U/S guided needle loc @ 8:00, INJECT AT 1045); Surgeon: Giselle Morales MD;  Location: [address] [place] [address] MAIN OR;  Service: General    TUBAL LIGATION      US GUIDED BREAST BIOPSY LEFT COMPLETE Left 9/11/2020    WISDOM TOOTH EXTRACTION         Family History   Problem Relation Age of Onset    Hypertension Mother     Heart disease Mother         cardiac disorder    Breast cancer Mother         dx age early 19's   Edward Spinner Glaucoma Mother     Hypertension Father     Heart disease Father         cardiac disorder    Cancer Father     No Known Problems Sister     Multiple sclerosis Daughter     Melanoma Daughter     No Known Problems Son     Breast cancer Paternal Aunt      I have reviewed and agree with the history as documented  E-Cigarette/Vaping    E-Cigarette Use Never User      E-Cigarette/Vaping Substances    Nicotine No     THC No     CBD No      Social History     Tobacco Use    Smoking status: Never Smoker    Smokeless tobacco: Never Used   Substance Use Topics    Alcohol use: No    Drug use: No       Review of Systems   Constitutional: Negative for chills and fever  HENT: Negative for congestion, nosebleeds, rhinorrhea and sore throat  Eyes: Negative for pain and visual disturbance  Respiratory: Negative for cough and wheezing  Cardiovascular: Negative for chest pain and leg swelling  Gastrointestinal: Negative for abdominal distention, abdominal pain, diarrhea, nausea and vomiting  Genitourinary: Negative for dysuria and frequency  Musculoskeletal: Negative for back pain and joint swelling  Skin: Positive for rash  Negative for wound  Neurological: Negative for weakness and numbness  Psychiatric/Behavioral: Negative for decreased concentration and suicidal ideas  Physical Exam  Physical Exam  Vitals signs and nursing note reviewed  Constitutional:       Appearance: She is well-developed  HENT:      Head: Normocephalic and atraumatic  Eyes:      Conjunctiva/sclera: Conjunctivae normal       Pupils: Pupils are equal, round, and reactive to light  Neck:      Musculoskeletal: Normal range of motion and neck supple  Trachea: No tracheal deviation  Cardiovascular:      Rate and Rhythm: Normal rate and regular rhythm  Heart sounds: Normal heart sounds  No murmur  Pulmonary:      Effort: Pulmonary effort is normal  No respiratory distress  Breath sounds: Normal breath sounds  No wheezing or rales  Abdominal:      General: Bowel sounds are normal  There is no distension  Palpations: Abdomen is soft  Tenderness: There is no abdominal tenderness  Musculoskeletal:         General: No deformity  Comments: Erythematous, tenderness rash left upper outer area extending approximately 1 inch outside of border traced by urgent care yesterday  Similar area approximately 1-2 inches on right lateral elbow   Skin:     General: Skin is warm and dry  Capillary Refill: Capillary refill takes less than 2 seconds  Neurological:      Mental Status: She is alert and oriented to person, place, and time  Sensory: No sensory deficit     Psychiatric:         Judgment: Judgment normal          Vital Signs  ED Triage Vitals [02/23/21 1408]   Temperature Pulse Respirations Blood Pressure SpO2   (!) 97 4 °F (36 3 °C) 87 18 (!) 174/74 99 %      Temp Source Heart Rate Source Patient Position - Orthostatic VS BP Location FiO2 (%)   Temporal Monitor Sitting Right arm --      Pain Score       5           Vitals:    02/23/21 1408   BP: (!) 174/74   Pulse: 87   Patient Position - Orthostatic VS: Sitting         Visual Acuity      ED Medications  Medications   vancomycin (VANCOCIN) IVPB (premix in dextrose) 1,000 mg 200 mL (1,000 mg Intravenous New Bag 2/23/21 1617)   sodium chloride 0 9 % bolus 1,000 mL (1,000 mL Intravenous New Bag 2/23/21 1459)       Diagnostic Studies  Results Reviewed     Procedure Component Value Units Date/Time    COVID19, Influenza A/B, RSV PCR, [facility] [[id number]]  (Normal) Collected: 02/23/21 1450    Lab Status: Final result Specimen: Nares from Nasopharyngeal Swab Updated: 02/23/21 1538     SARS-CoV-2 Negative     INFLUENZA A PCR Negative     INFLUENZA B PCR Negative     RSV PCR Negative    Narrative: This test has been authorized by FDA under an EUA (Emergency Use Assay) for use by authorized laboratories  Clinical caution and judgement should be used with the interpretation of these results with consideration of the clinical impression and other laboratory testing  Testing reported as "Positive" or "Negative" has been proven to be accurate according to standard laboratory validation requirements  All testing is performed with control materials showing appropriate reactivity at standard intervals      UA w Reflex to Microscopic w Reflex to Culture [[id number]]  (Abnormal) Collected: 02/23/21 1516    Lab Status: Final result Specimen: Urine, Clean Catch Updated: 02/23/21 1535     Color, UA Yellow     Clarity, UA Cloudy     Specific Gravity, UA 1 025     pH, UA 7 0     Leukocytes, UA Negative     Nitrite, UA Negative     Protein, UA Negative mg/dl      Glucose, UA Negative mg/dl      Ketones, UA Trace mg/dl      Urobilinogen, UA 0 2 E U /dl      Bilirubin, UA Negative     Blood, UA Negative    Sedimentation rate, automated [[id number]]     Lab Status: No result Specimen: Blood     C-reactive protein [[id number]]     Lab Status: No result Specimen: Blood     Comprehensive metabolic panel [[id number]]  (Abnormal) Collected: 02/23/21 1450    Lab Status: Final result Specimen: Blood from Arm, Left Updated: 02/23/21 1519     Sodium 139 mmol/L      Potassium 4 0 mmol/L      Chloride 103 mmol/L      CO2 25 mmol/L      ANION GAP 11 mmol/L      BUN 8 mg/dL      Creatinine 0 80 mg/dL      Glucose 145 mg/dL      Calcium 9 7 mg/dL      AST 17 U/L      ALT 18 U/L      Alkaline Phosphatase 76 U/L      Total Protein 7 3 g/dL      Albumin 4 4 g/dL      Total Bilirubin 1 00 mg/dL      eGFR 81 ml/min/1 73sq m     Narrative:      [place] guidelines for Chronic Kidney Disease (CKD):     Stage 1 with normal or high GFR (GFR > 90 mL/min/1 73 square meters)    Stage 2 Mild CKD (GFR = 60-89 mL/min/1 73 square meters)    Stage 3A Moderate CKD (GFR = 45-59 mL/min/1 73 square meters)    Stage 3B Moderate CKD (GFR = 30-44 mL/min/1 73 square meters)    Stage 4 Severe CKD (GFR = 15-29 mL/min/1 73 square meters)    Stage 5 End Stage CKD (GFR <15 mL/min/1 73 square meters)  Note: GFR calculation is accurate only with a steady state creatinine    Lactic acid [[id number]]  (Normal) Collected: 02/23/21 1450    Lab Status: Final result Specimen: Blood from Arm, Left Updated: 02/23/21 1514     LACTIC ACID 2 0 mmol/L     Narrative:      Result may be elevated if tourniquet was used during collection      CBC and differential [[id number]]  (Abnormal) Collected: 02/23/21 1450    Lab Status: Final result Specimen: Blood from Arm, Left Updated: 02/23/21 1502     WBC 5 70 Thousand/uL      RBC 4 43 Million/uL      Hemoglobin 13 2 g/dL      Hematocrit 39 2 %      MCV 88 fL      MCH 29 7 pg      MCHC 33 6 g/dL      RDW 14 3 %      MPV 7 0 fL      Platelets 952 Thousands/uL      Neutrophils Relative 70 % Lymphocytes Relative 16 %      Monocytes Relative 7 %      Eosinophils Relative 6 %      Basophils Relative 1 %      Neutrophils Absolute 4 00 Thousands/µL      Lymphocytes Absolute 0 90 Thousands/µL      Monocytes Absolute 0 40 Thousand/µL      Eosinophils Absolute 0 30 Thousand/µL      Basophils Absolute 0 00 Thousands/µL     Procalcitonin with AM Reflex [[id number]] Collected: 02/23/21 1450    Lab Status: In process Specimen: Blood from Arm, Left Updated: 02/23/21 1457    Blood culture #2 [[id number]] Collected: 02/23/21 1450    Lab Status: In process Specimen: Blood from Arm, Left Updated: 02/23/21 1457    Blood culture #1 [[id number]] Collected: 02/23/21 1450    Lab Status: In process Specimen: Blood from Arm, Left Updated: 02/23/21 1457                 No orders to display              Procedures  Procedures         ED Course  ED Course as of Feb 23 1635   Tue Feb 23, 2021   1542 Spoke with Dr Cammy Narvaez, not active cancer patient, will discuss with ANNABELLE SUAZO  Number of Diagnoses or Management Options  Cellulitis: new and requires workup  Diagnosis management comments: Bilateral UE cellulitis would be rare however her exam is very consistent with cellulitis, being as she recently finished chemotherapy, this raises concern for immunosuppresion, possible hematologous spread  She also received a COVID vaccine in that arm approximately 1 week before, however rash is not urticarial in appearance and doesn't explain contralateral involvement  Will treat as cellulitis as this is the most likely cause  Will send procal as well    No crepitus, pain out of proportion         Amount and/or Complexity of Data Reviewed  Review and summarize past medical records: yes  Discuss the patient with other providers: yes    Risk of Complications, Morbidity, and/or Mortality  Presenting problems: high  Diagnostic procedures: minimal  Management options: high        Disposition  Final diagnoses: Cellulitis     Time reflects when diagnosis was documented in both MDM as applicable and the Disposition within this note     Time User Action Codes Description Comment    2/23/2021  4:04 PM Jules Ng Add [L03 90] Cellulitis       ED Disposition     ED Disposition Condition Date/Time Comment    Admit Stable Tue Feb 23, 2021  4:04 PM Case was discussed with Aiyana Arzate and the patient's admission status was agreed to be Admission Status: observation status to the service of Dr Aiyana Arzate   Follow-up Information    None         Patient's Medications   Discharge Prescriptions    No medications on file     No discharge procedures on file      PDMP Review       Value Time User    PDMP Reviewed  Yes 10/2/2020  1:59 PM Shawn Gann PA-C          ED Provider  Electronically Signed by           Tree Hood DO  02/23/21 2423

## 2021-02-23 NOTE — ASSESSMENT & PLAN NOTE
· Failed outpatient treatment with Bactrim (patient only had 2 doses as outpatient however came to the hospital due to worsening cellulitis)  · Patient was initially ordered vancomycin by ER however patient had a suspected red man syndrome with pruritus/rash noted after Vanco infusion  Infusion was stopped and patient was given Benadryl which helped with patient's symptoms  · Patient does have a penicillin allergy which she states was a rash in the past   Unsure if she is ever had Keflex or any other cephalosporin    Will attempt ceftriaxone and monitor for any reaction  · If cellulitis is worsening or patient develops signs and symptoms of sepsis will consult Infectious Disease

## 2021-02-24 ENCOUNTER — HOSPITAL ENCOUNTER (OUTPATIENT)
Dept: INFUSION CENTER | Facility: HOSPITAL | Age: 61
Discharge: HOME | End: 2021-02-24
Attending: INTERNAL MEDICINE

## 2021-02-24 ENCOUNTER — TELEPHONE (OUTPATIENT)
Dept: HEMATOLOGY ONCOLOGY | Facility: MEDICAL CENTER | Age: 61
End: 2021-02-24

## 2021-02-24 ENCOUNTER — APPOINTMENT (OUTPATIENT)
Dept: RADIATION ONCOLOGY | Facility: CLINIC | Age: 61
End: 2021-02-24
Attending: RADIOLOGY
Payer: COMMERCIAL

## 2021-02-24 VITALS
HEIGHT: 64 IN | DIASTOLIC BLOOD PRESSURE: 51 MMHG | WEIGHT: 170.42 LBS | RESPIRATION RATE: 18 BRPM | TEMPERATURE: 97.6 F | SYSTOLIC BLOOD PRESSURE: 107 MMHG | HEART RATE: 74 BPM | OXYGEN SATURATION: 97 % | BODY MASS INDEX: 29.09 KG/M2

## 2021-02-24 LAB
ANION GAP SERPL CALCULATED.3IONS-SCNC: 8 MMOL/L (ref 4–13)
BASOPHILS # BLD AUTO: 0 THOUSANDS/ΜL (ref 0–0.1)
BASOPHILS NFR BLD AUTO: 1 % (ref 0–2)
BUN SERPL-MCNC: 12 MG/DL (ref 7–25)
CALCIUM SERPL-MCNC: 9.1 MG/DL (ref 8.6–10.5)
CHLORIDE SERPL-SCNC: 107 MMOL/L (ref 98–107)
CO2 SERPL-SCNC: 26 MMOL/L (ref 21–31)
CREAT SERPL-MCNC: 0.89 MG/DL (ref 0.6–1.2)
EOSINOPHIL # BLD AUTO: 0.5 THOUSAND/ΜL (ref 0–0.61)
EOSINOPHIL NFR BLD AUTO: 11 % (ref 0–5)
ERYTHROCYTE [DISTWIDTH] IN BLOOD BY AUTOMATED COUNT: 14.6 % (ref 11.5–14.5)
GFR SERPL CREATININE-BSD FRML MDRD: 71 ML/MIN/1.73SQ M
GLUCOSE SERPL-MCNC: 100 MG/DL (ref 65–99)
HCT VFR BLD AUTO: 34.7 % (ref 42–47)
HGB BLD-MCNC: 11.7 G/DL (ref 12–16)
LYMPHOCYTES # BLD AUTO: 1 THOUSANDS/ΜL (ref 0.6–4.47)
LYMPHOCYTES NFR BLD AUTO: 19 % (ref 21–51)
MCH RBC QN AUTO: 29.8 PG (ref 26–34)
MCHC RBC AUTO-ENTMCNC: 33.8 G/DL (ref 31–37)
MCV RBC AUTO: 88 FL (ref 81–99)
MONOCYTES # BLD AUTO: 0.4 THOUSAND/ΜL (ref 0.17–1.22)
MONOCYTES NFR BLD AUTO: 8 % (ref 2–12)
NEUTROPHILS # BLD AUTO: 3.2 THOUSANDS/ΜL (ref 1.4–6.5)
NEUTS SEG NFR BLD AUTO: 62 % (ref 42–75)
PLATELET # BLD AUTO: 197 THOUSANDS/UL (ref 149–390)
PMV BLD AUTO: 7 FL (ref 8.6–11.7)
POTASSIUM SERPL-SCNC: 3.9 MMOL/L (ref 3.5–5.5)
PROCALCITONIN SERPL-MCNC: <0.05 NG/ML
RBC # BLD AUTO: 3.92 MILLION/UL (ref 3.9–5.2)
SODIUM SERPL-SCNC: 141 MMOL/L (ref 134–143)
WBC # BLD AUTO: 5.2 THOUSAND/UL (ref 4.8–10.8)

## 2021-02-24 PROCEDURE — 99217 PR OBSERVATION CARE DISCHARGE MANAGEMENT: CPT | Performed by: NURSE PRACTITIONER

## 2021-02-24 PROCEDURE — 80048 BASIC METABOLIC PNL TOTAL CA: CPT | Performed by: INTERNAL MEDICINE

## 2021-02-24 PROCEDURE — 85025 COMPLETE CBC W/AUTO DIFF WBC: CPT | Performed by: INTERNAL MEDICINE

## 2021-02-24 PROCEDURE — 84145 PROCALCITONIN (PCT): CPT | Performed by: EMERGENCY MEDICINE

## 2021-02-24 RX ORDER — CEFTRIAXONE 1 G/50ML
1000 INJECTION, SOLUTION INTRAVENOUS EVERY 24 HOURS
Status: COMPLETED | OUTPATIENT
Start: 2021-02-24 | End: 2021-02-24

## 2021-02-24 RX ORDER — CEPHALEXIN 500 MG/1
500 CAPSULE ORAL EVERY 6 HOURS SCHEDULED
Status: DISCONTINUED | OUTPATIENT
Start: 2021-02-25 | End: 2021-02-24 | Stop reason: HOSPADM

## 2021-02-24 RX ORDER — CEPHALEXIN 500 MG/1
500 CAPSULE ORAL EVERY 6 HOURS SCHEDULED
Qty: 20 CAPSULE | Refills: 0 | Status: SHIPPED | OUTPATIENT
Start: 2021-02-25 | End: 2021-03-02

## 2021-02-24 RX ADMIN — ACETAMINOPHEN 650 MG: 325 TABLET ORAL at 12:06

## 2021-02-24 RX ADMIN — CEFTRIAXONE 1000 MG: 1 INJECTION, SOLUTION INTRAVENOUS at 13:32

## 2021-02-24 RX ADMIN — ANASTROZOLE 1 MG: 1 TABLET ORAL at 08:45

## 2021-02-24 RX ADMIN — CALCIUM 1 TABLET: 500 TABLET ORAL at 08:46

## 2021-02-24 RX ADMIN — Medication 1000 UNITS: at 08:46

## 2021-02-24 NOTE — TELEPHONE ENCOUNTER
Patient called in stated that she is admitted to the hospital and would like prolia injection schedule for next week

## 2021-02-24 NOTE — UTILIZATION REVIEW
Initial Clinical Review    Admission: Date/Time/Statement:   Admission Orders (From admission, onward)     Ordered        02/23/21 1604  Place in Observation  Once                   Orders Placed This Encounter   Procedures    Place in Observation     Standing Status:   Standing     Number of Occurrences:   1     Order Specific Question:   Level of Care     Answer:   Med Surg [16]     ED Arrival Information     Expected Arrival Acuity Means of Arrival Escorted By Service Admission Type    - 2/23/2021 13:51 Urgent Walk-In Self General Medicine Urgent    Arrival Complaint    rash        Chief Complaint   Patient presents with    Rash     According to the patient she has had a generalized rash for the last week  Patient seen at urgent care and has no relief  Assessment/Plan:   64-year-old female with past medical history of breast cancer status post chemo (finished Jan 2021) on radiation therapy presenting with bilateral upper extremity rash  She describes rash started approximately 1 week ago on the left outer upper arm, and has gradually worsened  She received a COVID booster in the left deltoid approximately 2 weeks ago, but had no symptoms until 1 week ago  Cellulitis of left upper extremity  Assessment & Plan  · Failed outpatient treatment with Bactrim (patient only had 2 doses as outpatient however came to the hospital due to worsening cellulitis)  · Patient was initially ordered vancomycin by ER however patient had a suspected red man syndrome with pruritus/rash noted after Vanco infusion  Infusion was stopped and patient was given Benadryl which helped with patient's symptoms  · Patient does have a penicillin allergy which she states was a rash in the past   Unsure if she is ever had Keflex or any other cephalosporin    Will attempt ceftriaxone and monitor for any reaction  · If cellulitis is worsening or patient develops signs and symptoms of sepsis will consult Infectious Disease     Primary cancer of upper outer quadrant of left female breast [facility])  Assessment & Plan  · Patient completed chemotherapy in January of this year  · Patient currently on radiation therapy as outpatient  · Continue Arimidex  · Outpatient follow-up with Oncology    ED Triage Vitals [02/23/21 1408]   Temperature Pulse Respirations Blood Pressure SpO2   (!) 97 4 °F (36 3 °C) 87 18 (!) 174/74 99 %      Temp Source Heart Rate Source Patient Position - Orthostatic VS BP Location FiO2 (%)   Temporal Monitor Sitting Right arm --      Pain Score       5          Wt Readings from Last 1 Encounters:   02/24/21 77 3 kg (170 lb 6 7 oz)     Additional Vital Signs:   Date/Time  Temp  Pulse  Resp  BP  SpO2  O2 Device  Patient Position - Orthostatic VS   02/23/21 2223  98 °F (36 7 °C)  61  18  103/50  100 %  --  Lying   02/23/21 1649  97 3 °F (36 3 °C)Abnormal   78  18  145/65  100 %  None (Room air)  Sitting   02/23/21 1506  --  --  --  --  --  None (Room air)  --   02/23/21 1408  97 4 °F (36 3 °C)Abnormal   87  18  174/74Abnormal   99 %  None (Room air)  Sitting     Pertinent Labs/Diagnostic Test Results:   Results from last 7 days   Lab Units 02/23/21  1450   SARS-COV-2  Negative     Results from last 7 days   Lab Units 02/24/21  0520 02/23/21  1450   WBC Thousand/uL 5 20 5 70   HEMOGLOBIN g/dL 11 7* 13 2   HEMATOCRIT % 34 7* 39 2*   PLATELETS Thousands/uL 197 223   NEUTROS ABS Thousands/µL 3 20 4 00     Results from last 7 days   Lab Units 02/24/21  0520 02/23/21  1450   SODIUM mmol/L 141 139   POTASSIUM mmol/L 3 9 4 0   CHLORIDE mmol/L 107 103   CO2 mmol/L 26 25   ANION GAP mmol/L 8 11   BUN mg/dL 12 8   CREATININE mg/dL 0 89 0 80   EGFR ml/min/1 73sq m 71 81   CALCIUM mg/dL 9 1 9 7     Results from last 7 days   Lab Units 02/23/21  1450   AST U/L 17   ALT U/L 18   ALK PHOS U/L 76   TOTAL PROTEIN g/dL 7 3   ALBUMIN g/dL 4 4   TOTAL BILIRUBIN mg/dL 1 00     Results from last 7 days   Lab Units 02/24/21  0520 02/23/21  1450   GLUCOSE RANDOM mg/dL 100* 145*     Results from last 7 days   Lab Units 02/23/21  1450   PROCALCITONIN ng/ml <0 05     Results from last 7 days   Lab Units 02/23/21  1450   LACTIC ACID mmol/L 2 0     Results from last 7 days   Lab Units 02/23/21  1450   CRP mg/L 8 6*   SED RATE mm/hour 26     Results from last 7 days   Lab Units 02/23/21  1516   CLARITY UA  Cloudy*   COLOR UA  Yellow   SPEC GRAV UA  1 025   PH UA  7 0   GLUCOSE UA mg/dl Negative   KETONES UA mg/dl Trace*   BLOOD UA  Negative   PROTEIN UA mg/dl Negative   NITRITE UA  Negative   BILIRUBIN UA  Negative   UROBILINOGEN UA E U /dl 0 2   LEUKOCYTES UA  Negative     Results from last 7 days   Lab Units 02/23/21  1450   INFLUENZA A PCR  Negative   INFLUENZA B PCR  Negative   RSV PCR  Negative     Results from last 7 days   Lab Units 02/23/21  1450   BLOOD CULTURE  Received in Microbiology Lab  Culture in Progress  Received in Microbiology Lab  Culture in Progress       ED Treatment:   Medication Administration from 02/23/2021 1351 to 02/23/2021 1645       Date/Time Order Dose Route Action     02/23/2021 1459 sodium chloride 0 9 % bolus 1,000 mL 1,000 mL Intravenous New Bag     02/23/2021 1617 vancomycin (VANCOCIN) IVPB (premix in dextrose) 1,000 mg 200 mL 1,000 mg Intravenous New Bag        Past Medical History:   Diagnosis Date    Asthma     has not been treated for seveeral years  exercise induced    BRCA1 negative     Breast cancer ([facility] )     Cancer ([facility] )     breast  9/20    Exercise-induced asthma 11/3/2016     Present on Admission:   [facility] cancer of upper outer quadrant of [facility] female breast [facility] )    Admitting Diagnosis: Cellulitis [L03 90]  Rash [R21]  Age/Sex: 62 y o  female  Admission Orders:    Scheduled Medications:  anastrozole, 1 mg, Oral, Daily  calcium carbonate, 1 tablet, Oral, Daily With Breakfast  cefTRIAXone, 1,000 mg, Intravenous, Q24H  cholecalciferol, 1,000 Units, Oral, Daily  enoxaparin, 40 mg, Subcutaneous, Daily    PRN Meds:  acetaminophen, 650 mg, Oral, Q6H PRN  diphenhydrAMINE, 25 mg, Oral, Q6H PRN  ondansetron, 4 mg, Intravenous, Q6H PRN    Network Utilization Review Department  ATTENTION: Please call with any questions or concerns to [phone] and carefully listen to the prompts so that you are directed to the right person  All voicemails are confidential   Maranda Hoff all requests for admission clinical reviews, approved or denied determinations and any other requests to dedicated fax number below belonging to the campus where the patient is receiving treatment   List of dedicated fax numbers for the Facilities:  [address] DENIALS (Administrative/Medical Necessity) [phone]   [address] (Maternity/NICU/Pediatrics) [phone]   [address] Timo Rodriguez 8235 (  Bryant Asencio "[facility]" 103) [address] [place] [address] Jessica Richter 14[address] [place] [address] [phone]

## 2021-02-24 NOTE — CASE MANAGEMENT
Cm met with the pt and made her aware of cm role, she was made aware she is here as an obs status for cellulitis, pt is independent and drives,pt lives with her spouse in a 1 story home no inside steps, 2 steps outside, no hx of HHC, DME: none, pt denies alcholol and smoking, RX plan [facility] [address] Kelly Yang, pt does wear glasses and she states she can read her labels, pt drove here and she plans to drive home, RN and doctor are aware,d/c order written, I met again with the pt and she declines any additional d/c needs, pt will continue with her treatments, pt is in agreement with the d/c and d/c plan home with her outpt services, CM reviewed d/c planning process including the following: identifying help at home, patient preference for d/c planning needs, availability of treatment team to discuss questions or concerns patient and/or family may have regarding understanding medications and recognizing signs and symptoms once discharged  CM also encouraged patient to follow up with all recommended appointments after discharge  Patient advised of importance for patient and family to participate in managing patients medical well being  Patient/caregiver received discharge checklist   Content reviewed  Patient/caregiver encouraged to participate in discharge plan of care prior to discharge home

## 2021-02-24 NOTE — DISCHARGE INSTR - AVS FIRST PAGE
Dear Obdulio Law,     It was our [facility] to care for you here at [facility]  It is our hope that we were always able to exceed the expected standards for your care during your stay  You were hospitalized due to cellulitis  You were cared for on the 1st floor by THERESA Vaz with the [facility] who covers for your primary care physician (PCP), No primary care provider on file  , while you were hospitalized  If you have any questions or concerns related to this hospitalization, you may contact us at [phone]  For follow up as well as any medication refills, we recommend that you follow up with your primary care physician  A registered nurse will reach out to you by phone within a few days after your discharge to answer any additional questions that you may have after going home  However, at this time we provide for you here, the most important instructions / recommendations at discharge:     · Notable Medication Adjustments -   · Keflex 500 mg Q6hr for 5 more days- antibiotic   · Testing Required after Discharge -   · None   · Important follow up information -   · Follow up with PCP   · Other Instructions -   · Please refer to discharge instruction   · Please review this entire after visit summary as additional general instructions including medication list, appointments, activity, diet, any pertinent wound care, and other additional recommendations from your care team that may be provided for you        Sincerely,     THERESA Vaz

## 2021-02-24 NOTE — ASSESSMENT & PLAN NOTE
· Failed outpatient treatment with Bactrim (patient only had 2 doses as outpatient however came to the hospital due to worsening cellulitis)  · Patient was initially ordered vancomycin by ER however patient had a suspected red man syndrome with pruritus/rash noted after Vanco infusion  Infusion was stopped and patient was given Benadryl which helped with patient's symptoms  · Patient does have a penicillin allergy which she states was a rash in the past  Able to tolerate ceftriaxone without any issues  · Clinically is much improved  · Will give one more dose of ceftriaxone prior to discharge (total of 2 dose total in hospital) and will transition to keflex 500 mg q6hr for 5 more days on discharge

## 2021-02-24 NOTE — DISCHARGE SUMMARY
Discharge- Angy Mera 1960, 62 y o  female MRN: [id number]    Unit/Bed#: -01 Encounter: [id number]    Primary Care Provider: No primary care provider on file  Date and time admitted to hospital: 2/23/2021  2:13 PM        * Cellulitis of left upper extremity  Assessment & Plan  · Failed outpatient treatment with Bactrim (patient only had 2 doses as outpatient however came to the hospital due to worsening cellulitis)  · Patient was initially ordered vancomycin by ER however patient had a suspected red man syndrome with pruritus/rash noted after Vanco infusion  Infusion was stopped and patient was given Benadryl which helped with patient's symptoms  · Patient does have a penicillin allergy which she states was a rash in the past  Able to tolerate ceftriaxone without any issues  · Clinically is much improved  · Will give one more dose of ceftriaxone prior to discharge (total of 2 dose total in hospital) and will transition to keflex 500 mg q6hr for 5 more days on discharge  Primary cancer of upper outer quadrant of left female breast [facility])  Assessment & Plan  · Patient completed chemotherapy in January of this year  · Patient currently on radiation therapy as outpatient  · Continue Arimidex  · Outpatient follow-up with Oncology            Discharging Physician / Practitioner: THERESA Farris  PCP: No primary care provider on file  Admission Date:   Admission Orders (From admission, onward)     Ordered        02/23/21 1604  Place in Observation  Once                   Discharge Date: 02/24/21    Resolved Problems  Date Reviewed: 2/24/2021    None          Consultations During Hospital Stay:  · None    Procedures Performed:   · None    Significant Findings / Test Results:   · None    Incidental Findings:   · None     Test Results Pending at Discharge (will require follow up):    · Final blood culture     Outpatient Tests Requested:  · Follow-up PCP    Complications:     None    Reason for Admission:  Worsening rash on left upper extremity    Hospital Course:     Roseanne Alcantara is a 62 y o  female patient who originally presented to the hospital on 2/23/2021 due to worsening rash on left upper extremity  Please refer to H&P for initial presenting complaint  Brief the patient presented with worsening rash on the left arm and subsequently was admitted for cellulitis of upper extremities left greater than right  The patient was started on Bactrim which she took 2 doses prior to coming into the ED as the patient thinks that her cellulitis was worsening  She received 1 dose of IV vancomycin however after the administration the patient developed paretic rash of bilateral upper extremities/scabbed/face and subsequently received Benadryl with much improvement of symptoms  Her antibiotic was transitioned to ceftriaxone which the patient tolerated well  She received 1 more dose of ceftriaxone today and transition to Keflex 500 mg q 6 hours for 5 more days on discharge  I discussed discharge planning in details with the patient  Her final blood culture results is still pending and the patient is aware that if this comes back positive the patient will need to return back to the hospital to receive IV antibiotics  Please see above list of diagnoses and related plan for additional information  Condition at Discharge: good     Discharge Day Visit / Exam:     Subjective:  Feeling much improved  She thinks that her cellulitis is much improved  She would like to go home  Vitals: Blood Pressure: 107/51 (02/24/21 0743)  Pulse: 74 (02/24/21 0743)  Temperature: 97 6 °F (36 4 °C) (02/24/21 0743)  Temp Source: Temporal (02/24/21 0743)  Respirations: 18 (02/24/21 0743)  Height: 5' 4" (162 6 cm) (02/23/21 1649)  Weight - Scale: 77 3 kg (170 lb 6 7 oz) (02/24/21 0600)  SpO2: 97 % (02/24/21 0743)  Exam:   Physical Exam  Vitals signs and nursing note reviewed     Constitutional:       General: She is not in acute distress  Appearance: Normal appearance  HENT:      Head: Normocephalic and atraumatic  Right Ear: External ear normal       Left Ear: External ear normal       Nose: Nose normal  No rhinorrhea  Mouth/Throat:      Mouth: Mucous membranes are moist       Pharynx: Oropharynx is clear  Eyes:      General:         Right eye: No discharge  Left eye: No discharge  Pupils: Pupils are equal, round, and reactive to light  Neck:      Musculoskeletal: Normal range of motion and neck supple  No muscular tenderness  Cardiovascular:      Rate and Rhythm: Normal rate and regular rhythm  Pulses: Normal pulses  Heart sounds: Normal heart sounds  No murmur  Pulmonary:      Effort: Pulmonary effort is normal  No respiratory distress  Breath sounds: Normal breath sounds  Abdominal:      General: Bowel sounds are normal  There is no distension  Palpations: Abdomen is soft  There is no mass  Tenderness: There is no abdominal tenderness  Musculoskeletal: Normal range of motion  General: No swelling or tenderness  Skin:     General: Skin is warm and dry  Capillary Refill: Capillary refill takes less than 2 seconds  Findings: Erythema present  No rash  Neurological:      General: No focal deficit present  Mental Status: She is alert and oriented to person, place, and time  Mental status is at baseline  Psychiatric:         Mood and Affect: Mood normal          Behavior: Behavior normal          Thought Content: Thought content normal          Judgment: Judgment normal          Discussion with Family:  None present during exam    Discharge instructions/Information to patient and family:   See after visit summary for information provided to patient and family  Provisions for Follow-Up Care:  See after visit summary for information related to follow-up care and any pertinent home health orders        Disposition: Home      Planned Readmission:    No     Discharge Statement:  I spent 36 minutes discharging the patient  This time was spent on the day of discharge  I had direct contact with the patient on the day of discharge  Greater than 50% of the total time was spent examining patient, answering all patient questions, arranging and discussing plan of care with patient as well as directly providing post-discharge instructions  Additional time then spent on discharge activities  Discharge Medications:  See after visit summary for reconciled discharge medications provided to patient and family        ** Please Note: This note has been constructed using a voice recognition system **

## 2021-02-25 ENCOUNTER — APPOINTMENT (OUTPATIENT)
Dept: RADIATION ONCOLOGY | Facility: CLINIC | Age: 61
End: 2021-02-25
Attending: RADIOLOGY
Payer: COMMERCIAL

## 2021-02-25 PROCEDURE — 77412 RADIATION TX DELIVERY LVL 3: CPT | Performed by: RADIOLOGY

## 2021-02-25 PROCEDURE — 77387 GUIDANCE FOR RADJ TX DLVR: CPT | Performed by: RADIOLOGY

## 2021-02-26 ENCOUNTER — APPOINTMENT (OUTPATIENT)
Dept: RADIATION ONCOLOGY | Facility: CLINIC | Age: 61
End: 2021-02-26
Attending: RADIOLOGY
Payer: COMMERCIAL

## 2021-02-26 PROCEDURE — 77412 RADIATION TX DELIVERY LVL 3: CPT | Performed by: RADIOLOGY

## 2021-02-26 PROCEDURE — 77387 GUIDANCE FOR RADJ TX DLVR: CPT | Performed by: RADIOLOGY

## 2021-02-28 LAB
BACTERIA BLD CULT: NORMAL
BACTERIA BLD CULT: NORMAL

## 2021-03-01 ENCOUNTER — APPOINTMENT (OUTPATIENT)
Dept: RADIATION ONCOLOGY | Facility: CLINIC | Age: 61
End: 2021-03-01
Attending: RADIOLOGY
Payer: COMMERCIAL

## 2021-03-01 ENCOUNTER — LAB (OUTPATIENT)
Dept: LAB | Facility: HOSPITAL | Age: 61
End: 2021-03-01
Attending: RADIOLOGY
Payer: COMMERCIAL

## 2021-03-01 PROCEDURE — 77412 RADIATION TX DELIVERY LVL 3: CPT | Performed by: RADIOLOGY

## 2021-03-01 PROCEDURE — 77387 GUIDANCE FOR RADJ TX DLVR: CPT | Performed by: RADIOLOGY

## 2021-03-01 PROCEDURE — 77336 RADIATION PHYSICS CONSULT: CPT | Performed by: RADIOLOGY

## 2021-03-02 ENCOUNTER — APPOINTMENT (OUTPATIENT)
Dept: RADIATION ONCOLOGY | Facility: CLINIC | Age: 61
End: 2021-03-02
Attending: RADIOLOGY
Payer: COMMERCIAL

## 2021-03-02 ENCOUNTER — TELEPHONE (OUTPATIENT)
Dept: HEMATOLOGY ONCOLOGY | Facility: MEDICAL CENTER | Age: 61
End: 2021-03-02

## 2021-03-02 PROCEDURE — 77417 THER RADIOLOGY PORT IMAGE(S): CPT | Performed by: RADIOLOGY

## 2021-03-02 PROCEDURE — 77412 RADIATION TX DELIVERY LVL 3: CPT | Performed by: RADIOLOGY

## 2021-03-02 PROCEDURE — 77387 GUIDANCE FOR RADJ TX DLVR: CPT | Performed by: RADIOLOGY

## 2021-03-03 ENCOUNTER — APPOINTMENT (OUTPATIENT)
Dept: RADIATION ONCOLOGY | Facility: CLINIC | Age: 61
End: 2021-03-03
Payer: COMMERCIAL

## 2021-03-03 ENCOUNTER — APPOINTMENT (OUTPATIENT)
Dept: RADIATION ONCOLOGY | Facility: CLINIC | Age: 61
End: 2021-03-03
Attending: RADIOLOGY
Payer: COMMERCIAL

## 2021-03-03 PROCEDURE — 77387 GUIDANCE FOR RADJ TX DLVR: CPT | Performed by: RADIOLOGY

## 2021-03-03 PROCEDURE — 77412 RADIATION TX DELIVERY LVL 3: CPT | Performed by: RADIOLOGY

## 2021-03-04 ENCOUNTER — HOSPITAL ENCOUNTER (OUTPATIENT)
Dept: INFUSION CENTER | Facility: HOSPITAL | Age: 61
Discharge: HOME | End: 2021-03-04
Attending: INTERNAL MEDICINE

## 2021-03-04 ENCOUNTER — APPOINTMENT (OUTPATIENT)
Dept: RADIATION ONCOLOGY | Facility: CLINIC | Age: 61
End: 2021-03-04
Attending: RADIOLOGY
Payer: COMMERCIAL

## 2021-03-04 PROCEDURE — 77412 RADIATION TX DELIVERY LVL 3: CPT | Performed by: RADIOLOGY

## 2021-03-04 PROCEDURE — 77334 RADIATION TREATMENT AID(S): CPT | Performed by: RADIOLOGY

## 2021-03-04 PROCEDURE — 77387 GUIDANCE FOR RADJ TX DLVR: CPT | Performed by: RADIOLOGY

## 2021-03-04 PROCEDURE — 77290 THER RAD SIMULAJ FIELD CPLX: CPT | Performed by: RADIOLOGY

## 2021-03-05 ENCOUNTER — APPOINTMENT (OUTPATIENT)
Dept: RADIATION ONCOLOGY | Facility: CLINIC | Age: 61
End: 2021-03-05
Attending: RADIOLOGY
Payer: COMMERCIAL

## 2021-03-05 PROCEDURE — 77412 RADIATION TX DELIVERY LVL 3: CPT | Performed by: RADIOLOGY

## 2021-03-05 PROCEDURE — 77387 GUIDANCE FOR RADJ TX DLVR: CPT | Performed by: RADIOLOGY

## 2021-03-08 ENCOUNTER — TELEPHONE (OUTPATIENT)
Dept: HEMATOLOGY ONCOLOGY | Facility: CLINIC | Age: 61
End: 2021-03-08

## 2021-03-08 ENCOUNTER — APPOINTMENT (OUTPATIENT)
Dept: RADIATION ONCOLOGY | Facility: CLINIC | Age: 61
End: 2021-03-08
Attending: RADIOLOGY
Payer: COMMERCIAL

## 2021-03-08 PROCEDURE — 77412 RADIATION TX DELIVERY LVL 3: CPT | Performed by: RADIOLOGY

## 2021-03-08 PROCEDURE — 77387 GUIDANCE FOR RADJ TX DLVR: CPT | Performed by: RADIOLOGY

## 2021-03-08 PROCEDURE — 77336 RADIATION PHYSICS CONSULT: CPT | Performed by: RADIOLOGY

## 2021-03-08 NOTE — TELEPHONE ENCOUNTER
----- Message from Mary Garcia sent at 3/8/2021 10:50 AM EST -----  Regarding: FW: Non-Urgent Medical Question  Contact: [phone]    ----- Message -----  From: Danilo Hess  Sent: 3/6/2021   8:15 AM EST  To: [facility] [place] [facility]  Subject: Non-Urgent Medical Question                      good morning, can you please add vancomycin to my list of allegeries?  The hospital didnt add to the list  thank you

## 2021-03-08 NOTE — TELEPHONE ENCOUNTER
I called and spoke directly with the patient  She stated that when she was in the hospital she broke out in with severe hives from Vancomycin  I updated the chart

## 2021-03-09 ENCOUNTER — HOSPITAL ENCOUNTER (OUTPATIENT)
Dept: INFUSION CENTER | Facility: CLINIC | Age: 61
Discharge: HOME | End: 2021-03-09
Payer: COMMERCIAL

## 2021-03-09 ENCOUNTER — APPOINTMENT (OUTPATIENT)
Dept: RADIATION ONCOLOGY | Facility: CLINIC | Age: 61
End: 2021-03-09
Attending: RADIOLOGY
Payer: COMMERCIAL

## 2021-03-09 VITALS — WEIGHT: 165.79 LBS | BODY MASS INDEX: 28.3 KG/M2 | TEMPERATURE: 97 F | HEIGHT: 64 IN

## 2021-03-09 DIAGNOSIS — Z79.810 LONG TERM CURRENT USE OF SELECTIVE ESTROGEN RECEPTOR MODULATORS (SERMS): Primary | ICD-10-CM

## 2021-03-09 DIAGNOSIS — M85.852 OSTEOPENIA OF LEFT HIP: ICD-10-CM

## 2021-03-09 DIAGNOSIS — C50.412 PRIMARY CANCER OF UPPER OUTER QUADRANT OF LEFT FEMALE BREAST (HCC): ICD-10-CM

## 2021-03-09 PROCEDURE — 77321 SPECIAL TELETX PORT PLAN: CPT | Performed by: RADIOLOGY

## 2021-03-09 PROCEDURE — 77417 THER RADIOLOGY PORT IMAGE(S): CPT | Performed by: RADIOLOGY

## 2021-03-09 PROCEDURE — 77387 GUIDANCE FOR RADJ TX DLVR: CPT | Performed by: RADIOLOGY

## 2021-03-09 PROCEDURE — 96401 CHEMO ANTI-NEOPL SQ/IM: CPT

## 2021-03-09 PROCEDURE — 77334 RADIATION TREATMENT AID(S): CPT | Performed by: RADIOLOGY

## 2021-03-09 PROCEDURE — 77412 RADIATION TX DELIVERY LVL 3: CPT | Performed by: RADIOLOGY

## 2021-03-09 RX ADMIN — DENOSUMAB 60 MG: 60 INJECTION SUBCUTANEOUS at 10:51

## 2021-03-09 NOTE — PROGRESS NOTES
Pt arrived for prolia injection offering no complaints  Denied recent or upcoming dental work  Ca 9 1 and creatinine clearance 59 2 ml/min from blood work performed 2/24/21  Pt tolerated injection well in right arm and was discharged in stable condition  AVS declined and next appointment scheduled in 6 months

## 2021-03-10 ENCOUNTER — APPOINTMENT (OUTPATIENT)
Dept: RADIATION ONCOLOGY | Facility: CLINIC | Age: 61
End: 2021-03-10
Attending: RADIOLOGY
Payer: COMMERCIAL

## 2021-03-10 DIAGNOSIS — Z23 ENCOUNTER FOR IMMUNIZATION: ICD-10-CM

## 2021-03-10 PROCEDURE — 77412 RADIATION TX DELIVERY LVL 3: CPT | Performed by: RADIOLOGY

## 2021-03-10 PROCEDURE — 77387 GUIDANCE FOR RADJ TX DLVR: CPT | Performed by: RADIOLOGY

## 2021-03-11 ENCOUNTER — APPOINTMENT (OUTPATIENT)
Dept: RADIATION ONCOLOGY | Facility: CLINIC | Age: 61
End: 2021-03-11
Attending: RADIOLOGY
Payer: COMMERCIAL

## 2021-03-11 PROCEDURE — 77412 RADIATION TX DELIVERY LVL 3: CPT | Performed by: RADIOLOGY

## 2021-03-11 PROCEDURE — 77387 GUIDANCE FOR RADJ TX DLVR: CPT | Performed by: RADIOLOGY

## 2021-03-12 ENCOUNTER — APPOINTMENT (OUTPATIENT)
Dept: RADIATION ONCOLOGY | Facility: CLINIC | Age: 61
End: 2021-03-12
Attending: RADIOLOGY
Payer: COMMERCIAL

## 2021-03-12 PROCEDURE — 77412 RADIATION TX DELIVERY LVL 3: CPT | Performed by: RADIOLOGY

## 2021-03-12 PROCEDURE — 77387 GUIDANCE FOR RADJ TX DLVR: CPT | Performed by: RADIOLOGY

## 2021-03-15 ENCOUNTER — APPOINTMENT (OUTPATIENT)
Dept: RADIATION ONCOLOGY | Facility: CLINIC | Age: 61
End: 2021-03-15
Attending: RADIOLOGY
Payer: COMMERCIAL

## 2021-03-15 PROCEDURE — 77336 RADIATION PHYSICS CONSULT: CPT | Performed by: RADIOLOGY

## 2021-03-15 PROCEDURE — 77412 RADIATION TX DELIVERY LVL 3: CPT | Performed by: RADIOLOGY

## 2021-03-15 PROCEDURE — 77387 GUIDANCE FOR RADJ TX DLVR: CPT | Performed by: RADIOLOGY

## 2021-03-16 ENCOUNTER — APPOINTMENT (OUTPATIENT)
Dept: RADIATION ONCOLOGY | Facility: CLINIC | Age: 61
End: 2021-03-16
Attending: RADIOLOGY
Payer: COMMERCIAL

## 2021-03-16 ENCOUNTER — APPOINTMENT (OUTPATIENT)
Dept: RADIATION ONCOLOGY | Facility: CLINIC | Age: 61
End: 2021-03-16
Payer: COMMERCIAL

## 2021-03-16 PROCEDURE — 77412 RADIATION TX DELIVERY LVL 3: CPT | Performed by: RADIOLOGY

## 2021-03-16 PROCEDURE — 77331 SPECIAL RADIATION DOSIMETRY: CPT | Performed by: RADIOLOGY

## 2021-03-16 PROCEDURE — 77280 THER RAD SIMULAJ FIELD SMPL: CPT | Performed by: RADIOLOGY

## 2021-03-17 ENCOUNTER — APPOINTMENT (OUTPATIENT)
Dept: RADIATION ONCOLOGY | Facility: CLINIC | Age: 61
End: 2021-03-17
Attending: RADIOLOGY
Payer: COMMERCIAL

## 2021-03-17 PROCEDURE — 77412 RADIATION TX DELIVERY LVL 3: CPT | Performed by: RADIOLOGY

## 2021-03-18 ENCOUNTER — APPOINTMENT (OUTPATIENT)
Dept: RADIATION ONCOLOGY | Facility: CLINIC | Age: 61
End: 2021-03-18
Attending: RADIOLOGY
Payer: COMMERCIAL

## 2021-03-18 PROCEDURE — 77412 RADIATION TX DELIVERY LVL 3: CPT | Performed by: RADIOLOGY

## 2021-03-19 ENCOUNTER — APPOINTMENT (OUTPATIENT)
Dept: RADIATION ONCOLOGY | Facility: CLINIC | Age: 61
End: 2021-03-19
Attending: RADIOLOGY
Payer: COMMERCIAL

## 2021-03-19 PROCEDURE — 77412 RADIATION TX DELIVERY LVL 3: CPT | Performed by: RADIOLOGY

## 2021-03-22 ENCOUNTER — APPOINTMENT (OUTPATIENT)
Dept: RADIATION ONCOLOGY | Facility: CLINIC | Age: 61
End: 2021-03-22
Attending: RADIOLOGY
Payer: COMMERCIAL

## 2021-03-22 PROCEDURE — 77336 RADIATION PHYSICS CONSULT: CPT | Performed by: RADIOLOGY

## 2021-03-22 PROCEDURE — 77412 RADIATION TX DELIVERY LVL 3: CPT | Performed by: RADIOLOGY

## 2021-03-23 ENCOUNTER — APPOINTMENT (OUTPATIENT)
Dept: RADIATION ONCOLOGY | Facility: CLINIC | Age: 61
End: 2021-03-23
Payer: COMMERCIAL

## 2021-03-29 ENCOUNTER — APPOINTMENT (OUTPATIENT)
Dept: LAB | Facility: CLINIC | Age: 61
End: 2021-03-29
Payer: COMMERCIAL

## 2021-03-29 ENCOUNTER — TRANSCRIBE ORDERS (OUTPATIENT)
Dept: LAB | Facility: CLINIC | Age: 61
End: 2021-03-29

## 2021-03-29 DIAGNOSIS — C50.412 PRIMARY CANCER OF UPPER OUTER QUADRANT OF LEFT FEMALE BREAST (HCC): ICD-10-CM

## 2021-03-29 LAB
ALBUMIN SERPL BCP-MCNC: 3.6 G/DL (ref 3.5–5)
ALP SERPL-CCNC: 97 U/L (ref 46–116)
ALT SERPL W P-5'-P-CCNC: 20 U/L (ref 12–78)
ANION GAP SERPL CALCULATED.3IONS-SCNC: 6 MMOL/L (ref 4–13)
AST SERPL W P-5'-P-CCNC: 11 U/L (ref 5–45)
BASOPHILS # BLD AUTO: 0.05 THOUSANDS/ΜL (ref 0–0.1)
BASOPHILS NFR BLD AUTO: 1 % (ref 0–1)
BILIRUB SERPL-MCNC: 0.88 MG/DL (ref 0.2–1)
BUN SERPL-MCNC: 11 MG/DL (ref 5–25)
CALCIUM SERPL-MCNC: 8.9 MG/DL (ref 8.3–10.1)
CHLORIDE SERPL-SCNC: 110 MMOL/L (ref 100–108)
CO2 SERPL-SCNC: 26 MMOL/L (ref 21–32)
CREAT SERPL-MCNC: 0.8 MG/DL (ref 0.6–1.3)
EOSINOPHIL # BLD AUTO: 0.12 THOUSAND/ΜL (ref 0–0.61)
EOSINOPHIL NFR BLD AUTO: 3 % (ref 0–6)
ERYTHROCYTE [DISTWIDTH] IN BLOOD BY AUTOMATED COUNT: 13.2 % (ref 11.6–15.1)
GFR SERPL CREATININE-BSD FRML MDRD: 81 ML/MIN/1.73SQ M
GLUCOSE P FAST SERPL-MCNC: 113 MG/DL (ref 65–99)
HCT VFR BLD AUTO: 40.6 % (ref 34.8–46.1)
HGB BLD-MCNC: 12.9 G/DL (ref 11.5–15.4)
IMM GRANULOCYTES # BLD AUTO: 0.01 THOUSAND/UL (ref 0–0.2)
IMM GRANULOCYTES NFR BLD AUTO: 0 % (ref 0–2)
LYMPHOCYTES # BLD AUTO: 0.87 THOUSANDS/ΜL (ref 0.6–4.47)
LYMPHOCYTES NFR BLD AUTO: 18 % (ref 14–44)
MAGNESIUM SERPL-MCNC: 2.6 MG/DL (ref 1.6–2.6)
MCH RBC QN AUTO: 28.2 PG (ref 26.8–34.3)
MCHC RBC AUTO-ENTMCNC: 31.8 G/DL (ref 31.4–37.4)
MCV RBC AUTO: 89 FL (ref 82–98)
MONOCYTES # BLD AUTO: 0.42 THOUSAND/ΜL (ref 0.17–1.22)
MONOCYTES NFR BLD AUTO: 9 % (ref 4–12)
NEUTROPHILS # BLD AUTO: 3.35 THOUSANDS/ΜL (ref 1.85–7.62)
NEUTS SEG NFR BLD AUTO: 69 % (ref 43–75)
NRBC BLD AUTO-RTO: 0 /100 WBCS
PLATELET # BLD AUTO: 237 THOUSANDS/UL (ref 149–390)
PMV BLD AUTO: 9.3 FL (ref 8.9–12.7)
POTASSIUM SERPL-SCNC: 4.2 MMOL/L (ref 3.5–5.3)
PROT SERPL-MCNC: 7.4 G/DL (ref 6.4–8.2)
RBC # BLD AUTO: 4.57 MILLION/UL (ref 3.81–5.12)
SODIUM SERPL-SCNC: 142 MMOL/L (ref 136–145)
WBC # BLD AUTO: 4.82 THOUSAND/UL (ref 4.31–10.16)

## 2021-03-29 PROCEDURE — 80053 COMPREHEN METABOLIC PANEL: CPT

## 2021-03-29 PROCEDURE — 36415 COLL VENOUS BLD VENIPUNCTURE: CPT

## 2021-03-29 PROCEDURE — 83735 ASSAY OF MAGNESIUM: CPT

## 2021-03-29 PROCEDURE — 85025 COMPLETE CBC W/AUTO DIFF WBC: CPT

## 2021-03-30 ENCOUNTER — OFFICE VISIT (OUTPATIENT)
Dept: HEMATOLOGY ONCOLOGY | Facility: CLINIC | Age: 61
End: 2021-03-30
Payer: COMMERCIAL

## 2021-03-30 VITALS
HEART RATE: 69 BPM | HEIGHT: 63 IN | BODY MASS INDEX: 28.88 KG/M2 | TEMPERATURE: 98 F | SYSTOLIC BLOOD PRESSURE: 142 MMHG | DIASTOLIC BLOOD PRESSURE: 84 MMHG | WEIGHT: 163 LBS | RESPIRATION RATE: 16 BRPM

## 2021-03-30 DIAGNOSIS — M85.852 OSTEOPENIA OF LEFT HIP: ICD-10-CM

## 2021-03-30 DIAGNOSIS — C50.412 PRIMARY CANCER OF UPPER OUTER QUADRANT OF LEFT FEMALE BREAST (HCC): Primary | ICD-10-CM

## 2021-03-30 DIAGNOSIS — Z79.811 AROMATASE INHIBITOR USE: ICD-10-CM

## 2021-03-30 PROCEDURE — 99214 OFFICE O/P EST MOD 30 MIN: CPT | Performed by: NURSE PRACTITIONER

## 2021-03-30 NOTE — PROGRESS NOTES
Hematology/Oncology Outpatient Follow-up  Joann Sibley 62 y o  female 1960 [id number]    Date:  3/30/2021      Assessment and Plan:  1  Primary cancer of upper outer quadrant of left female breast [facility])  Patient completed her adjuvant chemotherapy 4 cycles of TC and also completed her adjuvant radiation about 2 weeks ago  She continues to have skin changes to the left breast from the recent radiation  She was started on her endocrine treatment with anastrozole approximately 2 months ago which she is tolerating fairly well thus far  We did again review side effects of anastrozole  I did encourage her to call us in the interim should she have worsening arthralgias/myalgias especially in her legs or other concerning adverse effects  The patient was asking today about surveillance strategies after adjuvant treatment which we discussed today  She will continue to follow up with her breast surgeon on a regular basis and get her breast imaging as per his discretion  Will follow-up with us again in about 4 months with repeat labs prior  I did encourage the patient to continue to take her calcium and vitamin-D supplements, exercise on a regular basis and consume a well-balanced low-fat diet; increasing fruits, vegetables and whole grains in her meal and limiting her dairy, fatty, greasy and sugary foods     - CBC and differential; Future  - Comprehensive metabolic panel; Future  - DXA bone density spine hip and pelvis; Future    2  Aromatase inhibitor use  As above  Patient was recently started on Prolia injections for her osteopenia/cancer treatment induced bone loss which she will continue on an every six-month basis  She will be due for her 2 year follow-up DEXA scan around the end of June beginning of July which we will order and schedule to be done before her next office visit  - DXA bone density spine hip and pelvis; Future    3   Osteopenia of left hip  - DXA bone density spine hip and pelvis; Future    HPI:  The patient presents today for a follow-up visit  She had to be admitted to the hospital for observation 2/23 through 2/24 for left upper arm cellulitis which eventually resolved with antibiotics  Started her anastrozole approximately 2 months ago and has been tolerating it thus far  Admits to hot flashes typically more than 1 per day  Mentions that she did notice some mild leg discomfort/weakness after getting her Prolia injection; improves with ambulating  She does have significant radiation skin changes to the left breast for which she is applying moisturizer as advised  Otherwise no complaints  Her most recent laboratory studies from yesterday showed normal CBC hemoglobin 12 9  Fasting glucose 113 remaining metabolic panel and magnesium are normal     Oncology History Overview Note   Patient with a family history of breast cancer in her mother  She had two previous benign breast biopsies  She was going for yearly mammograms and most recent in August showed focal asymmetry in the upper outer posterior left breast  A biopsy on 9/11/20 revealed left invasive ductal breast carcinoma  She saw General Surgeon, Dr Manuelito Li in September for surgical evaluation  8/12/20 Screening Mammogram bilateral w 3d & cad   IMPRESSION:  1  Upper outer posterior left breast focal asymmetry  Diagnostic mammography, with possible ultrasound, recommended  2  Stable right mammogram      9/4/20 Mammo diagnostic left w 3d & cad   US breast left limited (diagnostic)   FINDINGS:   LEFT  1) MASS  Mammo diagnostic left w 3d & cad: There is a 43 mm transverse x 26 mm AP x 23 mm craniocaudal high density, irregularly shaped mass seen in the upper outer quadrant of the left breast at 1 o'clock in the posterior depth  There is a possible satellite lesions slightly inferior to the mass  If measured as 1 finding, it measures 32 mm craniocaudal   The mass correlates with the prior mammogram finding     US breast left limited (diagnostic): There is and irregularly shaped, hypoechoic mass with indistinct margins with shadowing seen in the upper outer quadrant of the left breast at 1 o'clock in the posterior depth  The mass measures at least 23 x 11 x 10 mm and the size may be underestimated on ultrasound  The mass correlates with the prior mammogram finding  This is highly suspicious and ultrasound-guided core needle biopsy is recommended  Targeted ultrasound of the left axilla was performed  At least 1 morphologically benign lymph node was visualized  No morphologically abnormal lymph nodes were visualized  ASSESSMENT/BI-RADS CATEGORY:  Left: 4C - High Suspicion for Malignancy  Overall: 4 - Suspicious     9/11/20 Left breast US guided biopsy, at 1:00 12cmfn  Invasive mammary carcinoma of no special type Grade 3  ER/PA 90-95% positive, HER2 negative  Lymphovascular invasion present     9/28/20 Breast working group recommended treatment plan: Lumpectomy with SLNB followed by adjuvant radiation therapy  Genetic testing consult  10/2/20 Left breast Lumpectomy  Invasive breast carcinoma of no special type, 21 mm focus  Grade 3  DCIS present in 2 out of 17 blocks  Margins negative for invasive carcinoma, 1 mm from the closest posterior margin  Negative for DCIS 1 mm from the closest anterior margin  Lymph-vascular invasion present     Lymph nodes:  Left sentinel lymph node excision: 1 lymph node positive for carcinoma  Left axillary LN excision: 1 reactive lymph node     Primary cancer of upper outer quadrant of left female breast ([address] )   9/2020 Initial Diagnosis    Primary cancer of upper outer quadrant of left female breast ([address] )  Stage IIB     9/11/2020 Biopsy    Breast, left at 1:00 12cmfn, US guided biopsy, 5 passes:  - Invasive mammary carcinoma of no special type (ductal, not otherwise specified)      -- [place] histologic grade 3 of 3 (total score: 8 of 9)       -- Invasive carcinoma involves 5 of 5 submitted core biopsies, max  Dimension= 14 mm     -- Estrogen, Progesterone 90-95% positive HER2 negative  - Lymphovascular invasion: Present  10/2/2020 Surgery    A  Breast, Left, Lumpectomy:  - Invasive breast carcinoma of no special type (ductal NST/invasive ductal carcinoma), 21 mm focus  * [place] grade 3 of 3 (total score: 9 of 9)   * Ductal carcinoma in situ (DCIS): Present in 2 out of 17 blocks  -- [place], solid and cribriform architectural pattern, nuclear grade 3 of 3     * Margins: Negative for invasive carcinoma, 1 mm from the closest posterior margin; Negative for DCIS, 1 mm from the closest anterior margin  * Skin: Negative for carcinoma  * Skeletal muscle: Negative for carcinoma  * Lymph-vascular invasion: Present  * Pathologic stage (AJCC 8th ed ): pT2, pN1a (sn)  B  [place] Lymph Node, Left # 1, Excision:  - One lymph node positive for carcinoma (1/1)  C  Axillary contents, Left, Excision:  - One reactive lymph node (0/1)       10/2/2020 -  Cancer Staged    Staging form: Breast, AJCC 8th Edition  - Clinical stage from 10/2/2020: Stage IIB (cT2, cN1(sn), cM0, G3, ER+, ID+, HER2-) - Signed by Alpha Gaucher, MD on 10/9/2020  Stage prefix: Initial diagnosis  Laterality: Left  Tumor size (mm): 21  Method of lymph node assessment: [place] lymph node biopsy  Nuclear grade: G3  Mitotic count score: Score 3  Percentage of tumor with tubule formation: 10  Tubule formation score: Score 3  Scarff-Bloom-Cevallos score: 9  Histologic grading system: 3 grade system  Lymph-vascular invasion (LVI): LVI present/identified, NOS  Specimen type: Excision  National guidelines used in treatment planning: Yes  Type of national guideline used in treatment planning: NCCN       11/4/2020 - 1/26/2021 Chemotherapy    Completed 4 cycles of adjuvant Taxotere plus Cytoxan   pegfilgrastim (NEULASTA) subcutaneous injection 6 mg, 6 mg, Subcutaneous, Once, 3 of 3 cycles  Administration: 6 mg (11/5/2020), 6 mg (11/25/2020), 6 mg (1/6/2021)  pegfilgrastim (NEULASTA ONPRO) subcutaneous injection kit 6 mg, 6 mg, Subcutaneous, Once, 1 of 1 cycle  Administration: 6 mg (12/15/2020)  cyclophosphamide (CYTOXAN) 1,068 mg in sodium chloride 0 9 % 250 mL IVPB, 600 mg/m2 = 1,068 mg, Intravenous, Once, 4 of 4 cycles  Administration: 1,068 mg (11/4/2020), 1,068 mg (11/24/2020), 1,068 mg (12/15/2020), 1,068 mg (1/5/2021)  DOCEtaxel (TAXOTERE) 140 mg in sodium chloride 0 9 % 250 mL chemo infusion, 133 6 mg, Intravenous, Once, 4 of 4 cycles  Administration: 140 mg (11/4/2020), 140 mg (11/24/2020), 140 mg (12/15/2020), 140 mg (1/5/2021)       11/2020 Genetic Testing    Patient has genetic testing done for breast cancer  Results revealed patient has the following mutation(s):  CHEK2 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), Heterozygous     2/9/2021 -  Hormone Therapy    Started anastrozole  Was also started on Prolia injections due to pre-existing osteopenia         Interval history:    ROS: Review of Systems   Constitutional: Positive for fatigue  Negative for activity change, appetite change, chills, fever and unexpected weight change  HENT: Negative for congestion, mouth sores, nosebleeds, sore throat and trouble swallowing  Eyes: Negative  Respiratory: Negative for cough, chest tightness and shortness of breath  Cardiovascular: Negative for chest pain, palpitations and leg swelling  Gastrointestinal: Negative for abdominal distention, abdominal pain, blood in stool, constipation, diarrhea, nausea and vomiting  Genitourinary: Negative for difficulty urinating, dysuria, frequency, hematuria and urgency  Musculoskeletal: Negative for arthralgias, back pain, gait problem, joint swelling and myalgias  Skin: Positive for color change and rash  Negative for pallor  Neurological: Negative for dizziness, light-headedness, numbness and headaches  Hematological: Negative for adenopathy  Does not bruise/bleed easily  Psychiatric/Behavioral: Negative for dysphoric mood and sleep disturbance  The patient is not nervous/anxious  Past Medical History:   Diagnosis Date    Asthma     has not been treated for seveeral years  exercise induced    BRCA1 negative     Breast cancer [address] [address] )     left sided    Cancer [address] [address] )     breast  9/20    Exercise-induced asthma 11/3/2016       Past Surgical History:   Procedure Laterality Date    BREAST CYST EXCISION Left     benign    BREAST CYST EXCISION Left     benign    BREAST SURGERY      CHOLECYSTECTOMY      HYSTERECTOMY      IR PORT PLACEMENT  10/26/2020    IR PORT REMOVAL  2/4/2021    MAMMO NEEDLE LOCALIZATION LEFT (ALL INC) Left 10/2/2020    MOUTH SURGERY      IN MASTECTOMY, PARTIAL Left 10/2/2020    Procedure: BREAST NEEDLE LOCALIZED LUMPECTOMY, SENTINEL LYMPH NODE BIOPSY (Bracketed U/S guided needle loc @ 8:00, INJECT AT 1045);   Surgeon: Francisco Demarco MD;  Location: [facility] MAIN OR;  Service: General    TUBAL LIGATION      US GUIDED BREAST BIOPSY LEFT COMPLETE Left 9/11/2020    WISDOM TOOTH EXTRACTION         Social History     Socioeconomic History    Marital status: /Civil Union     Spouse name: None    Number of children: 2    Years of education: None    Highest education level: None   Occupational History    Occupation: Retired   Social Needs    Financial resource strain: None    Food insecurity     Worry: None     Inability: None    Transportation needs     Medical: None     Non-medical: None   Tobacco Use    Smoking status: Never Smoker    Smokeless tobacco: Never Used   Substance and Sexual Activity    Alcohol use: Never     Frequency: Never    Drug use: No    Sexual activity: Yes     Partners: Male     Birth control/protection: Female Sterilization     Comment:  x 9 years, [address]    Physical activity     Days per week: None     Minutes per session: None    Stress: None   Relationships    Social connections     Talks on phone: None     Gets together: None     Attends [employer] service: None     Active member of club or organization: None     Attends meetings of clubs or organizations: None     Relationship status: None    Intimate partner violence     Fear of current or ex partner: None     Emotionally abused: None     Physically abused: None     Forced sexual activity: None   Other Topics Concern    None   Social History Narrative    Daily caffeine consumption, 1 serving a day    Exercises 3-4 times per week     x 8 years       Family History   Problem Relation Age of Onset    Hypertension Mother     Heart disease Mother         cardiac disorder    Breast cancer Mother         dx age early 19's    Glaucoma Mother     Hypertension Father     Heart disease Father         cardiac disorder    Cancer Father     No Known Problems Sister     Multiple sclerosis Daughter     Melanoma Daughter     No Known Problems Son     Breast cancer Paternal Aunt        Allergies   Allergen Reactions    Ofloxacin Hives    Penicillins Hives    Vancomycin Hives     Pt asked to update her chart  She received this med when she was in the hospital - 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   Chlorhexidine Rash         Current Outpatient Medications:     anastrozole (ARIMIDEX) 1 mg tablet, Take 1 tablet (1 mg total) by mouth daily, Disp: 30 tablet, Rfl: 4    Calcium 600 MG tablet, Take 1 tablet by mouth every morning , Disp: , Rfl:     Cholecalciferol (VITAMIN D3) 1000 units CAPS, Take 1 capsule by mouth every morning , Disp: , Rfl:     Cranberry 1000 MG CAPS, Take 1 capsule by mouth every morning , Disp: , Rfl:       Physical Exam:  /84 (BP Location: Left arm, Patient Position: Sitting, Cuff Size: Adult)   Pulse 69   Temp 98 °F (36 7 °C) (Tympanic)   Resp 16   Ht 5' 3" (1 6 m)   Wt 73 9 kg (163 lb)   LMP  (LMP Unknown)   BMI 28 87 kg/m²     Physical Exam  Vitals signs reviewed     Constitutional:       General: She is not in acute distress  Appearance: She is well-developed  She is not diaphoretic  HENT:      Head: Normocephalic and atraumatic  Eyes:      General: No scleral icterus  Conjunctiva/sclera: Conjunctivae normal       Pupils: Pupils are equal, round, and reactive to light  Neck:      Musculoskeletal: Normal range of motion and neck supple  Thyroid: No thyromegaly  Cardiovascular:      Rate and Rhythm: Normal rate and regular rhythm  Heart sounds: Normal heart sounds  No murmur  Pulmonary:      Effort: Pulmonary effort is normal  No respiratory distress  Breath sounds: Normal breath sounds  Chest:      Breasts:         Left: Skin change and tenderness present  Comments: Erythema/moist desquamation noted below the left breast and the left upper outer region of the breast   Abdominal:      General: There is no distension  Palpations: Abdomen is soft  There is no hepatomegaly or splenomegaly  Tenderness: There is no abdominal tenderness  Musculoskeletal: Normal range of motion  General: No swelling  Lymphadenopathy:      Cervical: No cervical adenopathy  Upper Body:      Right upper body: No axillary adenopathy  Left upper body: No axillary adenopathy  Skin:     General: Skin is warm and dry  Findings: No erythema or rash  Neurological:      General: No focal deficit present  Mental Status: She is alert and oriented to person, place, and time  Psychiatric:         Mood and Affect: Mood and affect normal          Behavior: Behavior normal  Behavior is cooperative  Thought Content:  Thought content normal          Judgment: Judgment normal            Labs:  Lab Results   Component Value Date    WBC 4 82 03/29/2021    HGB 12 9 03/29/2021    HCT 40 6 03/29/2021    MCV 89 03/29/2021     03/29/2021     Lab Results   Component Value Date    K 4 2 03/29/2021     (H) 03/29/2021    CO2 26 03/29/2021    BUN 11 03/29/2021    CREATININE 0 80 03/29/2021    GLUF 113 (H) 03/29/2021    CALCIUM 8 9 03/29/2021    AST 11 03/29/2021    ALT 20 03/29/2021    ALKPHOS 97 03/29/2021    EGFR 81 03/29/2021     Patient voiced understanding and agreement in the above discussion  Aware to contact our office with questions/symptoms in the interim  This note has been generated by voice recognition software system  Therefore, there may be spelling, grammar, and or syntax errors  Please contact if questions arise

## 2021-04-12 ENCOUNTER — TELEPHONE (OUTPATIENT)
Dept: SURGERY | Facility: CLINIC | Age: 61
End: 2021-04-12

## 2021-04-12 NOTE — TELEPHONE ENCOUNTER
Patient called with a question regarding what she could put over her incision scar since she is all finished with her treatments  I stated Vitamin E and doubled check with Dr Yadira Crocker

## 2021-06-28 ENCOUNTER — HOSPITAL ENCOUNTER (OUTPATIENT)
Dept: BONE DENSITY | Facility: HOSPITAL | Age: 61
Discharge: HOME | End: 2021-06-28
Payer: COMMERCIAL

## 2021-06-28 DIAGNOSIS — M85.852 OSTEOPENIA OF LEFT HIP: ICD-10-CM

## 2021-06-28 DIAGNOSIS — C50.412 PRIMARY CANCER OF UPPER OUTER QUADRANT OF LEFT FEMALE BREAST (HCC): ICD-10-CM

## 2021-06-28 DIAGNOSIS — Z79.811 AROMATASE INHIBITOR USE: ICD-10-CM

## 2021-06-28 PROCEDURE — 77080 DXA BONE DENSITY AXIAL: CPT

## 2021-07-05 DIAGNOSIS — Z79.811 AROMATASE INHIBITOR USE: ICD-10-CM

## 2021-07-05 DIAGNOSIS — C50.412 PRIMARY CANCER OF UPPER OUTER QUADRANT OF LEFT FEMALE BREAST (HCC): ICD-10-CM

## 2021-07-05 RX ORDER — ANASTROZOLE 1 MG/1
TABLET ORAL
Qty: 30 TABLET | Refills: 4 | Status: SHIPPED | OUTPATIENT
Start: 2021-07-05 | End: 2021-11-22

## 2021-07-23 ENCOUNTER — OFFICE VISIT (OUTPATIENT)
Dept: URGENT CARE | Facility: CLINIC | Age: 61
End: 2021-07-23
Payer: COMMERCIAL

## 2021-07-23 VITALS
WEIGHT: 163 LBS | RESPIRATION RATE: 20 BRPM | SYSTOLIC BLOOD PRESSURE: 136 MMHG | DIASTOLIC BLOOD PRESSURE: 70 MMHG | OXYGEN SATURATION: 98 % | BODY MASS INDEX: 28.87 KG/M2 | HEART RATE: 74 BPM | TEMPERATURE: 98 F

## 2021-07-23 DIAGNOSIS — R30.0 DYSURIA: ICD-10-CM

## 2021-07-23 DIAGNOSIS — N39.0 URINARY TRACT INFECTION WITHOUT HEMATURIA, SITE UNSPECIFIED: Primary | ICD-10-CM

## 2021-07-23 LAB
SL AMB  POCT GLUCOSE, UA: NEGATIVE
SL AMB LEUKOCYTE ESTERASE,UA: ABNORMAL
SL AMB POCT BILIRUBIN,UA: NEGATIVE
SL AMB POCT BLOOD,UA: ABNORMAL
SL AMB POCT CLARITY,UA: ABNORMAL
SL AMB POCT COLOR,UA: YELLOW
SL AMB POCT KETONES,UA: NEGATIVE
SL AMB POCT NITRITE,UA: NEGATIVE
SL AMB POCT PH,UA: 6.5
SL AMB POCT SPECIFIC GRAVITY,UA: 1.01
SL AMB POCT URINE PROTEIN: ABNORMAL
SL AMB POCT UROBILINOGEN: 0.2

## 2021-07-23 PROCEDURE — S9088 SERVICES PROVIDED IN URGENT: HCPCS | Performed by: PHYSICIAN ASSISTANT

## 2021-07-23 PROCEDURE — 87077 CULTURE AEROBIC IDENTIFY: CPT | Performed by: PHYSICIAN ASSISTANT

## 2021-07-23 PROCEDURE — 99213 OFFICE O/P EST LOW 20 MIN: CPT | Performed by: PHYSICIAN ASSISTANT

## 2021-07-23 PROCEDURE — 87086 URINE CULTURE/COLONY COUNT: CPT | Performed by: PHYSICIAN ASSISTANT

## 2021-07-23 PROCEDURE — 87186 SC STD MICRODIL/AGAR DIL: CPT | Performed by: PHYSICIAN ASSISTANT

## 2021-07-23 RX ORDER — PHENAZOPYRIDINE HYDROCHLORIDE 200 MG/1
200 TABLET, FILM COATED ORAL
Qty: 15 TABLET | Refills: 0 | Status: SHIPPED | OUTPATIENT
Start: 2021-07-23 | End: 2021-09-14

## 2021-07-23 RX ORDER — SULFAMETHOXAZOLE AND TRIMETHOPRIM 800; 160 MG/1; MG/1
1 TABLET ORAL 2 TIMES DAILY
Qty: 14 TABLET | Refills: 0 | Status: SHIPPED | OUTPATIENT
Start: 2021-07-23 | End: 2021-07-30

## 2021-07-23 NOTE — PATIENT INSTRUCTIONS
Urinary Tract Infection in Women   AMBULATORY CARE:   A urinary tract infection (UTI)  is caused by bacteria that get inside your urinary tract  Most bacteria that enter your urinary tract come out when you urinate  If the bacteria stay in your urinary tract, you may get an infection  Your urinary tract includes your kidneys, ureters, bladder, and urethra  Urine is made in your kidneys, and it flows from the ureters to the bladder  Urine leaves the bladder through the urethra  A UTI is more common in your lower urinary tract, which includes your bladder and urethra  Common symptoms include the following:   · Urinating more often or waking from sleep to urinate    · Pain or burning when you urinate    · Pain or pressure in your lower abdomen     · Urine that smells bad    · Blood in your urine    · Leaking urine    Seek care immediately if:   · You are urinating very little or not at all  · You have a high fever with shaking chills  · You have side or back pain that gets worse  Call your doctor if:   · You have a fever  · You do not feel better after 2 days of taking antibiotics  · You are vomiting  · You have questions or concerns about your condition or care  Treatment for a UTI  may include antibiotics to treat a bacterial infection  You may also need medicines to decrease pain and burning, or decrease the urge to urinate often  If you have UTIs often (called recurrent UTIs), you may be given antibiotics to take regularly  You will be given directions for when and how to use antibiotics  The goal is to prevent UTIs but not cause antibiotic resistance by using antibiotics too often  Prevent a UTI:   · Empty your bladder often  Urinate and empty your bladder as soon as you feel the need  Do not hold your urine for long periods of time  · Wipe from front to back after you urinate or have a bowel movement    This will help prevent germs from getting into your urinary tract through your urethra  · Drink liquids as directed  Ask how much liquid to drink each day and which liquids are best for you  You may need to drink more liquids than usual to help flush out the bacteria  Do not drink alcohol, caffeine, or citrus juices  These can irritate your bladder and increase your symptoms  Your healthcare provider may recommend cranberry juice to help prevent a UTI  · Urinate after you have sex  This can help flush out bacteria passed during sex  · Do not douche or use feminine deodorants  These can change the chemical balance in your vagina  · Change sanitary pads or tampons often  This will help prevent germs from getting into your urinary tract  · Talk to your healthcare provider about your birth control method  You may need to change your method if it is increasing your risk for UTIs  · Wear cotton underwear and clothes that are loose  Tight pants and nylon underwear can trap moisture and cause bacteria to grow  · Vaginal estrogen may be recommended  This medicine helps prevent UTIs in women who have gone through menopause or are in ronald-menopause  · Do pelvic muscle exercises often  Pelvic muscle exercises may help you start and stop urinating  Strong pelvic muscles may help you empty your bladder easier  Squeeze these muscles tightly for 5 seconds like you are trying to hold back urine  Then relax for 5 seconds  Gradually work up to squeezing for 10 seconds  Do 3 sets of 15 repetitions a day, or as directed  Follow up with your healthcare provider as directed:  Write down your questions so you remember to ask them during your visits  © [employer] 2021 Information is for End User's use only and may not be sold, redistributed or otherwise used for commercial purposes  All illustrations and images included in CareNotes® are the copyrighted property of [employer]  or Daphnie Swain   The above information is an  only   It is not intended as medical advice for individual conditions or treatments  Talk to your doctor, nurse or pharmacist before following any medical regimen to see if it is safe and effective for you

## 2021-07-23 NOTE — PROGRESS NOTES
[facility]        NAME: Felipa Meeks is a 62 y o  female  : 1960    MRN: [id number]  DATE: 2021  TIME: 11:16 AM    Assessment and Plan   Urinary tract infection without hematuria, site unspecified [N39 0]  1  Urinary tract infection without hematuria, site unspecified  sulfamethoxazole-trimethoprim (BACTRIM DS) 800-160 mg per tablet    phenazopyridine (PYRIDIUM) 200 mg tablet   2  Dysuria  POCT urine dip auto non-scope    Urine culture         Patient Instructions       Follow up with PCP in 3-5 days  Proceed to  ER if symptoms worsen  Chief Complaint     Chief Complaint   Patient presents with    Possible UTI     today , burning freq , painful urination         History of Present Illness       Patient presents with burning on urination and frequency which started this morning  Just came back from [address] in [place] where she was swimming  Tried to change her bathing suit soon after done swimming  She feels this is what caused her symptoms  Review of Systems   Review of Systems   Constitutional: Negative for chills and fever  Gastrointestinal: Negative for abdominal pain, nausea and vomiting  Genitourinary: Positive for dysuria, frequency and urgency  Negative for difficulty urinating, flank pain and hematuria  Musculoskeletal: Negative for back pain           Current Medications       Current Outpatient Medications:     anastrozole (ARIMIDEX) 1 mg tablet, take 1 tablet by mouth daily, Disp: 30 tablet, Rfl: 4    Calcium 600 MG tablet, Take 1 tablet by mouth every morning , Disp: , Rfl:     Cholecalciferol (VITAMIN D3) 1000 units CAPS, Take 1 capsule by mouth every morning , Disp: , Rfl:     Cranberry 1000 MG CAPS, Take 1 capsule by mouth every morning , Disp: , Rfl:     phenazopyridine (PYRIDIUM) 200 mg tablet, Take 1 tablet (200 mg total) by mouth 3 (three) times a day with meals, Disp: 15 tablet, Rfl: 0    sulfamethoxazole-trimethoprim (BACTRIM DS) 800-160 mg per tablet, Take 1 tablet by mouth 2 (two) times a day for 7 days, Disp: 14 tablet, Rfl: 0    Current Allergies     Allergies as of 07/23/2021 - Reviewed 07/23/2021   Allergen Reaction Noted    Ofloxacin Hives 03/23/2016    Penicillins Hives 03/23/2016    Vancomycin Hives 03/08/2021    Chlorhexidine Rash 10/31/2020            The following portions of the patient's history were reviewed and updated as appropriate: allergies, current medications, past family history, past medical history, past social history, past surgical history and problem list      Past Medical History:   Diagnosis Date    Asthma     has not been treated for seveeral years  exercise induced    BRCA1 negative     Breast cancer ([facility] [address] )     left sided    Cancer ([facility] [address] )     breast  9/20    Exercise-induced asthma 11/3/2016    Urinary tract infection        Past Surgical History:   Procedure Laterality Date    BREAST CYST EXCISION Left     benign    BREAST CYST EXCISION Left     benign    BREAST SURGERY      CHOLECYSTECTOMY      HYSTERECTOMY      IR PORT PLACEMENT  10/26/2020    IR PORT REMOVAL  2/4/2021    MAMMO NEEDLE LOCALIZATION LEFT (ALL INC) Left 10/2/2020    MOUTH SURGERY      [place] MASTECTOMY, PARTIAL Left 10/2/2020    Procedure: BREAST NEEDLE LOCALIZED LUMPECTOMY, SENTINEL LYMPH NODE BIOPSY (Bracketed U/S guided needle loc @ 8:00, INJECT AT 1045);   Surgeon: Alexander Solis MD;  Location: [facility] MA[facility] OR;  Service: General    TUBAL LIGATION      US GUIDED BREAST BIOPSY LEFT COMPLETE Left 9/11/2020    WISDOM TOOTH EXTRACTION         Family History   Problem Relation Age of Onset    Hypertension Mother     Heart disease Mother         cardiac disorder    Breast cancer Mother         dx age early 19's    Glaucoma Mother     Hypertension Father     Heart disease Father         cardiac disorder    Cancer Father     No Known Problems Sister     Multiple sclerosis Daughter    Karol Seals Melanoma Daughter     No Known Problems Son  Breast cancer Paternal Aunt          Medications have been verified  Objective   /70   Pulse 74   Temp 98 °F (36 7 °C)   Resp 20   Wt 73 9 kg (163 lb)   LMP  (LMP Unknown)   SpO2 98%   BMI 28 87 kg/m²   No LMP recorded (lmp unknown)  Patient has had a hysterectomy  Physical Exam     Physical Exam  Vitals and nursing note reviewed  Constitutional:       Appearance: Normal appearance  HENT:      Head: Normocephalic and atraumatic  Cardiovascular:      Rate and Rhythm: Normal rate and regular rhythm  Pulmonary:      Effort: Pulmonary effort is normal    Abdominal:      Comments: No CVA tenderness  Neurological:      Mental Status: She is alert

## 2021-07-25 LAB — BACTERIA UR CULT: ABNORMAL

## 2021-07-30 ENCOUNTER — APPOINTMENT (OUTPATIENT)
Dept: LAB | Facility: CLINIC | Age: 61
End: 2021-07-30
Payer: COMMERCIAL

## 2021-07-30 DIAGNOSIS — C50.412 PRIMARY CANCER OF UPPER OUTER QUADRANT OF LEFT FEMALE BREAST (HCC): ICD-10-CM

## 2021-07-30 LAB
ALBUMIN SERPL BCP-MCNC: 3.8 G/DL (ref 3.5–5)
ALP SERPL-CCNC: 84 U/L (ref 46–116)
ALT SERPL W P-5'-P-CCNC: 25 U/L (ref 12–78)
ANION GAP SERPL CALCULATED.3IONS-SCNC: 5 MMOL/L (ref 4–13)
AST SERPL W P-5'-P-CCNC: 13 U/L (ref 5–45)
BASOPHILS # BLD AUTO: 0.03 THOUSANDS/ΜL (ref 0–0.1)
BASOPHILS NFR BLD AUTO: 1 % (ref 0–1)
BILIRUB SERPL-MCNC: 0.9 MG/DL (ref 0.2–1)
BUN SERPL-MCNC: 16 MG/DL (ref 5–25)
CALCIUM SERPL-MCNC: 9.2 MG/DL (ref 8.3–10.1)
CHLORIDE SERPL-SCNC: 105 MMOL/L (ref 100–108)
CO2 SERPL-SCNC: 27 MMOL/L (ref 21–32)
CREAT SERPL-MCNC: 0.89 MG/DL (ref 0.6–1.3)
EOSINOPHIL # BLD AUTO: 0.13 THOUSAND/ΜL (ref 0–0.61)
EOSINOPHIL NFR BLD AUTO: 2 % (ref 0–6)
ERYTHROCYTE [DISTWIDTH] IN BLOOD BY AUTOMATED COUNT: 14.3 % (ref 11.6–15.1)
GFR SERPL CREATININE-BSD FRML MDRD: 71 ML/MIN/1.73SQ M
GLUCOSE P FAST SERPL-MCNC: 104 MG/DL (ref 65–99)
HCT VFR BLD AUTO: 41.9 % (ref 34.8–46.1)
HGB BLD-MCNC: 13.5 G/DL (ref 11.5–15.4)
IMM GRANULOCYTES # BLD AUTO: 0.02 THOUSAND/UL (ref 0–0.2)
IMM GRANULOCYTES NFR BLD AUTO: 0 % (ref 0–2)
LYMPHOCYTES # BLD AUTO: 0.89 THOUSANDS/ΜL (ref 0.6–4.47)
LYMPHOCYTES NFR BLD AUTO: 15 % (ref 14–44)
MCH RBC QN AUTO: 29.2 PG (ref 26.8–34.3)
MCHC RBC AUTO-ENTMCNC: 32.2 G/DL (ref 31.4–37.4)
MCV RBC AUTO: 91 FL (ref 82–98)
MONOCYTES # BLD AUTO: 0.46 THOUSAND/ΜL (ref 0.17–1.22)
MONOCYTES NFR BLD AUTO: 8 % (ref 4–12)
NEUTROPHILS # BLD AUTO: 4.38 THOUSANDS/ΜL (ref 1.85–7.62)
NEUTS SEG NFR BLD AUTO: 74 % (ref 43–75)
NRBC BLD AUTO-RTO: 0 /100 WBCS
PLATELET # BLD AUTO: 241 THOUSANDS/UL (ref 149–390)
PMV BLD AUTO: 9.3 FL (ref 8.9–12.7)
POTASSIUM SERPL-SCNC: 4.3 MMOL/L (ref 3.5–5.3)
PROT SERPL-MCNC: 7.8 G/DL (ref 6.4–8.2)
RBC # BLD AUTO: 4.62 MILLION/UL (ref 3.81–5.12)
SODIUM SERPL-SCNC: 137 MMOL/L (ref 136–145)
WBC # BLD AUTO: 5.91 THOUSAND/UL (ref 4.31–10.16)

## 2021-07-30 PROCEDURE — 85025 COMPLETE CBC W/AUTO DIFF WBC: CPT

## 2021-07-30 PROCEDURE — 36415 COLL VENOUS BLD VENIPUNCTURE: CPT

## 2021-07-30 PROCEDURE — 80053 COMPREHEN METABOLIC PANEL: CPT

## 2021-08-03 ENCOUNTER — OFFICE VISIT (OUTPATIENT)
Dept: HEMATOLOGY ONCOLOGY | Facility: CLINIC | Age: 61
End: 2021-08-03
Payer: COMMERCIAL

## 2021-08-03 ENCOUNTER — HOSPITAL ENCOUNTER (OUTPATIENT)
Dept: MAMMOGRAPHY | Facility: HOSPITAL | Age: 61
Discharge: HOME | End: 2021-08-03
Attending: SURGERY
Payer: COMMERCIAL

## 2021-08-03 VITALS
HEART RATE: 72 BPM | RESPIRATION RATE: 16 BRPM | OXYGEN SATURATION: 97 % | HEIGHT: 63 IN | WEIGHT: 160 LBS | TEMPERATURE: 96.8 F | BODY MASS INDEX: 28.35 KG/M2 | SYSTOLIC BLOOD PRESSURE: 148 MMHG | DIASTOLIC BLOOD PRESSURE: 68 MMHG

## 2021-08-03 DIAGNOSIS — C50.412 PRIMARY CANCER OF UPPER OUTER QUADRANT OF LEFT FEMALE BREAST (HCC): Primary | ICD-10-CM

## 2021-08-03 DIAGNOSIS — C50.412 PRIMARY CANCER OF UPPER OUTER QUADRANT OF LEFT FEMALE BREAST (HCC): ICD-10-CM

## 2021-08-03 DIAGNOSIS — Z79.811 AROMATASE INHIBITOR USE: ICD-10-CM

## 2021-08-03 DIAGNOSIS — E55.9 VITAMIN D DEFICIENCY: ICD-10-CM

## 2021-08-03 PROCEDURE — 99214 OFFICE O/P EST MOD 30 MIN: CPT | Performed by: NURSE PRACTITIONER

## 2021-08-03 PROCEDURE — 77066 DX MAMMO INCL CAD BI: CPT

## 2021-08-03 PROCEDURE — G0279 TOMOSYNTHESIS, MAMMO: HCPCS

## 2021-08-03 NOTE — PROGRESS NOTES
Hematology/Oncology Outpatient Follow-up  Abdiel Clarke 62 y o  female 1960 [id number]    Date:  8/3/2021      Assessment and Plan:  1  Primary cancer of upper outer quadrant of left female breast [facility])  Patient will be continued on her endocrine treatment with anastrozole on a daily basis which she is tolerating well  She will continue to follow up with her breast surgeon on a regular basis and get her breast imaging as per his discretion  Mentions that she is scheduled for her mammogram today after the visit  Did encourage her to continue to stay active/exercise on a regular basis, take her calcium and vitamin-D supplements and consume a load fat diet focusing on more plant based sources of nutrition  She will follow-up again in 6 months with repeat labs prior     - CBC and differential; Future  - Comprehensive metabolic panel; Future  - Vitamin D 25 hydroxy; Future    2  Aromatase inhibitor use  As above  Patient will continue her Prolia injections on an every six-month basis for her osteopenia/cancer treatment induced bone loss  She states that her next injection is scheduled for 09/06/2021  Her repeat DEXA scan will be due June 2023      3  Vitamin D deficiency  - Vitamin D 25 hydroxy; Future    HPI:  Patient presents today for follow-up visit  She continues to take her anastrozole on a daily basis which she is tolerating well the exception of an occasional tolerable hot flash  She is scheduled for her repeat mammogram today after the visit  Her most recent laboratory studies from 07/30/2021 showed normal CBC, blood sugar 104 otherwise normal CMP  She had a repeat DEXA scan 06/28/2021 which continues to show osteopenia without any significant change in comparison to her prior study  Oncology History Overview Note   Patient with a family history of breast cancer in her mother  She had two previous benign breast biopsies   She was going for yearly mammograms and most recent in August showed focal asymmetry in the upper outer posterior left breast  A biopsy on 9/11/20 revealed left invasive ductal breast carcinoma  She saw General Surgeon, Dr Karen Asencio in September for surgical evaluation  8/12/20 Screening Mammogram bilateral w 3d & cad   IMPRESSION:  1  Upper outer posterior left breast focal asymmetry  Diagnostic mammography, with possible ultrasound, recommended  2  Stable right mammogram      9/4/20 Mammo diagnostic left w 3d & cad   US breast left limited (diagnostic)   FINDINGS:   LEFT  1) MASS  Mammo diagnostic left w 3d & cad: There is a 43 mm transverse x 26 mm AP x 23 mm craniocaudal high density, irregularly shaped mass seen in the upper outer quadrant of the left breast at 1 o'clock in the posterior depth  There is a possible satellite lesions slightly inferior to the mass  If measured as 1 finding, it measures 32 mm craniocaudal   The mass correlates with the prior mammogram finding  US breast left limited (diagnostic): There is and irregularly shaped, hypoechoic mass with indistinct margins with shadowing seen in the upper outer quadrant of the left breast at 1 o'clock in the posterior depth  The mass measures at least 23 x 11 x 10 mm and the size may be underestimated on ultrasound  The mass correlates with the prior mammogram finding  This is highly suspicious and ultrasound-guided core needle biopsy is recommended  Targeted ultrasound of the left axilla was performed  At least 1 morphologically benign lymph node was visualized  No morphologically abnormal lymph nodes were visualized       ASSESSMENT/BI-RADS CATEGORY:  Left: 4C - High Suspicion for Malignancy  Overall: 4 - Suspicious     9/11/20 Left breast US guided biopsy, at 1:00 12cmfn  Invasive mammary carcinoma of no special type Grade 3  ER/ID 90-95% positive, HER2 negative  Lymphovascular invasion present     9/28/20 Breast working group recommended treatment plan: Lumpectomy with SLNB followed by adjuvant radiation therapy  Genetic testing consult  10/2/20 Left breast Lumpectomy  Invasive breast carcinoma of no special type, 21 mm focus  Grade 3  DCIS present in 2 out of 17 blocks  Margins negative for invasive carcinoma, 1 mm from the closest posterior margin  Negative for DCIS 1 mm from the closest anterior margin  Lymph-vascular invasion present     Lymph nodes:  Left sentinel lymph node excision: 1 lymph node positive for carcinoma  Left axillary LN excision: 1 reactive lymph node     Primary cancer of upper outer quadrant of left female breast ([facility] [address] )   9/2020 Initial Diagnosis    Primary cancer of upper outer quadrant of left female breast ([facility] [address] )  Stage IIB     9/11/2020 Biopsy    Breast, left at 1:00 12cmfn, US guided biopsy, 5 passes:  - Invasive mammary carcinoma of no special type (ductal, not otherwise specified)  -- [place] histologic grade 3 of 3 (total score: 8 of 9)       -- Invasive carcinoma involves 5 of 5 submitted core biopsies, max  Dimension= 14 mm     -- Estrogen, Progesterone 90-95% positive HER2 negative  - Lymphovascular invasion: Present  10/2/2020 Surgery    A  Breast, Left, Lumpectomy:  - Invasive breast carcinoma of no special type (ductal NST/invasive ductal carcinoma), 21 mm focus  * [place] grade 3 of 3 (total score: 9 of 9)   * Ductal carcinoma in situ (DCIS): Present in 2 out of 17 blocks  -- [place], solid and cribriform architectural pattern, nuclear grade 3 of 3     * Margins: Negative for invasive carcinoma, 1 mm from the closest posterior margin; Negative for DCIS, 1 mm from the closest anterior margin  * Skin: Negative for carcinoma  * Skeletal muscle: Negative for carcinoma  * Lymph-vascular invasion: Present  * Pathologic stage (AJCC 8th ed ): pT2, pN1a (sn)  B  [place] Lymph Node, Left # 1, Excision:  - One lymph node positive for carcinoma (1/1)  C  Axillary contents, Left, Excision:  - One reactive lymph node (0/1)       10/2/2020 - Cancer Staged    Staging form: Breast, AJCC 8th Edition  - Clinical stage from 10/2/2020: Stage IIB (cT2, cN1(sn), cM0, G3, ER+, DE+, HER2-) - Signed by Avery Claros MD on 10/9/2020  Stage prefix: Initial diagnosis  Laterality: Left  Tumor size (mm): 21  Method of lymph node assessment: [place] lymph node biopsy  Nuclear grade: G3  Mitotic count score: Score 3  Percentage of tumor with tubule formation: 10  Tubule formation score: Score 3  Scarff-Bloom-Cevallos score: 9  Histologic grading system: 3 grade system  Lymph-vascular invasion (LVI): LVI present/identified, NOS  Specimen type: Excision  National guidelines used in treatment planning: Yes  Type of national guideline used in treatment planning: NCCN       11/4/2020 - 1/26/2021 Chemotherapy    Completed 4 cycles of adjuvant Taxotere plus Cytoxan   pegfilgrastim (NEULASTA) subcutaneous injection 6 mg, 6 mg, Subcutaneous, Once, 3 of 3 cycles  Administration: 6 mg (11/5/2020), 6 mg (11/25/2020), 6 mg (1/6/2021)  pegfilgrastim (NEULASTA ONPRO) subcutaneous injection kit 6 mg, 6 mg, Subcutaneous, Once, 1 of 1 cycle  Administration: 6 mg (12/15/2020)  cyclophosphamide (CYTOXAN) 1,068 mg in sodium chloride 0 9 % 250 mL IVPB, 600 mg/m2 = 1,068 mg, Intravenous, Once, 4 of 4 cycles  Administration: 1,068 mg (11/4/2020), 1,068 mg (11/24/2020), 1,068 mg (12/15/2020), 1,068 mg (1/5/2021)  DOCEtaxel (TAXOTERE) 140 mg in sodium chloride 0 9 % 250 mL chemo infusion, 133 6 mg, Intravenous, Once, 4 of 4 cycles  Administration: 140 mg (11/4/2020), 140 mg (11/24/2020), 140 mg (12/15/2020), 140 mg (1/5/2021)       11/2020 Genetic Testing    Patient has genetic testing done for breast cancer    Results revealed patient has the following mutation(s):  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), Heterozygous     2/9/2021 -  Hormone Therapy    Started anastrozole  Was also started on Prolia injections due to pre-existing osteopenia         Interval history:    ROS: Review of Systems Constitutional: Negative for activity change, appetite change, chills, fatigue, fever and unexpected weight change  HENT: Negative for congestion, mouth sores, nosebleeds, sore throat and trouble swallowing  Eyes: Negative  Respiratory: Negative for cough, chest tightness and shortness of breath  Cardiovascular: Negative for chest pain, palpitations and leg swelling  Gastrointestinal: Negative for abdominal distention, abdominal pain, blood in stool, constipation, diarrhea, nausea and vomiting  Genitourinary: Negative for difficulty urinating, dysuria, frequency, hematuria and urgency  Musculoskeletal: Negative for arthralgias, back pain, gait problem, joint swelling and myalgias  Skin: Negative for color change, pallor and rash  Neurological: Negative for dizziness, weakness, light-headedness, numbness and headaches  Hematological: Negative for adenopathy  Does not bruise/bleed easily  Psychiatric/Behavioral: Positive for dysphoric mood  Negative for sleep disturbance  The patient is not nervous/anxious  Past Medical History:   Diagnosis Date    Asthma     has not been treated for seveeral years  exercise induced    BRCA1 negative     Breast cancer ([facility] [address] )     left sided    Cancer ([facility] [address] )     breast  9/20    Exercise-induced asthma 11/3/2016    Urinary tract infection        Past Surgical History:   Procedure Laterality Date    BREAST CYST EXCISION Left     benign    BREAST CYST EXCISION Left     benign    BREAST SURGERY      CHOLECYSTECTOMY      HYSTERECTOMY      IR PORT PLACEMENT  10/26/2020    IR PORT REMOVAL  2/4/2021    MAMMO NEEDLE LOCALIZATION LEFT (ALL INC) Left 10/2/2020    MOUTH SURGERY      [place] MASTECTOMY, PARTIAL Left 10/2/2020    Procedure: BREAST NEEDLE LOCALIZED LUMPECTOMY, SENTINEL LYMPH NODE BIOPSY (Bracketed U/S guided needle loc @ 8:00, INJECT AT 1045);   Surgeon: Nikki Keene MD;  Location: [facility] MAIN OR;  Service: [address] [id number] GUIDED BREAST BIOPSY LEFT COMPLETE Left 9/11/2020    WISDOM TOOTH EXTRACTION         Social History     Socioeconomic History    Marital status: /Civil Union     Spouse name: None    Number of children: 2    Years of education: None    Highest education level: None   Occupational History    Occupation: Retired   Tobacco Use    Smoking status: Never Smoker    Smokeless tobacco: Never Used   Vaping Use    Vaping Use: Never used   Substance and Sexual Activity    Alcohol use: Never    Drug use: No    Sexual activity: Yes     Partners: Male     Birth control/protection: Female Sterilization     Comment:  x 9 years, [employer] Space   Other Topics Concern    None   Social History Narrative    Daily caffeine consumption, 1 serving a day    Exercises 3-4 times per week     x 8 years     Social Determinants of Health     Financial Resource Strain:     Difficulty of Paying Living Expenses:    Food Insecurity:     Worried About Running Out of Food in the Last Year:     Ran Out of Food in the Last Year:    Transportation Needs:     Lack of Transportation (Medical):      Lack of Transportation (Non-Medical):    Physical Activity:     Days of Exercise per Week:     Minutes of Exercise per Session:    Stress:     Feeling of Stress :    Social Connections:     Frequency of Communication with Friends and Family:     Frequency of Social Gatherings with Friends and Family:     Attends Yarsani Services:     Active Member of Clubs or Organizations:     Attends Club or Organization Meetings:     Marital Status:    Intimate Partner Violence:     Fear of Current or Ex-Partner:     Emotionally Abused:     Physically Abused:     Sexually Abused:        Family History   Problem Relation Age of Onset    Hypertension Mother     Heart disease Mother         cardiac disorder    Breast cancer Mother         dx age early 19's    Glaucoma Mother     Hypertension Father     Heart disease Father cardiac disorder    Cancer Father     No Known Problems Sister     Multiple sclerosis Daughter     Melanoma Daughter     No Known Problems Son     Breast cancer Paternal Aunt        Allergies   Allergen Reactions    Ofloxacin Hives    Penicillins Hives    Vancomycin Hives     Pt asked to update her chart  She received this med when she was in the hospital - 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   Chlorhexidine Rash         Current Outpatient Medications:     anastrozole (ARIMIDEX) 1 mg tablet, take 1 tablet by mouth daily, Disp: 30 tablet, Rfl: 4    Calcium 600 MG tablet, Take 1 tablet by mouth every morning , Disp: , Rfl:     Cholecalciferol (VITAMIN D3) 1000 units CAPS, Take 1 capsule by mouth every morning , Disp: , Rfl:     Cranberry 1000 MG CAPS, Take 1 capsule by mouth every morning , Disp: , Rfl:     phenazopyridine (PYRIDIUM) 200 mg tablet, Take 1 tablet (200 mg total) by mouth 3 (three) times a day with meals, Disp: 15 tablet, Rfl: 0      Physical Exam:  /68 (BP Location: Left arm, Patient Position: Sitting, Cuff Size: Adult)   Pulse 72   Temp (!) 96 8 °F (36 °C) (Tympanic)   Resp 16   Ht 5' 3" (1 6 m)   Wt 72 6 kg (160 lb)   LMP  (LMP Unknown)   SpO2 97%   BMI 28 34 kg/m²     Physical Exam  Vitals reviewed  Constitutional:       General: She is not in acute distress  Appearance: She is well-developed  She is not diaphoretic  HENT:      Head: Normocephalic and atraumatic  Eyes:      General: No scleral icterus  Conjunctiva/sclera: Conjunctivae normal       Pupils: Pupils are equal, round, and reactive to light  Neck:      Thyroid: No thyromegaly  Cardiovascular:      Rate and Rhythm: Normal rate and regular rhythm  Heart sounds: Normal heart sounds  No murmur heard  Pulmonary:      Effort: Pulmonary effort is normal  No respiratory distress  Breath sounds: Normal breath sounds  Chest:      Breasts:         Left: No swelling, mass or tenderness  Abdominal:      General: There is no distension  Palpations: Abdomen is soft  There is no hepatomegaly or splenomegaly  Tenderness: There is no abdominal tenderness  Musculoskeletal:         General: No swelling  Normal range of motion  Cervical back: Normal range of motion and neck supple  Lymphadenopathy:      Cervical: No cervical adenopathy  Upper Body:      Right upper body: No axillary adenopathy  Left upper body: No axillary adenopathy  Skin:     General: Skin is warm and dry  Findings: No erythema or rash  Neurological:      General: No focal deficit present  Mental Status: She is alert and oriented to person, place, and time  Psychiatric:         Mood and Affect: Mood and affect normal          Behavior: Behavior normal  Behavior is cooperative  Thought Content: Thought content normal          Judgment: Judgment normal            Labs:  Lab Results   Component Value Date    WBC 5 91 07/30/2021    HGB 13 5 07/30/2021    HCT 41 9 07/30/2021    MCV 91 07/30/2021     07/30/2021     Lab Results   Component Value Date    K 4 3 07/30/2021     07/30/2021    CO2 27 07/30/2021    BUN 16 07/30/2021    CREATININE 0 89 07/30/2021    GLUF 104 (H) 07/30/2021    CALCIUM 9 2 07/30/2021    AST 13 07/30/2021    ALT 25 07/30/2021    ALKPHOS 84 07/30/2021    EGFR 71 07/30/2021       Patient voiced understanding and agreement in the above discussion  Aware to contact our office with questions/symptoms in the interim  This note has been generated by voice recognition software system  Therefore, there may be spelling, grammar, and or syntax errors  Please contact if questions arise

## 2021-08-09 ENCOUNTER — OFFICE VISIT (OUTPATIENT)
Dept: SURGERY | Facility: CLINIC | Age: 61
End: 2021-08-09
Payer: COMMERCIAL

## 2021-08-09 DIAGNOSIS — C50.412 PRIMARY CANCER OF UPPER OUTER QUADRANT OF LEFT FEMALE BREAST (HCC): Primary | ICD-10-CM

## 2021-08-09 PROCEDURE — 99213 OFFICE O/P EST LOW 20 MIN: CPT | Performed by: SURGERY

## 2021-08-09 NOTE — LETTER
August 9, 2021     Isabel Juan M, [address] [facility]  [address]    Patient: Arnold Morris   YOB: 1960   Date of Visit: 8/9/2021       Dear Dr Brigido Snellen:    Thank you for referring Arnold Morris to me for evaluation  Below are my notes for this consultation  If you have questions, please do not hesitate to call me  I look forward to following your patient along with you  Sincerely,        Josue Lemons MD        CC: No Recipients  Josue Lemons MD  8/9/2021 12:37 PM  Incomplete  Assessment/Plan:    Primary cancer of upper outer quadrant of left female breast [facility])  Patient is a pleasant 71-year-old female with a past medical history significant for upper outer quadrant left breast carcinoma - stage IIB status post breast conserving therapy October 2, 2020 returning for her routine breast health examination  Today the patient denies all complaints referable to her breasts  She specifically denies new lumps or nipple discharge bilaterally  She underwent bilateral mammography on August 3, 2021  This was a BI-RADS 2 study  Today on physical examination the patient is well-nourished well-developed female  She is in no acute distress  Pleasant competent reliable as a historian  She has no cervical supraclavicular or axillary lymphadenopathy bilaterally  The breasts are symmetric  No dominant lumps masses or nipple retraction noted  She does have postoperative and post radiation changes noted on the left  Patient appears clinically and radiographically stable with regards to her history of stage II B left breast carcinoma status post breast conserving therapy October 2, 2020  I look for to seeing her back for routine examination 6 months  I have prescribed a mammogram for 1 year  She has been encouraged to follow up sooner on an as-needed basis         Diagnoses and all orders for this visit:    Primary cancer of upper outer quadrant of left female breast ([address] )  -     Mammo diagnostic bilateral w 3d & cad; Future          Subjective:      Patient ID: Andres Armijo is a 62 y o  female  HPI    The following portions of the patient's history were reviewed and updated as appropriate: allergies, current medications, past family history, past medical history, past social history, past surgical history and problem list     Review of Systems   Constitutional: Negative for chills and fever  HENT: Negative for ear pain and sore throat  Eyes: Negative for pain and visual disturbance  Respiratory: Negative for cough and shortness of breath  Cardiovascular: Negative for chest pain and palpitations  Gastrointestinal: Negative for abdominal pain and vomiting  Genitourinary: Negative for dysuria and hematuria  Musculoskeletal: Negative for arthralgias and back pain  Skin: Negative for color change and rash  Neurological: Negative for seizures and syncope  All other systems reviewed and are negative  Objective:      LMP  (LMP Unknown)          Physical Exam  Constitutional:       Appearance: She is well-developed  HENT:      Head: Normocephalic and atraumatic  Eyes:      Conjunctiva/sclera: Conjunctivae normal       Pupils: Pupils are equal, round, and reactive to light  Cardiovascular:      Rate and Rhythm: Normal rate and regular rhythm  Pulmonary:      Effort: Pulmonary effort is normal       Breath sounds: Normal breath sounds  Abdominal:      General: Bowel sounds are normal       Palpations: Abdomen is soft  Musculoskeletal:         General: Normal range of motion  Cervical back: Normal range of motion and neck supple  Skin:     General: Skin is warm and dry  Comments: Breast exam as described above   Neurological:      Mental Status: She is alert and oriented to person, place, and time  Psychiatric:         Behavior: Behavior normal          Thought Content:  Thought content normal  Judgment: Judgment normal

## 2021-08-09 NOTE — ASSESSMENT & PLAN NOTE
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 status post breast conserving therapy October 2, 2020 returning for her routine breast health examination  Today the patient denies all complaints referable to her breasts  She specifically denies new lumps or nipple discharge bilaterally  She underwent bilateral mammography on August 3, 2021  This was a BI-RADS 2 study  Today on physical examination the patient is well-nourished well-developed female  She is in no acute distress  Pleasant competent reliable as a historian  She has no cervical supraclavicular or axillary lymphadenopathy bilaterally  The breasts are symmetric  No dominant lumps masses or nipple retraction noted  She does have postoperative and post radiation changes noted on the left  Patient appears clinically and radiographically stable with regards to her history of stage II B left breast carcinoma status post breast conserving therapy October 2, 2020  I look for to seeing her back for routine examination 6 months  I have prescribed a mammogram for 1 year  She has been encouraged to follow up sooner on an as-needed basis

## 2021-08-09 NOTE — PROGRESS NOTES
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 status post breast conserving therapy October 2, 2020 returning for her routine breast health examination  Today the patient denies all complaints referable to her breasts  She specifically denies new lumps or nipple discharge bilaterally  She underwent bilateral mammography on August 3, 2021  This was a BI-RADS 2 study  Today on physical examination the patient is well-nourished well-developed female  She is in no acute distress  Pleasant competent reliable as a historian  She has no cervical supraclavicular or axillary lymphadenopathy bilaterally  The breasts are symmetric  No dominant lumps masses or nipple retraction noted  She does have postoperative and post radiation changes noted on the left  Patient appears clinically and radiographically stable with regards to her history of stage II B left breast carcinoma status post breast conserving therapy October 2, 2020  I look for to seeing her back for routine examination 6 months  I have prescribed a mammogram for 1 year  She has been encouraged to follow up sooner on an as-needed basis  Diagnoses and all orders for this visit:    Primary cancer of upper outer quadrant of left female breast ([address] )  -     Mammo diagnostic bilateral w 3d & cad; Future          Subjective:      Patient ID: Shandra Griffin is a 62 y o  female  HPI    The following portions of the patient's history were reviewed and updated as appropriate: allergies, current medications, past family history, past medical history, past social history, past surgical history and problem list     Review of Systems   Constitutional: Negative for chills and fever  HENT: Negative for ear pain and sore throat      Eyes: Negative for pain and visual disturbance  Respiratory: Negative for cough and shortness of breath  Cardiovascular: Negative for chest pain and palpitations  Gastrointestinal: Negative for abdominal pain and vomiting  Genitourinary: Negative for dysuria and hematuria  Musculoskeletal: Negative for arthralgias and back pain  Skin: Negative for color change and rash  Neurological: Negative for seizures and syncope  All other systems reviewed and are negative  Objective:      LMP  (LMP Unknown)          Physical Exam  Constitutional:       Appearance: She is well-developed  HENT:      Head: Normocephalic and atraumatic  Eyes:      Conjunctiva/sclera: Conjunctivae normal       Pupils: Pupils are equal, round, and reactive to light  Cardiovascular:      Rate and Rhythm: Normal rate and regular rhythm  Pulmonary:      Effort: Pulmonary effort is normal       Breath sounds: Normal breath sounds  Abdominal:      General: Bowel sounds are normal       Palpations: Abdomen is soft  Musculoskeletal:         General: Normal range of motion  Cervical back: Normal range of motion and neck supple  Skin:     General: Skin is warm and dry  Comments: Breast exam as described above   Neurological:      Mental Status: She is alert and oriented to person, place, and time  Psychiatric:         Behavior: Behavior normal          Thought Content:  Thought content normal          Judgment: Judgment normal

## 2021-08-16 ENCOUNTER — TELEPHONE (OUTPATIENT)
Dept: HEMATOLOGY ONCOLOGY | Facility: CLINIC | Age: 61
End: 2021-08-16

## 2021-08-16 NOTE — TELEPHONE ENCOUNTER
Patient would like to know if Dr Lilia Wilhelm would recommend her to get the booster shot, she could best be reached at [phone]

## 2021-08-16 NOTE — TELEPHONE ENCOUNTER
Returned a call to pt and let her know that recommendation is for patients who are presently on immunosuppressant chemo  Her treatment was completed in Jan and her most recent CBC was WNL

## 2021-08-27 ENCOUNTER — TELEPHONE (OUTPATIENT)
Dept: HEMATOLOGY ONCOLOGY | Facility: CLINIC | Age: 61
End: 2021-08-27

## 2021-08-27 NOTE — TELEPHONE ENCOUNTER
Phoned pt and left a voice message informing her that ins will not cover Prolia to be administered in any hospital facility so orders were faxed over to [facility] at [phone]  I phoned [phone] infusion services at [phone] to find out what to fax over  They will makes sure this is covered and reach out to the patient

## 2021-09-14 ENCOUNTER — OFFICE VISIT (OUTPATIENT)
Dept: INTERNAL MEDICINE CLINIC | Facility: CLINIC | Age: 61
End: 2021-09-14
Payer: COMMERCIAL

## 2021-09-14 VITALS
HEART RATE: 71 BPM | DIASTOLIC BLOOD PRESSURE: 72 MMHG | BODY MASS INDEX: 28.7 KG/M2 | WEIGHT: 162 LBS | HEIGHT: 63 IN | SYSTOLIC BLOOD PRESSURE: 134 MMHG | TEMPERATURE: 98.1 F | OXYGEN SATURATION: 98 %

## 2021-09-14 DIAGNOSIS — Z87.440 HISTORY OF RECURRENT UTIS: ICD-10-CM

## 2021-09-14 DIAGNOSIS — R01.1 SYSTOLIC MURMUR: ICD-10-CM

## 2021-09-14 DIAGNOSIS — N39.3 SUI (STRESS URINARY INCONTINENCE, FEMALE): ICD-10-CM

## 2021-09-14 DIAGNOSIS — L30.9 DERMATITIS: ICD-10-CM

## 2021-09-14 DIAGNOSIS — C50.412 PRIMARY CANCER OF UPPER OUTER QUADRANT OF LEFT FEMALE BREAST (HCC): ICD-10-CM

## 2021-09-14 DIAGNOSIS — R73.01 IMPAIRED FASTING GLUCOSE: ICD-10-CM

## 2021-09-14 DIAGNOSIS — E55.9 VITAMIN D DEFICIENCY: Primary | ICD-10-CM

## 2021-09-14 DIAGNOSIS — Z79.810 LONG TERM CURRENT USE OF SELECTIVE ESTROGEN RECEPTOR MODULATORS (SERMS): ICD-10-CM

## 2021-09-14 DIAGNOSIS — M85.852 OSTEOPENIA OF LEFT HIP: ICD-10-CM

## 2021-09-14 DIAGNOSIS — Z13.6 SCREENING FOR CARDIOVASCULAR CONDITION: ICD-10-CM

## 2021-09-14 PROBLEM — L03.114 CELLULITIS OF LEFT UPPER EXTREMITY: Status: RESOLVED | Noted: 2021-02-23 | Resolved: 2021-09-14

## 2021-09-14 PROBLEM — Z13.31 POSITIVE DEPRESSION SCREENING: Status: RESOLVED | Noted: 2017-08-08 | Resolved: 2021-09-14

## 2021-09-14 PROCEDURE — 99214 OFFICE O/P EST MOD 30 MIN: CPT | Performed by: FAMILY MEDICINE

## 2021-09-14 RX ORDER — NICOTINE POLACRILEX 2 MG
GUM BUCCAL
COMMUNITY

## 2021-09-14 RX ORDER — TRIAMCINOLONE ACETONIDE 1 MG/G
CREAM TOPICAL 2 TIMES DAILY
Qty: 30 G | Refills: 0 | Status: SHIPPED | OUTPATIENT
Start: 2021-09-14

## 2021-09-14 RX ORDER — MULTIVIT WITH MINERALS/LUTEIN
250 TABLET ORAL DAILY
COMMUNITY

## 2021-09-14 NOTE — PROGRESS NOTES
Assessment/Plan:    No problem-specific Assessment & Plan notes found for this encounter  Diagnoses and all orders for this visit:    Vitamin D deficiency  -     CBC and differential; Future  -     TSH, 3rd generation; Future  -     Vitamin D 25 hydroxy; Future    KEIKO (stress urinary incontinence, female)  -     CBC and differential; Future  -     TSH, 3rd generation; Future    Primary cancer of upper outer quadrant of left female breast ([facility] [address] )  -     CBC and differential; Future  -     TSH, 3rd generation; Future    Impaired fasting glucose  -     CBC and differential; Future  -     Comprehensive metabolic panel; Future  -     TSH, 3rd generation; Future  -     HEMOGLOBIN A1C W/ EAG ESTIMATION; Future    Dermatitis  -     CBC and differential; Future  -     TSH, 3rd generation; Future  -     triamcinolone (KENALOG) 0 1 % cream; Apply topically 2 (two) times a day  -     Lyme Antibody Profile with reflex to WB; Future    Long term current use of selective estrogen receptor modulators (SERMs)  -     CBC and differential; Future  -     TSH, 3rd generation; Future    Systolic murmur  -     CBC and differential; Future  -     TSH, 3rd generation; Future    History of recurrent UTIs  -     CBC and differential; Future  -     TSH, 3rd generation; Future    Osteopenia of left hip  -     CBC and differential; Future  -     TSH, 3rd generation; Future    Screening for cardiovascular condition  -     CBC and differential; Future  -     Lipid panel; Future    Other orders  -     ascorbic acid (VITAMIN C) 250 mg tablet; Take 250 mg by mouth daily  -     Biotin 1 MG CAPS; Take by mouth      orders and recommendations as noted above  Slip given for laboratory testing  Skin lesion on the left thigh appears to be form of dermatitis but somewhat atypical   Discussed with her using the steroid cream for approximately 2 weeks  If symptoms persist, would refer her to Dermatology for further investigation     Coronavirus precautions discussed  Fully vaccinated  Watch for any recurrent urinary symptoms especially given her history of recurrent urinary tract infections in the past   Emptying bladder fully discussed  Continue follow-up with Oncology on a regular basis  Continue with vitamin-D supplementation  Will continue follow this with routine laboratory testing  Continue with mammograms yearly  Continue with bone densities every 2 years  Continue with the Prolia  Continue with the anastrozole as per Hematology  She is up-to-date on her flu shot  Discussed with her being very careful especially with her traveling in the near future  Recent stressors over the last year with her history of the cancer with subsequent surgery, radiation, and chemotherapy discussed  Also stressors regarding her mother who is in [place] discussed  Murmur had been evaluated in the past   Would consider repeat echocardiogram in the future  Will have her follow up in about 6 months or sooner if needed  Subjective:      Patient ID: Shandra Griffin is a 62 y o  female  She presents to establish as a new patient  Has had stressful year  Was diagnosed with breast cancer last year and had subsequent surgery and chemotherapy and radiation  One lymph node was positive  Has been recovering relatively well  Has had numerous stressors with her mother being in an assisted living in [place]  They have made 2 trips to [place] and will be leaving again later this month for [place]  Has had some laboratory testing done relatively recently  No recent lipid panel  Appetite has been good  Denies any nausea, vomiting, or diarrhea  Usually sleeps relatively well  Did notice rash/skin lesion left thigh  Has been there for a while but seems to wax and wane  Becomes dry and scaly at times  Has some issues with urinary incontinence at times  Some history of recurrent urinary tract infections most recently over the past 1-2 months    No current symptoms  Denies any fevers or chills  Up-to-date on her flu shot and COVID vaccination  Was diagnosed with heart murmur few years ago and had an echocardiogram which showed no significant abnormalities  Denies any chest pain or palpitations  The following portions of the patient's history were reviewed and updated as appropriate:   She  has a past medical history of Asthma, BRCA1 negative, Breast cancer ([facility] [address] ) (10/2020), Cancer ([facility] [address] ), Cellulitis of left upper extremity (2/23/2021), Exercise-induced asthma (11/3/2016), History of chemotherapy (11/2020), radiation therapy (03/2021), and Urinary tract infection    She   Patient Active Problem List    Diagnosis Date Noted    Dermatitis 09/14/2021    History of recurrent UTIs 09/14/2021    Long term current use of selective estrogen receptor modulators (SERMs) 02/09/2021    Encounter for central line care 02/01/2021    Chemotherapy induced neutropenia ([facility] ) 10/30/2020    Keratosis, inflamed seborrheic 10/23/2020    Primary cancer of upper outer quadrant of left female breast ([facility] ) 09/16/2020    Encntr for gyn exam (general) (routine) w abnormal findings 05/30/2019    At high risk for breast cancer 05/30/2019    Encounter for screening mammogram for breast cancer 05/30/2019    Introital dyspareunia 05/30/2019    KEIKO (stress urinary incontinence, female) 05/30/2019    Osteopenia 05/30/2019    Benign paroxysmal vertigo 12/07/2018    History of Meniere's disease 10/11/2018    Vitamin D deficiency 08/07/2018    Healthcare maintenance 08/07/2018    Vaginal atrophy 05/10/2018    BMI 26 0-26 9,adult 05/10/2018    Acute cystitis 03/02/2018    Right hip pain 03/02/2018    Menopausal symptoms 07/27/2017    Nevus, atypical 05/08/2017    Exercise-induced asthma 11/03/2016    Impaired fasting glucose 11/03/2016    Low serum high density lipoprotein (HDL) 25/19/0225    Systolic murmur 54/68/8841     She  has a past surgical history that includes Breast surgery; Hysterectomy; Tubal ligation; Mouth surgery; [place] tooth extraction; Cholecystectomy; Breast cyst excision (Left); Breast cyst excision (Left); US guided breast biopsy left complete (Left, 9/11/2020); Mammo needle localization left (all inc) (Left, 10/2/2020); pr mastectomy, partial (Left, 10/2/2020); IR port placement (10/26/2020); and IR port removal (2/4/2021)  Her family history includes Breast cancer in her mother and paternal aunt; Cancer in her father; Glaucoma in her mother; Heart disease in her father and mother; Hypertension in her father and mother; Melanoma in her daughter; Multiple sclerosis in her daughter; No Known Problems in her sister and son  She  reports that she has never smoked  She has never used smokeless tobacco  She reports that she does not drink alcohol and does not use drugs  Current Outpatient Medications   Medication Sig Dispense Refill    anastrozole (ARIMIDEX) 1 mg tablet take 1 tablet by mouth daily 30 tablet 4    ascorbic acid (VITAMIN C) 250 mg tablet Take 250 mg by mouth daily      Biotin 1 MG CAPS Take by mouth      Calcium 600 MG tablet Take 1 tablet by mouth every morning       Cholecalciferol (VITAMIN D3) 1000 units CAPS Take 1 capsule by mouth every morning       Cranberry 1000 MG CAPS Take 1 capsule by mouth every morning       triamcinolone (KENALOG) 0 1 % cream Apply topically 2 (two) times a day 30 g 0     No current facility-administered medications for this visit       Current Outpatient Medications on File Prior to Visit   Medication Sig    anastrozole (ARIMIDEX) 1 mg tablet take 1 tablet by mouth daily    ascorbic acid (VITAMIN C) 250 mg tablet Take 250 mg by mouth daily    Biotin 1 MG CAPS Take by mouth    Calcium 600 MG tablet Take 1 tablet by mouth every morning     Cholecalciferol (VITAMIN D3) 1000 units CAPS Take 1 capsule by mouth every morning     Cranberry 1000 MG CAPS Take 1 capsule by mouth every morning     [DISCONTINUED] estradiol (ESTRACE) 0 5 MG tablet Take 1 tablet by mouth daily    [DISCONTINUED] phenazopyridine (PYRIDIUM) 200 mg tablet Take 1 tablet (200 mg total) by mouth 3 (three) times a day with meals     No current facility-administered medications on file prior to visit  She is allergic to ofloxacin, penicillins, vancomycin, and chlorhexidine       Review of Systems   Constitutional: Negative for activity change, appetite change, chills and fever  HENT: Negative for congestion and rhinorrhea  Eyes: Negative for visual disturbance  Respiratory: Negative for cough, chest tightness and shortness of breath  Cardiovascular: Negative for chest pain and palpitations  Gastrointestinal: Negative for abdominal pain, blood in stool, diarrhea, nausea and vomiting  Endocrine: Negative for polydipsia, polyphagia and polyuria  Genitourinary: Negative for dysuria, frequency and urgency  Musculoskeletal: Negative for gait problem  Skin: Negative for color change  See HPI   Neurological: Negative for dizziness and headaches  Hematological: Does not bruise/bleed easily  Psychiatric/Behavioral: Negative for confusion, decreased concentration and sleep disturbance  The patient is not nervous/anxious  Objective:      /72 (BP Location: Left arm, Patient Position: Sitting, Cuff Size: Standard)   Pulse 71   Temp 98 1 °F (36 7 °C)   Ht 5' 3" (1 6 m)   Wt 73 5 kg (162 lb)   LMP  (LMP Unknown)   SpO2 98%   BMI 28 70 kg/m²          Physical Exam  Vitals and nursing note reviewed  Constitutional:       General: She is not in acute distress  Appearance: She is well-developed and well-groomed  HENT:      Head: Normocephalic and atraumatic  Eyes:      General:         Right eye: No discharge  Left eye: No discharge  Conjunctiva/sclera: Conjunctivae normal       Pupils: Pupils are equal, round, and reactive to light     Cardiovascular:      Rate and Rhythm:

## 2021-09-20 ENCOUNTER — ANNUAL EXAM (OUTPATIENT)
Dept: OBGYN CLINIC | Facility: CLINIC | Age: 61
End: 2021-09-20
Payer: COMMERCIAL

## 2021-09-20 VITALS — DIASTOLIC BLOOD PRESSURE: 60 MMHG | WEIGHT: 162 LBS | SYSTOLIC BLOOD PRESSURE: 115 MMHG | BODY MASS INDEX: 28.7 KG/M2

## 2021-09-20 DIAGNOSIS — Z01.419 ENCOUNTER FOR GYNECOLOGICAL EXAMINATION (GENERAL) (ROUTINE) WITHOUT ABNORMAL FINDINGS: Primary | ICD-10-CM

## 2021-09-20 PROCEDURE — 99396 PREV VISIT EST AGE 40-64: CPT | Performed by: OBSTETRICS & GYNECOLOGY

## 2021-09-20 NOTE — PROGRESS NOTES
ASSESSMENT & PLAN: Beverly Jansen was seen today for gynecologic exam     Diagnoses and all orders for this visit:    Encounter for gynecological examination (general) (routine) without abnormal findings        1  Routine well woman exam done today  2  Pap and HPV:  The patient's pap is up to date  Pap and cotesting was not done today  Current ASCCP Guidelines reviewed  3   Mammogram is up to date  4   Colorectal cancer screening is up to date  4  The following were reviewed in today's visit: adequate intake of calcium and vitamin D, exercise and healthy diet  5  F/u 1 year for next routine gyn exam   6  Declined pelvic floor PT to address intercourse issues  CC:  Annual Gynecologic Examination    HPI: Mina Raymond is a 62 y o   who presents for annual gynecologic examination  She has the following concerns:  No issues  Pt reports significant vaginal dryness and dyspareunia  States that they are not SA  Health Maintenance:    Patient describes her health as excellent  Patient has weight concerns  She exercises 7 days per week with walking x 1 hour  She does wear her seatbelt routinely  She does not perform regular monthly self breast exams  She does feel safe at home  Patients does follow a no red meat or no salt diet  Patient gets 1 servings of dairy or calcium rich foods a day      Last pap: n/a  Last mammogram:   Last colorectal cancer screening: colonoscopy   Last DEXA:     Patient Active Problem List   Diagnosis    Acute cystitis    Right hip pain    Vaginal atrophy    BMI 26 0-26 9,adult    Vitamin D deficiency    Healthcare maintenance    History of Meniere's disease    Exercise-induced asthma    Impaired fasting glucose    Low serum high density lipoprotein (HDL)    Menopausal symptoms    Nevus, atypical    Systolic murmur    Benign paroxysmal vertigo    Encntr for gyn exam (general) (routine) w abnormal findings    At high risk for breast cancer    Encounter for screening mammogram for breast cancer    Introital dyspareunia    KEIKO (stress urinary incontinence, female)    Osteopenia    Primary cancer of upper outer quadrant of left female breast ([facility] [address]    Keratosis, inflamed seborrheic    Chemotherapy induced neutropenia ([facility] [address]    Encounter for central line care    Long term current use of selective estrogen receptor modulators (SERMs)    Dermatitis    History of recurrent UTIs       Past Medical History:   Diagnosis Date    Asthma     has not been treated for seveeral years  exercise induced    BRCA1 negative     Breast cancer ([facility] [address] 10/2020    left sided    Cancer ([facility] [address] [facility]     breast  9/20    Cellulitis of left upper extremity 2/23/2021    Exercise-induced asthma 11/3/2016    History of chemotherapy 11/2020    Hx of radiation therapy 03/2021    Urinary tract infection        Past Surgical History:   Procedure Laterality Date    BREAST CYST EXCISION Left     benign    BREAST CYST EXCISION Left     benign    BREAST SURGERY      CHOLECYSTECTOMY      HYSTERECTOMY      IR PORT PLACEMENT  10/26/2020    IR PORT REMOVAL  2/4/2021    MAMMO NEEDLE LOCALIZATION LEFT (ALL INC) Left 10/2/2020    MOUTH SURGERY      PA MASTECTOMY, PARTIAL Left 10/2/2020    Procedure: BREAST NEEDLE LOCALIZED LUMPECTOMY, SENTINEL LYMPH NODE BIOPSY (Bracketed U/S guided needle loc @ 8:00, INJECT AT 1045); Surgeon: Falguni Marcelino MD;  Location: [facility] MAIN OR;  Service: General    TUBAL LIGATION      US GUIDED BREAST BIOPSY LEFT COMPLETE Left 9/11/2020    WISDOM TOOTH EXTRACTION         Past OB/Gyn History:    History of abnormal pap smears: No    Patient is not currently sexually active  heterosexual  Patient's family planning method is status post hysterectomy and post menopausal status      Family History   Problem Relation Age of Onset    Hypertension Mother     Heart disease Mother         cardiac disorder    Breast cancer Mother         dx age early 19's    Glaucoma Mother     Hypertension Father     Heart disease Father         cardiac disorder    Cancer Father     No Known Problems Sister     Multiple sclerosis Daughter    [facility] Melanoma Daughter     No Known Problems Son     Breast cancer Paternal Aunt        Social History:  Social History     Socioeconomic History    Marital status: /Civil Union     Spouse name: Not on file    Number of children: 2    Years of education: Not on file    Highest education level: Not on file   Occupational History    Occupation: Retired   Tobacco Use    Smoking status: Never Smoker    Smokeless tobacco: Never Used   Vaping Use    Vaping Use: Never used   Substance and Sexual Activity    Alcohol use: Never    Drug use: Never    Sexual activity: Yes     Partners: Male     Birth control/protection: Female Sterilization     Comment:  x 9 years, Anand Barron   Other Topics Concern    Not on file   Social History Narrative    Daily caffeine consumption, 1 serving a day    Exercises 3-4 times per week     x 8 years     Social Determinants of Health     Financial Resource Strain:     Difficulty of Paying Living Expenses:    Food Insecurity:     Worried About Running Out of Food in the Last Year:     [address] in the Last Year:    Transportation Needs:     Lack of Transportation (Medical):      Lack of Transportation (Non-Medical):    Physical Activity:     Days of Exercise per Week:     Minutes of Exercise per Session:    Stress:     Feeling of Stress :    Social Connections:     Frequency of Communication with Friends and Family:     Frequency of Social Gatherings with Friends and Family:     Attends [employer] Services:     Active Member of Clubs or Organizations:     Attends Club or Organization Meetings:     Marital Status:    Intimate Partner Violence:     Fear of Current or Ex-Partner:     Emotionally Abused:     Physically Abused:     Sexually Abused:      Presently lives with   Patient is currently retired  Allergies   Allergen Reactions    Ofloxacin Hives    Penicillins Hives    Vancomycin Hives     Pt asked to update her chart  She received this med when she was in the hospital - 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   Chlorhexidine Rash         Current Outpatient Medications:     anastrozole (ARIMIDEX) 1 mg tablet, take 1 tablet by mouth daily, Disp: 30 tablet, Rfl: 4    ascorbic acid (VITAMIN C) 250 mg tablet, Take 250 mg by mouth daily, Disp: , Rfl:     Biotin 1 MG CAPS, Take by mouth, Disp: , Rfl:     Calcium 600 MG tablet, Take 1 tablet by mouth every morning , Disp: , Rfl:     Cholecalciferol (VITAMIN D3) 1000 units CAPS, Take 1 capsule by mouth every morning , Disp: , Rfl:     Cranberry 1000 MG CAPS, Take 1 capsule by mouth every morning , Disp: , Rfl:     triamcinolone (KENALOG) 0 1 % cream, Apply topically 2 (two) times a day, Disp: 30 g, Rfl: 0    Review of Systems  Constitutional :no fever, feels well, no tiredness, no recent weight gain or loss  ENT: no ear ache, no loss of hearing, no nosebleeds or nasal discharge, no sore throat or hoarseness  Cardiovascular: no complaints of slow or fast heart beat, no chest pain, no palpitations, no leg claudication or lower extremity edema  Respiratory: no complaints of shortness of shortness of breath, no NAYAK  Breasts:no complaints of breast pain, breast lump, or nipple discharge  Gastrointestinal: no complaints of abdominal pain, constipation, nausea, vomiting, or diarrhea or bloody stools  Genitourinary : no complaints of dysuria, incontinence, pelvic pain, no dysmenorrhea, vaginal discharge or abnormal vaginal bleeding and as noted in HPI  Musculoskeletal: no complaints of arthralgia, no myalgia, no joint swelling or stiffness, no limb pain or swelling    Integumentary: no complaints of skin rash or lesion, itching or dry skin  Neurological: no complaints of headache, no confusion, no numbness or tingling, no dizziness or fainting    Physical Exam:     /60   Wt 73 5 kg (162 lb)   LMP  (LMP Unknown)   BMI 28 70 kg/m²     General: appears stated age, cooperative, alert normal mood and affect   Psychiatric oriented to person, place and time  Mood and affect normal   Neck: normal, supple,trachea midline, no masses  Thyroid: normal, no thyromegaly   Heart: regular rate and rhythm, S1, S2 normal, no murmur, click, rub or gallop   Lungs: clear to auscultation bilaterally, no increased work of breathing or signs of respiratory distress   Breasts: normal, no dimpling or skin changes noted, slight induration along left nipple and well healed surgical incisions   Abdomen: soft, non-tender, without masses or organomegaly   Vulva: normal , no lesions   Vagina: normal , no lesions, mod atrophy   Urethra: normal   Urethal meatus normal   Bladder Normal, soft, non-tender and no prolapse or masses appreciated   Cervix: Surgically absent   Uterus: Surgically absent   Adnexa: normal, non-tender without fullness or masses   Lymphatic Palpation of lymph nodes in neck, axilla, groin and/or other locations: no lymphadenopathy or masses noted   Skin Normal skin turgor and no rashes    Palpation of skin and subcutaneous tissue normal

## 2021-09-20 NOTE — PATIENT INSTRUCTIONS
Thank you for your confidence in our team    We appreciate you and welcome your feedback  If you receive a survey from us, please take a few moments to let us know how we are doing     Sincerely,   Nallely Edwards MD

## 2021-11-02 DIAGNOSIS — Z20.822 EXPOSURE TO COVID-19 VIRUS: Primary | ICD-10-CM

## 2021-11-02 PROCEDURE — U0003 INFECTIOUS AGENT DETECTION BY NUCLEIC ACID (DNA OR RNA); SEVERE ACUTE RESPIRATORY SYNDROME CORONAVIRUS 2 (SARS-COV-2) (CORONAVIRUS DISEASE [COVID-19]), AMPLIFIED PROBE TECHNIQUE, MAKING USE OF HIGH THROUGHPUT TECHNOLOGIES AS DESCRIBED BY CMS-2020-01-R: HCPCS | Performed by: NURSE PRACTITIONER

## 2021-11-02 PROCEDURE — U0005 INFEC AGEN DETEC AMPLI PROBE: HCPCS | Performed by: NURSE PRACTITIONER

## 2021-11-09 ENCOUNTER — APPOINTMENT (OUTPATIENT)
Dept: LAB | Facility: CLINIC | Age: 61
End: 2021-11-09
Payer: COMMERCIAL

## 2021-11-09 DIAGNOSIS — N39.3 SUI (STRESS URINARY INCONTINENCE, FEMALE): ICD-10-CM

## 2021-11-09 DIAGNOSIS — E55.9 VITAMIN D DEFICIENCY: ICD-10-CM

## 2021-11-09 DIAGNOSIS — L30.9 DERMATITIS: ICD-10-CM

## 2021-11-09 DIAGNOSIS — R01.1 SYSTOLIC MURMUR: ICD-10-CM

## 2021-11-09 DIAGNOSIS — C50.412 PRIMARY CANCER OF UPPER OUTER QUADRANT OF LEFT FEMALE BREAST (HCC): ICD-10-CM

## 2021-11-09 DIAGNOSIS — M85.852 OSTEOPENIA OF LEFT HIP: ICD-10-CM

## 2021-11-09 DIAGNOSIS — Z79.810 LONG TERM CURRENT USE OF SELECTIVE ESTROGEN RECEPTOR MODULATORS (SERMS): ICD-10-CM

## 2021-11-09 DIAGNOSIS — R73.01 IMPAIRED FASTING GLUCOSE: ICD-10-CM

## 2021-11-09 DIAGNOSIS — Z13.6 SCREENING FOR CARDIOVASCULAR CONDITION: ICD-10-CM

## 2021-11-09 DIAGNOSIS — Z87.440 HISTORY OF RECURRENT UTIS: ICD-10-CM

## 2021-11-09 LAB
25(OH)D3 SERPL-MCNC: 87.7 NG/ML (ref 30–100)
ALBUMIN SERPL BCP-MCNC: 3.5 G/DL (ref 3.5–5)
ALP SERPL-CCNC: 80 U/L (ref 46–116)
ALT SERPL W P-5'-P-CCNC: 19 U/L (ref 12–78)
ANION GAP SERPL CALCULATED.3IONS-SCNC: 6 MMOL/L (ref 4–13)
AST SERPL W P-5'-P-CCNC: 11 U/L (ref 5–45)
BASOPHILS # BLD AUTO: 0.05 THOUSANDS/ΜL (ref 0–0.1)
BASOPHILS NFR BLD AUTO: 1 % (ref 0–1)
BILIRUB SERPL-MCNC: 1.05 MG/DL (ref 0.2–1)
BUN SERPL-MCNC: 14 MG/DL (ref 5–25)
CALCIUM SERPL-MCNC: 9.1 MG/DL (ref 8.3–10.1)
CHLORIDE SERPL-SCNC: 107 MMOL/L (ref 100–108)
CHOLEST SERPL-MCNC: 156 MG/DL (ref 50–200)
CO2 SERPL-SCNC: 26 MMOL/L (ref 21–32)
CREAT SERPL-MCNC: 0.79 MG/DL (ref 0.6–1.3)
EOSINOPHIL # BLD AUTO: 0.08 THOUSAND/ΜL (ref 0–0.61)
EOSINOPHIL NFR BLD AUTO: 1 % (ref 0–6)
ERYTHROCYTE [DISTWIDTH] IN BLOOD BY AUTOMATED COUNT: 14.5 % (ref 11.6–15.1)
EST. AVERAGE GLUCOSE BLD GHB EST-MCNC: 114 MG/DL
GFR SERPL CREATININE-BSD FRML MDRD: 82 ML/MIN/1.73SQ M
GLUCOSE P FAST SERPL-MCNC: 97 MG/DL (ref 65–99)
HBA1C MFR BLD: 5.6 %
HCT VFR BLD AUTO: 41.1 % (ref 34.8–46.1)
HDLC SERPL-MCNC: 58 MG/DL
HGB BLD-MCNC: 13.5 G/DL (ref 11.5–15.4)
IMM GRANULOCYTES # BLD AUTO: 0.01 THOUSAND/UL (ref 0–0.2)
IMM GRANULOCYTES NFR BLD AUTO: 0 % (ref 0–2)
LDLC SERPL CALC-MCNC: 78 MG/DL (ref 0–100)
LYMPHOCYTES # BLD AUTO: 1.19 THOUSANDS/ΜL (ref 0.6–4.47)
LYMPHOCYTES NFR BLD AUTO: 21 % (ref 14–44)
MCH RBC QN AUTO: 29.5 PG (ref 26.8–34.3)
MCHC RBC AUTO-ENTMCNC: 32.8 G/DL (ref 31.4–37.4)
MCV RBC AUTO: 90 FL (ref 82–98)
MONOCYTES # BLD AUTO: 0.34 THOUSAND/ΜL (ref 0.17–1.22)
MONOCYTES NFR BLD AUTO: 6 % (ref 4–12)
NEUTROPHILS # BLD AUTO: 3.96 THOUSANDS/ΜL (ref 1.85–7.62)
NEUTS SEG NFR BLD AUTO: 71 % (ref 43–75)
NONHDLC SERPL-MCNC: 98 MG/DL
NRBC BLD AUTO-RTO: 0 /100 WBCS
PLATELET # BLD AUTO: 210 THOUSANDS/UL (ref 149–390)
PMV BLD AUTO: 9.1 FL (ref 8.9–12.7)
POTASSIUM SERPL-SCNC: 3.7 MMOL/L (ref 3.5–5.3)
PROT SERPL-MCNC: 7 G/DL (ref 6.4–8.2)
RBC # BLD AUTO: 4.57 MILLION/UL (ref 3.81–5.12)
SODIUM SERPL-SCNC: 139 MMOL/L (ref 136–145)
TRIGL SERPL-MCNC: 102 MG/DL
TSH SERPL DL<=0.05 MIU/L-ACNC: 0.46 UIU/ML (ref 0.36–3.74)
WBC # BLD AUTO: 5.63 THOUSAND/UL (ref 4.31–10.16)

## 2021-11-09 PROCEDURE — 36415 COLL VENOUS BLD VENIPUNCTURE: CPT

## 2021-11-09 PROCEDURE — 84443 ASSAY THYROID STIM HORMONE: CPT

## 2021-11-09 PROCEDURE — 80061 LIPID PANEL: CPT

## 2021-11-09 PROCEDURE — 83036 HEMOGLOBIN GLYCOSYLATED A1C: CPT

## 2021-11-09 PROCEDURE — 86618 LYME DISEASE ANTIBODY: CPT

## 2021-11-09 PROCEDURE — 85025 COMPLETE CBC W/AUTO DIFF WBC: CPT

## 2021-11-09 PROCEDURE — 82306 VITAMIN D 25 HYDROXY: CPT

## 2021-11-09 PROCEDURE — 80053 COMPREHEN METABOLIC PANEL: CPT

## 2021-11-10 LAB — B BURGDOR IGG+IGM SER-ACNC: 4

## 2021-11-20 DIAGNOSIS — Z79.811 AROMATASE INHIBITOR USE: ICD-10-CM

## 2021-11-20 DIAGNOSIS — C50.412 PRIMARY CANCER OF UPPER OUTER QUADRANT OF LEFT FEMALE BREAST (HCC): ICD-10-CM

## 2021-11-22 DIAGNOSIS — Z79.811 AROMATASE INHIBITOR USE: ICD-10-CM

## 2021-11-22 DIAGNOSIS — C50.412 PRIMARY CANCER OF UPPER OUTER QUADRANT OF LEFT FEMALE BREAST (HCC): ICD-10-CM

## 2021-11-22 RX ORDER — ANASTROZOLE 1 MG/1
1 TABLET ORAL DAILY
Qty: 90 TABLET | Refills: 3 | Status: SHIPPED | OUTPATIENT
Start: 2021-11-22

## 2021-11-22 RX ORDER — ANASTROZOLE 1 MG/1
TABLET ORAL
Qty: 30 TABLET | Refills: 4 | Status: SHIPPED | OUTPATIENT
Start: 2021-11-22 | End: 2021-11-22 | Stop reason: SDUPTHER

## 2022-01-12 ENCOUNTER — TELEPHONE (OUTPATIENT)
Dept: SURGERY | Facility: CLINIC | Age: 62
End: 2022-01-12

## 2022-01-12 NOTE — TELEPHONE ENCOUNTER
Patient cancelled her 6 mo f/u (breast exam)  for 02/09/2022 via My Chart, due to a large deductible  I called patient to reschedule and she states that she is feeling well and not noticing any issues  She said she does intend to have her mammogram in August and follow up with you at that time if you're okay with this?

## 2022-01-13 ENCOUNTER — TELEPHONE (OUTPATIENT)
Dept: SURGERY | Facility: CLINIC | Age: 62
End: 2022-01-13

## 2022-01-13 NOTE — TELEPHONE ENCOUNTER
Voicemail left for patient regarding her decision to cancel her office appointment secondary to her high deductible  She does plan on following up with the practice after her August bilateral mammogram     Order for mammogram entered into the computer record  We look for to seeing her back in office  She was encouraged to follow up sooner on an as-needed basis

## 2022-02-02 ENCOUNTER — APPOINTMENT (OUTPATIENT)
Dept: LAB | Facility: CLINIC | Age: 62
End: 2022-02-02
Payer: COMMERCIAL

## 2022-02-02 DIAGNOSIS — E55.9 VITAMIN D DEFICIENCY: ICD-10-CM

## 2022-02-02 DIAGNOSIS — C50.412 PRIMARY CANCER OF UPPER OUTER QUADRANT OF LEFT FEMALE BREAST (HCC): ICD-10-CM

## 2022-02-02 LAB
25(OH)D3 SERPL-MCNC: 86.8 NG/ML (ref 30–100)
ALBUMIN SERPL BCP-MCNC: 3.8 G/DL (ref 3.5–5)
ALP SERPL-CCNC: 86 U/L (ref 46–116)
ALT SERPL W P-5'-P-CCNC: 25 U/L (ref 12–78)
ANION GAP SERPL CALCULATED.3IONS-SCNC: 2 MMOL/L (ref 4–13)
AST SERPL W P-5'-P-CCNC: 14 U/L (ref 5–45)
BASOPHILS # BLD AUTO: 0.05 THOUSANDS/ΜL (ref 0–0.1)
BASOPHILS NFR BLD AUTO: 1 % (ref 0–1)
BILIRUB SERPL-MCNC: 1.6 MG/DL (ref 0.2–1)
BUN SERPL-MCNC: 13 MG/DL (ref 5–25)
CALCIUM SERPL-MCNC: 9.2 MG/DL (ref 8.3–10.1)
CHLORIDE SERPL-SCNC: 107 MMOL/L (ref 100–108)
CO2 SERPL-SCNC: 30 MMOL/L (ref 21–32)
CREAT SERPL-MCNC: 0.69 MG/DL (ref 0.6–1.3)
EOSINOPHIL # BLD AUTO: 0.19 THOUSAND/ΜL (ref 0–0.61)
EOSINOPHIL NFR BLD AUTO: 3 % (ref 0–6)
ERYTHROCYTE [DISTWIDTH] IN BLOOD BY AUTOMATED COUNT: 14.2 % (ref 11.6–15.1)
GFR SERPL CREATININE-BSD FRML MDRD: 94 ML/MIN/1.73SQ M
GLUCOSE P FAST SERPL-MCNC: 106 MG/DL (ref 65–99)
HCT VFR BLD AUTO: 41.6 % (ref 34.8–46.1)
HGB BLD-MCNC: 13.1 G/DL (ref 11.5–15.4)
IMM GRANULOCYTES # BLD AUTO: 0.01 THOUSAND/UL (ref 0–0.2)
IMM GRANULOCYTES NFR BLD AUTO: 0 % (ref 0–2)
LYMPHOCYTES # BLD AUTO: 1.35 THOUSANDS/ΜL (ref 0.6–4.47)
LYMPHOCYTES NFR BLD AUTO: 22 % (ref 14–44)
MCH RBC QN AUTO: 28.9 PG (ref 26.8–34.3)
MCHC RBC AUTO-ENTMCNC: 31.5 G/DL (ref 31.4–37.4)
MCV RBC AUTO: 92 FL (ref 82–98)
MONOCYTES # BLD AUTO: 0.46 THOUSAND/ΜL (ref 0.17–1.22)
MONOCYTES NFR BLD AUTO: 8 % (ref 4–12)
NEUTROPHILS # BLD AUTO: 3.97 THOUSANDS/ΜL (ref 1.85–7.62)
NEUTS SEG NFR BLD AUTO: 66 % (ref 43–75)
NRBC BLD AUTO-RTO: 0 /100 WBCS
PLATELET # BLD AUTO: 234 THOUSANDS/UL (ref 149–390)
PMV BLD AUTO: 9.3 FL (ref 8.9–12.7)
POTASSIUM SERPL-SCNC: 4.2 MMOL/L (ref 3.5–5.3)
PROT SERPL-MCNC: 7.7 G/DL (ref 6.4–8.2)
RBC # BLD AUTO: 4.53 MILLION/UL (ref 3.81–5.12)
SODIUM SERPL-SCNC: 139 MMOL/L (ref 136–145)
WBC # BLD AUTO: 6.03 THOUSAND/UL (ref 4.31–10.16)

## 2022-02-02 PROCEDURE — 85025 COMPLETE CBC W/AUTO DIFF WBC: CPT

## 2022-02-02 PROCEDURE — 80053 COMPREHEN METABOLIC PANEL: CPT

## 2022-02-02 PROCEDURE — 36415 COLL VENOUS BLD VENIPUNCTURE: CPT

## 2022-02-02 PROCEDURE — 82306 VITAMIN D 25 HYDROXY: CPT

## 2022-02-08 ENCOUNTER — OFFICE VISIT (OUTPATIENT)
Dept: HEMATOLOGY ONCOLOGY | Facility: CLINIC | Age: 62
End: 2022-02-08
Payer: COMMERCIAL

## 2022-02-08 ENCOUNTER — TELEPHONE (OUTPATIENT)
Dept: HEMATOLOGY ONCOLOGY | Facility: CLINIC | Age: 62
End: 2022-02-08

## 2022-02-08 VITALS
BODY MASS INDEX: 29.5 KG/M2 | RESPIRATION RATE: 18 BRPM | SYSTOLIC BLOOD PRESSURE: 126 MMHG | HEART RATE: 78 BPM | TEMPERATURE: 96.6 F | OXYGEN SATURATION: 99 % | DIASTOLIC BLOOD PRESSURE: 84 MMHG | HEIGHT: 63 IN | WEIGHT: 166.47 LBS

## 2022-02-08 DIAGNOSIS — Z79.811 AROMATASE INHIBITOR USE: ICD-10-CM

## 2022-02-08 DIAGNOSIS — E55.9 VITAMIN D DEFICIENCY: ICD-10-CM

## 2022-02-08 DIAGNOSIS — C50.412 PRIMARY CANCER OF UPPER OUTER QUADRANT OF LEFT FEMALE BREAST (HCC): Primary | ICD-10-CM

## 2022-02-08 PROCEDURE — 3008F BODY MASS INDEX DOCD: CPT | Performed by: INTERNAL MEDICINE

## 2022-02-08 PROCEDURE — 99214 OFFICE O/P EST MOD 30 MIN: CPT | Performed by: INTERNAL MEDICINE

## 2022-02-08 NOTE — PROGRESS NOTES
Hematology/Oncology Outpatient Follow-up  Lor Guy 62 y o  female 1960 [id number]    Date:  2/8/2022        Assessment and Plan:  1  Primary cancer of upper outer quadrant of left female breast [facility])  The patient was encouraged to continue with the anastrozole on a daily basis as endocrine therapy since she is tolerating it relatively well  She stated that she is in the process of moving to [place]  2  Aromatase inhibitor use  The patient seems to have a good vitamin-D level  I did encourage her to exercise on a daily basis which she is doing and take vitamin-D supplements regularly  She will be due for a bone density scan in June of 2023      3  Vitamin D deficiency  There is no hint of vitamin-D deficiency  HPI:  The patient came today for follow-up visit  She continues to take the anastrozole on a daily basis which she is tolerating relatively well  She seems to be up-to-date on mammograms  Her most recent blood work on 02/02/2022 showed normal CBC with white cell count of 6 0, hemoglobin 13 1 and platelet count of 489  Creatinine was 0 6 with normal calcium  Liver enzymes were within normal range  Total bili was slightly above normal 1 6  Vitamin-D 86 8  Oncology History Overview Note   Patient with a family history of breast cancer in her mother  She had two previous benign breast biopsies  She was going for yearly mammograms and most recent in August showed focal asymmetry in the upper outer posterior left breast  A biopsy on 9/11/20 revealed left invasive ductal breast carcinoma  She saw General Surgeon, Dr Isamar Meyer in September for surgical evaluation  8/12/20 Screening Mammogram bilateral w 3d & cad   IMPRESSION:  1  Upper outer posterior left breast focal asymmetry  Diagnostic mammography, with possible ultrasound, recommended    2  Stable right mammogram      9/4/20 Mammo diagnostic left w 3d & cad   US breast left limited (diagnostic)   FINDINGS:   LEFT  1) MASS  Mammo diagnostic left w 3d & cad: There is a 43 mm transverse x 26 mm AP x 23 mm craniocaudal high density, irregularly shaped mass seen in the upper outer quadrant of the left breast at 1 o'clock in the posterior depth  There is a possible satellite lesions slightly inferior to the mass  If measured as 1 finding, it measures 32 mm craniocaudal   The mass correlates with the prior mammogram finding  US breast left limited (diagnostic): There is and irregularly shaped, hypoechoic mass with indistinct margins with shadowing seen in the upper outer quadrant of the left breast at 1 o'clock in the posterior depth  The mass measures at least 23 x 11 x 10 mm and the size may be underestimated on ultrasound  The mass correlates with the prior mammogram finding  This is highly suspicious and ultrasound-guided core needle biopsy is recommended  Targeted ultrasound of the left axilla was performed  At least 1 morphologically benign lymph node was visualized  No morphologically abnormal lymph nodes were visualized  ASSESSMENT/BI-RADS CATEGORY:  Left: 4C - High Suspicion for Malignancy  Overall: 4 - Suspicious     9/11/20 Left breast US guided biopsy, at 1:00 12cmfn  Invasive mammary carcinoma of no special type Grade 3  ER/WY 90-95% positive, HER2 negative  Lymphovascular invasion present     9/28/20 Breast working group recommended treatment plan: Lumpectomy with SLNB followed by adjuvant radiation therapy  Genetic testing consult  10/2/20 Left breast Lumpectomy  Invasive breast carcinoma of no special type, 21 mm focus  Grade 3  DCIS present in 2 out of 17 blocks     Margins negative for invasive carcinoma, 1 mm from the closest posterior margin  Negative for DCIS 1 mm from the closest anterior margin  Lymph-vascular invasion present     Lymph nodes:  Left sentinel lymph node excision: 1 lymph node positive for carcinoma  Left axillary LN excision: 1 reactive lymph node     Primary cancer of upper outer quadrant of left female breast ([facility] [address] )   9/2020 Initial Diagnosis    Primary cancer of upper outer quadrant of left female breast ([facility] [address] )  Stage IIB     9/11/2020 Biopsy    Breast, left at 1:00 12cmfn, US guided biopsy, 5 passes:  - Invasive mammary carcinoma of no special type (ductal, not otherwise specified)  -- [place] histologic grade 3 of 3 (total score: 8 of 9)       -- Invasive carcinoma involves 5 of 5 submitted core biopsies, max  Dimension= 14 mm     -- Estrogen, Progesterone 90-95% positive HER2 negative  - Lymphovascular invasion: Present  10/2/2020 Surgery    A  Breast, Left, Lumpectomy:  - Invasive breast carcinoma of no special type (ductal NST/invasive ductal carcinoma), 21 mm focus  * [place] grade 3 of 3 (total score: 9 of 9)   * Ductal carcinoma in situ (DCIS): Present in 2 out of 17 blocks  -- [place], solid and cribriform architectural pattern, nuclear grade 3 of 3     * Margins: Negative for invasive carcinoma, 1 mm from the closest posterior margin; Negative for DCIS, 1 mm from the closest anterior margin  * Skin: Negative for carcinoma  * Skeletal muscle: Negative for carcinoma  * Lymph-vascular invasion: Present  * Pathologic stage (AJCC 8th ed ): pT2, pN1a (sn)  B  [place] Lymph Node, Left # 1, Excision:  - One lymph node positive for carcinoma (1/1)  C  Axillary contents, Left, Excision:  - One reactive lymph node (0/1)       10/2/2020 -  Cancer Staged    Staging form: Breast, AJCC 8th Edition  - Clinical stage from 10/2/2020: Stage IIB (cT2, cN1(sn), cM0, G3, ER+, IA+, HER2-) - Signed by Juliet Bird MD on 10/9/2020  Stage prefix: Initial diagnosis  Laterality: Left  Tumor size (mm): 21  Method of lymph node assessment: [place] lymph node biopsy  Nuclear grade: G3  Mitotic count score: Score 3  Percentage of tumor with tubule formation: 10  Tubule formation score: Score 3  Scarff-Bloom-Cevallos score: 9  Histologic grading system: 3 grade system  Lymph-vascular invasion (LVI): LVI present/identified, NOS  Specimen type: Excision  National guidelines used in treatment planning: Yes  Type of national guideline used in treatment planning: NCCN       11/4/2020 - 1/26/2021 Chemotherapy    Completed 4 cycles of adjuvant Taxotere plus Cytoxan   pegfilgrastim (NEULASTA) subcutaneous injection 6 mg, 6 mg, Subcutaneous, Once, 3 of 3 cycles  Administration: 6 mg (11/5/2020), 6 mg (11/25/2020), 6 mg (1/6/2021)  pegfilgrastim (NEULASTA ONPRO) subcutaneous injection kit 6 mg, 6 mg, Subcutaneous, Once, 1 of 1 cycle  Administration: 6 mg (12/15/2020)  cyclophosphamide (CYTOXAN) 1,068 mg in sodium chloride 0 9 % 250 mL IVPB, 600 mg/m2 = 1,068 mg, Intravenous, Once, 4 of 4 cycles  Administration: 1,068 mg (11/4/2020), 1,068 mg (11/24/2020), 1,068 mg (12/15/2020), 1,068 mg (1/5/2021)  DOCEtaxel (TAXOTERE) 140 mg in sodium chloride 0 9 % 250 mL chemo infusion, 133 6 mg, Intravenous, Once, 4 of 4 cycles  Administration: 140 mg (11/4/2020), 140 mg (11/24/2020), 140 mg (12/15/2020), 140 mg (1/5/2021)       11/2020 Genetic Testing    Patient has genetic testing done for breast cancer  Results revealed patient has the following mutation(s):  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), Heterozygous     2/9/2021 -  Hormone Therapy    Started anastrozole  Was also started on Prolia injections due to pre-existing osteopenia         Interval history:    ROS: Review of Systems   Constitutional: Negative for chills and fever  HENT: Negative for ear pain and sore throat  Eyes: Negative for pain and visual disturbance  Respiratory: Negative for cough and shortness of breath  Cardiovascular: Negative for chest pain and palpitations  Gastrointestinal: Negative for abdominal pain and vomiting  Genitourinary: Negative for dysuria and hematuria  Musculoskeletal: Negative for arthralgias and back pain  Skin: Negative for color change and rash     Neurological: Negative for seizures and syncope  All other systems reviewed and are negative  Past Medical History:   Diagnosis Date    Asthma     has not been treated for seveeral years  exercise induced    BRCA1 negative     Breast cancer ([facility] [address] ) 10/2020    left sided    Cancer [facility])     breast  9/20    Cellulitis of left upper extremity 2/23/2021    Exercise-induced asthma 11/3/2016    History of chemotherapy 11/2020    Hx of radiation therapy 03/2021    Urinary tract infection        Past Surgical History:   Procedure Laterality Date    BREAST CYST EXCISION Left     benign    BREAST CYST EXCISION Left     benign    BREAST SURGERY      CHOLECYSTECTOMY      HYSTERECTOMY      IR PORT PLACEMENT  10/26/2020    IR PORT REMOVAL  2/4/2021    MAMMO NEEDLE LOCALIZATION LEFT (ALL INC) Left 10/2/2020    MOUTH SURGERY      MN MASTECTOMY, PARTIAL Left 10/2/2020    Procedure: BREAST NEEDLE LOCALIZED LUMPECTOMY, SENTINEL LYMPH NODE BIOPSY (Bracketed U/S guided needle loc @ 8:00, INJECT AT 1045);   Surgeon: Mariella Madera MD;  Location: [facility] MAIN OR;  Service: General    TUBAL LIGATION      US GUIDED BREAST BIOPSY LEFT COMPLETE Left 9/11/2020    WISDOM TOOTH EXTRACTION         Social History     Socioeconomic History    Marital status: /Civil Union     Spouse name: None    Number of children: 2    Years of education: None    Highest education level: None   Occupational History    Occupation: Retired   Tobacco Use    Smoking status: Never Smoker    Smokeless tobacco: Never Used   Vaping Use    Vaping Use: Never used   Substance and Sexual Activity    Alcohol use: Never    Drug use: Never    Sexual activity: Yes     Partners: Male     Birth control/protection: Female Sterilization     Comment:  x 9 years, Samuel Everett   Other Topics Concern    None   Social History Narrative    Daily caffeine consumption, 1 serving a day    Exercises 3-4 times per week     x 8 years     Social Determinants of Health     Financial Resource Strain: Not on file   Food Insecurity: Not on file   Transportation Needs: Not on file   Physical Activity: Not on file   Stress: Not on file   Social Connections: Not on file   Intimate Partner Violence: Not on file   Housing Stability: Not on file       Family History   Problem Relation Age of Onset    Hypertension Mother     Heart disease Mother         cardiac disorder    Breast cancer Mother         dx age early 19's   Ish Peterson Glaucoma Mother     Hypertension Father     Heart disease Father         cardiac disorder    Cancer Father     No Known Problems Sister     Multiple sclerosis Daughter     Melanoma Daughter     No Known Problems Son     Breast cancer Paternal Aunt        Allergies   Allergen Reactions    Ofloxacin Hives    Penicillins Hives    Vancomycin Hives     Pt asked to update her chart  She received this med when she was in the hospital - 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   Chlorhexidine Rash         Current Outpatient Medications:     anastrozole (ARIMIDEX) 1 mg tablet, Take 1 tablet (1 mg total) by mouth daily, Disp: 90 tablet, Rfl: 3    ascorbic acid (VITAMIN C) 250 mg tablet, Take 250 mg by mouth daily, Disp: , Rfl:     Biotin 1 MG CAPS, Take by mouth, Disp: , Rfl:     Calcium 600 MG tablet, Take 1 tablet by mouth every morning , Disp: , Rfl:     Cholecalciferol (VITAMIN D3) 1000 units CAPS, Take 1 capsule by mouth every morning , Disp: , Rfl:     Cranberry 1000 MG CAPS, Take 1 capsule by mouth every morning , Disp: , Rfl:     triamcinolone (KENALOG) 0 1 % cream, Apply topically 2 (two) times a day, Disp: 30 g, Rfl: 0      Physical Exam:  /84 (BP Location: Left arm, Patient Position: Sitting, Cuff Size: Adult)   Pulse 78   Temp (!) 96 6 °F (35 9 °C) (Tympanic)   Resp 18   Ht 5' 3" (1 6 m)   Wt 75 5 kg (166 lb 7 5 oz)   LMP  (LMP Unknown)   SpO2 99%   BMI 29 49 kg/m²     Physical Exam  Constitutional:       General: She is not in acute distress       Appearance: She is well-developed  She is not diaphoretic  HENT:      Head: Normocephalic and atraumatic  Eyes:      General: No scleral icterus  Right eye: No discharge  Left eye: No discharge  Conjunctiva/sclera: Conjunctivae normal       Pupils: Pupils are equal, round, and reactive to light  Neck:      Thyroid: No thyromegaly  Vascular: No JVD  Trachea: No tracheal deviation  Cardiovascular:      Rate and Rhythm: Normal rate and regular rhythm  Heart sounds: Normal heart sounds  No murmur heard  No friction rub  Pulmonary:      Effort: Pulmonary effort is normal  No respiratory distress  Breath sounds: Normal breath sounds  No stridor  No wheezing or rales  Chest:      Chest wall: No tenderness  Abdominal:      General: There is no distension  Palpations: Abdomen is soft  There is no hepatomegaly or splenomegaly  Tenderness: There is no abdominal tenderness  There is no guarding or rebound  Musculoskeletal:         General: No tenderness or deformity  Normal range of motion  Cervical back: Normal range of motion and neck supple  Lymphadenopathy:      Cervical: No cervical adenopathy  Skin:     General: Skin is warm and dry  Coloration: Skin is not pale  Findings: No erythema or rash  Neurological:      Mental Status: She is alert and oriented to person, place, and time  Cranial Nerves: No cranial nerve deficit  Coordination: Coordination normal       Deep Tendon Reflexes: Reflexes are normal and symmetric  Psychiatric:         Behavior: Behavior normal          Thought Content:  Thought content normal          Judgment: Judgment normal            Labs:  Lab Results   Component Value Date    WBC 6 03 02/02/2022    HGB 13 1 02/02/2022    HCT 41 6 02/02/2022    MCV 92 02/02/2022     02/02/2022     Lab Results   Component Value Date    K 4 2 02/02/2022     02/02/2022    CO2 30 02/02/2022    BUN 13 02/02/2022 CREATININE 0 69 02/02/2022    GLUF 106 (H) 02/02/2022    CALCIUM 9 2 02/02/2022    AST 14 02/02/2022    ALT 25 02/02/2022    ALKPHOS 86 02/02/2022    EGFR 94 02/02/2022     Lab Results   Component Value Date    TSH 1 04 08/08/2018       Patient voiced understanding and agreement in the above discussion  Aware to contact our office with questions/symptoms in the interim

## 2022-02-08 NOTE — TELEPHONE ENCOUNTER
Phoned pt to let her know that I spoke to [facility] and they are good with the Prolia orders they have as they are good for one year  I let staff know that her Calcium level is 2 2 so she is good for Prolia on 3/24/22

## 2022-03-08 ENCOUNTER — TELEPHONE (OUTPATIENT)
Dept: HEMATOLOGY ONCOLOGY | Facility: CLINIC | Age: 62
End: 2022-03-08

## 2022-03-19 ENCOUNTER — OFFICE VISIT (OUTPATIENT)
Dept: URGENT CARE | Facility: CLINIC | Age: 62
End: 2022-03-19
Payer: COMMERCIAL

## 2022-03-19 VITALS
RESPIRATION RATE: 18 BRPM | OXYGEN SATURATION: 100 % | SYSTOLIC BLOOD PRESSURE: 140 MMHG | DIASTOLIC BLOOD PRESSURE: 78 MMHG | TEMPERATURE: 98.7 F | HEART RATE: 72 BPM

## 2022-03-19 DIAGNOSIS — J20.9 ACUTE BRONCHITIS, UNSPECIFIED ORGANISM: Primary | ICD-10-CM

## 2022-03-19 PROCEDURE — S9088 SERVICES PROVIDED IN URGENT: HCPCS

## 2022-03-19 PROCEDURE — 99213 OFFICE O/P EST LOW 20 MIN: CPT

## 2022-03-19 PROCEDURE — 87636 SARSCOV2 & INF A&B AMP PRB: CPT

## 2022-03-19 RX ORDER — BENZONATATE 200 MG/1
200 CAPSULE ORAL 3 TIMES DAILY PRN
Qty: 20 CAPSULE | Refills: 0 | Status: SHIPPED | OUTPATIENT
Start: 2022-03-19

## 2022-03-19 RX ORDER — ALBUTEROL SULFATE 90 UG/1
2 AEROSOL, METERED RESPIRATORY (INHALATION) EVERY 6 HOURS PRN
Qty: 8.5 G | Refills: 0 | Status: SHIPPED | OUTPATIENT
Start: 2022-03-19

## 2022-03-19 RX ORDER — DEXAMETHASONE SODIUM PHOSPHATE 10 MG/ML
10 INJECTION, SOLUTION INTRAMUSCULAR; INTRAVENOUS ONCE
Status: COMPLETED | OUTPATIENT
Start: 2022-03-19 | End: 2022-03-19

## 2022-03-19 RX ORDER — AZITHROMYCIN 250 MG/1
TABLET, FILM COATED ORAL
Qty: 6 TABLET | Refills: 0 | Status: SHIPPED | OUTPATIENT
Start: 2022-03-19 | End: 2022-03-23

## 2022-03-19 RX ADMIN — DEXAMETHASONE SODIUM PHOSPHATE 10 MG: 10 INJECTION, SOLUTION INTRAMUSCULAR; INTRAVENOUS at 09:06

## 2022-03-19 NOTE — PATIENT INSTRUCTIONS
Discussed importance of delayed antibiotic therapy, if your covid/influenza test is negative and your symptoms are not improving, you may start the antibiotic  If your covid/influenza are positive, my recommendation is to hold on antibiotic until PCP f/u  Use benzonatate as directed as needed for cough  Use albuterol as directed, as needed for shortness of breath and wheezing  Take antibiotic as prescribed  Recommend probiotic use while taking antibiotic  Hydration and rest   Acetaminophenfor pain and fever  COVID/influenza testing  Use the [facility] MyChart to obtain lab results  Wear your mask and wash hands often  PCP follow up in 3-5 days  Go to an emergency department if difficulty breathing occurs or if symptoms worsen  Recommended supplements for potential COVID-19 is the following: Vitamin D3 2000 IU  daily ,  Vitamin C 1g  every 12 hours , Multivitamin Daily     Acute Cough   AMBULATORY CARE:   An acute cough  can last up to 3 weeks  Common causes of an acute cough include a cold, allergies, or a lung infection  Seek care immediately if:   · You have trouble breathing or feel short of breath  · You cough up blood, or you see blood in your mucus  · You faint or feel weak or dizzy  · You have chest pain when you cough or take a deep breath  · You have new wheezing  Contact your healthcare provider if:   · You have a fever  · Your cough lasts longer than 4 weeks  · Your symptoms do not improve with treatment  · You have questions or concerns about your condition or care  Treatment:  An acute cough usually goes away on its own  Ask your healthcare provider about medicines you can take to decrease your cough  You may need medicine to stop the cough, decrease swelling in your airways, or help open your airways  Medicine may also be given to help you cough up mucus  If you have an infection caused by bacteria, you may need antibiotics    Manage your symptoms:   · Do not smoke and stay away from others who smoke  Nicotine and other chemicals in cigarettes and cigars can cause lung damage and make your cough worse  Ask your healthcare provider for information if you currently smoke and need help to quit  E-cigarettes or smokeless tobacco still contain nicotine  Talk to your healthcare provider before you use these products  · Drink extra liquids as directed  Liquids will help thin and loosen mucus so you can cough it up  Liquids will also help prevent dehydration  Examples of good liquids to drink include water, fruit juice, and broth  Do not drink liquids that contain caffeine  Caffeine can increase your risk for dehydration  Ask your healthcare provider how much liquid to drink each day  · Rest as directed  Do not do activities that make your cough worse, such as exercise  · Use a humidifier or vaporizer  Use a cool mist humidifier or a vaporizer to increase air moisture in your home  This may make it easier for you to breathe and help decrease your cough  · Eat 2 to 5 mL of honey 2 times each day  Honey can help thin mucus and decrease your cough  · Use cough drops or lozenges  These can help decrease throat irritation and your cough  Follow up with your healthcare provider as directed:  Write down your questions so you remember to ask them during your visits  © Copyright [address] 2022 Information is for End User's use only and may not be sold, redistributed or otherwise used for commercial purposes  All illustrations and images included in CareNotes® are the copyrighted property of A D A M , Inc  or Daphnie Alexander  The above information is an  only  It is not intended as medical advice for individual conditions or treatments  Talk to your doctor, nurse or pharmacist before following any medical regimen to see if it is safe and effective for you

## 2022-03-19 NOTE — PROGRESS NOTES
[address] [facility]        NAME: Marnie Brown is a 62 y o  female  : 1960    MRN: [id number]  DATE: 2022  TIME: 8:53 AM      Assessment and Plan     Acute cough [R05 1]  1  Acute cough  Covid19 and INFLUENZA A/B PCR     Discussed importance of delayed antibiotic therapy d/t pending covid/influenza, if your covid/influenza test is negative and your symptoms are not improving, you may start the antibiotic  If your covid/influenza are positive, my recommendation is to hold on antibiotic until PCP f/u  Pt educated and verbalizes understanding  o  Patient Instructions   Discussed importance of delayed antibiotic therapy, if your covid/influenza test is negative and your symptoms are not improving, you may start the antibiotic  If your covid/influenza are positive, my recommendation is to hold on antibiotic until PCP f/u  Use benzonatate as directed as needed for cough  Use albuterol as directed, as needed for shortness of breath and wheezing  Take antibiotic as prescribed  Recommend probiotic use while taking antibiotic  Hydration and rest   Acetaminophenfor pain and fever  COVID/influenza testing  Use the [facility] MyChart to obtain lab results  Wear your mask and wash hands often  PCP follow up in 3-5 days  Go to an emergency department if difficulty breathing occurs or if symptoms worsen  Recommended supplements for potential COVID-19 is the following: Vitamin D3 2000 IU  daily ,  Vitamin C 1g  every 12 hours , Multivitamin Daily         Chief Complaint     Chief Complaint   Patient presents with    Cold Like Symptoms     yesterday: started woth loss of voice, harsh dry cough, unknown fever & chills, faitgue/bodyaches, headache, chest congestion, sneezing, & b l inner ear discomfort  History of Present Illness     Patient is a 60-year-old female who presents with dry cough, fever, chills, sneezing, headache, body aches, and chest congestion for one day   Reports bilateral ear pain and chest congestion  Denies N/V/D  Denies SOB  Reports asthma history, states she does not have an inhaler at home  Denies smoking history  Denies DM history  Patient states she is no longer getting chemo/immuno treatments  States she did take two at home covid-19 tests that were both negative  Review of Systems     Review of Systems   Constitutional: Positive for chills, fatigue and fever  HENT: Positive for ear pain, sneezing, sore throat (scratchy) and voice change (loss of voice)  Respiratory: Positive for cough (dry)  Chest congestion     Musculoskeletal: Positive for myalgias  Neurological: Positive for headaches  All other systems reviewed and are negative          Current Medications       Current Outpatient Medications:     anastrozole (ARIMIDEX) 1 mg tablet, Take 1 tablet (1 mg total) by mouth daily, Disp: 90 tablet, Rfl: 3    ascorbic acid (VITAMIN C) 250 mg tablet, Take 250 mg by mouth daily, Disp: , Rfl:     Biotin 1 MG CAPS, Take by mouth, Disp: , Rfl:     Calcium 600 MG tablet, Take 1 tablet by mouth every morning , Disp: , Rfl:     Cholecalciferol (VITAMIN D3) 1000 units CAPS, Take 1 capsule by mouth every morning , Disp: , Rfl:     Cranberry 1000 MG CAPS, Take 1 capsule by mouth every morning , Disp: , Rfl:     triamcinolone (KENALOG) 0 1 % cream, Apply topically 2 (two) times a day (Patient not taking: Reported on 3/19/2022 ), Disp: 30 g, Rfl: 0    Current Allergies     Allergies as of 03/19/2022 - Reviewed 03/19/2022   Allergen Reaction Noted    Ofloxacin Hives 03/23/2016    Penicillins Hives 03/23/2016    Vancomycin Hives 03/08/2021    Chlorhexidine Rash 10/31/2020              The following portions of the patient's history were reviewed and updated as appropriate: allergies, current medications, past family history, past medical history, past social history, past surgical history and problem list      Past Medical History:   Diagnosis Date  Asthma     has not been treated for seveeral years  exercise induced    BRCA1 negative     Breast cancer ([address] ) 10/2020    left sided    Cancer [facility])     breast  9/20    Cellulitis of left upper extremity 2/23/2021    Exercise-induced asthma 11/3/2016    History of chemotherapy 11/2020    Hx of radiation therapy 03/2021    Urinary tract infection        Past Surgical History:   Procedure Laterality Date    BREAST CYST EXCISION Left     benign    BREAST CYST EXCISION Left     benign    BREAST SURGERY      CHOLECYSTECTOMY      HYSTERECTOMY      IR PORT PLACEMENT  10/26/2020    IR PORT REMOVAL  2/4/2021    MAMMO NEEDLE LOCALIZATION LEFT (ALL INC) Left 10/2/2020    MOUTH SURGERY      NM MASTECTOMY, PARTIAL Left 10/2/2020    Procedure: BREAST NEEDLE LOCALIZED LUMPECTOMY, SENTINEL LYMPH NODE BIOPSY (Bracketed U/S guided needle loc @ 8:00, INJECT AT 1045); Surgeon: Ary Najjar, MD;  Location: [facility] MAIN OR;  Service: General    TUBAL LIGATION      US GUIDED BREAST BIOPSY LEFT COMPLETE Left 9/11/2020    WISDOM TOOTH EXTRACTION         Family History   Problem Relation Age of Onset    Hypertension Mother     Heart disease Mother         cardiac disorder    Breast cancer Mother         dx age early 19's    Glaucoma Mother     Hypertension Father     Heart disease Father         cardiac disorder    Cancer Father     No Known Problems Sister     Multiple sclerosis Daughter    [facility] Melanoma Daughter     No Known Problems Son     Breast cancer Paternal Aunt          Medications have been verified  Objective     /78   Pulse 72   Temp 98 7 °F (37 1 °C)   Resp 18   LMP  (LMP Unknown)   SpO2 100%   No LMP recorded (lmp unknown)  Patient has had a hysterectomy  Physical Exam     Physical Exam  Vitals and nursing note reviewed  Constitutional:       General: She is awake  She is not in acute distress  Appearance: Normal appearance   She is not ill-appearing, toxic-appearing or diaphoretic  HENT:      Right Ear: Ear canal and external ear normal  A middle ear effusion is present  Tympanic membrane is not injected or erythematous  Left Ear: Ear canal and external ear normal  A middle ear effusion is present  Tympanic membrane is not injected or erythematous  Nose: No mucosal edema, congestion or rhinorrhea  Mouth/Throat:      Lips: Pink  Mouth: Mucous membranes are moist       Pharynx: Oropharynx is clear  Uvula midline  No pharyngeal swelling, oropharyngeal exudate, posterior oropharyngeal erythema or uvula swelling  Tonsils: No tonsillar exudate or tonsillar abscesses  1+ on the right  1+ on the left  Comments: Hoarse voice noted throughout examination  Cardiovascular:      Rate and Rhythm: Normal rate  Pulses: Normal pulses  Heart sounds: Normal heart sounds, S1 normal and S2 normal    Pulmonary:      Effort: Pulmonary effort is normal       Breath sounds: Normal breath sounds and air entry  No stridor, decreased air movement or transmitted upper airway sounds  No decreased breath sounds, wheezing, rhonchi or rales  Comments: Dry cough noted throughout examination  Skin:     General: Skin is warm  Capillary Refill: Capillary refill takes less than 2 seconds  Neurological:      Mental Status: She is alert  Psychiatric:         Mood and Affect: Mood normal          Behavior: Behavior normal          Thought Content:  Thought content normal          Judgment: Judgment normal

## 2022-03-20 LAB
FLUAV RNA RESP QL NAA+PROBE: NEGATIVE
FLUBV RNA RESP QL NAA+PROBE: NEGATIVE
SARS-COV-2 RNA RESP QL NAA+PROBE: NEGATIVE

## 2022-06-14 ENCOUNTER — VBI (OUTPATIENT)
Dept: ADMINISTRATIVE | Facility: OTHER | Age: 62
End: 2022-06-14

## 2022-08-09 ENCOUNTER — HOSPITAL ENCOUNTER (OUTPATIENT)
Dept: MAMMOGRAPHY | Facility: HOSPITAL | Age: 62
Discharge: HOME | End: 2022-08-09
Attending: SURGERY
Payer: COMMERCIAL

## 2022-08-09 ENCOUNTER — HOSPITAL ENCOUNTER (OUTPATIENT)
Dept: ULTRASOUND IMAGING | Facility: HOSPITAL | Age: 62
Discharge: HOME | End: 2022-08-09
Attending: SURGERY
Payer: COMMERCIAL

## 2022-08-09 VITALS — BODY MASS INDEX: 28.35 KG/M2 | HEIGHT: 63 IN | WEIGHT: 160 LBS

## 2022-08-09 DIAGNOSIS — C50.412 PRIMARY CANCER OF UPPER OUTER QUADRANT OF LEFT FEMALE BREAST (HCC): ICD-10-CM

## 2022-08-09 DIAGNOSIS — Z09 ENCOUNTER FOR FOLLOW-UP: ICD-10-CM

## 2022-08-09 PROCEDURE — G0279 TOMOSYNTHESIS, MAMMO: HCPCS

## 2022-08-09 PROCEDURE — 77066 DX MAMMO INCL CAD BI: CPT

## 2022-08-17 ENCOUNTER — OFFICE VISIT (OUTPATIENT)
Dept: SURGERY | Facility: CLINIC | Age: 62
End: 2022-08-17
Payer: COMMERCIAL

## 2022-08-17 VITALS
HEART RATE: 73 BPM | OXYGEN SATURATION: 98 % | RESPIRATION RATE: 18 BRPM | DIASTOLIC BLOOD PRESSURE: 64 MMHG | TEMPERATURE: 97 F | SYSTOLIC BLOOD PRESSURE: 110 MMHG

## 2022-08-17 DIAGNOSIS — C50.412 PRIMARY CANCER OF UPPER OUTER QUADRANT OF LEFT FEMALE BREAST (HCC): ICD-10-CM

## 2022-08-17 DIAGNOSIS — L82.0 INFLAMED SEBORRHEIC KERATOSIS: Primary | ICD-10-CM

## 2022-08-17 PROCEDURE — 88305 TISSUE EXAM BY PATHOLOGIST: CPT | Performed by: PATHOLOGY

## 2022-08-17 PROCEDURE — 11403 EXC TR-EXT B9+MARG 2.1-3CM: CPT | Performed by: SURGERY

## 2022-08-17 PROCEDURE — 99213 OFFICE O/P EST LOW 20 MIN: CPT | Performed by: SURGERY

## 2022-08-17 NOTE — ASSESSMENT & PLAN NOTE
Patient has 3 inflamed seborrheic keratoses on the left midback  The upper lesion measures 5 mm in diameter the inner lesion measures 8 mm in diameter and the outer lesion measures 7 mm in diameter  They are amenable to excision here in the office under local   Procedure explained to the patient and informed verbal consent obtained to proceed

## 2022-08-17 NOTE — ASSESSMENT & PLAN NOTE
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 in October 2020 with breast conserving therapy  Patient voiced no complaints referable to her breasts  Her only concern is that of inflamed seborrheic keratoses on her left back at the bra line  On physical examination she is a pleasant well-nourished well-developed female no acute distress  Awake alert oriented  Competent reliable as a historian  She has no cervical supraclavicular axillary lymphadenopathy bilaterally  Right breast is normal to palpation  Left breast shows no signs of locally recurrent disease  She has a well-healed scar laterally  This is smooth and flat  She has post radiation changes noted on the skin of the left breast     The patient has bilateral mammogram is a BI-RADS 2 study  Patient appears clinically and radiographically stable with regards to her history of stage II B left breast carcinoma status post breast conserving therapy October of 2020  I look for to seeing her back following a bilateral mammogram after August 9, 2023  She has been encouraged to follow up sooner on an as-needed basis

## 2022-08-17 NOTE — PROGRESS NOTES
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 in October 2020 with breast conserving therapy  Patient voiced no complaints referable to her breasts  Her only concern is that of inflamed seborrheic keratoses on her left back at the bra line  On physical examination she is a pleasant well-nourished well-developed female no acute distress  Awake alert oriented  Competent reliable as a historian  She has no cervical supraclavicular axillary lymphadenopathy bilaterally  Right breast is normal to palpation  Left breast shows no signs of locally recurrent disease  She has a well-healed scar laterally  This is smooth and flat  She has post radiation changes noted on the skin of the left breast     The patient has bilateral mammogram is a BI-RADS 2 study  Patient appears clinically and radiographically stable with regards to her history of stage II B left breast carcinoma status post breast conserving therapy October of 2020  I look for to seeing her back following a bilateral mammogram after August 9, 2023  She has been encouraged to follow up sooner on an as-needed basis  Inflamed seborrheic keratosis  Patient has 3 inflamed seborrheic keratoses on the left midback  The upper lesion measures 5 mm in diameter the inner lesion measures 8 mm in diameter and the outer lesion measures 7 mm in diameter  They are amenable to excision here in the office under local   Procedure explained to the patient and informed verbal consent obtained to proceed  Subjective:      Patient ID: Domenica Castillo is a 62 y o  female  Pt is coming for F/u after bilat mammo after 8/3/2022 not having pain and no discharge   Pt would like two moles on the back looked at poss removal HARDEEP Winter MA       The following portions of the patient's history were reviewed and updated as appropriate: allergies, current medications, past family history, past medical history, past social history, past surgical history and problem list     Review of Systems   Constitutional: Negative for chills and fever  HENT: Negative for ear pain and sore throat  Eyes: Negative for pain and visual disturbance  Respiratory: Negative for cough and shortness of breath  Cardiovascular: Negative for chest pain and palpitations  Gastrointestinal: Negative for abdominal pain and vomiting  Genitourinary: Negative for dysuria and hematuria  Musculoskeletal: Negative for arthralgias and back pain  Skin: Negative for color change and rash  Neurological: Negative for seizures and syncope  All other systems reviewed and are negative  Objective:      /64 (BP Location: Left arm, Patient Position: Sitting, Cuff Size: Large)   Pulse 73   Temp (!) 97 °F (36 1 °C) (Temporal)   Resp 18   LMP  (LMP Unknown)   SpO2 98%            Physical Exam  Constitutional:       Appearance: She is well-developed  HENT:      Head: Normocephalic and atraumatic  Eyes:      Conjunctiva/sclera: Conjunctivae normal       Pupils: Pupils are equal, round, and reactive to light  Cardiovascular:      Rate and Rhythm: Normal rate and regular rhythm  Pulmonary:      Effort: Pulmonary effort is normal       Breath sounds: Normal breath sounds  Abdominal:      General: Bowel sounds are normal       Palpations: Abdomen is soft  Musculoskeletal:         General: Normal range of motion  Cervical back: Normal range of motion and neck supple  Skin:     General: Skin is warm and dry  Comments: Breast and skin exam as described above   Neurological:      Mental Status: She is alert and oriented to person, place, and time  Psychiatric:         Behavior: Behavior normal          Thought Content:  Thought content normal          Judgment: Judgment normal  Skin excision    Date/Time: 8/17/2022 2:31 PM  Performed by: Juliet Bird MD  Authorized by: Juliet Bird MD   Universal Protocol:  Consent: Verbal consent obtained  Risks and benefits: risks, benefits and alternatives were discussed  Consent given by: patient  Timeout called at: 8/17/2022 2:31 PM   Patient understanding: patient states understanding of the procedure being performed  Patient consent: the patient's understanding of the procedure matches consent given  Site marked: the operative site was marked  Patient identity confirmed: verbally with patient      Procedure Details - Skin Excision:     Number of Lesions:  3  Lesion 1:     Body area:  Trunk    Trunk location:  Back       Final defect size (mm):  5    Malignancy: benign lesion      Repair type:  Linear closure    Graft type: full-thickness      Closure complexity: simple       Repair Comments: Procedure note: With informed verbal consent under sterile conditions using aseptic technique using 1% lidocaine local anesthesia with epinephrine and bicarbonate the upper left back keratosis was sharply excised with a 15 blade and the wound closed primarily with vertical mattress sutures 3-0 nylon  The exact same procedure was replicated on the left in her back lesion and the outer left back lesion  The patient tolerated the procedure well    Lesion 2:     Body area:  Trunk        Final defect size (mm):  8    Malignancy: benign lesion      Repair type:  Linear closure    Graft type: full-thickness      Closure complexity: simple    Lesion 3:     Body area:  Trunk    Trunk location:  Back        Final defect size (mm):  7    Malignancy: benign lesion      Repair type:  Linear closure    Graft type: full-thickness      Closure complexity: simple

## 2022-08-17 NOTE — LETTER
August 17, 2022     Candida Oden, [address]     Patient: Jerris Severin   YOB: 1960   Date of Visit: 8/17/2022       Dear Dr Timi Wade:    Thank you for referring Jerris Severin to me for evaluation  Below are my notes for this consultation  If you have questions, please do not hesitate to call me  I look forward to following your patient along with you  Sincerely,        Christine Rosario MD        CC: No Recipients  Christine Rosario MD  8/17/2022  2:34 PM  Sign when Signing Visit  Assessment/Plan:    Primary cancer of upper outer quadrant of left female breast 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 in October 2020 with breast conserving therapy  Patient voiced no complaints referable to her breasts  Her only concern is that of inflamed seborrheic keratoses on her left back at the bra line  On physical examination she is a pleasant well-nourished well-developed female no acute distress  Awake alert oriented  Competent reliable as a historian  She has no cervical supraclavicular axillary lymphadenopathy bilaterally  Right breast is normal to palpation  Left breast shows no signs of locally recurrent disease  She has a well-healed scar laterally  This is smooth and flat  She has post radiation changes noted on the skin of the left breast     The patient has bilateral mammogram is a BI-RADS 2 study  Patient appears clinically and radiographically stable with regards to her history of stage II B left breast carcinoma status post breast conserving therapy October of 2020  I look for to seeing her back following a bilateral mammogram after August 9, 2023  She has been encouraged to follow up sooner on an as-needed basis      Inflamed seborrheic keratosis  Patient has 3 inflamed seborrheic keratoses on the left midback  The upper lesion measures 5 mm in diameter the inner lesion measures 8 mm in diameter and the outer lesion measures 7 mm in diameter  They are amenable to excision here in the office under local   Procedure explained to the patient and informed verbal consent obtained to proceed  Subjective:      Patient ID: Barbie Mesa is a 62 y o  female  Pt is coming for F/u after bilat mammo after 8/3/2022 not having pain and no discharge  Pt would like two moles on the back looked at poss HARDEEP Tovar MA       The following portions of the patient's history were reviewed and updated as appropriate: allergies, current medications, past family history, past medical history, past social history, past surgical history and problem list     Review of Systems   Constitutional: Negative for chills and fever  HENT: Negative for ear pain and sore throat  Eyes: Negative for pain and visual disturbance  Respiratory: Negative for cough and shortness of breath  Cardiovascular: Negative for chest pain and palpitations  Gastrointestinal: Negative for abdominal pain and vomiting  Genitourinary: Negative for dysuria and hematuria  Musculoskeletal: Negative for arthralgias and back pain  Skin: Negative for color change and rash  Neurological: Negative for seizures and syncope  All other systems reviewed and are negative  Objective:      /64 (BP Location: Left arm, Patient Position: Sitting, Cuff Size: Large)   Pulse 73   Temp (!) 97 °F (36 1 °C) (Temporal)   Resp 18   LMP  (LMP Unknown)   SpO2 98%            Physical Exam  Constitutional:       Appearance: She is well-developed  HENT:      Head: Normocephalic and atraumatic  Eyes:      Conjunctiva/sclera: Conjunctivae normal       Pupils: Pupils are equal, round, and reactive to light  Cardiovascular:      Rate and Rhythm: Normal rate and regular rhythm     Pulmonary:      Effort: Pulmonary effort is normal       Breath sounds: Normal breath sounds  Abdominal:      General: Bowel sounds are normal       Palpations: Abdomen is soft  Musculoskeletal:         General: Normal range of motion  Cervical back: Normal range of motion and neck supple  Skin:     General: Skin is warm and dry  Comments: Breast and skin exam as described above   Neurological:      Mental Status: She is alert and oriented to person, place, and time  Psychiatric:         Behavior: Behavior normal          Thought Content: Thought content normal          Judgment: Judgment normal        Skin excision    Date/Time: 8/17/2022 2:31 PM  Performed by: Carina Springer MD  Authorized by: Carina Springer MD   Universal Protocol:  Consent: Verbal consent obtained  Risks and benefits: risks, benefits and alternatives were discussed  Consent given by: patient  Timeout called at: 8/17/2022 2:31 PM   Patient understanding: patient states understanding of the procedure being performed  Patient consent: the patient's understanding of the procedure matches consent given  Site marked: the operative site was marked  Patient identity confirmed: verbally with patient      Procedure Details - Skin Excision:     Number of Lesions:  3  Lesion 1:     Body area:  Trunk    Trunk location:  Back       Final defect size (mm):  5    Malignancy: benign lesion      Repair type:  Linear closure    Graft type: full-thickness      Closure complexity: simple       Repair Comments: Procedure note: With informed verbal consent under sterile conditions using aseptic technique using 1% lidocaine local anesthesia with epinephrine and bicarbonate the upper left back keratosis was sharply excised with a 15 blade and the wound closed primarily with vertical mattress sutures 3-0 nylon  The exact same procedure was replicated on the left in her back lesion and the outer left back lesion  The patient tolerated the procedure well    Lesion 2:     Body area:  Trunk        Final defect size (mm):  8    Malignancy: benign lesion      Repair type:  Linear closure    Graft type: full-thickness      Closure complexity: simple    Lesion 3:     Body area:  Trunk    Trunk location:  Back        Final defect size (mm):  7    Malignancy: benign lesion      Repair type:  Linear closure    Graft type: full-thickness      Closure complexity: simple

## 2022-08-22 ENCOUNTER — APPOINTMENT (OUTPATIENT)
Dept: LAB | Facility: CLINIC | Age: 62
End: 2022-08-22
Payer: COMMERCIAL

## 2022-08-22 DIAGNOSIS — M85.852 OSTEOPENIA OF LEFT HIP: ICD-10-CM

## 2022-08-22 DIAGNOSIS — Z79.810 LONG TERM CURRENT USE OF SELECTIVE ESTROGEN RECEPTOR MODULATORS (SERMS): ICD-10-CM

## 2022-08-22 DIAGNOSIS — C50.412 PRIMARY CANCER OF UPPER OUTER QUADRANT OF LEFT FEMALE BREAST (HCC): ICD-10-CM

## 2022-08-22 LAB
ALBUMIN SERPL BCP-MCNC: 3.5 G/DL (ref 3.5–5)
ALP SERPL-CCNC: 87 U/L (ref 46–116)
ALT SERPL W P-5'-P-CCNC: 28 U/L (ref 12–78)
ANION GAP SERPL CALCULATED.3IONS-SCNC: 3 MMOL/L (ref 4–13)
AST SERPL W P-5'-P-CCNC: 26 U/L (ref 5–45)
BASOPHILS # BLD AUTO: 0.07 THOUSANDS/ΜL (ref 0–0.1)
BASOPHILS NFR BLD AUTO: 1 % (ref 0–1)
BILIRUB SERPL-MCNC: 0.7 MG/DL (ref 0.2–1)
BUN SERPL-MCNC: 11 MG/DL (ref 5–25)
CALCIUM SERPL-MCNC: 8.9 MG/DL (ref 8.3–10.1)
CHLORIDE SERPL-SCNC: 113 MMOL/L (ref 96–108)
CO2 SERPL-SCNC: 24 MMOL/L (ref 21–32)
CREAT SERPL-MCNC: 0.82 MG/DL (ref 0.6–1.3)
EOSINOPHIL # BLD AUTO: 0.16 THOUSAND/ΜL (ref 0–0.61)
EOSINOPHIL NFR BLD AUTO: 2 % (ref 0–6)
ERYTHROCYTE [DISTWIDTH] IN BLOOD BY AUTOMATED COUNT: 14.4 % (ref 11.6–15.1)
GFR SERPL CREATININE-BSD FRML MDRD: 77 ML/MIN/1.73SQ M
GLUCOSE P FAST SERPL-MCNC: 100 MG/DL (ref 65–99)
HCT VFR BLD AUTO: 43.7 % (ref 34.8–46.1)
HGB BLD-MCNC: 13.4 G/DL (ref 11.5–15.4)
IMM GRANULOCYTES # BLD AUTO: 0.03 THOUSAND/UL (ref 0–0.2)
IMM GRANULOCYTES NFR BLD AUTO: 0 % (ref 0–2)
LYMPHOCYTES # BLD AUTO: 2.01 THOUSANDS/ΜL (ref 0.6–4.47)
LYMPHOCYTES NFR BLD AUTO: 29 % (ref 14–44)
MAGNESIUM SERPL-MCNC: 3.1 MG/DL (ref 1.6–2.6)
MCH RBC QN AUTO: 29.1 PG (ref 26.8–34.3)
MCHC RBC AUTO-ENTMCNC: 30.7 G/DL (ref 31.4–37.4)
MCV RBC AUTO: 95 FL (ref 82–98)
MONOCYTES # BLD AUTO: 0.55 THOUSAND/ΜL (ref 0.17–1.22)
MONOCYTES NFR BLD AUTO: 8 % (ref 4–12)
NEUTROPHILS # BLD AUTO: 4.11 THOUSANDS/ΜL (ref 1.85–7.62)
NEUTS SEG NFR BLD AUTO: 60 % (ref 43–75)
NRBC BLD AUTO-RTO: 0 /100 WBCS
PLATELET # BLD AUTO: 224 THOUSANDS/UL (ref 149–390)
PMV BLD AUTO: 9.4 FL (ref 8.9–12.7)
POTASSIUM SERPL-SCNC: 4.8 MMOL/L (ref 3.5–5.3)
PROT SERPL-MCNC: 7.4 G/DL (ref 6.4–8.4)
RBC # BLD AUTO: 4.6 MILLION/UL (ref 3.81–5.12)
SODIUM SERPL-SCNC: 140 MMOL/L (ref 135–147)
WBC # BLD AUTO: 6.93 THOUSAND/UL (ref 4.31–10.16)

## 2022-08-22 PROCEDURE — 85025 COMPLETE CBC W/AUTO DIFF WBC: CPT

## 2022-08-22 PROCEDURE — 80053 COMPREHEN METABOLIC PANEL: CPT

## 2022-08-22 PROCEDURE — 36415 COLL VENOUS BLD VENIPUNCTURE: CPT

## 2022-08-22 PROCEDURE — 83735 ASSAY OF MAGNESIUM: CPT

## 2022-08-25 ENCOUNTER — TELEPHONE (OUTPATIENT)
Dept: HEMATOLOGY ONCOLOGY | Facility: CLINIC | Age: 62
End: 2022-08-25

## 2022-08-25 ENCOUNTER — OFFICE VISIT (OUTPATIENT)
Dept: HEMATOLOGY ONCOLOGY | Facility: CLINIC | Age: 62
End: 2022-08-25
Payer: COMMERCIAL

## 2022-08-25 ENCOUNTER — APPOINTMENT (OUTPATIENT)
Dept: LAB | Facility: CLINIC | Age: 62
End: 2022-08-25
Payer: COMMERCIAL

## 2022-08-25 VITALS
DIASTOLIC BLOOD PRESSURE: 76 MMHG | SYSTOLIC BLOOD PRESSURE: 124 MMHG | HEART RATE: 70 BPM | WEIGHT: 167 LBS | HEIGHT: 63 IN | OXYGEN SATURATION: 99 % | RESPIRATION RATE: 18 BRPM | BODY MASS INDEX: 29.59 KG/M2 | TEMPERATURE: 98.4 F

## 2022-08-25 DIAGNOSIS — Z79.811 AROMATASE INHIBITOR USE: ICD-10-CM

## 2022-08-25 DIAGNOSIS — C50.412 PRIMARY CANCER OF UPPER OUTER QUADRANT OF LEFT FEMALE BREAST (HCC): Primary | ICD-10-CM

## 2022-08-25 DIAGNOSIS — C50.412 PRIMARY CANCER OF UPPER OUTER QUADRANT OF LEFT FEMALE BREAST (HCC): ICD-10-CM

## 2022-08-25 DIAGNOSIS — M85.852 OSTEOPENIA OF LEFT HIP: ICD-10-CM

## 2022-08-25 DIAGNOSIS — E83.41 HYPERMAGNESEMIA: ICD-10-CM

## 2022-08-25 DIAGNOSIS — Z51.11 ENCOUNTER FOR CHEMOTHERAPY MANAGEMENT: Primary | ICD-10-CM

## 2022-08-25 DIAGNOSIS — E55.9 VITAMIN D DEFICIENCY: ICD-10-CM

## 2022-08-25 DIAGNOSIS — Z51.11 ENCOUNTER FOR CHEMOTHERAPY MANAGEMENT: ICD-10-CM

## 2022-08-25 LAB — MAGNESIUM SERPL-MCNC: 2.7 MG/DL (ref 1.6–2.6)

## 2022-08-25 PROCEDURE — 99214 OFFICE O/P EST MOD 30 MIN: CPT | Performed by: NURSE PRACTITIONER

## 2022-08-25 PROCEDURE — 36415 COLL VENOUS BLD VENIPUNCTURE: CPT

## 2022-08-25 PROCEDURE — 83735 ASSAY OF MAGNESIUM: CPT

## 2022-08-25 RX ORDER — ANASTROZOLE 1 MG/1
1 TABLET ORAL DAILY
Qty: 90 TABLET | Refills: 3 | Status: SHIPPED | OUTPATIENT
Start: 2022-08-25

## 2022-08-25 RX ORDER — EPINEPHRINE 0.3 MG/.3ML
0.3 INJECTION SUBCUTANEOUS ONCE
Qty: 0.6 ML | Refills: 0 | Status: SHIPPED | OUTPATIENT
Start: 2022-08-25 | End: 2022-08-25 | Stop reason: SDUPTHER

## 2022-08-25 RX ORDER — EPINEPHRINE 0.3 MG/.3ML
0.3 INJECTION SUBCUTANEOUS ONCE
Qty: 0.6 ML | Refills: 1 | Status: SHIPPED | OUTPATIENT
Start: 2022-08-25 | End: 2022-09-22

## 2022-08-25 NOTE — TELEPHONE ENCOUNTER
Order for prolia and epinephrine pended to [facility] to be faxed to [facility] at [phone]    Patient aware

## 2022-08-25 NOTE — TELEPHONE ENCOUNTER
CALL TRANSFER   Reason for patient call? Patient was seen by Dr Jo De nAda and he gave a script for Prolia  She was informed he needs to send authorization to pharmacy   Patient's primary physician? Dr Jo De Anda   RN call was transferred to and time it was transferred? Toby Najera @ 12:56pm   Informed patient that the message will be forwarded to the team and someone will get back to them as soon as possible    Did you relay this information to the patient?  yes

## 2022-08-25 NOTE — PROGRESS NOTES
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 [facility])  Patient will be continued on her endocrine treatment with anastrozole on a daily basis which she is tolerating well  She is up-to-date with her mammography which was done this week and read as biRADS2  She does continue to follow-up with her breast surgeon on a regular basis and get her breast imaging as per his discretion  We will continue to monitor her laboratory studies on a regular basis  Encouraged her to continue to take her calcium with vitamin-D supplements, stay active/exercise on a regular basis and consume a healthy low-fat well-balanced diet with a lot of plant based nutrition in her diet  Request she follow up again with repeat labs in 6 months or sooner should the need arise     - CBC and differential; Future  - Comprehensive metabolic panel; Future  - Vitamin D 25 hydroxy; Future  - anastrozole (ARIMIDEX) 1 mg tablet; Take 1 tablet (1 mg total) by mouth daily  Dispense: 90 tablet; Refill: 3  - denosumab (PROLIA) 60 mg/mL; Inject 1 mL (60 mg total) under the skin once for 1 dose Every 6 months  Dispense: 1 mL; Refill: 1    2  Aromatase inhibitor use  As above  She will continue her Prolia injections on an every six-month basis which are being administered at home per [facility] protocol  She states that her next 1 will be due September 2022  She was provided with paper script for Prolia as per her request     - anastrozole (ARIMIDEX) 1 mg tablet; Take 1 tablet (1 mg total) by mouth daily  Dispense: 90 tablet; Refill: 3  - denosumab (PROLIA) 60 mg/mL; Inject 1 mL (60 mg total) under the skin once for 1 dose Every 6 months  Dispense: 1 mL; Refill: 1    3  Osteopenia of left hip  - denosumab (PROLIA) 60 mg/mL;  Inject 1 mL (60 mg total) under the skin once for 1 dose Every 6 months  Dispense: 1 mL; Refill: 1    4  Vitamin D deficiency  - Magnesium; Future    5  Hypermagnesemia  Patient's most recent laboratory study showed elevated magnesium 3 1 however specimen was noted to be hemolyzed and likely inaccurate  She did state that it was a difficult stick  She is not taking any magnesium supplements  Will repeat her magnesium level in a week or so for completeness sake  Encouraged her to hydrate herself well before she goes to the lab which is nonfasting     - Magnesium; Future  - Magnesium; Future    HPI:  Patient presents today for a follow-up visit  She states that they were planning to move to [place] but plans have been put on hold due to the current state of the market  She mentions that she is going to be leaving in a month to be with her daughter as she will be pursuing prophylactic mastectomy and oophorectomy  Her daughter was also found to have chek mutation with history of multiple melanoma skin lesions  She personally had a few skin lesions removed when she saw her surgeon the other day which seem to be benign  Is up-to-date with her mammography her mammogram 08/09/2022 showed biRADS2 on the left side and birads 1 on the right  She continues to take her anastrozole on a daily basis which she is tolerating well; only adverse effect is that she developed hot flash about 1 hour after dosing  Her most recent laboratory studies from 08/22/2022 showed essentially normal CBC and CMP  Her magnesium was elevated 3 1 however it was noted specimen was hemolyzed and could be inaccurate  Oncology History Overview Note   Patient with a family history of breast cancer in her mother  She had two previous benign breast biopsies  She was going for yearly mammograms and most recent in August showed focal asymmetry in the upper outer posterior left breast  A biopsy on 9/11/20 revealed left invasive ductal breast carcinoma   She saw General Surgeon, Dr Lacey Torres in September for surgical evaluation  8/12/20 Screening Mammogram bilateral w 3d & cad   IMPRESSION:  1  Upper outer posterior left breast focal asymmetry  Diagnostic mammography, with possible ultrasound, recommended  2  Stable right mammogram      9/4/20 Mammo diagnostic left w 3d & cad   US breast left limited (diagnostic)   FINDINGS:   LEFT  1) MASS  Mammo diagnostic left w 3d & cad: There is a 43 mm transverse x 26 mm AP x 23 mm craniocaudal high density, irregularly shaped mass seen in the upper outer quadrant of the left breast at 1 o'clock in the posterior depth  There is a possible satellite lesions slightly inferior to the mass  If measured as 1 finding, it measures 32 mm craniocaudal   The mass correlates with the prior mammogram finding  US breast left limited (diagnostic): There is and irregularly shaped, hypoechoic mass with indistinct margins with shadowing seen in the upper outer quadrant of the left breast at 1 o'clock in the posterior depth  The mass measures at least 23 x 11 x 10 mm and the size may be underestimated on ultrasound  The mass correlates with the prior mammogram finding  This is highly suspicious and ultrasound-guided core needle biopsy is recommended  Targeted ultrasound of the left axilla was performed  At least 1 morphologically benign lymph node was visualized  No morphologically abnormal lymph nodes were visualized  ASSESSMENT/BI-RADS CATEGORY:  Left: 4C - High Suspicion for Malignancy  Overall: 4 - Suspicious     9/11/20 Left breast US guided biopsy, at 1:00 12cmfn  Invasive mammary carcinoma of no special type Grade 3  ER/MT 90-95% positive, HER2 negative  Lymphovascular invasion present     9/28/20 Breast working group recommended treatment plan: Lumpectomy with SLNB followed by adjuvant radiation therapy  Genetic testing consult       10/2/20 Left breast Lumpectomy  Invasive breast carcinoma of no special type, 21 mm focus  Grade 3  DCIS present in 2 out of 17 blocks  Margins negative for invasive carcinoma, 1 mm from the closest posterior margin  Negative for DCIS 1 mm from the closest anterior margin  Lymph-vascular invasion present     Lymph nodes:  Left sentinel lymph node excision: 1 lymph node positive for carcinoma  Left axillary LN excision: 1 reactive lymph node     Primary cancer of upper outer quadrant of left female breast ([facility] [address] )   9/2020 Initial Diagnosis    Primary cancer of upper outer quadrant of left female breast ([facility] [address] )  Stage IIB     9/11/2020 Biopsy    Breast, left at 1:00 12cmfn, US guided biopsy, 5 passes:  - Invasive mammary carcinoma of no special type (ductal, not otherwise specified)  -- [place] histologic grade 3 of 3 (total score: 8 of 9)       -- Invasive carcinoma involves 5 of 5 submitted core biopsies, max  Dimension= 14 mm     -- Estrogen, Progesterone 90-95% positive HER2 negative  - Lymphovascular invasion: Present  10/2/2020 Surgery    A  Breast, Left, Lumpectomy:  - Invasive breast carcinoma of no special type (ductal NST/invasive ductal carcinoma), 21 mm focus  * [place] grade 3 of 3 (total score: 9 of 9)   * Ductal carcinoma in situ (DCIS): Present in 2 out of 17 blocks  -- [place], solid and cribriform architectural pattern, nuclear grade 3 of 3     * Margins: Negative for invasive carcinoma, 1 mm from the closest posterior margin; Negative for DCIS, 1 mm from the closest anterior margin  * Skin: Negative for carcinoma  * Skeletal muscle: Negative for carcinoma  * Lymph-vascular invasion: Present  * Pathologic stage (AJCC 8th ed ): pT2, pN1a (sn)  B  [place] Lymph Node, Left # 1, Excision:  - One lymph node positive for carcinoma (1/1)  C  Axillary contents, Left, Excision:  - One reactive lymph node (0/1)       10/2/2020 -  Cancer Staged    Staging form: Breast, AJCC 8th Edition  - Clinical stage from 10/2/2020: Stage IIB (cT2, cN1(sn), cM0, G3, ER+, OH+, HER2-) - Signed by Carina Springer MD on 10/9/2020  Stage prefix: Initial diagnosis  Laterality: Left  Tumor size (mm): 21  Method of lymph node assessment: [place] lymph node biopsy  Nuclear grade: G3  Mitotic count score: Score 3  Percentage of tumor with tubule formation: 10  Tubule formation score: Score 3  Scarff-Bloom-Cevallos score: 9  Histologic grading system: 3 grade system  Lymph-vascular invasion (LVI): LVI present/identified, NOS  Specimen type: Excision  National guidelines used in treatment planning: Yes  Type of national guideline used in treatment planning: NCCN       11/4/2020 - 1/26/2021 Chemotherapy    Completed 4 cycles of adjuvant Taxotere plus Cytoxan   pegfilgrastim (NEULASTA) subcutaneous injection 6 mg, 6 mg, Subcutaneous, Once, 3 of 3 cycles  Administration: 6 mg (11/5/2020), 6 mg (11/25/2020), 6 mg (1/6/2021)  pegfilgrastim (NEULASTA ONPRO) subcutaneous injection kit 6 mg, 6 mg, Subcutaneous, Once, 1 of 1 cycle  Administration: 6 mg (12/15/2020)  cyclophosphamide (CYTOXAN) 1,068 mg in sodium chloride 0 9 % 250 mL IVPB, 600 mg/m2 = 1,068 mg, Intravenous, Once, 4 of 4 cycles  Administration: 1,068 mg (11/4/2020), 1,068 mg (11/24/2020), 1,068 mg (12/15/2020), 1,068 mg (1/5/2021)  DOCEtaxel (TAXOTERE) 140 mg in sodium chloride 0 9 % 250 mL chemo infusion, 133 6 mg, Intravenous, Once, 4 of 4 cycles  Administration: 140 mg (11/4/2020), 140 mg (11/24/2020), 140 mg (12/15/2020), 140 mg (1/5/2021)       11/2020 Genetic Testing    Patient has genetic testing done for breast cancer  Results revealed patient has the following mutation(s):  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), Heterozygous     2/9/2021 -  Hormone Therapy    Started anastrozole  Was also started on Prolia injections due to pre-existing osteopenia         Interval history:    ROS: Review of Systems   Constitutional: Negative for activity change, appetite change, chills, fatigue, fever and unexpected weight change     HENT: Negative for congestion, mouth sores, nosebleeds, sore throat and trouble swallowing  Eyes: Negative  Respiratory: Negative for cough, chest tightness and shortness of breath  Cardiovascular: Negative for chest pain, palpitations and leg swelling  Gastrointestinal: Negative for abdominal distention, abdominal pain, blood in stool, constipation, diarrhea, nausea and vomiting  Genitourinary: Negative for difficulty urinating, dysuria, frequency, hematuria and urgency  Musculoskeletal: Negative for arthralgias, back pain, gait problem, joint swelling and myalgias  Skin: Negative for color change, pallor and rash  Neurological: Negative for dizziness, weakness, light-headedness, numbness and headaches  Hematological: Negative for adenopathy  Does not bruise/bleed easily  Psychiatric/Behavioral: Negative for dysphoric mood and sleep disturbance  The patient is not nervous/anxious  Past Medical History:   Diagnosis Date    Asthma     has not been treated for seveeral years  exercise induced    BRCA1 negative     Breast cancer ([facility] [address] ) 10/2020    left sided    Cancer [facility])     breast  9/20    Cellulitis of left upper extremity 2/23/2021    Exercise-induced asthma 11/3/2016    History of chemotherapy 11/2020    Hx of radiation therapy 03/2021    Urinary tract infection        Past Surgical History:   Procedure Laterality Date    BREAST CYST EXCISION Left     benign    BREAST CYST EXCISION Left     benign    BREAST SURGERY      CHOLECYSTECTOMY      HYSTERECTOMY      IR PORT PLACEMENT  10/26/2020    IR PORT REMOVAL  2/4/2021    MAMMO NEEDLE LOCALIZATION LEFT (ALL INC) Left 10/2/2020    MOUTH SURGERY      AR MASTECTOMY, PARTIAL Left 10/2/2020    Procedure: BREAST NEEDLE LOCALIZED LUMPECTOMY, SENTINEL LYMPH NODE BIOPSY (Bracketed U/S guided needle loc @ 8:00, INJECT AT 1045);   Surgeon: Nj Frazier MD;  Location: [address] [place] [address];  Service: General    TUBAL LIGATION      US GUIDED BREAST BIOPSY LEFT COMPLETE Left 9/11/2020    ALEKSEY TOOTH EXTRACTION         Social History     Socioeconomic History    Marital status: /Civil Union     Spouse name: Not on file    Number of children: 2    Years of education: Not on file    Highest education level: Not on file   Occupational History    Occupation: Retired   Tobacco Use    Smoking status: Never Smoker    Smokeless tobacco: Never Used   Vaping Use    Vaping Use: Never used   Substance and Sexual Activity    Alcohol use: Never    Drug use: Never    Sexual activity: Yes     Partners: Male     Birth control/protection: Female Sterilization     Comment:  x 9 years, Sinda Sloop   Other Topics Concern    Not on file   Social History Narrative    Daily caffeine consumption, 1 serving a day    Exercises 3-4 times per week     x 8 years     Social Determinants of Health     Financial Resource Strain: Not on file   Food Insecurity: Not on file   Transportation Needs: Not on file   Physical Activity: Not on file   Stress: Not on file   Social Connections: Not on file   Intimate Partner Violence: Not on file   Housing Stability: Not on file       Family History   Problem Relation Age of Onset    Hypertension Mother     Heart disease Mother         cardiac disorder    Breast cancer Mother         dx age early 19's    Glaucoma Mother     Hypertension Father     Heart disease Father         cardiac disorder    Cancer Father     No Known Problems Sister     Multiple sclerosis Daughter     Melanoma Daughter     No Known Problems Son     No Known Problems Paternal Aunt        Allergies   Allergen Reactions    Ofloxacin Hives    Penicillins Hives    Vancomycin Hives     Pt asked to update her chart  She received this med when she was in the hospital - 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       Chlorhexidine Rash         Current Outpatient Medications:     anastrozole (ARIMIDEX) 1 mg tablet, Take 1 tablet (1 mg total) by mouth daily, Disp: 90 tablet, Rfl: 3    ascorbic acid (VITAMIN C) 250 mg tablet, Take 250 mg by mouth daily, Disp: , Rfl:     Biotin 1 MG CAPS, Take by mouth, Disp: , Rfl:     Calcium 600 MG tablet, Take 1 tablet by mouth every morning , Disp: , Rfl:     Cholecalciferol (VITAMIN D3) 1000 units CAPS, Take 1 capsule by mouth every morning , Disp: , Rfl:     triamcinolone (KENALOG) 0 1 % cream, Apply topically 2 (two) times a day, Disp: 30 g, Rfl: 0    albuterol (ProAir HFA) 90 mcg/act inhaler, Inhale 2 puffs every 6 (six) hours as needed for wheezing or shortness of breath (Patient not taking: Reported on 8/25/2022), Disp: 8 5 g, Rfl: 0      Physical Exam:  /76 (BP Location: Right arm, Patient Position: Sitting, Cuff Size: Adult)   Pulse 70   Temp 98 4 °F (36 9 °C)   Resp 18   Ht 5' 3" (1 6 m)   Wt 75 8 kg (167 lb)   LMP  (LMP Unknown)   SpO2 99%   BMI 29 58 kg/m²     Physical Exam  Vitals reviewed  Constitutional:       General: She is not in acute distress  Appearance: She is well-developed  She is not diaphoretic  HENT:      Head: Normocephalic and atraumatic  Eyes:      General: No scleral icterus  Conjunctiva/sclera: Conjunctivae normal       Pupils: Pupils are equal, round, and reactive to light  Neck:      Thyroid: No thyromegaly  Cardiovascular:      Rate and Rhythm: Normal rate and regular rhythm  Heart sounds: Normal heart sounds  No murmur heard  Pulmonary:      Effort: Pulmonary effort is normal  No respiratory distress  Breath sounds: Normal breath sounds  Chest:   Breasts:      Right: No axillary adenopathy  Left: No axillary adenopathy  Abdominal:      General: There is no distension  Palpations: Abdomen is soft  There is no hepatomegaly or splenomegaly  Tenderness: There is no abdominal tenderness  Musculoskeletal:         General: No swelling  Normal range of motion  Cervical back: Normal range of motion and neck supple  Lymphadenopathy:      Cervical: No cervical adenopathy        Upper Body:      Right upper body: No axillary adenopathy  Left upper body: No axillary adenopathy  Skin:     General: Skin is warm and dry  Findings: No erythema or rash  Neurological:      General: No focal deficit present  Mental Status: She is alert and oriented to person, place, and time  Psychiatric:         Mood and Affect: Mood and affect normal          Behavior: Behavior normal  Behavior is cooperative  Thought Content: Thought content normal          Judgment: Judgment normal            Labs:  Lab Results   Component Value Date    WBC 6 93 08/22/2022    HGB 13 4 08/22/2022    HCT 43 7 08/22/2022    MCV 95 08/22/2022     08/22/2022     Lab Results   Component Value Date    K 4 8 08/22/2022     (H) 08/22/2022    CO2 24 08/22/2022    BUN 11 08/22/2022    CREATININE 0 82 08/22/2022    GLUF 100 (H) 08/22/2022    CALCIUM 8 9 08/22/2022    AST 26 08/22/2022    ALT 28 08/22/2022    ALKPHOS 87 08/22/2022    EGFR 77 08/22/2022     Patient voiced understanding and agreement in the above discussion  Aware to contact our office with questions/symptoms in the interim  This note has been generated by voice recognition software system  Therefore, there may be spelling, grammar, and or syntax errors  Please contact if questions arise

## 2022-08-26 ENCOUNTER — TELEPHONE (OUTPATIENT)
Dept: HEMATOLOGY ONCOLOGY | Facility: CLINIC | Age: 62
End: 2022-08-26

## 2022-08-26 ENCOUNTER — TELEPHONE (OUTPATIENT)
Dept: SURGERY | Facility: CLINIC | Age: 62
End: 2022-08-26

## 2022-08-26 DIAGNOSIS — E83.41 HYPERMAGNESEMIA: Primary | ICD-10-CM

## 2022-08-26 NOTE — TELEPHONE ENCOUNTER
Reviewed magnesium results with Radha Bailey  Results are slightly elevated but better than before  Patient can repeat again when she returns from her trip next week  Patient verbalized understanding

## 2022-08-26 NOTE — TELEPHONE ENCOUNTER
Patient requesting lab results     Person calling in/  Name if not patient  Patient   Lab Draw Date 8/25   Lab Draw [address] [place]    Call Back Number [phone]

## 2022-08-31 ENCOUNTER — OFFICE VISIT (OUTPATIENT)
Dept: SURGERY | Facility: CLINIC | Age: 62
End: 2022-08-31

## 2022-08-31 VITALS — TEMPERATURE: 96.9 F

## 2022-08-31 DIAGNOSIS — L82.0 INFLAMED SEBORRHEIC KERATOSIS: Primary | ICD-10-CM

## 2022-08-31 PROCEDURE — 99024 POSTOP FOLLOW-UP VISIT: CPT | Performed by: PHYSICIAN ASSISTANT

## 2022-08-31 NOTE — PROGRESS NOTES
Post-Op Note - General Surgery   Madhuri Cowart 65 y o  female MRN: [id number]  Encounter: [id number]    Assessment/Plan    Inflamed seborrheic keratosis  Doing well after excision of path confirmed benign keratosis x 3  On exam incisions are irritated with foreign body reaction, but no signs of infection  Sutures removed and Band-Aid applied  All questions answered, she will keep her follow-up next year for routine breast surveillance  Diagnoses and all orders for this visit:    Inflamed seborrheic keratosis        Subjective      Chief Complaint   Patient presents with    Post-op     exc of mole of left side of back bra line 08/17/2022 suture removal      Pt is coming in the office today for exc of mole of left side of back bra line 08/17/2022 suture removal  No pain/fever/chills Saima Nuno 66 yo F here for follow-up suture removal after skin excision x 3  Reports irritation from her clothing  Review of Systems    The following portions of the patient's history were reviewed and updated as appropriate: allergies, current medications, past family history, past medical history, past social history, past surgical history and problem list     Objective      Temperature (!) 96 9 °F (36 1 °C), temperature source Temporal, not currently breastfeeding  Physical Exam  Vitals and nursing note reviewed  Constitutional:       General: She is not in acute distress  Appearance: She is well-developed  She is not diaphoretic  HENT:      Head: Normocephalic and atraumatic  Eyes:      Conjunctiva/sclera: Conjunctivae normal       Pupils: Pupils are equal, round, and reactive to light  Pulmonary:      Effort: No respiratory distress  Musculoskeletal:         General: Normal range of motion  Cervical back: Normal range of motion  Skin:     General: Skin is warm and dry  Capillary Refill: Capillary refill takes less than 2 seconds        Comments: Incisions are C/D/I x 3 with moderate foreign body reaction/irritation to the sutures and her clothing  No signs of infection  Sutures removed and bandaids applied  Neurological:      Mental Status: She is alert and oriented to person, place, and time     Psychiatric:         Behavior: Behavior normal          Signature:  Virgilio Gann PA-C  Date: 8/31/2022 Time: 11:43 AM

## 2022-08-31 NOTE — ASSESSMENT & PLAN NOTE
Doing well after excision of path confirmed benign keratosis x 3  On exam incisions are irritated with foreign body reaction, but no signs of infection  Sutures removed and Band-Aid applied  All questions answered, she will keep her follow-up next year for routine breast surveillance

## 2022-09-06 ENCOUNTER — APPOINTMENT (OUTPATIENT)
Dept: LAB | Facility: CLINIC | Age: 62
End: 2022-09-06
Payer: COMMERCIAL

## 2022-09-06 DIAGNOSIS — E83.41 HYPERMAGNESEMIA: ICD-10-CM

## 2022-09-06 LAB — MAGNESIUM SERPL-MCNC: 2.5 MG/DL (ref 1.6–2.6)

## 2022-09-06 PROCEDURE — 83735 ASSAY OF MAGNESIUM: CPT

## 2022-09-06 PROCEDURE — 36415 COLL VENOUS BLD VENIPUNCTURE: CPT

## 2022-09-07 DIAGNOSIS — E83.41 HYPERMAGNESEMIA: Primary | ICD-10-CM

## 2022-09-22 ENCOUNTER — OFFICE VISIT (OUTPATIENT)
Dept: INTERNAL MEDICINE CLINIC | Facility: CLINIC | Age: 62
End: 2022-09-22
Payer: COMMERCIAL

## 2022-09-22 VITALS
SYSTOLIC BLOOD PRESSURE: 122 MMHG | TEMPERATURE: 97 F | HEART RATE: 65 BPM | WEIGHT: 171.3 LBS | BODY MASS INDEX: 30.35 KG/M2 | DIASTOLIC BLOOD PRESSURE: 74 MMHG | HEIGHT: 63 IN | OXYGEN SATURATION: 94 %

## 2022-09-22 DIAGNOSIS — Z79.810 LONG TERM CURRENT USE OF SELECTIVE ESTROGEN RECEPTOR MODULATORS (SERMS): ICD-10-CM

## 2022-09-22 DIAGNOSIS — M85.852 OSTEOPENIA OF LEFT HIP: ICD-10-CM

## 2022-09-22 DIAGNOSIS — Z00.00 ANNUAL PHYSICAL EXAM: Primary | ICD-10-CM

## 2022-09-22 DIAGNOSIS — R73.01 IMPAIRED FASTING GLUCOSE: ICD-10-CM

## 2022-09-22 DIAGNOSIS — E55.9 VITAMIN D DEFICIENCY: ICD-10-CM

## 2022-09-22 DIAGNOSIS — R63.5 WEIGHT GAIN: ICD-10-CM

## 2022-09-22 DIAGNOSIS — Z13.6 ENCOUNTER FOR SCREENING FOR CARDIOVASCULAR DISORDERS: ICD-10-CM

## 2022-09-22 PROBLEM — Z12.31 ENCOUNTER FOR SCREENING MAMMOGRAM FOR BREAST CANCER: Status: RESOLVED | Noted: 2019-05-30 | Resolved: 2022-09-22

## 2022-09-22 PROBLEM — Z45.2 ENCOUNTER FOR CENTRAL LINE CARE: Status: RESOLVED | Noted: 2021-02-01 | Resolved: 2022-09-22

## 2022-09-22 PROBLEM — Z01.411 ENCNTR FOR GYN EXAM (GENERAL) (ROUTINE) W ABNORMAL FINDINGS: Status: RESOLVED | Noted: 2019-05-30 | Resolved: 2022-09-22

## 2022-09-22 PROBLEM — D70.1 CHEMOTHERAPY INDUCED NEUTROPENIA (HCC): Status: RESOLVED | Noted: 2020-10-30 | Resolved: 2022-09-22

## 2022-09-22 PROBLEM — T45.1X5A CHEMOTHERAPY INDUCED NEUTROPENIA (HCC): Status: RESOLVED | Noted: 2020-10-30 | Resolved: 2022-09-22

## 2022-09-22 PROBLEM — N30.00 ACUTE CYSTITIS: Status: RESOLVED | Noted: 2018-03-02 | Resolved: 2022-09-22

## 2022-09-22 PROCEDURE — 99396 PREV VISIT EST AGE 40-64: CPT | Performed by: FAMILY MEDICINE

## 2022-09-22 PROCEDURE — 3725F SCREEN DEPRESSION PERFORMED: CPT | Performed by: FAMILY MEDICINE

## 2022-09-22 NOTE — PROGRESS NOTES
[facility]    NAME: Maia Brown  AGE: 65 y o  SEX: female  : 1960     DATE: 2022     Assessment and Plan:     Problem List Items Addressed This Visit        Endocrine    Impaired fasting glucose    Relevant Orders    CBC and differential    Comprehensive metabolic panel    Hemoglobin A1C    TSH, 3rd generation       Musculoskeletal and Integument    Osteopenia    Relevant Orders    CBC and differential    TSH, 3rd generation       Other    Vitamin D deficiency    Relevant Orders    CBC and differential    Vitamin D 25 hydroxy    Long term current use of selective estrogen receptor modulators (SERMs)    Relevant Orders    CBC and differential    Weight gain    Relevant Orders    CBC and differential    Comprehensive metabolic panel    TSH, 3rd generation      Other Visit Diagnoses     Annual physical exam    -  Primary    Encounter for screening for cardiovascular disorders        Relevant Orders    Lipid panel        Orders and recommendations as noted above  Continue with vitamin supplementation  Continue with the calcium and vitamin-D  Continue with regular exercise regimen  Continue with the Prolia every 6 months  Continue with dietary changes  Methods for weight loss discussed  Discussed medications such as Ozempic especially given her history of the impaired glucose tolerance  She wishes to consider this and we can discuss following her upcoming laboratory testing  Continue follow-up with Oncology  Continue with the Arimidex as per oncology is recommendations  Be very careful to avoid falls  Up-to-date on vaccinations  Coronavirus precautions discussed  Continue with mammograms yearly  Continue with bone densities every other year  Recent stressors discussed    Will have her follow up in about 1 year or sooner if needed depending on the results of her upcoming laboratory testing  Immunizations and preventive care screenings were discussed with patient today  Appropriate education was printed on patient's after visit summary  Counseling:  Alcohol/drug use: discussed moderation in alcohol intake, the recommendations for healthy alcohol use, and avoidance of illicit drug use  Dental Health: discussed importance of regular tooth brushing, flossing, and dental visits  Injury prevention: discussed safety/seat belts, safety helmets, smoke detectors, carbon dioxide detectors, and smoking near bedding or upholstery  Sexual health: discussed sexually transmitted diseases, partner selection, use of condoms, avoidance of unintended pregnancy, and contraceptive alternatives  Exercise: the importance of regular exercise/physical activity was discussed  Recommend exercise 3-5 times per week for at least 30 minutes  BMI Counseling: Body mass index is 30 34 kg/m²  The BMI is above normal  Nutrition recommendations include decreasing portion sizes, encouraging healthy choices of fruits and vegetables, decreasing fast food intake, consuming healthier snacks, limiting drinks that contain sugar, moderation in carbohydrate intake and reducing intake of cholesterol  Exercise recommendations include exercising 3-5 times per week  Rationale for BMI follow-up plan is due to patient being overweight or obese  Depression Screening and Follow-up Plan: Patient was screened for depression during today's encounter  They screened negative with a PHQ-2 score of 0  No follow-ups on file  Chief Complaint:     Chief Complaint   Patient presents with    Follow-up     C/O not being able to lose weight       History of Present Illness:     Adult Annual Physical   Patient here for a comprehensive physical exam  The patient reports problems - See below  She presents for wellness visit  Has generally been doing relatively well  She is frustrated by her weight    She exercises regularly and has been watching her diet closely but has been unable to lose any weight  Has been having some stressors  Her daughter recently underwent prophylactic bilateral mastectomy and oophorectomy  She will be going to stay with her daughter for a few weeks in the near future to help her  Her mother is now in an assisted living in [place]  Mother's memory is worsening steadily  Tolerating the Arimidex without much difficulty  Has difficulty sleeping at times with the stressors  Continues with the Prolia every 6 months  Is up-to-date on her immunizations  Diet and Physical Activity  Diet/Nutrition: well balanced diet and limited junk food  Exercise: moderate cardiovascular exercise, strength training exercises and 3-4 times a week on average  Depression Screening  PHQ-2/9 Depression Screening    Little interest or pleasure in doing things: 0 - not at all  Feeling down, depressed, or hopeless: 0 - not at all  PHQ-2 Score: 0  PHQ-2 Interpretation: Negative depression screen       General Health  Sleep: sleeps well  Hearing: normal - bilateral   Vision: goes for regular eye exams  Dental: regular dental visits  /GYN Health  Patient is: postmenopausal  Last menstrual period:    Contraceptive method:        Review of Systems:     Review of Systems   Constitutional: Positive for unexpected weight change  Negative for activity change, appetite change, chills and fever  HENT: Negative for congestion and rhinorrhea  Eyes: Negative for visual disturbance  Respiratory: Negative for cough, chest tightness and shortness of breath  Cardiovascular: Negative for chest pain and palpitations  Gastrointestinal: Negative for abdominal pain, blood in stool, diarrhea, nausea and vomiting  Endocrine: Negative for polydipsia, polyphagia and polyuria  Genitourinary: Negative for dysuria, frequency and urgency  Musculoskeletal: Negative for gait problem  Skin: Negative for color change  Neurological: Negative for dizziness and headaches  Hematological: Does not bruise/bleed easily  Psychiatric/Behavioral: Negative for confusion and sleep disturbance  The patient is not nervous/anxious  Past Medical History:     Past Medical History:   Diagnosis Date    Acute cystitis 3/2/2018    Asthma     has not been treated for seveeral years  exercise induced    BRCA1 negative     Breast cancer ([facility] [address] ) 10/2020    left sided    Cancer [facility])     breast  9/20    Cellulitis of left upper extremity 2/23/2021    Chemotherapy induced neutropenia ([facility] [address] ) 10/30/2020    Exercise-induced asthma 11/3/2016    History of chemotherapy 11/2020    Hx of radiation therapy 03/2021    Urinary tract infection       Past Surgical History:     Past Surgical History:   Procedure Laterality Date    BREAST CYST EXCISION Left     benign    BREAST CYST EXCISION Left     benign    BREAST SURGERY      CHOLECYSTECTOMY      HYSTERECTOMY      IR PORT PLACEMENT  10/26/2020    IR PORT REMOVAL  2/4/2021    MAMMO NEEDLE LOCALIZATION LEFT (ALL INC) Left 10/2/2020    MOUTH SURGERY      AL MASTECTOMY, PARTIAL Left 10/2/2020    Procedure: BREAST NEEDLE LOCALIZED LUMPECTOMY, SENTINEL LYMPH NODE BIOPSY (Bracketed U/S guided needle loc @ 8:00, INJECT AT 1045);   Surgeon: Ary Najjar, MD;  Location: [address] [place] [address] OR;  Service: General    TUBAL LIGATION      US GUIDED BREAST BIOPSY LEFT COMPLETE Left 9/11/2020    WISDOM TOOTH EXTRACTION        Social History:     Social History     Socioeconomic History    Marital status: /Civil Union     Spouse name: None    Number of children: 2    Years of education: None    Highest education level: None   Occupational History    Occupation: Retired   Tobacco Use    Smoking status: Never Smoker    Smokeless tobacco: Never Used   Vaping Use    Vaping Use: Never used   Substance and Sexual Activity    Alcohol use: Never    Drug use: Never    Sexual activity: Yes Partners: Male     Birth control/protection: Female Sterilization     Comment:  x 9 years, [facility]   Other Topics Concern    None   Social History Narrative    Daily caffeine consumption, 1 serving a day    Exercises 3-4 times per week     x 8 years     Social Determinants of Health     Financial Resource Strain: Not on file   Food Insecurity: Not on file   Transportation Needs: Not on file   Physical Activity: Not on file   Stress: Not on file   Social Connections: Not on file   Intimate Partner Violence: Not on file   Housing Stability: Not on file      Family History:     Family History   Problem Relation Age of Onset    Hypertension Mother     Heart disease Mother         cardiac disorder    Breast cancer Mother         dx age early 19's    Glaucoma Mother     Hypertension Father     Heart disease Father         cardiac disorder    Cancer Father     No Known Problems Sister     Multiple sclerosis Daughter     Melanoma Daughter     No Known Problems Son     No Known Problems Paternal Aunt       Current Medications:     Current Outpatient Medications   Medication Sig Dispense Refill    anastrozole (ARIMIDEX) 1 mg tablet Take 1 tablet (1 mg total) by mouth daily 90 tablet 3    ascorbic acid (VITAMIN C) 250 mg tablet Take 250 mg by mouth daily      Biotin 1 MG CAPS Take by mouth      Calcium 600 MG tablet Take 1 tablet by mouth every morning       Cholecalciferol (VITAMIN D3) 1000 units CAPS Take 1 capsule by mouth every morning       denosumab (PROLIA) 60 mg/mL Inject 1 mL (60 mg total) under the skin once for 1 dose Every 6 months 1 mL 1    EPINEPHrine (EPIPEN) 0 3 mg/0 3 mL SOAJ Inject 0 3 mL (0 3 mg total) into a muscle once for 1 dose IM as needed  For home administration of prolia 0 6 mL 1    triamcinolone (KENALOG) 0 1 % cream Apply topically 2 (two) times a day 30 g 0    albuterol (ProAir HFA) 90 mcg/act inhaler Inhale 2 puffs every 6 (six) hours as needed for wheezing or shortness of breath (Patient not taking: No sig reported) 8 5 g 0     No current facility-administered medications for this visit  Allergies: Allergies   Allergen Reactions    Ofloxacin Hives    Penicillins Hives    Vancomycin Hives     Pt asked to update her chart  She received this med when she was in the hospital - 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   Chlorhexidine Rash      Physical Exam:     /74 (BP Location: Right arm, Patient Position: Sitting, Cuff Size: Standard)   Pulse 65   Temp (!) 97 °F (36 1 °C)   Ht 5' 3" (1 6 m)   Wt 77 7 kg (171 lb 4 8 oz)   LMP  (LMP Unknown)   SpO2 94%   BMI 30 34 kg/m²     Physical Exam  Vitals and nursing note reviewed  Constitutional:       General: She is not in acute distress  Appearance: She is well-developed, well-groomed and overweight  HENT:      Head: Normocephalic and atraumatic  Eyes:      Conjunctiva/sclera: Conjunctivae normal    Cardiovascular:      Rate and Rhythm: Normal rate and regular rhythm  Heart sounds: No murmur heard  Pulmonary:      Effort: Pulmonary effort is normal  No respiratory distress  Breath sounds: Normal breath sounds  Abdominal:      Palpations: Abdomen is soft  Tenderness: There is no abdominal tenderness  Musculoskeletal:      Cervical back: Neck supple  Skin:     General: Skin is warm and dry  Neurological:      Mental Status: She is alert  Psychiatric:         Mood and Affect: Mood and affect normal          Speech: Speech normal          Behavior: Behavior is cooperative           Cognition and Memory: Cognition and memory normal           Terri Melara MD  [address] [place] [address],[address]

## 2022-09-22 NOTE — PATIENT INSTRUCTIONS

## 2022-10-21 ENCOUNTER — APPOINTMENT (OUTPATIENT)
Dept: LAB | Facility: CLINIC | Age: 62
End: 2022-10-21
Payer: COMMERCIAL

## 2022-10-21 DIAGNOSIS — Z79.810 LONG TERM CURRENT USE OF SELECTIVE ESTROGEN RECEPTOR MODULATORS (SERMS): ICD-10-CM

## 2022-10-21 DIAGNOSIS — E55.9 VITAMIN D DEFICIENCY: ICD-10-CM

## 2022-10-21 DIAGNOSIS — M85.852 OSTEOPENIA OF LEFT HIP: ICD-10-CM

## 2022-10-21 DIAGNOSIS — E83.41 HYPERMAGNESEMIA: ICD-10-CM

## 2022-10-21 DIAGNOSIS — R73.01 IMPAIRED FASTING GLUCOSE: ICD-10-CM

## 2022-10-21 DIAGNOSIS — R63.5 WEIGHT GAIN: ICD-10-CM

## 2022-10-21 DIAGNOSIS — Z13.6 ENCOUNTER FOR SCREENING FOR CARDIOVASCULAR DISORDERS: ICD-10-CM

## 2022-10-21 LAB
25(OH)D3 SERPL-MCNC: 105.9 NG/ML (ref 30–100)
ALBUMIN SERPL BCP-MCNC: 3.6 G/DL (ref 3.5–5)
ALP SERPL-CCNC: 83 U/L (ref 46–116)
ALT SERPL W P-5'-P-CCNC: 23 U/L (ref 12–78)
ANION GAP SERPL CALCULATED.3IONS-SCNC: 4 MMOL/L (ref 4–13)
AST SERPL W P-5'-P-CCNC: 13 U/L (ref 5–45)
BASOPHILS # BLD AUTO: 0.05 THOUSANDS/ÂΜL (ref 0–0.1)
BASOPHILS NFR BLD AUTO: 1 % (ref 0–1)
BILIRUB SERPL-MCNC: 0.77 MG/DL (ref 0.2–1)
BUN SERPL-MCNC: 13 MG/DL (ref 5–25)
CALCIUM SERPL-MCNC: 9.3 MG/DL (ref 8.3–10.1)
CHLORIDE SERPL-SCNC: 107 MMOL/L (ref 96–108)
CHOLEST SERPL-MCNC: 140 MG/DL
CO2 SERPL-SCNC: 28 MMOL/L (ref 21–32)
CREAT SERPL-MCNC: 0.77 MG/DL (ref 0.6–1.3)
EOSINOPHIL # BLD AUTO: 0.12 THOUSAND/ÂΜL (ref 0–0.61)
EOSINOPHIL NFR BLD AUTO: 2 % (ref 0–6)
ERYTHROCYTE [DISTWIDTH] IN BLOOD BY AUTOMATED COUNT: 14.2 % (ref 11.6–15.1)
EST. AVERAGE GLUCOSE BLD GHB EST-MCNC: 126 MG/DL
GFR SERPL CREATININE-BSD FRML MDRD: 83 ML/MIN/1.73SQ M
GLUCOSE P FAST SERPL-MCNC: 111 MG/DL (ref 65–99)
HBA1C MFR BLD: 6 %
HCT VFR BLD AUTO: 39.3 % (ref 34.8–46.1)
HDLC SERPL-MCNC: 51 MG/DL
HGB BLD-MCNC: 12.3 G/DL (ref 11.5–15.4)
IMM GRANULOCYTES # BLD AUTO: 0.03 THOUSAND/UL (ref 0–0.2)
IMM GRANULOCYTES NFR BLD AUTO: 1 % (ref 0–2)
LDLC SERPL CALC-MCNC: 74 MG/DL (ref 0–100)
LYMPHOCYTES # BLD AUTO: 1.79 THOUSANDS/ÂΜL (ref 0.6–4.47)
LYMPHOCYTES NFR BLD AUTO: 30 % (ref 14–44)
MAGNESIUM SERPL-MCNC: 2.3 MG/DL (ref 1.6–2.6)
MCH RBC QN AUTO: 28.5 PG (ref 26.8–34.3)
MCHC RBC AUTO-ENTMCNC: 31.3 G/DL (ref 31.4–37.4)
MCV RBC AUTO: 91 FL (ref 82–98)
MONOCYTES # BLD AUTO: 0.4 THOUSAND/ÂΜL (ref 0.17–1.22)
MONOCYTES NFR BLD AUTO: 7 % (ref 4–12)
NEUTROPHILS # BLD AUTO: 3.62 THOUSANDS/ÂΜL (ref 1.85–7.62)
NEUTS SEG NFR BLD AUTO: 59 % (ref 43–75)
NONHDLC SERPL-MCNC: 89 MG/DL
NRBC BLD AUTO-RTO: 0 /100 WBCS
PLATELET # BLD AUTO: 247 THOUSANDS/UL (ref 149–390)
PMV BLD AUTO: 9.4 FL (ref 8.9–12.7)
POTASSIUM SERPL-SCNC: 4.2 MMOL/L (ref 3.5–5.3)
PROT SERPL-MCNC: 7.2 G/DL (ref 6.4–8.4)
RBC # BLD AUTO: 4.32 MILLION/UL (ref 3.81–5.12)
SODIUM SERPL-SCNC: 139 MMOL/L (ref 135–147)
TRIGL SERPL-MCNC: 74 MG/DL
TSH SERPL DL<=0.05 MIU/L-ACNC: 0.82 UIU/ML (ref 0.45–4.5)
WBC # BLD AUTO: 6.01 THOUSAND/UL (ref 4.31–10.16)

## 2022-10-21 PROCEDURE — 80053 COMPREHEN METABOLIC PANEL: CPT

## 2022-10-21 PROCEDURE — 80061 LIPID PANEL: CPT

## 2022-10-21 PROCEDURE — 83036 HEMOGLOBIN GLYCOSYLATED A1C: CPT

## 2022-10-21 PROCEDURE — 85025 COMPLETE CBC W/AUTO DIFF WBC: CPT

## 2022-10-21 PROCEDURE — 36415 COLL VENOUS BLD VENIPUNCTURE: CPT

## 2022-10-21 PROCEDURE — 83735 ASSAY OF MAGNESIUM: CPT

## 2022-10-21 PROCEDURE — 84443 ASSAY THYROID STIM HORMONE: CPT

## 2022-10-21 PROCEDURE — 82306 VITAMIN D 25 HYDROXY: CPT

## 2022-10-31 ENCOUNTER — APPOINTMENT (OUTPATIENT)
Dept: RADIOLOGY | Facility: CLINIC | Age: 62
End: 2022-10-31
Payer: COMMERCIAL

## 2022-10-31 ENCOUNTER — OFFICE VISIT (OUTPATIENT)
Dept: URGENT CARE | Facility: CLINIC | Age: 62
End: 2022-10-31

## 2022-10-31 VITALS
SYSTOLIC BLOOD PRESSURE: 138 MMHG | DIASTOLIC BLOOD PRESSURE: 80 MMHG | BODY MASS INDEX: 30.29 KG/M2 | RESPIRATION RATE: 20 BRPM | WEIGHT: 171 LBS | TEMPERATURE: 98 F | OXYGEN SATURATION: 99 % | HEART RATE: 88 BPM

## 2022-10-31 DIAGNOSIS — R05.1 ACUTE COUGH: ICD-10-CM

## 2022-10-31 DIAGNOSIS — Z87.09 HISTORY OF ASTHMA: ICD-10-CM

## 2022-10-31 DIAGNOSIS — J98.01 ACUTE BRONCHOSPASM: Primary | ICD-10-CM

## 2022-10-31 PROCEDURE — 71046 X-RAY EXAM CHEST 2 VIEWS: CPT

## 2022-10-31 RX ORDER — ALBUTEROL SULFATE 90 UG/1
2 AEROSOL, METERED RESPIRATORY (INHALATION) EVERY 6 HOURS PRN
Qty: 8.5 G | Refills: 0 | Status: SHIPPED | OUTPATIENT
Start: 2022-10-31

## 2022-10-31 RX ORDER — PREDNISONE 20 MG/1
60 TABLET ORAL DAILY
Qty: 15 TABLET | Refills: 0 | Status: SHIPPED | OUTPATIENT
Start: 2022-10-31 | End: 2022-11-05

## 2022-10-31 RX ORDER — BENZONATATE 200 MG/1
200 CAPSULE ORAL 3 TIMES DAILY PRN
COMMUNITY

## 2022-10-31 NOTE — PATIENT INSTRUCTIONS
Your xray was preliminarily read by your provider  A radiologist will read the xray and you will be notified if it is abnormal     You are to Rest, Ice, compression (ace wrap, splint) elevate  You are being prescribed prednisone ( monitor your blood sugars as this can cause elevation)  You also have been prescribed a rescue inhaler  You are to take plain robitussin - this is an expectorant and will help bring up phlegm if you feel as though you have phlegm  You can try the benzonate you have if you wish - drink plenty of water    Follow up with your PCP in 2-3 days  Go to the ED if symptoms worsen

## 2022-10-31 NOTE — PROGRESS NOTES
[facility]        NAME: Dominga Navarrete is a 65 y o  female  : 1960    MRN: [id number]  DATE: 2022  TIME: 10:14 AM    Assessment and Plan   Acute bronchospasm [J98 01]  1  Acute bronchospasm  predniSONE 20 mg tablet    albuterol (ProAir HFA) 90 mcg/act inhaler   2  Acute cough  XR chest pa & lateral    predniSONE 20 mg tablet    albuterol (ProAir HFA) 90 mcg/act inhaler   3  History of asthma  predniSONE 20 mg tablet    albuterol (ProAir HFA) 90 mcg/act inhaler         Patient Instructions       Follow up with PCP in 3-5 days  Proceed to  ER if symptoms worsen  Your xray was preliminarily read by your provider  A radiologist will read the xray and you will be notified if it is abnormal     You are to Rest, Ice, compression (ace wrap, splint) elevate  You are being prescribed prednisone ( monitor your blood sugars as this can cause elevation)  You also have been prescribed a rescue inhaler  You are to take plain robitussin - this is an expectorant and will help bring up phlegm if you feel as though you have phlegm  You can try the benzonate you have if you wish - drink plenty of water  Follow up with your PCP in 2-3 days  Go to the ED if symptoms worsen        Chief Complaint     Chief Complaint   Patient presents with   • Cough         History of Present Illness       This is a 65year old female who states was visiting her children out of state and became ill with non productive cough that started on 10/8  She states she did 2 covid tests and both were negative  She denies that any of her family was + covid  She had influenza vaccine  Denies fevers, chills, n/v/d  She has been taking cough medication  She states she has tessalon but did not take and has an inhaler and has not used it  History of exercise induced asthma  She states she seemed to get better but the last several days she has gotten worse with the weather change    She states she feels like she has phlegm in the center of her chest        Review of Systems   Review of Systems   Constitutional: Negative  HENT: Negative  Eyes: Negative  Respiratory: Positive for cough  Cardiovascular: Negative  Gastrointestinal: Negative  Endocrine: Negative  Genitourinary: Negative  Musculoskeletal: Negative  Skin: Negative  Allergic/Immunologic: Negative  Neurological: Negative  Hematological: Negative  Psychiatric/Behavioral: Negative            Current Medications       Current Outpatient Medications:   •  albuterol (ProAir HFA) 90 mcg/act inhaler, Inhale 2 puffs every 6 (six) hours as needed for wheezing or shortness of breath, Disp: 8 5 g, Rfl: 0  •  anastrozole (ARIMIDEX) 1 mg tablet, Take 1 tablet (1 mg total) by mouth daily, Disp: 90 tablet, Rfl: 3  •  ascorbic acid (VITAMIN C) 250 mg tablet, Take 250 mg by mouth daily, Disp: , Rfl:   •  benzonatate (TESSALON) 200 MG capsule, Take 200 mg by mouth 3 (three) times a day as needed for cough, Disp: , Rfl:   •  Biotin 1 MG CAPS, Take by mouth, Disp: , Rfl:   •  Calcium 600 MG tablet, Take 1 tablet by mouth every morning , Disp: , Rfl:   •  Cholecalciferol (VITAMIN D3) 1000 units CAPS, Take 1 capsule by mouth every morning , Disp: , Rfl:   •  predniSONE 20 mg tablet, Take 3 tablets (60 mg total) by mouth daily for 5 days, Disp: 15 tablet, Rfl: 0  •  triamcinolone (KENALOG) 0 1 % cream, Apply topically 2 (two) times a day, Disp: 30 g, Rfl: 0  •  denosumab (PROLIA) 60 mg/mL, Inject 1 mL (60 mg total) under the skin once for 1 dose Every 6 months, Disp: 1 mL, Rfl: 1  •  EPINEPHrine (EPIPEN) 0 3 mg/0 3 mL SOAJ, Inject 0 3 mL (0 3 mg total) into a muscle once for 1 dose IM as needed For home administration of prolia, Disp: 0 6 mL, Rfl: 1    Current Allergies     Allergies as of 10/31/2022 - Reviewed 10/31/2022   Allergen Reaction Noted   • Ofloxacin Hives 03/23/2016   • Penicillins Hives 03/23/2016   • Vancomycin Hives 03/08/2021   • Chlorhexidine Rash 10/31/2020            The following portions of the patient's history were reviewed and updated as appropriate: allergies, current medications, past family history, past medical history, past social history, past surgical history and problem list      Past Medical History:   Diagnosis Date   • Acute cystitis 3/2/2018   • Asthma     has not been treated for seveeral years  exercise induced   • BRCA1 negative    • Breast cancer ([facility] [address] ) 10/2020    left sided   • Cancer ([facility] [address] )     breast  9/20   • Cellulitis of left upper extremity 2/23/2021   • Chemotherapy induced neutropenia ([facility] [address] ) 10/30/2020   • Exercise-induced asthma 11/3/2016   • History of chemotherapy 11/2020   • Hx of radiation therapy 03/2021   • Urinary tract infection        Past Surgical History:   Procedure Laterality Date   • BREAST CYST EXCISION Left     benign   • BREAST CYST EXCISION Left     benign   • BREAST SURGERY     • CHOLECYSTECTOMY     • HYSTERECTOMY     • IR PORT PLACEMENT  10/26/2020   • IR PORT REMOVAL  2/4/2021   • MAMMO NEEDLE LOCALIZATION LEFT (ALL INC) Left 10/2/2020   • MOUTH SURGERY     • OR MASTECTOMY, PARTIAL Left 10/2/2020    Procedure: BREAST NEEDLE LOCALIZED LUMPECTOMY, SENTINEL LYMPH NODE BIOPSY (Bracketed U/S guided needle loc @ 8:00, INJECT AT 1045);   Surgeon: Sumit Crooks MD;  Location: [address] [place] [address] OR;  Service: General   • TUBAL LIGATION     • US GUIDED BREAST BIOPSY LEFT COMPLETE Left 9/11/2020   • WISDOM TOOTH EXTRACTION         Family History   Problem Relation Age of Onset   • Hypertension Mother    • Heart disease Mother         cardiac disorder   • Breast cancer Mother         dx age early 19's   • Glaucoma Mother    • Hypertension Father    • Heart disease Father         cardiac disorder   • Cancer Father    • No Known Problems Sister    • Multiple sclerosis Daughter    • Melanoma Daughter    • No Known Problems Son    • No Known Problems Paternal Aunt          Medications have been verified  Objective   /80   Pulse 88   Temp 98 °F (36 7 °C)   Resp 20   Wt 77 6 kg (171 lb)   LMP  (LMP Unknown)   SpO2 99%   BMI 30 29 kg/m²   No LMP recorded (lmp unknown)  Patient has had a hysterectomy  Physical Exam     Physical Exam  Vitals and nursing note reviewed  Constitutional:       General: She is not in acute distress  Appearance: Normal appearance  She is normal weight  She is not ill-appearing, toxic-appearing or diaphoretic  HENT:      Head: Normocephalic and atraumatic  Right Ear: Tympanic membrane and ear canal normal       Left Ear: Tympanic membrane and ear canal normal       Nose: Nose normal  No congestion or rhinorrhea  Mouth/Throat:      Mouth: Mucous membranes are moist       Pharynx: No oropharyngeal exudate or posterior oropharyngeal erythema  Comments: Injected   Eyes:      Extraocular Movements: Extraocular movements intact  Cardiovascular:      Rate and Rhythm: Normal rate and regular rhythm  Pulses: Normal pulses  Heart sounds: Normal heart sounds  Pulmonary:      Effort: Pulmonary effort is normal  No respiratory distress  Breath sounds: No stridor  No wheezing, rhonchi or rales  Comments: Decreased breath sounds through out  Continuous dry cough during exam   Chest:      Chest wall: No tenderness  Abdominal:      General: There is no distension  Palpations: Abdomen is soft  Tenderness: There is no abdominal tenderness  Musculoskeletal:         General: Normal range of motion  Cervical back: Normal range of motion and neck supple  Skin:     General: Skin is warm and dry  Capillary Refill: Capillary refill takes less than 2 seconds  Neurological:      General: No focal deficit present  Mental Status: She is alert and oriented to person, place, and time  Psychiatric:         Mood and Affect: Mood normal          Behavior: Behavior normal          Thought Content:  Thought content normal          Judgment: Judgment normal              Preliminary reading CXR  No acute process  Waiting on rad read

## 2022-11-14 DIAGNOSIS — C50.412 PRIMARY CANCER OF UPPER OUTER QUADRANT OF LEFT FEMALE BREAST (HCC): Primary | ICD-10-CM

## 2022-11-14 DIAGNOSIS — R25.2 LEG CRAMPING: ICD-10-CM

## 2022-11-15 ENCOUNTER — VBI (OUTPATIENT)
Dept: ADMINISTRATIVE | Facility: OTHER | Age: 62
End: 2022-11-15

## 2022-11-15 ENCOUNTER — OFFICE VISIT (OUTPATIENT)
Dept: INTERNAL MEDICINE CLINIC | Facility: CLINIC | Age: 62
End: 2022-11-15

## 2022-11-15 VITALS — HEART RATE: 78 BPM | TEMPERATURE: 95.9 F | OXYGEN SATURATION: 99 %

## 2022-11-15 DIAGNOSIS — J01.10 ACUTE NON-RECURRENT FRONTAL SINUSITIS: Primary | ICD-10-CM

## 2022-11-15 RX ORDER — AZITHROMYCIN 250 MG/1
TABLET, FILM COATED ORAL
Qty: 6 TABLET | Refills: 0 | Status: SHIPPED | OUTPATIENT
Start: 2022-11-15 | End: 2022-11-19

## 2022-11-15 NOTE — PROGRESS NOTES
Name: Barbie Mesa      : 1960      MRN: [id number]  Encounter Provider: THERESA Barreto  Encounter Date: 11/15/2022   Encounter department: [address] [place] [address], [address]  Will start on a Z pack take as directed  Continue supportive care  Take Tylenol and Motrin for pain and fever  Will follow up as needed  1  Acute non-recurrent frontal sinusitis  -     azithromycin (ZITHROMAX) 250 mg tablet; Take 2 tablets today then 1 tablet daily x 4 days           Brenna Peters is for an acute visit  She was sick around two weeks ago and was on a steroid  She states he felt a little better but still having congestion, cough, and postnasal drip  She denies any fever  She states she feels it is more in her head than her chest  She offers no other issues  Review of Systems   HENT: Positive for congestion, postnasal drip, sinus pressure and sinus pain  Respiratory: Positive for cough  All other systems reviewed and are negative        Current Outpatient Medications on File Prior to Visit   Medication Sig   • albuterol (ProAir HFA) 90 mcg/act inhaler Inhale 2 puffs every 6 (six) hours as needed for wheezing or shortness of breath   • anastrozole (ARIMIDEX) 1 mg tablet Take 1 tablet (1 mg total) by mouth daily   • ascorbic acid (VITAMIN C) 250 mg tablet Take 250 mg by mouth daily   • benzonatate (TESSALON) 200 MG capsule Take 200 mg by mouth 3 (three) times a day as needed for cough   • Biotin 1 MG CAPS Take by mouth   • Calcium 600 MG tablet Take 1 tablet by mouth every morning    • Cholecalciferol (VITAMIN D3) 1000 units CAPS Take 1 capsule by mouth every morning    • denosumab (PROLIA) 60 mg/mL Inject 1 mL (60 mg total) under the skin once for 1 dose Every 6 months   • EPINEPHrine (EPIPEN) 0 3 mg/0 3 mL SOAJ Inject 0 3 mL (0 3 mg total) into a muscle once for 1 dose IM as needed  For home administration of prolia   • triamcinolone (KENALOG) 0 1 % cream Apply topically 2 (two) times a day   • [DISCONTINUED] estradiol (ESTRACE) 0 5 MG tablet Take 1 tablet by mouth daily       Objective     Pulse 78   Temp (!) 95 9 °F (35 5 °C)   LMP  (LMP Unknown)   SpO2 99%     Physical Exam  Vitals reviewed  Constitutional:       Appearance: Normal appearance  She is normal weight  HENT:      Head: Normocephalic and atraumatic  Right Ear: Tympanic membrane, ear canal and external ear normal       Left Ear: Tympanic membrane, ear canal and external ear normal       Nose: Congestion present  Mouth/Throat:      Mouth: Mucous membranes are moist       Pharynx: Oropharynx is clear  Cardiovascular:      Rate and Rhythm: Normal rate and regular rhythm  Pulses: Normal pulses  Heart sounds: Normal heart sounds  Pulmonary:      Effort: Pulmonary effort is normal       Breath sounds: Normal breath sounds  Skin:     General: Skin is warm and dry  Capillary Refill: Capillary refill takes less than 2 seconds  Neurological:      General: No focal deficit present  Mental Status: She is alert and oriented to person, place, and time  Mental status is at baseline  Psychiatric:         Mood and Affect: Mood normal          Behavior: Behavior normal          Thought Content:  Thought content normal          Judgment: Judgment normal        THERESA Lua

## 2022-11-22 ENCOUNTER — APPOINTMENT (OUTPATIENT)
Dept: LAB | Facility: CLINIC | Age: 62
End: 2022-11-22

## 2022-11-22 DIAGNOSIS — R25.2 LEG CRAMPING: ICD-10-CM

## 2022-11-22 LAB
25(OH)D3 SERPL-MCNC: 77.7 NG/ML (ref 30–100)
ANION GAP SERPL CALCULATED.3IONS-SCNC: 5 MMOL/L (ref 4–13)
BUN SERPL-MCNC: 12 MG/DL (ref 5–25)
CALCIUM SERPL-MCNC: 8.5 MG/DL (ref 8.3–10.1)
CHLORIDE SERPL-SCNC: 111 MMOL/L (ref 96–108)
CO2 SERPL-SCNC: 25 MMOL/L (ref 21–32)
CREAT SERPL-MCNC: 0.7 MG/DL (ref 0.6–1.3)
FERRITIN SERPL-MCNC: 156 NG/ML (ref 8–388)
GFR SERPL CREATININE-BSD FRML MDRD: 93 ML/MIN/1.73SQ M
GLUCOSE P FAST SERPL-MCNC: 107 MG/DL (ref 65–99)
IRON SATN MFR SERPL: 17 % (ref 15–50)
IRON SERPL-MCNC: 60 UG/DL (ref 50–170)
MAGNESIUM SERPL-MCNC: 2.6 MG/DL (ref 1.6–2.6)
POTASSIUM SERPL-SCNC: 4 MMOL/L (ref 3.5–5.3)
SODIUM SERPL-SCNC: 141 MMOL/L (ref 135–147)
TIBC SERPL-MCNC: 356 UG/DL (ref 250–450)

## 2022-11-23 ENCOUNTER — TELEPHONE (OUTPATIENT)
Dept: HEMATOLOGY ONCOLOGY | Facility: CLINIC | Age: 62
End: 2022-11-23

## 2022-11-23 NOTE — TELEPHONE ENCOUNTER
Called patient back to discuss her concerns regarding her labs  Magnesium is normal in the high normal range 2 6  Her vitamin-D in the high normal range 77 7 improved after she stopped the additional supplements  She is taking calcium that has minute amount of vitamin-D is planning to change to calcium without the D perhaps Tums  Fasting blood sugar higher than average 107 is concerned about her blood sugars since she has been losing weight, dieting and exercising as advised  Encouraged her to wait until her three-month follow-up hemoglobin A1c which is a better evaluation of her blood sugar control  She does not seem to have significant iron deficient ferritin 156, iron saturation is slightly lower than average 17%; is concerned it may not be accurate as she was taking biotin which can affect some lab results  She is still having leg cramps  Encouraged her to hydrate herself well with a lot of water  Could consider an occasional electrolyte packet which may help with the leg cramps but would not do frequently due to her borderline high magnesium  Also recommended that she could trial an iron supplement once or twice a week since her iron saturation is lower than average which may or may not help her leg cramps  She will keep her follow-up appointment early next year which is already scheduled  Will be getting repeat vitamin-D/magnesium before the next visit  Encouraged her to call back with any further questions or concerns

## 2023-01-14 PROBLEM — J01.10 ACUTE NON-RECURRENT FRONTAL SINUSITIS: Status: RESOLVED | Noted: 2022-11-15 | Resolved: 2023-01-14

## 2023-02-14 ENCOUNTER — TELEPHONE (OUTPATIENT)
Dept: HEMATOLOGY ONCOLOGY | Facility: CLINIC | Age: 63
End: 2023-02-14

## 2023-02-14 NOTE — TELEPHONE ENCOUNTER
Called pt and let her know I needed to reschedule her appt with Dr Francisco Hoff    We went over date and time she was fine with

## 2023-03-17 ENCOUNTER — APPOINTMENT (OUTPATIENT)
Dept: LAB | Facility: CLINIC | Age: 63
End: 2023-03-17

## 2023-03-17 DIAGNOSIS — E83.41 HYPERMAGNESEMIA: ICD-10-CM

## 2023-03-17 DIAGNOSIS — E55.9 VITAMIN D DEFICIENCY: ICD-10-CM

## 2023-03-17 DIAGNOSIS — C50.412 PRIMARY CANCER OF UPPER OUTER QUADRANT OF LEFT FEMALE BREAST (HCC): ICD-10-CM

## 2023-03-17 LAB
25(OH)D3 SERPL-MCNC: 48.8 NG/ML (ref 30–100)
ALBUMIN SERPL BCP-MCNC: 3.7 G/DL (ref 3.5–5)
ALP SERPL-CCNC: 75 U/L (ref 46–116)
ALT SERPL W P-5'-P-CCNC: 28 U/L (ref 12–78)
ANION GAP SERPL CALCULATED.3IONS-SCNC: 4 MMOL/L (ref 4–13)
AST SERPL W P-5'-P-CCNC: 16 U/L (ref 5–45)
BASOPHILS # BLD AUTO: 0.04 THOUSANDS/ÂΜL (ref 0–0.1)
BASOPHILS NFR BLD AUTO: 1 % (ref 0–1)
BILIRUB SERPL-MCNC: 1.04 MG/DL (ref 0.2–1)
BUN SERPL-MCNC: 16 MG/DL (ref 5–25)
CALCIUM SERPL-MCNC: 9 MG/DL (ref 8.3–10.1)
CHLORIDE SERPL-SCNC: 107 MMOL/L (ref 96–108)
CO2 SERPL-SCNC: 28 MMOL/L (ref 21–32)
CREAT SERPL-MCNC: 0.76 MG/DL (ref 0.6–1.3)
EOSINOPHIL # BLD AUTO: 0.09 THOUSAND/ÂΜL (ref 0–0.61)
EOSINOPHIL NFR BLD AUTO: 1 % (ref 0–6)
ERYTHROCYTE [DISTWIDTH] IN BLOOD BY AUTOMATED COUNT: 13.9 % (ref 11.6–15.1)
GFR SERPL CREATININE-BSD FRML MDRD: 84 ML/MIN/1.73SQ M
GLUCOSE P FAST SERPL-MCNC: 116 MG/DL (ref 65–99)
HCT VFR BLD AUTO: 42.8 % (ref 34.8–46.1)
HGB BLD-MCNC: 13.8 G/DL (ref 11.5–15.4)
IMM GRANULOCYTES # BLD AUTO: 0.01 THOUSAND/UL (ref 0–0.2)
IMM GRANULOCYTES NFR BLD AUTO: 0 % (ref 0–2)
LYMPHOCYTES # BLD AUTO: 1.88 THOUSANDS/ÂΜL (ref 0.6–4.47)
LYMPHOCYTES NFR BLD AUTO: 28 % (ref 14–44)
MAGNESIUM SERPL-MCNC: 2.8 MG/DL (ref 1.6–2.6)
MCH RBC QN AUTO: 28.7 PG (ref 26.8–34.3)
MCHC RBC AUTO-ENTMCNC: 32.2 G/DL (ref 31.4–37.4)
MCV RBC AUTO: 89 FL (ref 82–98)
MONOCYTES # BLD AUTO: 0.43 THOUSAND/ÂΜL (ref 0.17–1.22)
MONOCYTES NFR BLD AUTO: 6 % (ref 4–12)
NEUTROPHILS # BLD AUTO: 4.23 THOUSANDS/ÂΜL (ref 1.85–7.62)
NEUTS SEG NFR BLD AUTO: 64 % (ref 43–75)
NRBC BLD AUTO-RTO: 0 /100 WBCS
PLATELET # BLD AUTO: 251 THOUSANDS/UL (ref 149–390)
PMV BLD AUTO: 9.5 FL (ref 8.9–12.7)
POTASSIUM SERPL-SCNC: 3.9 MMOL/L (ref 3.5–5.3)
PROT SERPL-MCNC: 7.2 G/DL (ref 6.4–8.4)
RBC # BLD AUTO: 4.81 MILLION/UL (ref 3.81–5.12)
SODIUM SERPL-SCNC: 139 MMOL/L (ref 135–147)
WBC # BLD AUTO: 6.68 THOUSAND/UL (ref 4.31–10.16)

## 2023-03-22 ENCOUNTER — OFFICE VISIT (OUTPATIENT)
Dept: HEMATOLOGY ONCOLOGY | Facility: CLINIC | Age: 63
End: 2023-03-22

## 2023-03-22 VITALS
SYSTOLIC BLOOD PRESSURE: 128 MMHG | BODY MASS INDEX: 29.54 KG/M2 | HEIGHT: 63 IN | HEART RATE: 65 BPM | RESPIRATION RATE: 16 BRPM | WEIGHT: 166.7 LBS | DIASTOLIC BLOOD PRESSURE: 76 MMHG | OXYGEN SATURATION: 100 %

## 2023-03-22 DIAGNOSIS — M85.852 OSTEOPENIA OF LEFT HIP: ICD-10-CM

## 2023-03-22 DIAGNOSIS — E83.41 HYPERMAGNESEMIA: ICD-10-CM

## 2023-03-22 DIAGNOSIS — Z79.811 AROMATASE INHIBITOR USE: ICD-10-CM

## 2023-03-22 DIAGNOSIS — C50.412 PRIMARY CANCER OF UPPER OUTER QUADRANT OF LEFT FEMALE BREAST (HCC): Primary | ICD-10-CM

## 2023-03-22 DIAGNOSIS — Z51.11 ENCOUNTER FOR CHEMOTHERAPY MANAGEMENT: ICD-10-CM

## 2023-03-22 DIAGNOSIS — C50.412 PRIMARY CANCER OF UPPER OUTER QUADRANT OF LEFT FEMALE BREAST (HCC): ICD-10-CM

## 2023-03-22 RX ORDER — CALCIUM CARBONATE 200(500)MG
1 TABLET,CHEWABLE ORAL DAILY
COMMUNITY

## 2023-03-22 RX ORDER — EPINEPHRINE 0.3 MG/.3ML
0.3 INJECTION SUBCUTANEOUS ONCE
Qty: 0.6 ML | Refills: 1 | Status: SHIPPED | OUTPATIENT
Start: 2023-03-22 | End: 2023-03-22

## 2023-03-22 NOTE — PROGRESS NOTES
Hematology/Oncology Outpatient Follow-up  Karmen Koenig 65 y o  female 1960 [id number]    Date:  3/22/2023        Assessment and Plan:  1  Primary cancer of upper outer quadrant of left female breast [facility])  The patient seems to be concerned about the tenderness she is experiencing in the left upper outer quadrant of her breast above the surgical site  The physical examination today did not reveal any solid component to that area  For completeness sake we will pursue an ultrasound to the left breast   I did also ask her to see her breast surgeon if the pain persist   The patient was encouraged to continue with the current endocrine therapy with anastrozole on a daily basis since she is tolerating it relatively well  - CBC and differential; Future  - Comprehensive metabolic panel; Future  - Magnesium; Future  - LD,Blood; Future  - DXA bone density spine hip and pelvis; Future  - US breast left limited (diagnostic); Future    2  Aromatase inhibitor use  She will be due for a bone density scan in June of this year  She was encouraged to continue with vitamin D supplements regularly  - DXA bone density spine hip and pelvis; Future    3  Hypermagnesemia  Her magnesium remains slightly above normal which may be related to her diet  - Magnesium; Future    4  Osteopenia of left hip  She is on Prolia injections on every 6 months basis  - DXA bone density spine hip and pelvis; Future        HPI:  The patient came today for follow-up visit  She stated she is taking the anastrozole on a daily basis without interruption which she is tolerating relatively well  She did complain today about increased tenderness in the left upper outer quadrant of her breast above the surgical site which made her be concerned about her recurrence of her breast cancer and that region  Blood work on 3/17/2023 showed hemoglobin of 13 8 with normal white cells and platelets  Creatinine 0 7 with normal calcium and liver enzymes  Vitamin D48 8  Magnesium 2 8  Oncology History Overview Note   Patient with a family history of breast cancer in her mother  She had two previous benign breast biopsies  She was going for yearly mammograms and most recent in August showed focal asymmetry in the upper outer posterior left breast  A biopsy on 9/11/20 revealed left invasive ductal breast carcinoma  She saw General Surgeon, Dr Manny Michaels in September for surgical evaluation  8/12/20 Screening Mammogram bilateral w 3d & cad   IMPRESSION:  1  Upper outer posterior left breast focal asymmetry  Diagnostic mammography, with possible ultrasound, recommended  2  Stable right mammogram      9/4/20 Mammo diagnostic left w 3d & cad   US breast left limited (diagnostic)   FINDINGS:   LEFT  1) MASS  Mammo diagnostic left w 3d & cad: There is a 43 mm transverse x 26 mm AP x 23 mm craniocaudal high density, irregularly shaped mass seen in the upper outer quadrant of the left breast at 1 o'clock in the posterior depth  There is a possible satellite lesions slightly inferior to the mass  If measured as 1 finding, it measures 32 mm craniocaudal   The mass correlates with the prior mammogram finding  US breast left limited (diagnostic): There is and irregularly shaped, hypoechoic mass with indistinct margins with shadowing seen in the upper outer quadrant of the left breast at 1 o'clock in the posterior depth  The mass measures at least 23 x 11 x 10 mm and the size may be underestimated on ultrasound  The mass correlates with the prior mammogram finding  This is highly suspicious and ultrasound-guided core needle biopsy is recommended  Targeted ultrasound of the left axilla was performed  At least 1 morphologically benign lymph node was visualized  No morphologically abnormal lymph nodes were visualized       ASSESSMENT/BI-RADS CATEGORY:  Left: 4C - High Suspicion for Malignancy  Overall: 4 - Suspicious     9/11/20 Left breast US guided biopsy, at 1:00 12cmfn  Invasive mammary carcinoma of no special type Grade 3  ER/MN 90-95% positive, HER2 negative  Lymphovascular invasion present     9/28/20 Breast working group recommended treatment plan: Lumpectomy with SLNB followed by adjuvant radiation therapy  Genetic testing consult  10/2/20 Left breast Lumpectomy  Invasive breast carcinoma of no special type, 21 mm focus  Grade 3  DCIS present in 2 out of 17 blocks  Margins negative for invasive carcinoma, 1 mm from the closest posterior margin  Negative for DCIS 1 mm from the closest anterior margin  Lymph-vascular invasion present     Lymph nodes:  Left sentinel lymph node excision: 1 lymph node positive for carcinoma  Left axillary LN excision: 1 reactive lymph node     Primary cancer of upper outer quadrant of left female breast ([facility] [address] )   9/2020 Initial Diagnosis    Primary cancer of upper outer quadrant of left female breast ([facility] [address] )  Stage IIB     9/11/2020 Biopsy    Breast, left at 1:00 12cmfn, US guided biopsy, 5 passes:  - Invasive mammary carcinoma of no special type (ductal, not otherwise specified)  -- [place] histologic grade 3 of 3 (total score: 8 of 9)       -- Invasive carcinoma involves 5 of 5 submitted core biopsies, max  Dimension= 14 mm     -- Estrogen, Progesterone 90-95% positive HER2 negative  - Lymphovascular invasion: Present  10/2/2020 Surgery    A  Breast, Left, Lumpectomy:  - Invasive breast carcinoma of no special type (ductal NST/invasive ductal carcinoma), 21 mm focus  * [place] grade 3 of 3 (total score: 9 of 9)   * Ductal carcinoma in situ (DCIS): Present in 2 out of 17 blocks  -- [place], solid and cribriform architectural pattern, nuclear grade 3 of 3     * Margins: Negative for invasive carcinoma, 1 mm from the closest posterior margin; Negative for DCIS, 1 mm from the closest anterior margin  * Skin: Negative for carcinoma  * Skeletal muscle: Negative for carcinoma  * Lymph-vascular invasion: Present      * Pathologic stage (AJCC 8th ed ): pT2, pN1a (sn)  B  [place] Lymph Node, Left # 1, Excision:  - One lymph node positive for carcinoma (1/1)  C  Axillary contents, Left, Excision:  - One reactive lymph node (0/1)  10/2/2020 -  Cancer Staged    Staging form: Breast, AJCC 8th Edition  - Clinical stage from 10/2/2020: Stage IIB (cT2, cN1(sn), cM0, G3, ER+, MI+, HER2-) - Signed by Ange Franco MD on 10/9/2020  Stage prefix: Initial diagnosis  Laterality: Left  Tumor size (mm): 21  Method of lymph node assessment: [place] lymph node biopsy  Nuclear grade: G3  Mitotic count score: Score 3  Percentage of tumor with tubule formation: 10  Tubule formation score: Score 3  Scarff-Bloom-Cevallos score: 9  Histologic grading system: 3 grade system  Lymph-vascular invasion (LVI): LVI present/identified, NOS  Specimen type: Excision  National guidelines used in treatment planning: Yes  Type of national guideline used in treatment planning: NCCN       11/4/2020 - 1/26/2021 Chemotherapy    Completed 4 cycles of adjuvant Taxotere plus Cytoxan   pegfilgrastim (NEULASTA) subcutaneous injection 6 mg, 6 mg, Subcutaneous, Once, 3 of 3 cycles  Administration: 6 mg (11/5/2020), 6 mg (11/25/2020), 6 mg (1/6/2021)  pegfilgrastim (NEULASTA ONPRO) subcutaneous injection kit 6 mg, 6 mg, Subcutaneous, Once, 1 of 1 cycle  Administration: 6 mg (12/15/2020)  cyclophosphamide (CYTOXAN) 1,068 mg in sodium chloride 0 9 % 250 mL IVPB, 600 mg/m2 = 1,068 mg, Intravenous, Once, 4 of 4 cycles  Administration: 1,068 mg (11/4/2020), 1,068 mg (11/24/2020), 1,068 mg (12/15/2020), 1,068 mg (1/5/2021)  DOCEtaxel (TAXOTERE) 140 mg in sodium chloride 0 9 % 250 mL chemo infusion, 133 6 mg, Intravenous, Once, 4 of 4 cycles  Administration: 140 mg (11/4/2020), 140 mg (11/24/2020), 140 mg (12/15/2020), 140 mg (1/5/2021)       11/2020 Genetic Testing    Patient has genetic testing done for breast cancer    Results revealed patient has the following mutation(s):  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), Heterozygous     2/9/2021 -  Hormone Therapy    Started anastrozole  Was also started on Prolia injections due to pre-existing osteopenia         Interval history:    ROS: Review of Systems   Constitutional: Positive for fatigue  Negative for chills and fever  HENT: Negative for ear pain and sore throat  Eyes: Negative for pain and visual disturbance  Respiratory: Negative for cough and shortness of breath  Cardiovascular: Negative for chest pain and palpitations  Gastrointestinal: Negative for abdominal pain and vomiting  Genitourinary: Negative for dysuria and hematuria  Musculoskeletal: Negative for arthralgias and back pain  Skin: Negative for color change and rash  Neurological: Negative for seizures and syncope  Psychiatric/Behavioral: Positive for sleep disturbance  All other systems reviewed and are negative  Past Medical History:   Diagnosis Date   • Acute cystitis 3/2/2018   • Asthma     has not been treated for seveeral years  exercise induced   • BRCA1 negative    • Breast cancer ([facility] [address] ) 10/2020    left sided   • Cancer ([facility] [address] )     breast  9/20   • Cellulitis of left upper extremity 2/23/2021   • Chemotherapy induced neutropenia ([facility] [address] ) 10/30/2020   • Exercise-induced asthma 11/3/2016   • History of chemotherapy 11/2020   • Hx of radiation therapy 03/2021   • Urinary tract infection        Past Surgical History:   Procedure Laterality Date   • BREAST CYST EXCISION Left     benign   • BREAST CYST EXCISION Left     benign   • BREAST SURGERY     • CHOLECYSTECTOMY     • HYSTERECTOMY     • IR PORT PLACEMENT  10/26/2020   • IR PORT REMOVAL  2/4/2021   • MAMMO NEEDLE LOCALIZATION LEFT (ALL INC) Left 10/2/2020   • MOUTH SURGERY     • AK MASTECTOMY PARTIAL Left 10/2/2020    Procedure: BREAST NEEDLE LOCALIZED LUMPECTOMY, SENTINEL LYMPH NODE BIOPSY (Bracketed U/S guided needle loc @ 8:00, INJECT AT 1045);   Surgeon: Carina Springer MD;  Location: [facility] MAIN OR;  Service: General   • TUBAL LIGATION     • US GUIDED BREAST BIOPSY LEFT COMPLETE Left 9/11/2020   • WISDOM TOOTH EXTRACTION         Social History     Socioeconomic History   • Marital status: /Civil Union     Spouse name: None   • Number of children: 2   • Years of education: None   • Highest education level: None   Occupational History   • Occupation: Retired   Tobacco Use   • Smoking status: Never   • Smokeless tobacco: Never   Vaping Use   • Vaping Use: Never used   Substance and Sexual Activity   • Alcohol use: Never   • Drug use: Never   • Sexual activity: Yes     Partners: Male     Birth control/protection: Female Sterilization     Comment:  x 9 years, [place] [employer]   Other Topics Concern   • None   Social History Narrative    Daily caffeine consumption, 1 serving a day    Exercises 3-4 times per week     x 8 years     Social Determinants of Health     Financial Resource Strain: Not on file   Food Insecurity: Not on file   Transportation Needs: Not on file   Physical Activity: Not on file   Stress: Not on file   Social Connections: Not on file   Intimate Partner Violence: Not on file   Housing Stability: Not on file       Family History   Problem Relation Age of Onset   • Hypertension Mother    • Heart disease Mother         cardiac disorder   • Breast cancer Mother         dx age early 19's   • Glaucoma Mother    • Hypertension Father    • Heart disease Father         cardiac disorder   • Cancer Father    • No Known Problems Sister    • Multiple sclerosis Daughter    • Melanoma Daughter    • No Known Problems Son    • No Known Problems Paternal Aunt        Allergies   Allergen Reactions   • Ofloxacin Hives   • Penicillins Hives   • Vancomycin Hives     Pt asked to update her chart  She received this med when she was in the hospital - 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      • Chlorhexidine Rash         Current Outpatient Medications:   •  albuterol (ProAir HFA) 90 mcg/act inhaler, Inhale 2 puffs every 6 (six) hours as needed for wheezing or shortness of breath, Disp: 8 5 g, Rfl: 0  •  anastrozole (ARIMIDEX) 1 mg tablet, Take 1 tablet (1 mg total) by mouth daily, Disp: 90 tablet, Rfl: 3  •  ascorbic acid (VITAMIN C) 250 mg tablet, Take 250 mg by mouth daily, Disp: , Rfl:   •  calcium carbonate (TUMS) 500 mg chewable tablet, Chew 1 tablet daily, Disp: , Rfl:   •  benzonatate (TESSALON) 200 MG capsule, Take 200 mg by mouth 3 (three) times a day as needed for cough, Disp: , Rfl:   •  Biotin 1 MG CAPS, Take by mouth, Disp: , Rfl:   •  Calcium 600 MG tablet, Take 1 tablet by mouth every morning , Disp: , Rfl:   •  Cholecalciferol (VITAMIN D3) 1000 units CAPS, Take 1 capsule by mouth every morning , Disp: , Rfl:   •  denosumab (PROLIA) 60 mg/mL, Inject 1 mL (60 mg total) under the skin once for 1 dose Every 6 months, Disp: 1 mL, Rfl: 1  •  EPINEPHrine (EPIPEN) 0 3 mg/0 3 mL SOAJ, Inject 0 3 mL (0 3 mg total) into a muscle once for 1 dose IM as needed For home administration of prolia, Disp: 0 6 mL, Rfl: 1  •  triamcinolone (KENALOG) 0 1 % cream, Apply topically 2 (two) times a day, Disp: 30 g, Rfl: 0      Physical Exam:  /76 (BP Location: Right arm, Patient Position: Sitting, Cuff Size: Adult)   Pulse 65   Resp 16   Ht 5' 3" (1 6 m)   Wt 75 6 kg (166 lb 11 2 oz)   LMP  (LMP Unknown)   SpO2 100%   BMI 29 53 kg/m²     Physical Exam  Constitutional:       General: She is not in acute distress  Appearance: She is well-developed  She is not diaphoretic  HENT:      Head: Normocephalic and atraumatic  Nose: Nose normal    Eyes:      General: No scleral icterus  Right eye: No discharge  Left eye: No discharge  Conjunctiva/sclera: Conjunctivae normal       Pupils: Pupils are equal, round, and reactive to light  Neck:      Thyroid: No thyromegaly  Vascular: No JVD  Trachea: No tracheal deviation  Cardiovascular:      Rate and Rhythm: Normal rate and regular rhythm  Heart sounds: Normal heart sounds  No murmur heard  No friction rub  Pulmonary:      Effort: Pulmonary effort is normal  No respiratory distress  Breath sounds: Normal breath sounds  No stridor  No wheezing or rales  Chest:      Chest wall: No tenderness  Comments: Tenderness of the left upper outer quadrant of the breast without any solid component  Abdominal:      General: There is no distension  Palpations: Abdomen is soft  There is no hepatomegaly or splenomegaly  Tenderness: There is no abdominal tenderness  There is no guarding or rebound  Musculoskeletal:         General: No tenderness or deformity  Normal range of motion  Cervical back: Normal range of motion and neck supple  Lymphadenopathy:      Cervical: No cervical adenopathy  Skin:     General: Skin is warm and dry  Coloration: Skin is not pale  Findings: No erythema or rash  Neurological:      Mental Status: She is alert and oriented to person, place, and time  Cranial Nerves: No cranial nerve deficit  Coordination: Coordination normal       Deep Tendon Reflexes: Reflexes are normal and symmetric  Psychiatric:         Behavior: Behavior normal          Thought Content: Thought content normal          Judgment: Judgment normal            Labs:  Lab Results   Component Value Date    WBC 6 68 03/17/2023    HGB 13 8 03/17/2023    HCT 42 8 03/17/2023    MCV 89 03/17/2023     03/17/2023     Lab Results   Component Value Date    K 3 9 03/17/2023     03/17/2023    CO2 28 03/17/2023    BUN 16 03/17/2023    CREATININE 0 76 03/17/2023    GLUF 116 (H) 03/17/2023    CALCIUM 9 0 03/17/2023    AST 16 03/17/2023    ALT 28 03/17/2023    ALKPHOS 75 03/17/2023    EGFR 84 03/17/2023     Lab Results   Component Value Date    TSH 1 04 08/08/2018       Patient voiced understanding and agreement in the above discussion  Aware to contact our office with questions/symptoms in the interim

## 2023-04-04 ENCOUNTER — TELEPHONE (OUTPATIENT)
Dept: HEMATOLOGY ONCOLOGY | Facility: CLINIC | Age: 63
End: 2023-04-04

## 2023-04-04 DIAGNOSIS — Z51.11 ENCOUNTER FOR CHEMOTHERAPY MANAGEMENT: ICD-10-CM

## 2023-04-04 RX ORDER — EPINEPHRINE 0.3 MG/.3ML
0.3 INJECTION SUBCUTANEOUS ONCE
Qty: 0.6 ML | Refills: 1 | Status: SHIPPED | OUTPATIENT
Start: 2023-04-04 | End: 2023-04-04

## 2023-04-04 NOTE — TELEPHONE ENCOUNTER
Call Transfer   Reason for patient call? Monica from [address] calling in with questions regarding the patient starting a Prolia Shot  If someone other than patient is calling, are they listed on the communication consent? N/A   Who is the patients Primary Oncologist? THERESA Sanchez    Person/Department that the call was transferred to? Time that call was transferred? Yelena Prom @ 10:34AM   Informed patient that the message will be forwarded to the team and someone will get back to them as soon as possible  Did you relay this information to the patient?   Yes

## 2023-04-13 NOTE — TELEPHONE ENCOUNTER
Patient release of health information was faxed to Dr Raymond Guzman office at [phone], phone [phone]  Laps, needles and instruments count was reported as correct.

## 2023-04-25 DIAGNOSIS — L30.9 DERMATITIS: ICD-10-CM

## 2023-04-25 RX ORDER — TRIAMCINOLONE ACETONIDE 1 MG/G
CREAM TOPICAL 2 TIMES DAILY
Qty: 30 G | Refills: 1 | Status: SHIPPED | OUTPATIENT
Start: 2023-04-25

## 2023-05-02 ENCOUNTER — HOSPITAL ENCOUNTER (OUTPATIENT)
Dept: ULTRASOUND IMAGING | Facility: HOSPITAL | Age: 63
Discharge: HOME | End: 2023-05-02
Attending: INTERNAL MEDICINE

## 2023-05-02 ENCOUNTER — HOSPITAL ENCOUNTER (OUTPATIENT)
Dept: MAMMOGRAPHY | Facility: HOSPITAL | Age: 63
Discharge: HOME | End: 2023-05-02

## 2023-05-02 VITALS — BODY MASS INDEX: 29.41 KG/M2 | HEIGHT: 63 IN | WEIGHT: 166 LBS

## 2023-05-02 DIAGNOSIS — Z09 ENCOUNTER FOR FOLLOW-UP: ICD-10-CM

## 2023-05-02 DIAGNOSIS — C50.412 PRIMARY CANCER OF UPPER OUTER QUADRANT OF LEFT FEMALE BREAST (HCC): ICD-10-CM

## 2023-06-19 DIAGNOSIS — R74.8 LOW SERUM HIGH DENSITY LIPOPROTEIN (HDL): ICD-10-CM

## 2023-06-19 DIAGNOSIS — J45.990 EXERCISE-INDUCED ASTHMA: ICD-10-CM

## 2023-06-19 DIAGNOSIS — E55.9 VITAMIN D DEFICIENCY: ICD-10-CM

## 2023-06-19 DIAGNOSIS — R73.01 IMPAIRED FASTING GLUCOSE: Primary | ICD-10-CM

## 2023-07-14 ENCOUNTER — VBI (OUTPATIENT)
Dept: ADMINISTRATIVE | Facility: OTHER | Age: 63
End: 2023-07-14

## 2023-08-07 DIAGNOSIS — C50.412 PRIMARY CANCER OF UPPER OUTER QUADRANT OF LEFT FEMALE BREAST (HCC): ICD-10-CM

## 2023-08-07 DIAGNOSIS — Z79.811 AROMATASE INHIBITOR USE: ICD-10-CM

## 2023-08-07 RX ORDER — ANASTROZOLE 1 MG/1
1 TABLET ORAL DAILY
Qty: 90 TABLET | Refills: 3 | Status: SHIPPED | OUTPATIENT
Start: 2023-08-07 | End: 2023-09-12 | Stop reason: SINTOL

## 2023-08-07 NOTE — TELEPHONE ENCOUNTER
Medication failed [facility] protocol. Please forward to your office staff for further review as this medication was reviewed by a [facility] RN.

## 2023-09-05 ENCOUNTER — TELEPHONE (OUTPATIENT)
Dept: HEMATOLOGY ONCOLOGY | Facility: CLINIC | Age: 63
End: 2023-09-05

## 2023-09-05 NOTE — TELEPHONE ENCOUNTER
Pt phoned to report hot flashes and mood swings and wonders if her Kimberlyn Lopez is still working. I informed pt that theses are expected side effects of Anastrazole but she could go off the Anastrazole for 7 days to see if it subsides and we can try Letrozole. Pt is in agreement.

## 2023-09-12 DIAGNOSIS — C50.412 PRIMARY CANCER OF UPPER OUTER QUADRANT OF LEFT FEMALE BREAST (HCC): Primary | ICD-10-CM

## 2023-09-12 RX ORDER — LETROZOLE 2.5 MG/1
2.5 TABLET, FILM COATED ORAL DAILY
Qty: 30 TABLET | Refills: 11 | Status: SHIPPED | OUTPATIENT
Start: 2023-09-12 | End: 2023-09-20

## 2023-09-18 ENCOUNTER — APPOINTMENT (OUTPATIENT)
Age: 63
End: 2023-09-18
Payer: COMMERCIAL

## 2023-09-18 DIAGNOSIS — E83.41 HYPERMAGNESEMIA: ICD-10-CM

## 2023-09-18 DIAGNOSIS — E55.9 VITAMIN D DEFICIENCY: ICD-10-CM

## 2023-09-18 DIAGNOSIS — C50.412 PRIMARY CANCER OF UPPER OUTER QUADRANT OF LEFT FEMALE BREAST (HCC): ICD-10-CM

## 2023-09-18 DIAGNOSIS — J45.990 EXERCISE-INDUCED ASTHMA: ICD-10-CM

## 2023-09-18 DIAGNOSIS — R73.01 IMPAIRED FASTING GLUCOSE: ICD-10-CM

## 2023-09-18 DIAGNOSIS — R74.8 LOW SERUM HIGH DENSITY LIPOPROTEIN (HDL): ICD-10-CM

## 2023-09-18 LAB
25(OH)D3 SERPL-MCNC: 43.5 NG/ML (ref 30–100)
ALBUMIN SERPL BCP-MCNC: 4.1 G/DL (ref 3.5–5)
ALP SERPL-CCNC: 65 U/L (ref 34–104)
ALT SERPL W P-5'-P-CCNC: 32 U/L (ref 7–52)
ANION GAP SERPL CALCULATED.3IONS-SCNC: 7 MMOL/L
AST SERPL W P-5'-P-CCNC: 28 U/L (ref 13–39)
BASOPHILS # BLD AUTO: 0.03 THOUSANDS/ÂΜL (ref 0–0.1)
BASOPHILS NFR BLD AUTO: 1 % (ref 0–1)
BILIRUB SERPL-MCNC: 1.07 MG/DL (ref 0.2–1)
BUN SERPL-MCNC: 12 MG/DL (ref 5–25)
CALCIUM SERPL-MCNC: 8.9 MG/DL (ref 8.4–10.2)
CHLORIDE SERPL-SCNC: 104 MMOL/L (ref 96–108)
CHOLEST SERPL-MCNC: 147 MG/DL
CO2 SERPL-SCNC: 28 MMOL/L (ref 21–32)
CREAT SERPL-MCNC: 0.79 MG/DL (ref 0.6–1.3)
EOSINOPHIL # BLD AUTO: 0.08 THOUSAND/ÂΜL (ref 0–0.61)
EOSINOPHIL NFR BLD AUTO: 2 % (ref 0–6)
ERYTHROCYTE [DISTWIDTH] IN BLOOD BY AUTOMATED COUNT: 14 % (ref 11.6–15.1)
EST. AVERAGE GLUCOSE BLD GHB EST-MCNC: 126 MG/DL
GFR SERPL CREATININE-BSD FRML MDRD: 80 ML/MIN/1.73SQ M
GLUCOSE P FAST SERPL-MCNC: 107 MG/DL (ref 65–99)
HBA1C MFR BLD: 6 %
HCT VFR BLD AUTO: 41.6 % (ref 34.8–46.1)
HDLC SERPL-MCNC: 50 MG/DL
HGB BLD-MCNC: 13.7 G/DL (ref 11.5–15.4)
IMM GRANULOCYTES # BLD AUTO: 0.02 THOUSAND/UL (ref 0–0.2)
IMM GRANULOCYTES NFR BLD AUTO: 0 % (ref 0–2)
LDH SERPL-CCNC: 191 U/L (ref 140–271)
LDLC SERPL CALC-MCNC: 80 MG/DL (ref 0–100)
LYMPHOCYTES # BLD AUTO: 0.88 THOUSANDS/ÂΜL (ref 0.6–4.47)
LYMPHOCYTES NFR BLD AUTO: 16 % (ref 14–44)
MAGNESIUM SERPL-MCNC: 2.3 MG/DL (ref 1.9–2.7)
MCH RBC QN AUTO: 29.4 PG (ref 26.8–34.3)
MCHC RBC AUTO-ENTMCNC: 32.9 G/DL (ref 31.4–37.4)
MCV RBC AUTO: 89 FL (ref 82–98)
MONOCYTES # BLD AUTO: 0.49 THOUSAND/ÂΜL (ref 0.17–1.22)
MONOCYTES NFR BLD AUTO: 9 % (ref 4–12)
NEUTROPHILS # BLD AUTO: 3.85 THOUSANDS/ÂΜL (ref 1.85–7.62)
NEUTS SEG NFR BLD AUTO: 72 % (ref 43–75)
NONHDLC SERPL-MCNC: 97 MG/DL
NRBC BLD AUTO-RTO: 0 /100 WBCS
PLATELET # BLD AUTO: 172 THOUSANDS/UL (ref 149–390)
PMV BLD AUTO: 8.9 FL (ref 8.9–12.7)
POTASSIUM SERPL-SCNC: 4.6 MMOL/L (ref 3.5–5.3)
PROT SERPL-MCNC: 6.9 G/DL (ref 6.4–8.4)
RBC # BLD AUTO: 4.66 MILLION/UL (ref 3.81–5.12)
SODIUM SERPL-SCNC: 139 MMOL/L (ref 135–147)
TRIGL SERPL-MCNC: 86 MG/DL
WBC # BLD AUTO: 5.35 THOUSAND/UL (ref 4.31–10.16)

## 2023-09-18 PROCEDURE — 36415 COLL VENOUS BLD VENIPUNCTURE: CPT

## 2023-09-18 PROCEDURE — 80061 LIPID PANEL: CPT

## 2023-09-18 PROCEDURE — 82306 VITAMIN D 25 HYDROXY: CPT

## 2023-09-18 PROCEDURE — 80053 COMPREHEN METABOLIC PANEL: CPT

## 2023-09-18 PROCEDURE — 85025 COMPLETE CBC W/AUTO DIFF WBC: CPT

## 2023-09-18 PROCEDURE — 83735 ASSAY OF MAGNESIUM: CPT

## 2023-09-18 PROCEDURE — 83615 LACTATE (LD) (LDH) ENZYME: CPT

## 2023-09-18 PROCEDURE — 83036 HEMOGLOBIN GLYCOSYLATED A1C: CPT

## 2023-09-18 NOTE — TELEPHONE ENCOUNTER
Pt phoned to report that since she has started Letrozole, meaghan has headaches and diarrhea and generally does not feel well. Pt wants to go back on anastrazole, she has an OV with Dorothea Everson next week and will talk to her instructed her to ask for some meds to help with the hot flashes that she was having with Anastrazole.

## 2023-09-20 ENCOUNTER — OFFICE VISIT (OUTPATIENT)
Dept: INTERNAL MEDICINE CLINIC | Facility: CLINIC | Age: 63
End: 2023-09-20
Payer: COMMERCIAL

## 2023-09-20 ENCOUNTER — TELEPHONE (OUTPATIENT)
Dept: INTERNAL MEDICINE CLINIC | Facility: CLINIC | Age: 63
End: 2023-09-20

## 2023-09-20 VITALS
HEIGHT: 63 IN | OXYGEN SATURATION: 98 % | BODY MASS INDEX: 28.46 KG/M2 | DIASTOLIC BLOOD PRESSURE: 74 MMHG | SYSTOLIC BLOOD PRESSURE: 122 MMHG | TEMPERATURE: 97.8 F | WEIGHT: 160.6 LBS | HEART RATE: 77 BPM

## 2023-09-20 DIAGNOSIS — R73.01 IMPAIRED FASTING GLUCOSE: ICD-10-CM

## 2023-09-20 DIAGNOSIS — L30.9 DERMATITIS: ICD-10-CM

## 2023-09-20 DIAGNOSIS — B37.2 CANDIDAL DERMATITIS: ICD-10-CM

## 2023-09-20 DIAGNOSIS — L82.0 INFLAMED SEBORRHEIC KERATOSIS: ICD-10-CM

## 2023-09-20 DIAGNOSIS — Z00.00 ANNUAL PHYSICAL EXAM: Primary | ICD-10-CM

## 2023-09-20 DIAGNOSIS — E55.9 VITAMIN D DEFICIENCY: ICD-10-CM

## 2023-09-20 PROCEDURE — 99396 PREV VISIT EST AGE 40-64: CPT | Performed by: FAMILY MEDICINE

## 2023-09-20 RX ORDER — FLUCONAZOLE 150 MG/1
150 TABLET ORAL DAILY
Qty: 3 TABLET | Refills: 0 | Status: SHIPPED | OUTPATIENT
Start: 2023-09-20 | End: 2023-09-23

## 2023-09-20 RX ORDER — NYSTATIN 100000 U/G
CREAM TOPICAL 2 TIMES DAILY
Qty: 30 G | Refills: 1 | Status: SHIPPED | OUTPATIENT
Start: 2023-09-20

## 2023-09-20 RX ORDER — ANASTROZOLE 1 MG/1
1 TABLET ORAL DAILY
COMMUNITY

## 2023-09-20 NOTE — TELEPHONE ENCOUNTER
I called Pt and informed her that her physical is due after Sept 22, and may receive a bill. She self scheduled. Stated she will still come in. Pt came into office stating she called Ref# [id number] stating physical can be done calender year.

## 2023-09-20 NOTE — PROGRESS NOTES
[facility] [place]    NAME: Mari Clark  AGE: 59 y.o. SEX: female  : 1960     DATE: 2023     Assessment and Plan:     1. Annual physical exam  -     CBC and differential; Future  -     TSH, 3rd generation; Future    2. Impaired fasting glucose  -     CBC and differential; Future  -     Comprehensive metabolic panel; Future  -     Hemoglobin A1C; Future  -     Lipid panel; Future  -     TSH, 3rd generation; Future    3. Inflamed seborrheic keratosis  -     CBC and differential; Future  -     TSH, 3rd generation; Future    4. Dermatitis  -     CBC and differential; Future  -     TSH, 3rd generation; Future    5. Vitamin D deficiency  -     CBC and differential; Future  -     TSH, 3rd generation; Future  -     Vitamin D 25 hydroxy; Future    6. Candidal dermatitis  -     CBC and differential; Future  -     TSH,  generation; Future  -     nystatin (MYCOSTATIN) cream; Apply topically 2 (two) times a day  -     fluconazole (DIFLUCAN) 150 mg tablet; Take 1 tablet (150 mg total) by mouth daily for 3 doses       Breast cancer  We discussed that anastrozole had side effects of depression. Continue to follow-up with oncology. Prediabetic  I discussed her having the blood glucose checked every 6 to 12 months. Watch carbohydrate intake. Try to remain as active as possible. Yeast infection  We discussed to start with nystatin cream. Advised her to use cloth diapers to keep the area as dry as possible and hold the nystatin cream in place. Advised her to monitor the area. Discussed the use of oral fluconazole for 3 days since this rash has now been going on for a few months. The patient wants to start on both nystatin cream and Diflucan once a day for 3 days. Areas of eczema also discussed.   Discussed use of moisturizer such as Eucerin especially with the upcoming winter months    Immunizations and preventive care screenings were discussed with patient today. Appropriate education was printed on patient's after visit summary. Continue to follow-up with specialists with a history of breast cancer. Discussed ongoing mood issues. Discussed use of medication for this but she is hesitant to start anything because of potential for short-term or long-term side effects. Advised her of the risks and benefits and advised her to watch for any worsening. We have discussed on RSV vaccine, flu vaccine and COVID-19 booster vaccine. We have discussed natural anti-inflammatory such as turmeric, fish oil, glucosamine, and chondroitin. Counseling:   I advised her to start with antidepressants and she confirmed she would consult her oncologist.     Counseling:  Alcohol/drug use: discussed moderation in alcohol intake, the recommendations for healthy alcohol use, and avoidance of illicit drug use. Dental Health: discussed importance of regular tooth brushing, flossing, and dental visits. Injury prevention: discussed safety/seat belts, safety helmets, smoke detectors, carbon dioxide detectors, and smoking near bedding or upholstery. Sexual health: discussed sexually transmitted diseases, partner selection, use of condoms, avoidance of unintended pregnancy, and contraceptive alternatives. Exercise: the importance of regular exercise/physical activity was discussed. Recommend exercise 3-5 times per week for at least 30 minutes. No follow-ups on file. Chief Complaint:     Chief Complaint   Patient presents with   • Annual Exam      History of Present Illness:     Adult Annual Physical   Ms. Leticia Rivera is a 71-year-old female who presents here for a comprehensive physical exam.     She felt depressed at the time before her travel to [place]. Has been feeling somewhat better since spending an extended period of time with her grandchildren in [place]. She has noticed some mood changes, however.   Has been having some marital issues. She has lost weight which she says is intentional and unintentional. She has had history of breast cancer. Had been on the Humira in place of the anastrozole but did not tolerate this and is now back on the anastrozole. She does not want to take antidepressants. She prefers natural products to use. She took letrozole in place of anastrozole but she discontinued it since it gives her headaches and upset stomach. She still has hot flashes and felt burning up. She will see the oncologist next week. She is postmenopausal.     She is prediabetic. She is scheduled for a Prolia injection. The patient has knee arthritis. Her mammogram and bone density scan are scheduled. The patient had her pneumonia vaccine last year. She has a bump on her ear, and it has been bothering her. It is the same in her neck. She notices patches forming and has been there since 04/2023. Is following up with dermatology    General Health  - Vision: She sees her eye doctor yearly. - Dental: She goes to the dentist every 6 months. Diet and Physical Activity  Diet/Nutrition: well balanced diet and limited junk food. Exercise: walking. General Health  Sleep: sleeps well. Hearing: normal - bilateral.  Vision: goes for regular eye exams. Dental: regular dental visits.        Depression Screening  PHQ-2/9 Depression Screening    Little interest or pleasure in doing things: 0 - not at all  Feeling down, depressed, or hopeless: 1 - several days  PHQ-2 Score: 1  PHQ-2 Interpretation: Negative depression screen          Past Medical History:     Past Medical History:   Diagnosis Date   • Acute cystitis 3/2/2018   • Asthma     has not been treated for seveeral years  exercise induced   • BRCA1 negative    • Breast cancer ([address]) 10/2020    left sided   • Cancer ([address])     breast  9/20   • Cellulitis of left upper extremity 2/23/2021   • Chemotherapy induced neutropenia ([address]) 10/30/2020   • Exercise-induced asthma 11/3/2016   • History of chemotherapy 11/2020   • Hx of radiation therapy 03/2021   • Urinary tract infection       Past Surgical History:     Past Surgical History:   Procedure Laterality Date   • BREAST CYST EXCISION Left     benign   • BREAST CYST EXCISION Left     benign   • BREAST SURGERY     • CHOLECYSTECTOMY     • HYSTERECTOMY     • IR PORT PLACEMENT  10/26/2020   • IR PORT REMOVAL  2/4/2021   • MAMMO NEEDLE LOCALIZATION LEFT (ALL INC) Left 10/2/2020   • MOUTH SURGERY     • IL MASTECTOMY PARTIAL Left 10/2/2020    Procedure: BREAST NEEDLE LOCALIZED LUMPECTOMY, SENTINEL LYMPH NODE BIOPSY (Bracketed U/S guided needle loc @ 8:00, INJECT AT 1045);   Surgeon: Heike Bonds MD;  Location: [facility] MAIN OR;  Service: General   • TUBAL LIGATION     • US GUIDED BREAST BIOPSY LEFT COMPLETE Left 9/11/2020   • WISDOM TOOTH EXTRACTION        Social History:     Social History     Socioeconomic History   • Marital status: /Civil Union     Spouse name: None   • Number of children: 2   • Years of education: None   • Highest education level: None   Occupational History   • Occupation: Retired   Tobacco Use   • Smoking status: Never   • Smokeless tobacco: Never   Vaping Use   • Vaping Use: Never used   Substance and Sexual Activity   • Alcohol use: Never   • Drug use: Never   • Sexual activity: Yes     Partners: Male     Birth control/protection: Female Sterilization     Comment:  x 9 years, Mike Linker   Other Topics Concern   • None   Social History Narrative    Daily caffeine consumption, 1 serving a day    Exercises 3-4 times per week     x 8 years     Social Determinants of Health     Financial Resource Strain: Not on file   Food Insecurity: Not on file   Transportation Needs: Not on file   Physical Activity: Not on file   Stress: Not on file   Social Connections: Not on file   Intimate Partner Violence: Not on file   Housing Stability: Not on file      Family History:     Family History   Problem Relation Age of Onset   • Hypertension Mother    • Heart disease Mother         cardiac disorder   • Breast cancer Mother         dx age early 19's   • Glaucoma Mother    • Hypertension Father    • Heart disease Father         cardiac disorder   • Cancer Father    • No Known Problems Sister    • Multiple sclerosis Daughter    • Melanoma Daughter    • No Known Problems Maternal Grandmother    • No Known Problems Paternal Grandmother    • No Known Problems Son    • No Known Problems Paternal Aunt       Current Medications:     Current Outpatient Medications   Medication Sig Dispense Refill   • albuterol (ProAir HFA) 90 mcg/act inhaler Inhale 2 puffs every 6 (six) hours as needed for wheezing or shortness of breath 8.5 g 0   • anastrozole (ARIMIDEX) 1 mg tablet Take 1 mg by mouth daily     • ascorbic acid (VITAMIN C) 250 mg tablet Take 250 mg by mouth daily     • calcium carbonate (TUMS) 500 mg chewable tablet Chew 1 tablet daily     • fluconazole (DIFLUCAN) 150 mg tablet Take 1 tablet (150 mg total) by mouth daily for 3 doses 3 tablet 0   • nystatin (MYCOSTATIN) cream Apply topically 2 (two) times a day 30 g 1   • triamcinolone (KENALOG) 0.1 % cream Apply topically 2 (two) times a day 30 g 1     No current facility-administered medications for this visit. Allergies: Allergies   Allergen Reactions   • Ofloxacin Hives   • Penicillins Hives   • Vancomycin Hives     Pt asked to update her chart. 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 • Chlorhexidine Rash        Review of Systems:     Review of Systems  Constitutional: Negative for activity change, appetite change, chills and fever. HENT: Negative for congestion and rhinorrhea. Eyes: Negative for visual disturbance. Respiratory: Negative for chest tightness and shortness of breath. Cardiovascular: Negative for chest pain and palpitations.    Gastrointestinal: Negative for abdominal pain, blood in stool, diarrhea, nausea and vomiting. Endocrine: Negative for polydipsia, polyphagia and polyuria. Genitourinary: Negative for dysuria, frequency and urgency. Musculoskeletal: Negative for gait problem. Skin: Negative for color change. Neurological: Negative for dizziness and headaches. Hematological: Does not bruise/bleed easily. Psychiatric/Behavioral: Negative for confusion and sleep disturbance. The patient is not nervous/anxious. Physical Exam:   /74 (BP Location: Right arm, Patient Position: Sitting, Cuff Size: Large)   Pulse 77   Temp 97.8 °F (36.6 °C)   Ht 5' 3" (1.6 m)   Wt 72.8 kg (160 lb 9.6 oz)   LMP  (LMP Unknown)   SpO2 98%   BMI 28.45 kg/m²   Physical Exam   She is alert and oriented x3. She has Moist mucous membranes. No pharyngeal erythema or exudate. She has dry skin in the ear canals, dry skin behind bilateral ears, seborrheic keratoses behind the ears and on to the neck, bilaterally. Her neck is supple. No lymphadenopathy. No thyromegaly. No carotid bruits. Her heart is regular. No murmur. Her Lungs are clear to auscultation bilaterally. No rales, rhonchi or wheezes. Some degenerative changes bilateral knees over the medial aspect. No peripheral edema. Under the breasts bilaterally erythematous rash, moist appearing with satellite areas. Left lower extremity with circular scaling rashes consistent with nummular eczema. I have personally reviewed results with the patient. Below is the patient's most recent value for Albumin, ALT, AST, BUN, Calcium, Chloride, Cholesterol, CO2, Creatinine, GFR, Glucose, HDL, Hematocrit, Hemoglobin, Hemoglobin A1C, LDL, Magnesium, Phosphorus, Platelets, Potassium, PSA, Sodium, Triglycerides, and WBC.    Lab Results   Component Value Date    ALT 32 09/18/2023    AST 28 09/18/2023    BUN 12 09/18/2023    CALCIUM 8.9 09/18/2023     09/18/2023    CO2 28 09/18/2023    CREATININE 0.79 09/18/2023    HDL 50 09/18/2023    HCT 41.6 09/18/2023 HGB 13.7 09/18/2023    HGBA1C 6.0 (H) 09/18/2023    MG 2.3 09/18/2023     09/18/2023    K 4.6 09/18/2023    TRIG 86 09/18/2023    WBC 5.35 09/18/2023     Note: for a comprehensive list of the patient's lab results, access the Results Review activity. Her blood glucose is 107 mg/dL. Her vitamin D level has improved. Her magnesium level has improved. Annabel Peter MD  [facility] [address]    Transcribed for Annabel Peter MD, by Juan Manuel East on 09/21/23 at 2:56 AM. Powered by [employer].

## 2023-09-26 ENCOUNTER — HOSPITAL ENCOUNTER (OUTPATIENT)
Dept: BONE DENSITY | Facility: HOSPITAL | Age: 63
Discharge: HOME | End: 2023-09-26
Attending: INTERNAL MEDICINE
Payer: COMMERCIAL

## 2023-09-26 VITALS — HEIGHT: 63 IN | BODY MASS INDEX: 28.36 KG/M2 | WEIGHT: 160.05 LBS

## 2023-09-26 DIAGNOSIS — M85.852 OSTEOPENIA OF LEFT HIP: ICD-10-CM

## 2023-09-26 DIAGNOSIS — C50.412 PRIMARY CANCER OF UPPER OUTER QUADRANT OF LEFT FEMALE BREAST (HCC): ICD-10-CM

## 2023-09-26 DIAGNOSIS — Z79.811 AROMATASE INHIBITOR USE: ICD-10-CM

## 2023-09-26 PROCEDURE — 77080 DXA BONE DENSITY AXIAL: CPT

## 2023-09-27 ENCOUNTER — APPOINTMENT (OUTPATIENT)
Dept: LAB | Facility: CLINIC | Age: 63
End: 2023-09-27
Payer: COMMERCIAL

## 2023-09-27 ENCOUNTER — OFFICE VISIT (OUTPATIENT)
Dept: HEMATOLOGY ONCOLOGY | Facility: CLINIC | Age: 63
End: 2023-09-27
Payer: COMMERCIAL

## 2023-09-27 VITALS
BODY MASS INDEX: 28.35 KG/M2 | WEIGHT: 160 LBS | DIASTOLIC BLOOD PRESSURE: 80 MMHG | RESPIRATION RATE: 16 BRPM | HEART RATE: 81 BPM | HEIGHT: 63 IN | SYSTOLIC BLOOD PRESSURE: 124 MMHG | OXYGEN SATURATION: 100 % | TEMPERATURE: 97.8 F

## 2023-09-27 DIAGNOSIS — C50.412 PRIMARY CANCER OF UPPER OUTER QUADRANT OF LEFT FEMALE BREAST (HCC): Primary | ICD-10-CM

## 2023-09-27 DIAGNOSIS — F32.9 REACTIVE DEPRESSION: ICD-10-CM

## 2023-09-27 DIAGNOSIS — L82.0 INFLAMED SEBORRHEIC KERATOSIS: ICD-10-CM

## 2023-09-27 DIAGNOSIS — T45.1X5A HOT FLASHES RELATED TO AROMATASE INHIBITOR THERAPY: ICD-10-CM

## 2023-09-27 DIAGNOSIS — M85.852 OSTEOPENIA OF LEFT HIP: ICD-10-CM

## 2023-09-27 DIAGNOSIS — L30.9 DERMATITIS: ICD-10-CM

## 2023-09-27 DIAGNOSIS — Z79.810 LONG TERM CURRENT USE OF SELECTIVE ESTROGEN RECEPTOR MODULATORS (SERMS): ICD-10-CM

## 2023-09-27 DIAGNOSIS — Z00.00 ANNUAL PHYSICAL EXAM: ICD-10-CM

## 2023-09-27 DIAGNOSIS — B36.9 FUNGAL RASH OF TORSO: ICD-10-CM

## 2023-09-27 DIAGNOSIS — G25.81 RESTLESS LEGS: ICD-10-CM

## 2023-09-27 DIAGNOSIS — Z79.811 AROMATASE INHIBITOR USE: ICD-10-CM

## 2023-09-27 DIAGNOSIS — L65.9 THINNING HAIR: ICD-10-CM

## 2023-09-27 DIAGNOSIS — R73.01 IMPAIRED FASTING GLUCOSE: ICD-10-CM

## 2023-09-27 DIAGNOSIS — R23.2 HOT FLASHES RELATED TO AROMATASE INHIBITOR THERAPY: ICD-10-CM

## 2023-09-27 DIAGNOSIS — N95.2 VAGINAL ATROPHY: ICD-10-CM

## 2023-09-27 DIAGNOSIS — B37.2 CANDIDAL DERMATITIS: ICD-10-CM

## 2023-09-27 DIAGNOSIS — C50.412 PRIMARY CANCER OF UPPER OUTER QUADRANT OF LEFT FEMALE BREAST (HCC): ICD-10-CM

## 2023-09-27 DIAGNOSIS — E55.9 VITAMIN D DEFICIENCY: ICD-10-CM

## 2023-09-27 LAB
FERRITIN SERPL-MCNC: 94 NG/ML (ref 11–307)
IRON SATN MFR SERPL: 17 % (ref 15–50)
IRON SERPL-MCNC: 59 UG/DL (ref 50–212)
TIBC SERPL-MCNC: 338 UG/DL (ref 250–450)
UIBC SERPL-MCNC: 279 UG/DL (ref 155–355)

## 2023-09-27 PROCEDURE — 82728 ASSAY OF FERRITIN: CPT

## 2023-09-27 PROCEDURE — 83540 ASSAY OF IRON: CPT

## 2023-09-27 PROCEDURE — 83550 IRON BINDING TEST: CPT

## 2023-09-27 PROCEDURE — 99215 OFFICE O/P EST HI 40 MIN: CPT | Performed by: NURSE PRACTITIONER

## 2023-09-27 PROCEDURE — 36415 COLL VENOUS BLD VENIPUNCTURE: CPT

## 2023-09-27 RX ORDER — TURMERIC/TURMERIC EXT/PEPR EXT 900-100 MG
CAPSULE ORAL
COMMUNITY

## 2023-09-27 RX ORDER — VENLAFAXINE HYDROCHLORIDE 37.5 MG/1
37.5 CAPSULE, EXTENDED RELEASE ORAL DAILY
Qty: 30 CAPSULE | Refills: 3 | Status: SHIPPED | OUTPATIENT
Start: 2023-09-27

## 2023-09-27 RX ORDER — FLUCONAZOLE 150 MG/1
150 TABLET ORAL DAILY
Qty: 7 TABLET | Refills: 0 | Status: SHIPPED | OUTPATIENT
Start: 2023-09-27 | End: 2023-09-27

## 2023-09-27 RX ORDER — NYSTATIN 100000 [USP'U]/G
POWDER TOPICAL 4 TIMES DAILY
Qty: 60 G | Refills: 1 | Status: SHIPPED | OUTPATIENT
Start: 2023-09-27

## 2023-09-27 RX ORDER — FLUCONAZOLE 150 MG/1
150 TABLET ORAL DAILY
Qty: 7 TABLET | Refills: 0 | Status: SHIPPED | OUTPATIENT
Start: 2023-09-27 | End: 2023-10-04

## 2023-09-27 NOTE — PROGRESS NOTES
Hematology/Oncology Outpatient Follow-up  Con Cee 59 y.o. female 1960 [id number]    Date:  9/27/2023      Assessment and Plan:  1. Primary cancer of upper outer quadrant of left female breast ([address])  Due to worsening hot flashes affecting quality of life and mood swings/depression patient recently switched her aromatase inhibitor from anastrozole to letrozole 9/5. Unfortunately she tells that the letrozole poorly worse than the anastrozole with headaches, diarrhea hair thinning and worsening symptoms. She is now back on the anastrozole and seems to be tolerating better. Would like to continue with the anastrozole. Does admit that she continues to have ongoing hot flashes which are bothersome. She continues to have some mild depression/mood changes but believes this is multifactorial including lifestyle issues; anastrozole may be contributory as well. She was given 1 of 2 options the first would be to switch to a third aromatase inhibitor Aromasin. The second option would be to trial SNRI antidepressant Effexor which will likely improve her hot flashes as well as her mood/depression. She was interested in trialing the Effexor. We will start lowest dose Effexor XR 37.5 mg daily. She was educated on possible side effects including insomnia, sleepiness/fatigue, constipation, weight changes. Was educated on rare side effect of suicidal ideation. Aware not to stop medication on her own without consulting provider. She will keep us updated if she has any further questions or side effects from the new medication or from her aromatase inhibitor. She is scheduled for her annual mammography tomorrow. Continues to follow-up with her breast surgeon regularly who is ordering her breast imaging. Request she follow-up again with repeat labs in about 4 months from now or sooner should the need arise.      - CBC and differential; Future  - Comprehensive metabolic panel;  Future  - Magnesium; Future    2. Aromatase inhibitor use  As above. She will continue her Prolia injections every 6 months which are being administered at home; is due for her next injection next month. She did have repeat DEXA scan just yesterday that continues to show osteopenia in the bilateral femoral neck however did note improvement of bone density in the lumbar spine and hips. Her next DEXA scan will be due September 2025. Discharged to continue to stay active and take her calcium supplements. 3. Osteopenia of left hip    4. Hot flashes related to aromatase inhibitor therapy  As above #1.    - venlafaxine (EFFEXOR-XR) 37.5 mg 24 hr capsule; Take 1 capsule (37.5 mg total) by mouth daily  Dispense: 30 capsule; Refill: 3    5. Reactive depression  As above #1.    - venlafaxine (EFFEXOR-XR) 37.5 mg 24 hr capsule; Take 1 capsule (37.5 mg total) by mouth daily  Dispense: 30 capsule; Refill: 3    6. Vaginal atrophy  Believes that her vaginal dryness/decreased libido is definite contributing factor to her stress/depression. Has trialed K-Y jelly lubricant without improvement is trialing oral based lubricant at present moment. Is not a candidate for estrogen-based therapies due to hormone positive breast cancer. Did recommend trialing coconut oil daily/every few days for vaginal moisture. Could also trial Luvena vaginal moisturizer every 3 to 4 days-which can be obtained over-the-counter. 7. Fungal rash of torso  Is occurring/reoccurring below both breast; feels that her hot flashes are contributing factor. Hopefully will improve if we can get her hot flashes under better control. We will treat her with another course of Diflucan 150 mg x 7 days. Will trial nystatin powder rather than cream to see if this improves her symptoms. Was educated to apply the nystatin powder up to 4 times a day and make sure the skin is clean and dry before applying.    - fluconazole (DIFLUCAN) 150 mg tablet;  Take 1 tablet (150 mg total) by mouth daily for 7 doses  Dispense: 7 tablet; Refill: 0  - nystatin (MYCOSTATIN) powder; Apply topically 4 (four) times a day  Dispense: 60 g; Refill: 1    8. Thinning hair  Likely due to aromatase inhibitor use. Considering restarting biotin. So that we can check an iron panel to rule out iron deficiency since she also has restless legs. Await results which will be addressed accordingly. - Iron Panel (Includes Ferritin, Iron Sat%, Iron, and TIBC); Future  - Ferritin; Future    9. Restless legs  - Iron Panel (Includes Ferritin, Iron Sat%, Iron, and TIBC); Future  - Ferritin; Future    HPI:  Patient presents today for a follow-up visit. She recently started to develop worsening symptoms while on anastrozole mainly significant worsening hot flashes affecting her quality of life and mood swings/depression. The constant hot flashes/sweating are leading to frequent fungal infections under the breast.  Did recently complete 3-day course of Diflucan and started Mycostatin cream however this has not been effective. She felt slightly better after withholding drug but believes that her depression mood is multifactorial including things that are going on in her life right now. She was switched to letrozole temporarily 9/5/2023 but did not tolerate at all with worsening hair thinning diarrhea and headaches. She decided to switch back to the anastrozole which she is tolerating much better. She does voice interest in trialing something for the hot flashes. She states that part of her stress/mood are related to her sexual relation with her  as she has been experiencing vaginal dryness and decreased libido since starting treatment. Tried KY jelly without significant improvement is currently trialing an oil-based KY. she does admit to ongoing hair thinning and leg cramps. Considering restarting biotin. Has also been taking tumeric for her arthritis.      She had a 6-month follow-up diagnostic mammogram/ultrasound of the left breast 5/2023 which was read as BI-RADS 2. Is scheduled for her usual annual mammography tomorrow. Her most recent laboratory studies from 9/18/2023 showed showed normal CBC hemoglobin 13. 7. Glucose 107, total bili again slightly higher than average 1.07 remaining metabolic panel is appropriate. Magnesium is normal 2.3. LDH is normal.  Vitamin D is normal.    She had a repeat DEXA scan just yesterday 9/26/2023 which continues to show osteopenia in the bilateral femoral necks however it is noted that there has been statistically significant increase of the bone density in the lumbar spine 4.5% and hips 3.8%. Oncology History Overview Note   Patient with a family history of breast cancer in her mother. She had two previous benign breast biopsies. She was going for yearly mammograms and most recent in August showed focal asymmetry in the upper outer posterior left breast. A biopsy on 9/11/20 revealed left invasive ductal breast carcinoma. She saw General Surgeon, Dr. Kaelyn Garcia in September for surgical evaluation. 8/12/20 Screening Mammogram bilateral w 3d & cad   IMPRESSION:  1. Upper outer posterior left breast focal asymmetry. Diagnostic mammography, with possible ultrasound, recommended. 2. Stable right mammogram.     9/4/20 Mammo diagnostic left w 3d & cad   US breast left limited (diagnostic)   FINDINGS:   LEFT  1) MASS  Mammo diagnostic left w 3d & cad: There is a 43 mm transverse x 26 mm AP x 23 mm craniocaudal high density, irregularly shaped mass seen in the upper outer quadrant of the left breast at 1 o'clock in the posterior depth. There is a possible satellite lesions slightly inferior to the mass. If measured as 1 finding, it measures 32 mm craniocaudal.  The mass correlates with the prior mammogram finding. US breast left limited (diagnostic):  There is and irregularly shaped, hypoechoic mass with indistinct margins with shadowing seen in the upper outer quadrant of the left breast at 1 o'clock in the posterior depth. The mass measures at least 23 x 11 x 10 mm and the size may be underestimated on ultrasound. The mass correlates with the prior mammogram finding. This is highly suspicious and ultrasound-guided core needle biopsy is recommended. Targeted ultrasound of the left axilla was performed. At least 1 morphologically benign lymph node was visualized. No morphologically abnormal lymph nodes were visualized. ASSESSMENT/BI-RADS CATEGORY:  Left: 4C - High Suspicion for Malignancy  Overall: 4 - Suspicious     9/11/20 Left breast US guided biopsy, at 1:00 12cmfn  Invasive mammary carcinoma of no special type Grade 3  ER/OH 90-95% positive, HER2 negative  Lymphovascular invasion present     9/28/20 Breast working group recommended treatment plan: Lumpectomy with SLNB followed by adjuvant radiation therapy. Genetic testing consult. 10/2/20 Left breast Lumpectomy  Invasive breast carcinoma of no special type, 21 mm focus  Grade 3  DCIS present in 2 out of 17 blocks. Margins negative for invasive carcinoma, 1 mm from the closest posterior margin  Negative for DCIS 1 mm from the closest anterior margin  Lymph-vascular invasion present     Lymph nodes:  Left sentinel lymph node excision: 1 lymph node positive for carcinoma  Left axillary LN excision: 1 reactive lymph node     Primary cancer of upper outer quadrant of left female breast ([address])   9/2020 Initial Diagnosis    Primary cancer of upper outer quadrant of left female breast ([address])  Stage IIB     9/11/2020 Biopsy    Breast, left at 1:00 12cmfn, US guided biopsy, 5 passes:  - Invasive mammary carcinoma of no special type (ductal, not otherwise specified). -- [place] histologic grade 3 of 3 (total score: 8 of 9)       -- Invasive carcinoma involves 5 of 5 submitted core biopsies, max.  Dimension= 14 mm.    -- Estrogen, Progesterone 90-95% positive HER2 negative  - Lymphovascular invasion: Present. 10/2/2020 Surgery    A. Breast, Left, Lumpectomy:  - Invasive breast carcinoma of no special type (ductal NST/invasive ductal carcinoma), 21 mm focus. * [place] grade 3 of 3 (total score: 9 of 9)   * Ductal carcinoma in situ (DCIS): Present in 2 out of 17 blocks. -- [place], solid and cribriform architectural pattern, nuclear grade 3 of 3.    * Margins: Negative for invasive carcinoma, 1 mm from the closest posterior margin; Negative for DCIS, 1 mm from the closest anterior margin. * Skin: Negative for carcinoma. * Skeletal muscle: Negative for carcinoma. * Lymph-vascular invasion: Present. * Pathologic stage (AJCC 8th ed.): pT2, pN1a (sn). B. [place] Lymph Node, Left # 1, Excision:  - One lymph node positive for carcinoma (1/1). C. Axillary contents, Left, Excision:  - One reactive lymph node (0/1).      10/2/2020 -  Cancer Staged    Staging form: Breast, AJCC 8th Edition  - Clinical stage from 10/2/2020: Stage IIB (cT2, cN1(sn), cM0, G3, ER+, MT+, HER2-) - Signed by Halima Moore MD on 10/9/2020  Stage prefix: Initial diagnosis  Laterality: Left  Tumor size (mm): 21  Method of lymph node assessment: [place] lymph node biopsy  Nuclear grade: G3  Mitotic count score: Score 3  Percentage of tumor with tubule formation: 10  Tubule formation score: Score 3  Scarff-Bloom-Cevallos score: 9  Histologic grading system: 3 grade system  Lymph-vascular invasion (LVI): LVI present/identified, NOS  Specimen type: Excision  National guidelines used in treatment planning: Yes  Type of national guideline used in treatment planning: NCCN       11/4/2020 - 1/26/2021 Chemotherapy    Completed 4 cycles of adjuvant Taxotere plus Cytoxan   pegfilgrastim (NEULASTA) subcutaneous injection 6 mg, 6 mg, Subcutaneous, Once, 3 of 3 cycles  Administration: 6 mg (11/5/2020), 6 mg (11/25/2020), 6 mg (1/6/2021)  pegfilgrastim (NEULASTA ONPRO) subcutaneous injection kit 6 mg, 6 mg, Subcutaneous, Once, 1 of 1 cycle  Administration: 6 mg (12/15/2020)  cyclophosphamide (CYTOXAN) 1,068 mg in sodium chloride 0.9 % 250 mL IVPB, 600 mg/m2 = 1,068 mg, Intravenous, Once, 4 of 4 cycles  Administration: 1,068 mg (11/4/2020), 1,068 mg (11/24/2020), 1,068 mg (12/15/2020), 1,068 mg (1/5/2021)  DOCEtaxel (TAXOTERE) 140 mg in sodium chloride 0.9 % 250 mL chemo infusion, 133.6 mg, Intravenous, Once, 4 of 4 cycles  Administration: 140 mg (11/4/2020), 140 mg (11/24/2020), 140 mg (12/15/2020), 140 mg (1/5/2021)       11/2020 Genetic Testing    Patient has genetic testing done for breast cancer. 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 Skin: Positive for rash (fungal below both breasts). Neurological:        +restless legs   Psychiatric/Behavioral: Positive for dysphoric mood. All other systems reviewed and are negative.       Past Medical History:   Diagnosis Date   • Acute cystitis 3/2/2018   • Asthma     has not been treated for seveeral years  exercise induced   • BRCA1 negative    • Breast cancer ([address]) 10/2020    left sided   • Cancer ([address])     breast  9/20   • Cellulitis of left upper extremity 2/23/2021   • Chemotherapy induced neutropenia ([address]) 10/30/2020   • Exercise-induced asthma 11/3/2016   • History of chemotherapy 11/2020   • Hx of radiation therapy 03/2021   • Urinary tract infection        Past Surgical History:   Procedure Laterality Date   • BREAST CYST EXCISION Left     benign   • BREAST CYST EXCISION Left     benign   • BREAST SURGERY     • CHOLECYSTECTOMY     • HYSTERECTOMY     • IR PORT PLACEMENT  10/26/2020   • IR PORT REMOVAL 2/4/2021   • MAMMO NEEDLE LOCALIZATION LEFT (ALL INC) Left 10/2/2020   • MOUTH SURGERY     • TN MASTECTOMY PARTIAL Left 10/2/2020    Procedure: BREAST NEEDLE LOCALIZED LUMPECTOMY, SENTINEL LYMPH NODE BIOPSY (Bracketed U/S guided needle loc @ 8:00, INJECT AT 1045);   Surgeon: Tracee Cook MD;  Location: [facility] MAIN OR;  Service: General   • TUBAL LIGATION     • US GUIDED BREAST BIOPSY LEFT COMPLETE Left 9/11/2020   • WISDOM TOOTH EXTRACTION         Social History     Socioeconomic History   • Marital status: /Civil Union     Spouse name: None   • Number of children: 2   • Years of education: None   • Highest education level: None   Occupational History   • Occupation: Retired   Tobacco Use   • Smoking status: Never   • Smokeless tobacco: Never   Vaping Use   • Vaping Use: Never used   Substance and Sexual Activity   • Alcohol use: Never   • Drug use: Never   • Sexual activity: Yes     Partners: Male     Birth control/protection: Female Sterilization     Comment:  x 9 years, [employer] [facility] [employer]   Other Topics Concern   • None   Social History Narrative    Daily caffeine consumption, 1 serving a day    Exercises 3-4 times per week     x 8 years     Social Determinants of Health     Financial Resource Strain: Not on file   Food Insecurity: Not on file   Transportation Needs: Not on file   Physical Activity: Not on file   Stress: Not on file   Social Connections: Not on file   Intimate Partner Violence: Not on file   Housing Stability: Not on file       Family History   Problem Relation Age of Onset   • Hypertension Mother    • Heart disease Mother         cardiac disorder   • Breast cancer Mother         dx age early 19's   • Glaucoma Mother    • Hypertension Father    • Heart disease Father         cardiac disorder   • Cancer Father    • No Known Problems Sister    • Multiple sclerosis Daughter    • Melanoma Daughter    • No Known Problems Maternal Grandmother    • No Known Problems Paternal Grandmother    • No Known Problems Son    • No Known Problems Paternal Aunt        Allergies   Allergen Reactions   • Ofloxacin Hives   • Penicillins Hives   • Vancomycin Hives     Pt asked to update her chart. 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 • Chlorhexidine Rash         Current Outpatient Medications:   •  albuterol (ProAir HFA) 90 mcg/act inhaler, Inhale 2 puffs every 6 (six) hours as needed for wheezing or shortness of breath, Disp: 8.5 g, Rfl: 0  •  anastrozole (ARIMIDEX) 1 mg tablet, Take 1 mg by mouth daily, Disp: , Rfl:   •  ascorbic acid (VITAMIN C) 250 mg tablet, Take 250 mg by mouth daily, Disp: , Rfl:   •  Black Pepper-Turmeric (Turmeric Curcumin) 5-1000 MG CAPS, Take by mouth, Disp: , Rfl:   •  calcium carbonate (TUMS) 500 mg chewable tablet, Chew 1 tablet daily, Disp: , Rfl:   •  fluconazole (DIFLUCAN) 150 mg tablet, Take 1 tablet (150 mg total) by mouth daily for 7 doses, Disp: 7 tablet, Rfl: 0  •  nystatin (MYCOSTATIN) cream, Apply topically 2 (two) times a day, Disp: 30 g, Rfl: 1  •  nystatin (MYCOSTATIN) powder, Apply topically 4 (four) times a day, Disp: 60 g, Rfl: 1  •  triamcinolone (KENALOG) 0.1 % cream, Apply topically 2 (two) times a day, Disp: 30 g, Rfl: 1  •  venlafaxine (EFFEXOR-XR) 37.5 mg 24 hr capsule, Take 1 capsule (37.5 mg total) by mouth daily, Disp: 30 capsule, Rfl: 3      Physical Exam:  /80 (BP Location: Right arm, Patient Position: Sitting, Cuff Size: Adult)   Pulse 81   Temp 97.8 °F (36.6 °C)   Resp 16   Ht 5' 3" (1.6 m)   Wt 72.6 kg (160 lb)   LMP  (LMP Unknown)   SpO2 100%   BMI 28.34 kg/m²     Physical Exam  Vitals reviewed. Constitutional:       General: She is not in acute distress. Appearance: She is well-developed. She is not diaphoretic. HENT:      Head: Normocephalic and atraumatic. Eyes:      General: No scleral icterus.      Conjunctiva/sclera: Conjunctivae normal.      Pupils: Pupils are equal, round, and reactive to light. Neck:      Thyroid: No thyromegaly. Cardiovascular:      Rate and Rhythm: Normal rate and regular rhythm. Heart sounds: Normal heart sounds. No murmur heard. Pulmonary:      Effort: Pulmonary effort is normal. No respiratory distress. Breath sounds: Normal breath sounds. Abdominal:      General: There is no distension. Palpations: Abdomen is soft. There is no hepatomegaly or splenomegaly. Tenderness: There is no abdominal tenderness. Musculoskeletal:         General: No swelling. Normal range of motion. Cervical back: Normal range of motion and neck supple. Lymphadenopathy:      Cervical: No cervical adenopathy. Upper Body:      Right upper body: No axillary adenopathy. Left upper body: No axillary adenopathy. Skin:     General: Skin is warm and dry. Findings: No erythema or rash. Neurological:      General: No focal deficit present. Mental Status: She is alert and oriented to person, place, and time. Psychiatric:         Mood and Affect: Mood and affect normal.         Behavior: Behavior normal. Behavior is cooperative. Thought Content: Thought content normal.         Judgment: Judgment normal.           Labs:  Lab Results   Component Value Date    WBC 5.35 09/18/2023    HGB 13.7 09/18/2023    HCT 41.6 09/18/2023    MCV 89 09/18/2023     09/18/2023          Patient voiced understanding and agreement in the above discussion. Aware to contact our office with questions/symptoms in the interim. This note has been generated by voice recognition software system. Therefore, there may be spelling, grammar, and or syntax errors. Please contact if questions arise.

## 2023-09-28 ENCOUNTER — HOSPITAL ENCOUNTER (OUTPATIENT)
Dept: MAMMOGRAPHY | Facility: HOSPITAL | Age: 63
Discharge: HOME | End: 2023-09-28
Attending: SURGERY
Payer: COMMERCIAL

## 2023-09-28 VITALS — HEIGHT: 63 IN | BODY MASS INDEX: 28.35 KG/M2 | WEIGHT: 160 LBS

## 2023-09-28 DIAGNOSIS — C50.412 PRIMARY CANCER OF UPPER OUTER QUADRANT OF LEFT FEMALE BREAST (HCC): ICD-10-CM

## 2023-09-28 PROCEDURE — 77066 DX MAMMO INCL CAD BI: CPT

## 2023-09-28 PROCEDURE — G0279 TOMOSYNTHESIS, MAMMO: HCPCS

## 2023-10-04 ENCOUNTER — OFFICE VISIT (OUTPATIENT)
Dept: SURGERY | Facility: CLINIC | Age: 63
End: 2023-10-04
Payer: COMMERCIAL

## 2023-10-04 VITALS
RESPIRATION RATE: 18 BRPM | BODY MASS INDEX: 28.34 KG/M2 | DIASTOLIC BLOOD PRESSURE: 80 MMHG | WEIGHT: 160 LBS | HEART RATE: 70 BPM | SYSTOLIC BLOOD PRESSURE: 140 MMHG | TEMPERATURE: 98.8 F | OXYGEN SATURATION: 100 %

## 2023-10-04 DIAGNOSIS — C50.412 PRIMARY CANCER OF UPPER OUTER QUADRANT OF LEFT FEMALE BREAST (HCC): Primary | ICD-10-CM

## 2023-10-04 PROCEDURE — 99213 OFFICE O/P EST LOW 20 MIN: CPT | Performed by: SURGERY

## 2023-10-04 NOTE — PROGRESS NOTES
Assessment/Plan:    Primary cancer of upper outer quadrant of left female breast [facility])  The patient is a pleasant 60-year-old female with a past medical history significant for stage IIb upper outer quadrant left breast carcinoma status post breast conserving therapy in October 2020 returning to the office for her routine breast health maintenance examination. Today the patient denies all complaints referable to her breasts. She underwent recent mammographic imaging which is normal.    On physical examination she is well-nourished well-appearing female in no acute distress. Nurys competent reliable as a historian. She has no cervical supraclavicular axillary lymphadenopathy bilaterally. Her breasts are symmetric without dominant lumps masses skin changes or nipple retraction. Patient appears clinically and radiographically stable with regards to her history of stage IIb upper outer quadrant left breast carcinoma status post breast conserving therapy in October 2020. I look forward to seeing her back in a years time. She has been encouraged to follow-up sooner on an as-needed basis. Diagnoses and all orders for this visit:    Primary cancer of upper outer quadrant of left female breast ([address])  -     Mammo diagnostic bilateral w 3d & cad; Future          Subjective:      Patient ID: Mari Clark is a 59 y.o. female. HPI    The following portions of the patient's history were reviewed and updated as appropriate: allergies, current medications, past family history, past medical history, past social history, past surgical history and problem list.    Review of Systems   Constitutional: Negative for chills and fever. HENT: Negative for ear pain and sore throat. Eyes: Negative for pain and visual disturbance. Respiratory: Negative for cough and shortness of breath. Cardiovascular: Negative for chest pain and palpitations. Gastrointestinal: Negative for abdominal pain and vomiting. Genitourinary: Negative for dysuria and hematuria. Musculoskeletal: Negative for arthralgias and back pain. Skin: Negative for color change and rash. Neurological: Negative for seizures and syncope. All other systems reviewed and are negative. Objective:      /80 (BP Location: Right arm, Patient Position: Sitting, Cuff Size: Standard)   Pulse 70   Temp 98.8 °F (37.1 °C) (Temporal)   Resp 18   Wt 72.6 kg (160 lb)   LMP  (LMP Unknown)   SpO2 100%   BMI 28.34 kg/m²          Physical Exam  Constitutional:       Appearance: She is well-developed. HENT:      Head: Normocephalic and atraumatic. Eyes:      Conjunctiva/sclera: Conjunctivae normal.      Pupils: Pupils are equal, round, and reactive to light. Cardiovascular:      Rate and Rhythm: Normal rate and regular rhythm. Pulmonary:      Effort: Pulmonary effort is normal.      Breath sounds: Normal breath sounds. Abdominal:      General: Bowel sounds are normal.      Palpations: Abdomen is soft. Musculoskeletal:         General: Normal range of motion. Cervical back: Normal range of motion and neck supple. Skin:     General: Skin is warm and dry. Comments: Breast exam as described above   Neurological:      Mental Status: She is alert and oriented to person, place, and time. Psychiatric:         Behavior: Behavior normal.         Thought Content:  Thought content normal.         Judgment: Judgment normal.

## 2023-10-04 NOTE — ASSESSMENT & PLAN NOTE
The patient is a pleasant 61-year-old female with a past medical history significant for stage IIb upper outer quadrant left breast carcinoma status post breast conserving therapy in October 2020 returning to the office for her routine breast health maintenance examination. Today the patient denies all complaints referable to her breasts. She underwent recent mammographic imaging which is normal.    On physical examination she is well-nourished well-appearing female in no acute distress. Nurys competent reliable as a historian. She has no cervical supraclavicular axillary lymphadenopathy bilaterally. Her breasts are symmetric without dominant lumps masses skin changes or nipple retraction. Patient appears clinically and radiographically stable with regards to her history of stage IIb upper outer quadrant left breast carcinoma status post breast conserving therapy in October 2020. I look forward to seeing her back in a years time. She has been encouraged to follow-up sooner on an as-needed basis.

## 2023-10-23 ENCOUNTER — TELEPHONE (OUTPATIENT)
Dept: HEMATOLOGY ONCOLOGY | Facility: CLINIC | Age: 63
End: 2023-10-23

## 2023-10-23 NOTE — TELEPHONE ENCOUNTER
Medical Records Request   Who are you speaking with? Physician Office   If it is not the patient, are they listed on an active communication consent form? Yes   What is the purpose of this call? Requesting medical records   What records are being requested? Reports   Details/ Additional Info Lab results most recent   Which provider does the patient see?  Dr. Fatoumata Lopez   Fax Number   Person's name (Attention:)   [phone] [id number] call back number [phone]    Patient call back number na

## 2023-10-24 ENCOUNTER — TELEPHONE (OUTPATIENT)
Dept: HEMATOLOGY ONCOLOGY | Facility: CLINIC | Age: 63
End: 2023-10-24

## 2023-10-24 NOTE — TELEPHONE ENCOUNTER
Patient Call    Who are you speaking with? [address] [facility]     If it is not the patient, are they listed on an active communication consent form? N/A   What is the reason for this call? Seb Marin calling in regards to patient's most recent CMP lab results. Seb Marin needs this report faxed to her in order for patient to receive  her Prolia injection this week. Seb Marin would like the results faxed to [phone]   Does this require a call back? Yes   If a call back is required, please list best call back number [phone]   If a call back is required, advise that a message will be forwarded to their care team and someone will return their call as soon as possible. Did you relay this information to the patient?  Yes

## 2023-10-25 NOTE — TELEPHONE ENCOUNTER
Returned a call to [facility] and let them know I had faxed over the labs yesterday and spoke to someone in their office as they were confused about pt because her last name was entered in correctly in their system.  Refaxed lab results to [facility]

## 2023-11-03 ENCOUNTER — TELEPHONE (OUTPATIENT)
Dept: HEMATOLOGY ONCOLOGY | Facility: CLINIC | Age: 63
End: 2023-11-03

## 2023-11-03 DIAGNOSIS — C50.412 PRIMARY CANCER OF UPPER OUTER QUADRANT OF LEFT FEMALE BREAST (HCC): Primary | ICD-10-CM

## 2023-11-03 RX ORDER — ANASTROZOLE 1 MG/1
1 TABLET ORAL DAILY
Qty: 90 TABLET | Refills: 3 | Status: SHIPPED | OUTPATIENT
Start: 2023-11-03

## 2023-11-03 NOTE — TELEPHONE ENCOUNTER
Medication Refill Request   Who are you speaking with? Patient   If it is not the patient, are they listed on an active communication consent form? N/A   Which medication is being requested for refill? Please list medication name and dosage    anastrozole (ARIMIDEX) 1 mg tablet    How many pills does the patient have left? 30   Preferred Pharmacy / Address  [address] [place] [address] [facility], [address] [place] [address]    Who is the prescribing provider?  Dr. Javon Guzmán   Call back number      [phone]      Relevant Information  90 day supply

## 2023-12-15 ENCOUNTER — TELEPHONE (OUTPATIENT)
Dept: HEMATOLOGY ONCOLOGY | Facility: CLINIC | Age: 63
End: 2023-12-15

## 2023-12-15 NOTE — TELEPHONE ENCOUNTER
Patient Call    Who are you speaking with? Patient    If it is not the patient, are they listed on an active communication consent form? Yes   What is the reason for this call? Should caller get moderna covid shot #6? Does this require a call back? Yes   If a call back is required, please list best call back number [phone]    If a call back is required, advise that a message will be forwarded to their care team and someone will return their call as soon as possible. Did you relay this information to the patient?  Yes

## 2023-12-15 NOTE — TELEPHONE ENCOUNTER
Appointment Change  Cancel, Reschedule, Change to Virtual      Who are you speaking with? Patient   If it is not the patient, is the caller listed on the communication consent form? Yes   Which provider is the appointment scheduled with? Dr. Juan Carlos Rodgers   When was the original appointment scheduled? Please list date and time 1/30   At which location is the appointment scheduled to take place? [facility]   Was the appointment rescheduled? Was the appointment changed from an in person visit to a virtual visit? If so, please list the details of the change. 3/12 9:40am   What is the reason for the appointment change? rounding       Was STAR transport scheduled? No   Does STAR transport need to be scheduled for the new visit (if applicable) No   Does the patient need an infusion appointment rescheduled? No   Does the patient have an upcoming infusion appointment scheduled? If so, when? No   Is the patient undergoing chemotherapy? No   For appointments cancelled with less than 24 hours:  Was the no-show policy reviewed?  Yes

## 2024-02-28 ENCOUNTER — TELEPHONE (OUTPATIENT)
Dept: HEMATOLOGY ONCOLOGY | Facility: CLINIC | Age: 64
End: 2024-02-28

## 2024-03-01 ENCOUNTER — TELEPHONE (OUTPATIENT)
Dept: HEMATOLOGY ONCOLOGY | Facility: CLINIC | Age: 64
End: 2024-03-01

## 2024-03-01 NOTE — TELEPHONE ENCOUNTER
Appointment Change  Cancel, Reschedule, Change to Virtual      Who are you speaking with? Patient   If it is not the patient, is the caller listed on the communication consent form? N/A   Which provider is the appointment scheduled with? Dr. Stacy   When was the original appointment scheduled?    Please list date and time 03/12/2024 @ 9:40AM    At which location is the appointment scheduled to take place? [place]   Was the appointment rescheduled?     Was the appointment changed from an in person visit to a virtual visit?    If so, please list the details of the change. Yes, 05/23/2024 @9:20AM     What is the reason for the appointment change? Scheduling conflict

## 2024-03-04 ENCOUNTER — TELEPHONE (OUTPATIENT)
Dept: HEMATOLOGY ONCOLOGY | Facility: CLINIC | Age: 64
End: 2024-03-04

## 2024-03-04 NOTE — TELEPHONE ENCOUNTER
Phoned patient.  Per patient, she receives prolia shots at home, but would prefer to use infusion centers if possible.  Will review and return call to patient

## 2024-03-04 NOTE — TELEPHONE ENCOUNTER
"----- Message from Jaci Kiran MA sent at 3/4/2024  8:16 AM EST -----  Regarding: FW: Prolia shot  Contact: [phone]  Please schedule, thank you!  ----- Message -----  From: Ina Morejon \"Pat\"  Sent: 3/1/2024  11:21 AM EST  To: Hematology [facility]cology [facility]  Subject: Prolia shot                                      Can you please schedule my next prolia shot?    Thank you     "

## 2024-03-04 NOTE — TELEPHONE ENCOUNTER
It looks like she goes through [facility] for her Prolia shot which we don't schedule. Is she going to be changing over to our infusion centers?

## 2024-03-05 DIAGNOSIS — M85.852 OSTEOPENIA OF LEFT HIP: Primary | ICD-10-CM

## 2024-03-05 NOTE — TELEPHONE ENCOUNTER
Phoned pt and left a voice message that per our prior auth team the Prolia has to go thru Rx plan for pt so home health will have to give at her home Left my Teams number for questions [phone].

## 2024-03-15 ENCOUNTER — APPOINTMENT (OUTPATIENT)
Age: 64
End: 2024-03-15
Payer: COMMERCIAL

## 2024-03-15 DIAGNOSIS — C50.412 PRIMARY CANCER OF UPPER OUTER QUADRANT OF LEFT FEMALE BREAST (HCC): ICD-10-CM

## 2024-03-15 LAB
ALBUMIN SERPL BCP-MCNC: 4.3 G/DL (ref 3.5–5)
ALP SERPL-CCNC: 59 U/L (ref 34–104)
ALT SERPL W P-5'-P-CCNC: 17 U/L (ref 7–52)
ANION GAP SERPL CALCULATED.3IONS-SCNC: 8 MMOL/L (ref 4–13)
AST SERPL W P-5'-P-CCNC: 18 U/L (ref 13–39)
BASOPHILS # BLD AUTO: 0.05 THOUSANDS/ÂΜL (ref 0–0.1)
BASOPHILS NFR BLD AUTO: 1 % (ref 0–1)
BILIRUB SERPL-MCNC: 1.12 MG/DL (ref 0.2–1)
BUN SERPL-MCNC: 13 MG/DL (ref 5–25)
CALCIUM SERPL-MCNC: 9.3 MG/DL (ref 8.4–10.2)
CHLORIDE SERPL-SCNC: 105 MMOL/L (ref 96–108)
CO2 SERPL-SCNC: 28 MMOL/L (ref 21–32)
CREAT SERPL-MCNC: 0.7 MG/DL (ref 0.6–1.3)
EOSINOPHIL # BLD AUTO: 0.14 THOUSAND/ÂΜL (ref 0–0.61)
EOSINOPHIL NFR BLD AUTO: 2 % (ref 0–6)
ERYTHROCYTE [DISTWIDTH] IN BLOOD BY AUTOMATED COUNT: 13.8 % (ref 11.6–15.1)
GFR SERPL CREATININE-BSD FRML MDRD: 93 ML/MIN/1.73SQ M
GLUCOSE P FAST SERPL-MCNC: 105 MG/DL (ref 65–99)
HCT VFR BLD AUTO: 39.3 % (ref 34.8–46.1)
HGB BLD-MCNC: 12.9 G/DL (ref 11.5–15.4)
IMM GRANULOCYTES # BLD AUTO: 0.03 THOUSAND/UL (ref 0–0.2)
IMM GRANULOCYTES NFR BLD AUTO: 1 % (ref 0–2)
LYMPHOCYTES # BLD AUTO: 1.76 THOUSANDS/ÂΜL (ref 0.6–4.47)
LYMPHOCYTES NFR BLD AUTO: 27 % (ref 14–44)
MAGNESIUM SERPL-MCNC: 2.3 MG/DL (ref 1.9–2.7)
MCH RBC QN AUTO: 29.3 PG (ref 26.8–34.3)
MCHC RBC AUTO-ENTMCNC: 32.8 G/DL (ref 31.4–37.4)
MCV RBC AUTO: 89 FL (ref 82–98)
MONOCYTES # BLD AUTO: 0.43 THOUSAND/ÂΜL (ref 0.17–1.22)
MONOCYTES NFR BLD AUTO: 7 % (ref 4–12)
NEUTROPHILS # BLD AUTO: 4.05 THOUSANDS/ÂΜL (ref 1.85–7.62)
NEUTS SEG NFR BLD AUTO: 62 % (ref 43–75)
NRBC BLD AUTO-RTO: 0 /100 WBCS
PLATELET # BLD AUTO: 228 THOUSANDS/UL (ref 149–390)
PMV BLD AUTO: 9.5 FL (ref 8.9–12.7)
POTASSIUM SERPL-SCNC: 4.7 MMOL/L (ref 3.5–5.3)
PROT SERPL-MCNC: 6.7 G/DL (ref 6.4–8.4)
RBC # BLD AUTO: 4.4 MILLION/UL (ref 3.81–5.12)
SODIUM SERPL-SCNC: 141 MMOL/L (ref 135–147)
WBC # BLD AUTO: 6.46 THOUSAND/UL (ref 4.31–10.16)

## 2024-03-15 PROCEDURE — 36415 COLL VENOUS BLD VENIPUNCTURE: CPT

## 2024-03-15 PROCEDURE — 80053 COMPREHEN METABOLIC PANEL: CPT

## 2024-03-15 PROCEDURE — 83735 ASSAY OF MAGNESIUM: CPT

## 2024-03-15 PROCEDURE — 85025 COMPLETE CBC W/AUTO DIFF WBC: CPT

## 2024-03-27 ENCOUNTER — TELEPHONE (OUTPATIENT)
Dept: HEMATOLOGY ONCOLOGY | Facility: CLINIC | Age: 64
End: 2024-03-27

## 2024-03-27 NOTE — TELEPHONE ENCOUNTER
Lab Inquiry   Who are you speaking with? luisito     If it is not the patient, are they listed on an active communication consent form? N/A   Name of ordering provider Dr. Stacy   What is being requested? [facility] faxed to [phone]    Lab draw location [facility]   What is the best call back number? [phone]    If patient at the lab, Was a live attempts to contact the team made? N/a

## 2024-04-26 ENCOUNTER — TELEPHONE (OUTPATIENT)
Dept: HEMATOLOGY ONCOLOGY | Facility: CLINIC | Age: 64
End: 2024-04-26

## 2024-04-26 NOTE — TELEPHONE ENCOUNTER
Patient Call    Who are you speaking with? [facility]     If it is not the patient, are they listed on an active communication consent form? Yes   What is the reason for this call? Need order for epinephrine for allergic reaction to prolia   Does this require a call back? Yes or [phone]   If a call back is required, please list best call back number [phone]    If a call back is required, advise that a message will be forwarded to their care team and someone will return their call as soon as possible.   Did you relay this information to the patient? Yes

## 2024-04-26 NOTE — TELEPHONE ENCOUNTER
Returned a call to Glenny at [facility] services and provided a verbal OK for the epinephrine refill in case os allergic anaphylatic reaction

## 2024-05-23 ENCOUNTER — TELEPHONE (OUTPATIENT)
Dept: HEMATOLOGY ONCOLOGY | Facility: CLINIC | Age: 64
End: 2024-05-23

## 2024-05-23 ENCOUNTER — OFFICE VISIT (OUTPATIENT)
Dept: HEMATOLOGY ONCOLOGY | Facility: CLINIC | Age: 64
End: 2024-05-23
Payer: COMMERCIAL

## 2024-05-23 VITALS
TEMPERATURE: 97.9 F | BODY MASS INDEX: 28.88 KG/M2 | DIASTOLIC BLOOD PRESSURE: 76 MMHG | SYSTOLIC BLOOD PRESSURE: 136 MMHG | HEIGHT: 63 IN | OXYGEN SATURATION: 98 % | HEART RATE: 85 BPM | RESPIRATION RATE: 18 BRPM | WEIGHT: 163 LBS

## 2024-05-23 DIAGNOSIS — Z79.811 AROMATASE INHIBITOR USE: ICD-10-CM

## 2024-05-23 DIAGNOSIS — M85.852 OSTEOPENIA OF LEFT HIP: ICD-10-CM

## 2024-05-23 DIAGNOSIS — C50.412 PRIMARY CANCER OF UPPER OUTER QUADRANT OF LEFT FEMALE BREAST (HCC): Primary | ICD-10-CM

## 2024-05-23 PROCEDURE — 99214 OFFICE O/P EST MOD 30 MIN: CPT | Performed by: INTERNAL MEDICINE

## 2024-05-23 NOTE — PROGRESS NOTES
Hematology/Oncology Outpatient Follow-up  Ina Morejon 63 y.o. female 1960 [id number]    Date:  5/23/2024        Assessment and Plan:  1. 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    The patient is tolerating the anastrozole better than the letrozole which she is taking on a daily basis without interruption.  She seems to be up-to-date on screening mammogram which is going to be due again around the end of September of this year.  - CBC and differential; Future  - Comprehensive metabolic panel; Future  - Magnesium; Future  - Vitamin D 25 hydroxy; Future  - CBC and differential; Future  - Comprehensive metabolic panel; Future    2. Aromatase inhibitor use  She was encouraged to go back on the vitamin D supplements which was interrupted for a while after she was found to have too high of a level.  - Vitamin D 25 hydroxy; Future    3. Osteopenia of left hip  She is on Prolia injections every 6 months.  She will be due for bone density scan again around October 2025.  - Vitamin D 25 hydroxy; Future  - CBC and differential; Future  - Comprehensive metabolic panel; Future        HPI:  The patient came there for follow-up visit.  She stated that she is back to anastrozole which she is tolerating better than the letrozole.  She had blood work on 3/15/2024 which showed normal CBC.  Hemoglobin was 12.9 with a platelet count of 228.  Creatinine 0.7 with normal calcium and liver enzymes.  Oncology History Overview Note   Patient with a family history of breast cancer in her mother. She had two previous benign breast biopsies. She was going for yearly mammograms and most recent in August showed focal asymmetry in the upper outer posterior left breast.  A biopsy on 9/11/20 revealed left invasive ductal breast carcinoma. She saw General Surgeon, Dr. Magana in September for surgical evaluation.      8/12/20 Screening Mammogram bilateral w 3d & cad   IMPRESSION:  1. Upper outer posterior left breast focal asymmetry.  Diagnostic mammography, with possible ultrasound, recommended.  2. Stable right mammogram.     9/4/20 Mammo diagnostic left w 3d & cad   US breast left limited (diagnostic)   FINDINGS:   LEFT  1) MASS  Mammo diagnostic left w 3d & cad: There is a 43 mm transverse x 26 mm AP x 23 mm craniocaudal high density, irregularly shaped mass seen in the upper outer quadrant of the left breast at 1 o'clock in the posterior depth.  There is a possible satellite lesions slightly inferior to the mass.  If measured as 1 finding, it measures 32 mm craniocaudal.  The mass correlates with the prior mammogram finding.   US breast left limited (diagnostic): There is and irregularly shaped, hypoechoic mass with indistinct margins with shadowing seen in the upper outer quadrant of the left breast at 1 o'clock in the posterior depth.  The mass measures at least 23 x 11 x 10 mm and the size may be underestimated on ultrasound.  The mass correlates with the prior mammogram finding.  This is highly suspicious and ultrasound-guided core needle biopsy is recommended.    Targeted ultrasound of the left axilla was performed.  At least 1 morphologically benign lymph node was visualized.  No morphologically abnormal lymph nodes were visualized.     ASSESSMENT/BI-RADS CATEGORY:  Left: 4C - High Suspicion for Malignancy  Overall: 4 - Suspicious     9/11/20 Left breast US guided biopsy, at 1:00 12cmfn  Invasive mammary carcinoma of no special type Grade 3  ER/ID 90-95% positive, HER2 negative  Lymphovascular invasion present     9/28/20 Breast working group recommended treatment plan: Lumpectomy with SLNB followed by adjuvant radiation therapy. Genetic testing consult.     10/2/20 Left  breast Lumpectomy  Invasive breast carcinoma of no special type, 21 mm focus  Grade 3  DCIS present in 2 out of 17 blocks.   Margins negative for invasive carcinoma, 1 mm from the closest posterior margin  Negative for DCIS 1 mm from the closest anterior margin  Lymph-vascular invasion present     Lymph nodes:  Left sentinel lymph node excision: 1 lymph node positive for carcinoma  Left axillary LN excision: 1 reactive lymph node     Primary cancer of upper outer quadrant of left female breast (HCC)   9/2020 Initial Diagnosis    Primary cancer of upper outer quadrant of left female breast (HCC)  Stage IIB     9/11/2020 Biopsy    Breast, left at 1:00 12cmfn, US guided biopsy, 5 passes:  - Invasive mammary carcinoma of no special type (ductal, not otherwise specified).    -- [place] histologic grade 3 of 3 (total score: 8 of 9)       -- Invasive carcinoma involves 5 of 5 submitted core biopsies, max. Dimension= 14 mm.    -- Estrogen, Progesterone 90-95% positive HER2 negative  - Lymphovascular invasion: Present.     10/2/2020 Surgery    A. Breast, Left, Lumpectomy:  - Invasive breast carcinoma of no special type (ductal NST/invasive ductal carcinoma), 21 mm focus.   * [place] grade 3 of 3 (total score: 9 of 9)   * Ductal carcinoma in situ (DCIS): Present in 2 out of 17 blocks.    -- [place], solid and cribriform architectural pattern, nuclear grade 3 of 3.    * Margins: Negative for invasive carcinoma, 1 mm from the closest posterior margin; Negative for DCIS, 1 mm from the closest anterior margin.   * Skin: Negative for carcinoma.   * Skeletal muscle: Negative for carcinoma.   * Lymph-vascular invasion: Present.    * Pathologic stage (AJCC 8th ed.): pT2, pN1a (sn).      B. [place] Lymph Node, Left # 1, Excision:  - One lymph node positive for carcinoma (1/1).     C. Axillary contents, Left, Excision:  - One reactive lymph node (0/1).     10/2/2020 -  Cancer Staged    Staging form: Breast, AJCC 8th Edition  -  Clinical stage from 10/2/2020: Stage IIB (cT2, cN1(sn), cM0, G3, ER+, IA+, HER2-) - Signed by Ti Magana MD on 10/9/2020  Stage prefix: Initial diagnosis  Laterality: Left  Tumor size (mm): 21  Method of lymph node assessment: [place] lymph node biopsy  Nuclear grade: G3  Mitotic count score: Score 3  Percentage of tumor with tubule formation: 10  Tubule formation score: Score 3  Scarff-Bloom-Cevallos score: 9  Histologic grading system: 3 grade system  Lymph-vascular invasion (LVI): LVI present/identified, NOS  Specimen type: Excision  National guidelines used in treatment planning: Yes  Type of national guideline used in treatment planning: NCCN       11/4/2020 - 1/26/2021 Chemotherapy    Completed 4 cycles of adjuvant Taxotere plus Cytoxan   pegfilgrastim (NEULASTA) subcutaneous injection 6 mg, 6 mg, Subcutaneous, Once, 3 of 3 cycles  Administration: 6 mg (11/5/2020), 6 mg (11/25/2020), 6 mg (1/6/2021)  pegfilgrastim (NEULASTA ONPRO) subcutaneous injection kit 6 mg, 6 mg, Subcutaneous, Once, 1 of 1 cycle  Administration: 6 mg (12/15/2020)  cyclophosphamide (CYTOXAN) 1,068 mg in sodium chloride 0.9 % 250 mL IVPB, 600 mg/m2 = 1,068 mg, Intravenous, Once, 4 of 4 cycles  Administration: 1,068 mg (11/4/2020), 1,068 mg (11/24/2020), 1,068 mg (12/15/2020), 1,068 mg (1/5/2021)  DOCEtaxel (TAXOTERE) 140 mg in sodium chloride 0.9 % 250 mL chemo infusion, 133.6 mg, Intravenous, Once, 4 of 4 cycles  Administration: 140 mg (11/4/2020), 140 mg (11/24/2020), 140 mg (12/15/2020), 140 mg (1/5/2021)       11/2020 Genetic Testing    Patient has genetic testing done for breast cancer.  Results revealed patient has the following mutation(s):  CHEK2 c.470T>C (p.Dig747Pnp), Heterozygous     2/9/2021 -  Hormone Therapy    Started anastrozole  Was also started on Prolia injections due to pre-existing osteopenia         Interval history:    ROS: Review of Systems   Constitutional:  Negative for chills and fever.   HENT:   Negative for ear pain and sore throat.    Eyes:  Negative for pain and visual disturbance.   Respiratory:  Negative for cough and shortness of breath.    Cardiovascular:  Negative for chest pain and palpitations.   Gastrointestinal:  Negative for abdominal pain and vomiting.   Genitourinary:  Negative for dysuria and hematuria.   Musculoskeletal:  Negative for arthralgias and back pain.   Skin:  Negative for color change and rash.   Neurological:  Negative for seizures and syncope.   All other systems reviewed and are negative.      Past Medical History:   Diagnosis Date    Acute cystitis 3/2/2018    Asthma     has not been treated for seveeral years  exercise induced    BRCA1 negative     Breast cancer (HCC) 10/2020    left sided    Cancer (HCC)     breast  9/20    Cellulitis of left upper extremity 2/23/2021    Chemotherapy induced neutropenia (HCC) 10/30/2020    Exercise-induced asthma 11/3/2016    History of chemotherapy 11/2020    Hx of radiation therapy 03/2021    Urinary tract infection        Past Surgical History:   Procedure Laterality Date    BREAST CYST EXCISION Left     benign    BREAST CYST EXCISION Left     benign    BREAST SURGERY      CHOLECYSTECTOMY      HYSTERECTOMY      IR PORT PLACEMENT  10/26/2020    IR PORT REMOVAL  2/4/2021    MAMMO NEEDLE LOCALIZATION LEFT (ALL INC) Left 10/2/2020    MOUTH SURGERY      [place] MASTECTOMY PARTIAL Left 10/2/2020    Procedure: BREAST NEEDLE LOCALIZED LUMPECTOMY, SENTINEL LYMPH NODE BIOPSY (Bracketed U/S guided needle loc @ 8:00, INJECT AT 1045);  Surgeon: Ti Magana MD;  Location:  MAIN OR;  Service: General    TUBAL LIGATION      US GUIDED BREAST BIOPSY LEFT COMPLETE Left 9/11/2020    WISDOM TOOTH EXTRACTION         Social History     Socioeconomic History    Marital status: /Civil Union     Spouse name: None    Number of children: 2    Years of education: None    Highest education level: None   Occupational History    Occupation: Retired   Tobacco  Use    Smoking status: Never    Smokeless tobacco: Never   Vaping Use    Vaping status: Never Used   Substance and Sexual Activity    Alcohol use: Never    Drug use: Never    Sexual activity: Yes     Partners: Male     Birth control/protection: Female Sterilization     Comment:  x 9 years, Ollie   Other Topics Concern    None   Social History Narrative    Daily caffeine consumption, 1 serving a day    Exercises 3-4 times per week     x 8 years     Social Determinants of Health     Financial Resource Strain: Not on file   Food Insecurity: Not on file   Transportation Needs: Not on file   Physical Activity: Not on file   Stress: Not on file   Social Connections: Not on file   Intimate Partner Violence: Not on file   Housing Stability: Not on file       Family History   Problem Relation Age of Onset    Hypertension Mother     Heart disease Mother         cardiac disorder    Breast cancer Mother         dx age early 20's    Glaucoma Mother     Hypertension Father     Heart disease Father         cardiac disorder    Cancer Father     No Known Problems Sister     Multiple sclerosis Daughter     Melanoma Daughter     No Known Problems Maternal Grandmother     No Known Problems Paternal Grandmother     No Known Problems Son     No Known Problems Paternal Aunt        Allergies   Allergen Reactions    Ofloxacin Hives    Penicillins Hives    Vancomycin Hives     Pt asked to update her chart. 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     Chlorhexidine Rash         Current Outpatient Medications:     anastrozole (ARIMIDEX) 1 mg tablet, Take 1 tablet (1 mg total) by mouth daily, Disp: 90 tablet, Rfl: 3    ascorbic acid (VITAMIN C) 250 mg tablet, Take 250 mg by mouth daily, Disp: , Rfl:     Black Pepper-Turmeric (Turmeric Curcumin) 5-1000 MG CAPS, Take by mouth, Disp: , Rfl:     calcium carbonate (TUMS) 500 mg chewable tablet, Chew 1 tablet daily, Disp: , Rfl:     nystatin  "(MYCOSTATIN) powder, Apply topically 4 (four) times a day, Disp: 60 g, Rfl: 1    triamcinolone (KENALOG) 0.1 % cream, Apply topically 2 (two) times a day, Disp: 30 g, Rfl: 1    albuterol (ProAir HFA) 90 mcg/act inhaler, Inhale 2 puffs every 6 (six) hours as needed for wheezing or shortness of breath (Patient not taking: Reported on 5/23/2024), Disp: 8.5 g, Rfl: 0    denosumab (PROLIA) 60 mg/mL, Inject 1 mL (60 mg total) under the skin once for 1 dose Every 6 months, Disp: 1 mL, Rfl: 0    nystatin (MYCOSTATIN) cream, Apply topically 2 (two) times a day (Patient not taking: Reported on 5/23/2024), Disp: 30 g, Rfl: 1    venlafaxine (EFFEXOR-XR) 37.5 mg 24 hr capsule, Take 1 capsule (37.5 mg total) by mouth daily (Patient not taking: Reported on 10/4/2023), Disp: 30 capsule, Rfl: 3      Physical Exam:  /76 (BP Location: Right arm, Patient Position: Sitting, Cuff Size: Adult)   Pulse 85   Temp 97.9 °F (36.6 °C)   Resp 18   Ht 5' 3\" (1.6 m)   Wt 73.9 kg (163 lb)   LMP  (LMP Unknown)   SpO2 98%   BMI 28.87 kg/m²     Physical Exam  Constitutional:       General: She is not in acute distress.     Appearance: She is well-developed. She is not diaphoretic.   HENT:      Head: Normocephalic and atraumatic.      Nose: Nose normal.   Eyes:      General: No scleral icterus.        Right eye: No discharge.         Left eye: No discharge.      Conjunctiva/sclera: Conjunctivae normal.      Pupils: Pupils are equal, round, and reactive to light.   Neck:      Thyroid: No thyromegaly.      Vascular: No JVD.      Trachea: No tracheal deviation.   Cardiovascular:      Rate and Rhythm: Normal rate and regular rhythm.      Heart sounds: Normal heart sounds. No murmur heard.     No friction rub.   Pulmonary:      Effort: Pulmonary effort is normal. No respiratory distress.      Breath sounds: Normal breath sounds. No stridor. No wheezing or rales.   Chest:      Chest wall: No tenderness.   Abdominal:      General: There is no " distension.      Palpations: Abdomen is soft. There is no hepatomegaly or splenomegaly.      Tenderness: There is no abdominal tenderness. There is no guarding or rebound.   Musculoskeletal:         General: No tenderness or deformity. Normal range of motion.      Cervical back: Normal range of motion and neck supple.   Lymphadenopathy:      Cervical: No cervical adenopathy.   Skin:     General: Skin is warm and dry.      Coloration: Skin is not pale.      Findings: No erythema or rash.   Neurological:      Mental Status: She is alert and oriented to person, place, and time.      Cranial Nerves: No cranial nerve deficit.      Coordination: Coordination normal.      Deep Tendon Reflexes: Reflexes are normal and symmetric.   Psychiatric:         Behavior: Behavior normal.         Thought Content: Thought content normal.         Judgment: Judgment normal.           Labs:  Lab Results   Component Value Date    WBC 6.46 03/15/2024    HGB 12.9 03/15/2024    HCT 39.3 03/15/2024    MCV 89 03/15/2024     03/15/2024     Lab Results   Component Value Date    K 4.7 03/15/2024     03/15/2024    CO2 28 03/15/2024    BUN 13 03/15/2024    CREATININE 0.70 03/15/2024    GLUF 105 (H) 03/15/2024    CALCIUM 9.3 03/15/2024    AST 18 03/15/2024    ALT 17 03/15/2024    ALKPHOS 59 03/15/2024    EGFR 93 03/15/2024     Lab Results   Component Value Date    TSH 1.04 08/08/2018       Patient voiced understanding and agreement in the above discussion. Aware to contact our office with questions/symptoms in the interim.

## 2024-06-20 DIAGNOSIS — Z00.6 ENCOUNTER FOR EXAMINATION FOR NORMAL COMPARISON OR CONTROL IN CLINICAL RESEARCH PROGRAM: ICD-10-CM

## 2024-08-15 ENCOUNTER — APPOINTMENT (RX ONLY)
Dept: URBAN - NONMETROPOLITAN AREA CLINIC 4 | Facility: CLINIC | Age: 64
Setting detail: DERMATOLOGY
End: 2024-08-15

## 2024-08-15 DIAGNOSIS — D22 MELANOCYTIC NEVI: ICD-10-CM

## 2024-08-15 DIAGNOSIS — Z85.828 PERSONAL HISTORY OF OTHER MALIGNANT NEOPLASM OF SKIN: ICD-10-CM

## 2024-08-15 DIAGNOSIS — Z80.8 FAMILY HISTORY OF MALIGNANT NEOPLASM OF OTHER ORGANS OR SYSTEMS: ICD-10-CM

## 2024-08-15 DIAGNOSIS — L82.1 OTHER SEBORRHEIC KERATOSIS: ICD-10-CM

## 2024-08-15 DIAGNOSIS — D18.0 HEMANGIOMA: ICD-10-CM

## 2024-08-15 DIAGNOSIS — L57.8 OTHER SKIN CHANGES DUE TO CHRONIC EXPOSURE TO NONIONIZING RADIATION: ICD-10-CM

## 2024-08-15 DIAGNOSIS — L56.5 DISSEMINATED SUPERFICIAL ACTINIC POROKERATOSIS (DSAP): ICD-10-CM

## 2024-08-15 PROBLEM — D22.5 MELANOCYTIC NEVI OF TRUNK: Status: ACTIVE | Noted: 2024-08-15

## 2024-08-15 PROBLEM — D18.01 HEMANGIOMA OF SKIN AND SUBCUTANEOUS TISSUE: Status: ACTIVE | Noted: 2024-08-15

## 2024-08-15 PROBLEM — D22.62 MELANOCYTIC NEVI OF LEFT UPPER LIMB, INCLUDING SHOULDER: Status: ACTIVE | Noted: 2024-08-15

## 2024-08-15 PROBLEM — D22.61 MELANOCYTIC NEVI OF RIGHT UPPER LIMB, INCLUDING SHOULDER: Status: ACTIVE | Noted: 2024-08-15

## 2024-08-15 PROCEDURE — ? LIQUID NITROGEN

## 2024-08-15 PROCEDURE — ? OBSERVATION

## 2024-08-15 PROCEDURE — ? PHOTO-DOCUMENTATION

## 2024-08-15 PROCEDURE — 99203 OFFICE O/P NEW LOW 30 MIN: CPT | Mod: 25

## 2024-08-15 PROCEDURE — 17003 DESTRUCT PREMALG LES 2-14: CPT

## 2024-08-15 PROCEDURE — ? FULL BODY SKIN EXAM

## 2024-08-15 PROCEDURE — 17000 DESTRUCT PREMALG LESION: CPT

## 2024-08-15 PROCEDURE — ? SUNSCREEN RECOMMENDATIONS

## 2024-08-15 PROCEDURE — ? COUNSELING

## 2024-08-15 PROCEDURE — ? TREATMENT REGIMEN

## 2024-08-15 ASSESSMENT — LOCATION SIMPLE DESCRIPTION DERM
LOCATION SIMPLE: CHEST
LOCATION SIMPLE: RIGHT BREAST
LOCATION SIMPLE: LEFT THIGH
LOCATION SIMPLE: RIGHT SHOULDER
LOCATION SIMPLE: LEFT CHEEK
LOCATION SIMPLE: LEFT BREAST
LOCATION SIMPLE: LEFT SHOULDER
LOCATION SIMPLE: RIGHT BUTTOCK
LOCATION SIMPLE: RIGHT UPPER BACK
LOCATION SIMPLE: POSTERIOR NECK
LOCATION SIMPLE: LEFT CALF
LOCATION SIMPLE: LEFT FOREARM
LOCATION SIMPLE: RIGHT THIGH
LOCATION SIMPLE: LEFT UPPER BACK
LOCATION SIMPLE: RIGHT FOREHEAD
LOCATION SIMPLE: RIGHT LOWER BACK
LOCATION SIMPLE: RIGHT CHEEK
LOCATION SIMPLE: RIGHT UPPER ARM

## 2024-08-15 ASSESSMENT — LOCATION DETAILED DESCRIPTION DERM
LOCATION DETAILED: LEFT ANTERIOR SHOULDER
LOCATION DETAILED: LEFT POSTERIOR SHOULDER
LOCATION DETAILED: RIGHT SUPERIOR MEDIAL MIDBACK
LOCATION DETAILED: LEFT DISTAL CALF
LOCATION DETAILED: LEFT PROXIMAL DORSAL FOREARM
LOCATION DETAILED: LEFT MEDIAL BREAST 10-11:00 REGION
LOCATION DETAILED: LEFT ANTERIOR DISTAL THIGH
LOCATION DETAILED: RIGHT INFERIOR MEDIAL FOREHEAD
LOCATION DETAILED: RIGHT ANTERIOR DISTAL UPPER ARM
LOCATION DETAILED: RIGHT CENTRAL MALAR CHEEK
LOCATION DETAILED: MID POSTERIOR NECK
LOCATION DETAILED: RIGHT BUTTOCK
LOCATION DETAILED: RIGHT ANTERIOR SHOULDER
LOCATION DETAILED: LEFT INFERIOR UPPER BACK
LOCATION DETAILED: RIGHT SUPERIOR MEDIAL UPPER BACK
LOCATION DETAILED: RIGHT MID-UPPER BACK
LOCATION DETAILED: RIGHT POSTERIOR SHOULDER
LOCATION DETAILED: LEFT CENTRAL MALAR CHEEK
LOCATION DETAILED: RIGHT MEDIAL BREAST 2-3:00 REGION
LOCATION DETAILED: MIDDLE STERNUM
LOCATION DETAILED: RIGHT ANTERIOR PROXIMAL THIGH

## 2024-08-15 ASSESSMENT — LOCATION ZONE DERM
LOCATION ZONE: TRUNK
LOCATION ZONE: NECK
LOCATION ZONE: ARM
LOCATION ZONE: FACE
LOCATION ZONE: LEG

## 2024-08-15 ASSESSMENT — PAIN INTENSITY VAS: HOW INTENSE IS YOUR PAIN 0 BEING NO PAIN, 10 BEING THE MOST SEVERE PAIN POSSIBLE?: NO PAIN

## 2024-08-15 NOTE — HPI: EVALUATION OF SKIN LESION(S)
What Type Of Note Output Would You Prefer (Optional)?: Standard Output
Hpi Title: Evaluation of Skin Lesions
How Severe Are Your Spot(S)?: mild
Have Your Spot(S) Been Treated In The Past?: has not been treated
Family Member: Father, Mother, Daughter

## 2024-08-15 NOTE — PROCEDURE: LIQUID NITROGEN
Render Post-Care Instructions In Note?: no
Application Tool (Optional): Cry-AC
Post-Care Instructions: I reviewed with the patient in detail post-care instructions. Patient is to wear sunprotection, and avoid picking at any of the treated lesions. Pt may apply Vaseline to crusted or scabbing areas.
Consent: The patient's consent was obtained including but not limited to risks of crusting, scabbing, blistering, scarring, darker or lighter pigmentary change, recurrence, incomplete removal and infection.
Detail Level: Zone
Show Applicator Variable?: Yes
Number Of Freeze-Thaw Cycles: 1 freeze-thaw cycle

## 2024-08-15 NOTE — PROCEDURE: OBSERVATION
Body Location Override (Optional - Billing Will Still Be Based On Selected Body Map Location If Applicable): Left proximal dorsal forearm
Detail Level: Detailed
Size Of Lesion In Cm (Optional): 0

## 2024-09-16 ENCOUNTER — TELEPHONE (OUTPATIENT)
Age: 64
End: 2024-09-16

## 2024-09-16 NOTE — TELEPHONE ENCOUNTER
Patient calling in regarding her Prolia shot, she received a bill from the insurance regarding a nurse coming out to administer the shot, patient would like to know if there is another way of going about receiving the shot to avoid the costs.    Please advise.

## 2024-09-17 ENCOUNTER — TELEPHONE (OUTPATIENT)
Dept: HEMATOLOGY ONCOLOGY | Facility: CLINIC | Age: 64
End: 2024-09-17

## 2024-09-17 NOTE — TELEPHONE ENCOUNTER
Phoned pt and had to leave a message as I have to reach out to [employer] as pt's insurance requires that we have the Prolia administered by visiting nurse. Let pt know I would get back to her once I hear from [employer].

## 2024-09-17 NOTE — TELEPHONE ENCOUNTER
Pt stopped into office to let me know she was able to get Rx boniva for a $25 co pay for 3 months so pt will no longer be receiving prolia. I let pt know that I would document this in her chart       Phoned pt to let her know that pharmacy ran a test claim and it came back with a $8.93 co pay so Rx was sent to [facility]       Pt phoned back office to let office know she reached out to her ins company and asked if Boniva was covered as the Prolia is too expensive, pt has $8,400 deductible and she was responsible for almost $2,000 and she is due again in Oct. Rx sent and message sent to [facility] to check or get auth.

## 2024-09-17 NOTE — TELEPHONE ENCOUNTER
Phoned pt and let her know that she has a 0% co insurance after deductible. Deductible is $8,400 and it would be the same for outpatient hospital. The amount for the Prolia is $1,693.11 and this was applied to her deductible, the total applied to her deductible for med and administration is $1,762.04. Pt is asking about alternative treatment and Iet her know that there is not another shot but there is oral med live boniva which is a monthly pill. I told pt to check her insurance pharmacy rx formulary to see if it is covered and the cost. Pt will phone me back to let me know if she wants to change as she does not want another $2,000 bill for prolia.

## 2024-09-18 DIAGNOSIS — M85.852 OSTEOPENIA OF LEFT HIP: Primary | ICD-10-CM

## 2024-09-18 RX ORDER — IBANDRONATE SODIUM 150 MG/1
150 TABLET, FILM COATED ORAL
Qty: 3 TABLET | Refills: 3 | Status: SHIPPED | OUTPATIENT
Start: 2024-09-18

## 2024-10-01 ENCOUNTER — HOSPITAL ENCOUNTER (OUTPATIENT)
Dept: ULTRASOUND IMAGING | Facility: HOSPITAL | Age: 64
End: 2024-10-01
Attending: SURGERY
Payer: COMMERCIAL

## 2024-10-01 ENCOUNTER — HOSPITAL ENCOUNTER (OUTPATIENT)
Dept: MAMMOGRAPHY | Facility: HOSPITAL | Age: 64
Discharge: HOME | End: 2024-10-01
Attending: SURGERY
Payer: COMMERCIAL

## 2024-10-01 DIAGNOSIS — B36.9 FUNGAL RASH OF TORSO: ICD-10-CM

## 2024-10-01 DIAGNOSIS — C50.412 PRIMARY CANCER OF UPPER OUTER QUADRANT OF LEFT FEMALE BREAST (HCC): ICD-10-CM

## 2024-10-01 PROCEDURE — G0279 TOMOSYNTHESIS, MAMMO: HCPCS

## 2024-10-01 PROCEDURE — 77066 DX MAMMO INCL CAD BI: CPT

## 2024-10-01 NOTE — TELEPHONE ENCOUNTER
Patient called the RX Refill Line. Message is being forwarded to the office.     Patient is requesting another round of antibiotic and powder for  yeast infection under breast that has came back- pt was unable to provide me the names of medication she was previously prescribed    Please contact patient at [phone]

## 2024-10-02 RX ORDER — FLUCONAZOLE 150 MG/1
150 TABLET ORAL ONCE
Qty: 1 TABLET | Refills: 0 | Status: SHIPPED | OUTPATIENT
Start: 2024-10-02 | End: 2024-10-02

## 2024-10-02 RX ORDER — NYSTATIN 100000 [USP'U]/G
POWDER TOPICAL 2 TIMES DAILY
Qty: 60 G | Refills: 1 | Status: SHIPPED | OUTPATIENT
Start: 2024-10-02

## 2024-10-07 NOTE — PROGRESS NOTES
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    Patient denies new complaints referable to her breasts.    She underwent bilateral mammogram on 2024.  This is a BI-RADS 2 study.    Her chief complaint today is that of breast pain, asymmetry and recurrent yeast infections.    On physical examination she is well-appearing.  Pleasant competent reliable as a historian.  She has no cervical supraclavicular or axillary lymphadenopathy bilaterally.  The breasts are asymmetric.  She has postradiation changes in the left breast.  The right breast is normal and pendulous.  No dominant lumps masses skin changes or nipple retraction on the right.  On the left she has well-healed scar without signs of locally recurrent disease.    The patient appears clinically and radiographically stable with regards to her history of stage IIb left breast carcinoma status post breast conserving therapy in 2020.    I look forward to seeing her back in a years time for routine breast health maintenance examination following a bilateral mammogram after 2025.    A referral will be made to Dr. Polo Burkett of plastic surgery for treatment recommendations regarding her breast pain and asymmetry.           History of Present Illness     Ina Morejon is a 64 y.o. female who presents for a f/u mammo after 2024. Pt had left breast cancer follow up lumpectomy in  and .Pt states she  hasn't notice any new lumps, nipple discharge/ inverted nipples, breast pain/swelling, redness/irritation, or fevers/chills. Pt continues to take the Anastrozole pill and she is doing well with this medication. Nazario Linn obtained from : patient  Review of Systems   Constitutional:  Negative for chills and fever.   HENT:  Negative for ear pain and sore throat.    Eyes:  Negative for pain and visual disturbance.   Respiratory:  Negative for cough and shortness of breath.    Cardiovascular:  Negative for chest pain and palpitations.   Gastrointestinal:  Negative for abdominal pain and vomiting.   Genitourinary:  Negative for dysuria and hematuria.   Musculoskeletal:  Positive for back pain. Negative for arthralgias.   Skin:  Negative for color change and rash.   Neurological:  Negative for seizures and syncope.   All other systems reviewed and are negative.    Pertinent Medical History         Medical History Reviewed by provider this encounter:       Past Medical History   Past Medical History:   Diagnosis Date    Acute cystitis 3/2/2018    Asthma     has not been treated for seveeral years  exercise induced    BRCA1 negative     Breast cancer (HCC) 10/2020    left sided    Cancer (HCC)     breast  9/20    Cellulitis of left upper extremity 2/23/2021    Chemotherapy induced neutropenia (HCC) 10/30/2020    Exercise-induced asthma 11/3/2016    History of chemotherapy 11/2020    Hx of radiation therapy 03/2021    Urinary tract infection      Past Surgical History:   Procedure Laterality Date    BREAST CYST EXCISION Left     benign    BREAST CYST EXCISION Left     benign    BREAST SURGERY      CHOLECYSTECTOMY      HYSTERECTOMY      IR PORT PLACEMENT  10/26/2020    IR PORT REMOVAL  2/4/2021    MAMMO NEEDLE LOCALIZATION LEFT (ALL INC) Left 10/2/2020    MOUTH SURGERY      MI MASTECTOMY PARTIAL Left 10/2/2020    Procedure: BREAST NEEDLE LOCALIZED LUMPECTOMY, SENTINEL LYMPH NODE BIOPSY (Bracketed U/S guided needle loc  @ 8:00, INJECT AT 1045);  Surgeon: Ti Magana MD;  Location:  MAIN OR;  Service: General    TUBAL LIGATION      US GUIDED BREAST BIOPSY LEFT COMPLETE Left 9/11/2020    WISDOM TOOTH EXTRACTION       Family History   Problem Relation Age of Onset    Hypertension Mother     Heart disease Mother         cardiac disorder    Breast cancer Mother         dx age early 20's    Glaucoma Mother     Hypertension Father     Heart disease Father         cardiac disorder    Cancer Father     No Known Problems Sister     Multiple sclerosis Daughter     Melanoma Daughter     No Known Problems Maternal Grandmother     No Known Problems Paternal Grandmother     No Known Problems Son     No Known Problems Paternal Aunt      Current Outpatient Medications on File Prior to Visit   Medication Sig Dispense Refill    anastrozole (ARIMIDEX) 1 mg tablet Take 1 tablet (1 mg total) by mouth daily 90 tablet 3    ascorbic acid (VITAMIN C) 250 mg tablet Take 250 mg by mouth daily      Black Pepper-Turmeric (Turmeric Curcumin) 5-1000 MG CAPS Take by mouth      calcium carbonate (TUMS) 500 mg chewable tablet Chew 1 tablet daily      fluconazole (DIFLUCAN) 150 mg tablet take 1 tablet by mouth as a one time dose      ibandronate (BONIVA) 150 MG tablet Take 1 tablet (150 mg total) by mouth every 30 (thirty) days 3 tablet 3    nystatin (MYCOSTATIN) powder Apply topically 2 (two) times a day 60 g 1    denosumab (PROLIA) 60 mg/mL Inject 1 mL (60 mg total) under the skin once for 1 dose Every 6 months 1 mL 0    triamcinolone (KENALOG) 0.1 % cream Apply topically 2 (two) times a day (Patient not taking: Reported on 10/9/2024) 30 g 1    [DISCONTINUED] estradiol (ESTRACE) 0.5 MG tablet Take 1 tablet by mouth daily       No current facility-administered medications on file prior to visit.     Allergies   Allergen Reactions    Ofloxacin Hives    Penicillins Hives    Vancomycin Hives     Pt asked to update her chart. She received this med when she was  "in the hospital - 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.     Chlorhexidine Rash      Current Outpatient Medications on File Prior to Visit   Medication Sig Dispense Refill    anastrozole (ARIMIDEX) 1 mg tablet Take 1 tablet (1 mg total) by mouth daily 90 tablet 3    ascorbic acid (VITAMIN C) 250 mg tablet Take 250 mg by mouth daily      Black Pepper-Turmeric (Turmeric Curcumin) 5-1000 MG CAPS Take by mouth      calcium carbonate (TUMS) 500 mg chewable tablet Chew 1 tablet daily      fluconazole (DIFLUCAN) 150 mg tablet take 1 tablet by mouth as a one time dose      ibandronate (BONIVA) 150 MG tablet Take 1 tablet (150 mg total) by mouth every 30 (thirty) days 3 tablet 3    nystatin (MYCOSTATIN) powder Apply topically 2 (two) times a day 60 g 1    denosumab (PROLIA) 60 mg/mL Inject 1 mL (60 mg total) under the skin once for 1 dose Every 6 months 1 mL 0    triamcinolone (KENALOG) 0.1 % cream Apply topically 2 (two) times a day (Patient not taking: Reported on 10/9/2024) 30 g 1    [DISCONTINUED] estradiol (ESTRACE) 0.5 MG tablet Take 1 tablet by mouth daily       No current facility-administered medications on file prior to visit.      Social History     Tobacco Use    Smoking status: Never    Smokeless tobacco: Never   Vaping Use    Vaping status: Never Used   Substance and Sexual Activity    Alcohol use: Never    Drug use: Never    Sexual activity: Yes     Partners: Male     Birth control/protection: Female Sterilization     Comment:  x 9 years, [place]         Objective     /70 (BP Location: Left arm, Patient Position: Sitting, Cuff Size: Standard)   Pulse 68   Temp 98.1 °F (36.7 °C) (Temporal)   Resp 16   Ht 5' 3\" (1.6 m)   Wt 76.2 kg (168 lb)   LMP  (LMP Unknown)   SpO2 98%   BMI 29.76 kg/m²     Physical Exam  Vitals and nursing note reviewed.   Constitutional:       General: She is not in acute distress.     Appearance: She is well-developed.   HENT:      Head: Normocephalic and atraumatic.   Eyes:      " Conjunctiva/sclera: Conjunctivae normal.   Cardiovascular:      Rate and Rhythm: Normal rate and regular rhythm.      Heart sounds: No murmur heard.  Pulmonary:      Effort: Pulmonary effort is normal. No respiratory distress.      Breath sounds: Normal breath sounds.   Abdominal:      Palpations: Abdomen is soft.      Tenderness: There is no abdominal tenderness.   Musculoskeletal:         General: No swelling.      Cervical back: Neck supple.   Skin:     General: Skin is warm and dry.      Capillary Refill: Capillary refill takes less than 2 seconds.      Comments: Breast exam as described above   Neurological:      Mental Status: She is alert.   Psychiatric:         Mood and Affect: Mood normal.       Administrative Statements   I have spent a total time of 20 minutes in caring for this patient on the day of the visit/encounter including Instructions for management.

## 2024-10-09 ENCOUNTER — OFFICE VISIT (OUTPATIENT)
Dept: SURGERY | Facility: CLINIC | Age: 64
End: 2024-10-09
Payer: COMMERCIAL

## 2024-10-09 ENCOUNTER — TELEPHONE (OUTPATIENT)
Age: 64
End: 2024-10-09

## 2024-10-09 VITALS
DIASTOLIC BLOOD PRESSURE: 70 MMHG | OXYGEN SATURATION: 98 % | RESPIRATION RATE: 16 BRPM | BODY MASS INDEX: 29.77 KG/M2 | HEIGHT: 63 IN | WEIGHT: 168 LBS | TEMPERATURE: 98.1 F | HEART RATE: 68 BPM | SYSTOLIC BLOOD PRESSURE: 130 MMHG

## 2024-10-09 DIAGNOSIS — C50.412 PRIMARY CANCER OF UPPER OUTER QUADRANT OF LEFT FEMALE BREAST (HCC): Primary | ICD-10-CM

## 2024-10-09 PROCEDURE — 99213 OFFICE O/P EST LOW 20 MIN: CPT | Performed by: SURGERY

## 2024-10-09 RX ORDER — FLUCONAZOLE 150 MG/1
TABLET ORAL
COMMUNITY
Start: 2024-10-02 | End: 2024-10-15 | Stop reason: SDUPTHER

## 2024-10-09 NOTE — ASSESSMENT & PLAN NOTE
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    Patient denies new complaints referable to her breasts.    She underwent bilateral mammogram on October 1, 2024.  This is a BI-RADS 2 study.    Her chief complaint today is that of breast pain, asymmetry and recurrent yeast infections.    On physical examination she is well-appearing.  Nurys competent reliable as a historian.  She has no cervical supraclavicular or axillary lymphadenopathy bilaterally.  The breasts are asymmetric.  She has postradiation changes in the left breast.  The right breast is normal and pendulous.  No dominant lumps masses skin changes or nipple retraction on the right.  On the left she has well-healed scar without signs of locally recurrent disease.    The patient appears clinically and radiographically stable with regards to her history of stage IIb left breast carcinoma status post breast conserving therapy in October 2020.    I look forward to seeing her back in a years time for routine breast health maintenance examination following a bilateral mammogram after October 1, 2025.    A referral will be made to Dr. Ploo Burkett of plastic surgery for treatment recommendations regarding her breast pain and asymmetry.

## 2024-10-09 NOTE — TELEPHONE ENCOUNTER
Patient called asking for a consultation apt in [place] office  Cancer patient/referral: Primary cancer of upper outer quadrant of left female breast (HCC)     Thank you

## 2024-10-09 NOTE — LETTER
2024     Crescencio Burkett MD  [address]  [address]  [facility] [address]    Patient: Ina Morejon   YOB: 1960   Date of Visit: 10/9/2024       Dear Dr. Burkett:    Thank you for referring Ina Morejon to me for evaluation. Below are my notes for this consultation.    If you have questions, please do not hesitate to call me. I look forward to following your patient along with you.         Sincerely,        Ti Magana MD        CC: No Recipients    Ti Magana MD  10/9/2024  9:44 AM  Sign when Signing Visit  Ambulatory Visit  Name: Ina Morejon      : 1960      MRN: [id number]  Encounter Provider: Ti Magana MD  Encounter Date: 10/9/2024   Encounter department: 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    Patient denies new complaints referable to her breasts.    She underwent bilateral mammogram on 2024.  This is a BI-RADS 2 study.    Her chief complaint today is that of breast pain, asymmetry and recurrent yeast infections.    On physical examination she is well-appearing.  Nurys competent reliable as a historian.  She has no cervical supraclavicular or axillary lymphadenopathy bilaterally.  The breasts are asymmetric.  She has postradiation changes in the left breast.  The right breast is normal and pendulous.  No dominant lumps masses skin changes or nipple retraction on the right.  On the left she has well-healed scar without signs of locally recurrent disease.    The patient appears clinically and radiographically stable with regards to her history of stage IIb left breast  HPI   Chief Complaint   Patient presents with    Knee Injury     Pt reports work related injury. Pt states at 1705 big rubber piece hit pt in lateral side of right knee. Pt reports knee pain and knee swelling.        This is a 22-year-old male presenting for evaluation of right lateral knee pain that occurred at work [employer].  An 80 pound piece of rubber dropped off of the  and hit his lateral knee after bouncing off the.  Pain is worse with weightbearing and ambulation.  Denies any other complaints or injuries at this time.                          No data recorded                Patient History   History reviewed. No pertinent past medical history.  History reviewed. No pertinent surgical history.  No family history on file.  Social History     Tobacco Use    Smoking status: Never    Smokeless tobacco: Never   Substance Use Topics    Alcohol use: Not on file    Drug use: Not on file       Physical Exam   ED Triage Vitals [10/27/23 1956]   Temp Heart Rate Resp BP   36.4 °C (97.5 °F) 69 16 (!) 151/96      SpO2 Temp Source Heart Rate Source Patient Position   99 % Temporal Monitor Sitting      BP Location FiO2 (%)     Right arm --       Physical Exam    Gen.: Vitals noted. No distress  Cardiac: Regular rate and rhythm. No murmur.   Pulmonary: Equal breath sounds bilaterally. No adventitious breath sounds.   Lower extremity: Exam of the right knee shows that there is ttp over the lateral joint line. The remainder of the knee is nontender. The extensor mechanism is intact. There is subtle varus laxity. The remainder of the extremity, specifically, the tib-fib, ankle, and foot are nontender. Skin is intact. Is neurovascularly intact distally. Compartments soft.    ED Course & MDM   Diagnoses as of 10/27/23 2147   Injury of right knee, initial encounter       Medical Decision Making  DDx: fracture, dislocation, nonvisualized/occult fracture, tendon/ligament injury, soft tissue injury    Patient presenting as documented  above.  Neurovascularly intact.  Subtle laxity noted on the right joint line.  X-ray negative as read by the radiologist for acute osseous process.  Ligamentous injury not excluded.  Patient given crutches and knee immobilizer by nursing.  Advised RICE.  Advised orthopedic referral for follow-up and this was given.  Instructed to return to the nearest ED if any concerns or new or worsening symptoms. Patient verbalized understanding and agreement with plan. Discharged in stable condition.    Impression: see diagnosis     Disposition: Discharge      Disclaimer: This note was dictated using speech recognition software. An attempt at proofreading was made to minimize errors. Minor errors in transcription may be present. Please call if questions.        Procedure  Procedures     Juan Espinosa PA-C  10/27/23 3239     carcinoma status post breast conserving therapy in October 2020.    I look forward to seeing her back in a years time for routine breast health maintenance examination following a bilateral mammogram after October 1, 2025.    A referral will be made to Dr. Polo Burkett of plastic surgery for treatment recommendations regarding her breast pain and asymmetry.           History of Present Illness    Ina Morejon is a 64 y.o. female who presents for a f/u mammo after 09/28/2024. Pt had left breast cancer follow up lumpectomy in 2020 and 2021.Pt states she hasn't notice any new lumps, nipple discharge/ inverted nipples, breast pain/swelling, redness/irritation, or fevers/chills. Pt continues to take the Anastrozole pill and she is doing well with this medication. Nazario Linn obtained from : patient  Review of Systems   Constitutional:  Negative for chills and fever.   HENT:  Negative for ear pain and sore throat.    Eyes:  Negative for pain and visual disturbance.   Respiratory:  Negative for cough and shortness of breath.    Cardiovascular:  Negative for chest pain and palpitations.   Gastrointestinal:  Negative for abdominal pain and vomiting.   Genitourinary:  Negative for dysuria and hematuria.   Musculoskeletal:  Positive for back pain. Negative for arthralgias.   Skin:  Negative for color change and rash.   Neurological:  Negative for seizures and syncope.   All other systems reviewed and are negative.    Pertinent Medical History        Medical History Reviewed by provider this encounter:       Past Medical History  Past Medical History:   Diagnosis Date   • Acute cystitis 3/2/2018   • Asthma     has not been treated for seveeral years  exercise induced   • BRCA1 negative    • Breast cancer (HCC) 10/2020    left sided   • Cancer (HCC)     breast  9/20   • Cellulitis of left upper extremity 2/23/2021   • Chemotherapy induced neutropenia (HCC) 10/30/2020   • Exercise-induced asthma 11/3/2016   • History  of chemotherapy 11/2020   • Hx of radiation therapy 03/2021   • Urinary tract infection      Past Surgical History:   Procedure Laterality Date   • BREAST CYST EXCISION Left     benign   • BREAST CYST EXCISION Left     benign   • BREAST SURGERY     • CHOLECYSTECTOMY     • HYSTERECTOMY     • IR PORT PLACEMENT  10/26/2020   • IR PORT REMOVAL  2/4/2021   • MAMMO NEEDLE LOCALIZATION LEFT (ALL INC) Left 10/2/2020   • MOUTH SURGERY     • MO MASTECTOMY PARTIAL Left 10/2/2020    Procedure: BREAST NEEDLE LOCALIZED LUMPECTOMY, SENTINEL LYMPH NODE BIOPSY (Bracketed U/S guided needle loc @ 8:00, INJECT AT 1045);  Surgeon: Ti Magana MD;  Location:  MAIN OR;  Service: General   • TUBAL LIGATION     • US GUIDED BREAST BIOPSY LEFT COMPLETE Left 9/11/2020   • WISDOM TOOTH EXTRACTION       Family History   Problem Relation Age of Onset   • Hypertension Mother    • Heart disease Mother         cardiac disorder   • Breast cancer Mother         dx age early 20's   • Glaucoma Mother    • Hypertension Father    • Heart disease Father         cardiac disorder   • Cancer Father    • No Known Problems Sister    • Multiple sclerosis Daughter    • Melanoma Daughter    • No Known Problems Maternal Grandmother    • No Known Problems Paternal Grandmother    • No Known Problems Son    • No Known Problems Paternal Aunt      Current Outpatient Medications on File Prior to Visit   Medication Sig Dispense Refill   • anastrozole (ARIMIDEX) 1 mg tablet Take 1 tablet (1 mg total) by mouth daily 90 tablet 3   • ascorbic acid (VITAMIN C) 250 mg tablet Take 250 mg by mouth daily     • Black Pepper-Turmeric (Turmeric Curcumin) 5-1000 MG CAPS Take by mouth     • calcium carbonate (TUMS) 500 mg chewable tablet Chew 1 tablet daily     • fluconazole (DIFLUCAN) 150 mg tablet take 1 tablet by mouth as a one time dose     • ibandronate (BONIVA) 150 MG tablet Take 1 tablet (150 mg total) by mouth every 30 (thirty) days 3 tablet 3   • nystatin (MYCOSTATIN)  powder Apply topically 2 (two) times a day 60 g 1   • denosumab (PROLIA) 60 mg/mL Inject 1 mL (60 mg total) under the skin once for 1 dose Every 6 months 1 mL 0   • triamcinolone (KENALOG) 0.1 % cream Apply topically 2 (two) times a day (Patient not taking: Reported on 10/9/2024) 30 g 1   • [DISCONTINUED] estradiol (ESTRACE) 0.5 MG tablet Take 1 tablet by mouth daily       No current facility-administered medications on file prior to visit.     Allergies   Allergen Reactions   • Ofloxacin Hives   • Penicillins Hives   • Vancomycin Hives     Pt asked to update her chart. 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    • Chlorhexidine Rash      Current Outpatient Medications on File Prior to Visit   Medication Sig Dispense Refill   • anastrozole (ARIMIDEX) 1 mg tablet Take 1 tablet (1 mg total) by mouth daily 90 tablet 3   • ascorbic acid (VITAMIN C) 250 mg tablet Take 250 mg by mouth daily     • Black Pepper-Turmeric (Turmeric Curcumin) 5-1000 MG CAPS Take by mouth     • calcium carbonate (TUMS) 500 mg chewable tablet Chew 1 tablet daily     • fluconazole (DIFLUCAN) 150 mg tablet take 1 tablet by mouth as a one time dose     • ibandronate (BONIVA) 150 MG tablet Take 1 tablet (150 mg total) by mouth every 30 (thirty) days 3 tablet 3   • nystatin (MYCOSTATIN) powder Apply topically 2 (two) times a day 60 g 1   • denosumab (PROLIA) 60 mg/mL Inject 1 mL (60 mg total) under the skin once for 1 dose Every 6 months 1 mL 0   • triamcinolone (KENALOG) 0.1 % cream Apply topically 2 (two) times a day (Patient not taking: Reported on 10/9/2024) 30 g 1   • [DISCONTINUED] estradiol (ESTRACE) 0.5 MG tablet Take 1 tablet by mouth daily       No current facility-administered medications on file prior to visit.      Social History     Tobacco Use   • Smoking status: Never   • Smokeless tobacco: Never   Vaping Use   • Vaping status: Never Used   Substance and Sexual Activity   • Alcohol use: Never  "  • Drug use: Never   • Sexual activity: Yes     Partners: Male     Birth control/protection: Female Sterilization     Comment:  x 9 years, Ollie         Objective    /70 (BP Location: Left arm, Patient Position: Sitting, Cuff Size: Standard)   Pulse 68   Temp 98.1 °F (36.7 °C) (Temporal)   Resp 16   Ht 5' 3\" (1.6 m)   Wt 76.2 kg (168 lb)   LMP  (LMP Unknown)   SpO2 98%   BMI 29.76 kg/m²     Physical Exam  Vitals and nursing note reviewed.   Constitutional:       General: She is not in acute distress.     Appearance: She is well-developed.   HENT:      Head: Normocephalic and atraumatic.   Eyes:      Conjunctiva/sclera: Conjunctivae normal.   Cardiovascular:      Rate and Rhythm: Normal rate and regular rhythm.      Heart sounds: No murmur heard.  Pulmonary:      Effort: Pulmonary effort is normal. No respiratory distress.      Breath sounds: Normal breath sounds.   Abdominal:      Palpations: Abdomen is soft.      Tenderness: There is no abdominal tenderness.   Musculoskeletal:         General: No swelling.      Cervical back: Neck supple.   Skin:     General: Skin is warm and dry.      Capillary Refill: Capillary refill takes less than 2 seconds.      Comments: Breast exam as described above   Neurological:      Mental Status: She is alert.   Psychiatric:         Mood and Affect: Mood normal.       Administrative Statements  I have spent a total time of 20 minutes in caring for this patient on the day of the visit/encounter including Instructions for management.  "

## 2024-10-10 ENCOUNTER — APPOINTMENT (OUTPATIENT)
Age: 64
End: 2024-10-10
Payer: COMMERCIAL

## 2024-10-10 ENCOUNTER — TELEPHONE (OUTPATIENT)
Dept: OTHER | Facility: OTHER | Age: 64
End: 2024-10-10

## 2024-10-10 ENCOUNTER — TELEPHONE (OUTPATIENT)
Dept: INTERNAL MEDICINE CLINIC | Facility: CLINIC | Age: 64
End: 2024-10-10

## 2024-10-10 DIAGNOSIS — L30.9 DERMATITIS: ICD-10-CM

## 2024-10-10 DIAGNOSIS — E55.9 VITAMIN D DEFICIENCY: ICD-10-CM

## 2024-10-10 DIAGNOSIS — Z00.00 ANNUAL PHYSICAL EXAM: ICD-10-CM

## 2024-10-10 DIAGNOSIS — R73.01 IMPAIRED FASTING GLUCOSE: Primary | ICD-10-CM

## 2024-10-10 DIAGNOSIS — R73.01 IMPAIRED FASTING GLUCOSE: ICD-10-CM

## 2024-10-10 LAB
25(OH)D3 SERPL-MCNC: 57.2 NG/ML (ref 30–100)
ALBUMIN SERPL BCG-MCNC: 4.4 G/DL (ref 3.5–5)
ALP SERPL-CCNC: 70 U/L (ref 34–104)
ALT SERPL W P-5'-P-CCNC: 19 U/L (ref 7–52)
ANION GAP SERPL CALCULATED.3IONS-SCNC: 6 MMOL/L (ref 4–13)
AST SERPL W P-5'-P-CCNC: 18 U/L (ref 13–39)
BASOPHILS # BLD AUTO: 0.06 THOUSANDS/ΜL (ref 0–0.1)
BASOPHILS NFR BLD AUTO: 1 % (ref 0–1)
BILIRUB SERPL-MCNC: 1.01 MG/DL (ref 0.2–1)
BUN SERPL-MCNC: 12 MG/DL (ref 5–25)
CALCIUM SERPL-MCNC: 9 MG/DL (ref 8.4–10.2)
CHLORIDE SERPL-SCNC: 104 MMOL/L (ref 96–108)
CHOLEST SERPL-MCNC: 160 MG/DL
CO2 SERPL-SCNC: 31 MMOL/L (ref 21–32)
CREAT SERPL-MCNC: 0.7 MG/DL (ref 0.6–1.3)
EOSINOPHIL # BLD AUTO: 0.15 THOUSAND/ΜL (ref 0–0.61)
EOSINOPHIL NFR BLD AUTO: 2 % (ref 0–6)
ERYTHROCYTE [DISTWIDTH] IN BLOOD BY AUTOMATED COUNT: 13.9 % (ref 11.6–15.1)
EST. AVERAGE GLUCOSE BLD GHB EST-MCNC: 126 MG/DL
GFR SERPL CREATININE-BSD FRML MDRD: 92 ML/MIN/1.73SQ M
GLUCOSE P FAST SERPL-MCNC: 105 MG/DL (ref 65–99)
HBA1C MFR BLD: 6 %
HCT VFR BLD AUTO: 40.7 % (ref 34.8–46.1)
HDLC SERPL-MCNC: 47 MG/DL
HGB BLD-MCNC: 13.2 G/DL (ref 11.5–15.4)
IMM GRANULOCYTES # BLD AUTO: 0.02 THOUSAND/UL (ref 0–0.2)
IMM GRANULOCYTES NFR BLD AUTO: 0 % (ref 0–2)
LDLC SERPL CALC-MCNC: 91 MG/DL (ref 0–100)
LYMPHOCYTES # BLD AUTO: 1.86 THOUSANDS/ΜL (ref 0.6–4.47)
LYMPHOCYTES NFR BLD AUTO: 29 % (ref 14–44)
MCH RBC QN AUTO: 28.8 PG (ref 26.8–34.3)
MCHC RBC AUTO-ENTMCNC: 32.4 G/DL (ref 31.4–37.4)
MCV RBC AUTO: 89 FL (ref 82–98)
MONOCYTES # BLD AUTO: 0.45 THOUSAND/ΜL (ref 0.17–1.22)
MONOCYTES NFR BLD AUTO: 7 % (ref 4–12)
NEUTROPHILS # BLD AUTO: 3.88 THOUSANDS/ΜL (ref 1.85–7.62)
NEUTS SEG NFR BLD AUTO: 61 % (ref 43–75)
NONHDLC SERPL-MCNC: 113 MG/DL
NRBC BLD AUTO-RTO: 0 /100 WBCS
PLATELET # BLD AUTO: 242 THOUSANDS/UL (ref 149–390)
PMV BLD AUTO: 9.1 FL (ref 8.9–12.7)
POTASSIUM SERPL-SCNC: 4.4 MMOL/L (ref 3.5–5.3)
PROT SERPL-MCNC: 7 G/DL (ref 6.4–8.4)
RBC # BLD AUTO: 4.58 MILLION/UL (ref 3.81–5.12)
SODIUM SERPL-SCNC: 141 MMOL/L (ref 135–147)
TRIGL SERPL-MCNC: 109 MG/DL
TSH SERPL DL<=0.05 MIU/L-ACNC: 1.03 UIU/ML (ref 0.45–4.5)
WBC # BLD AUTO: 6.42 THOUSAND/UL (ref 4.31–10.16)

## 2024-10-10 PROCEDURE — 83036 HEMOGLOBIN GLYCOSYLATED A1C: CPT

## 2024-10-10 PROCEDURE — 85025 COMPLETE CBC W/AUTO DIFF WBC: CPT

## 2024-10-10 PROCEDURE — 82306 VITAMIN D 25 HYDROXY: CPT

## 2024-10-10 PROCEDURE — 80061 LIPID PANEL: CPT

## 2024-10-10 PROCEDURE — 80053 COMPREHEN METABOLIC PANEL: CPT

## 2024-10-10 PROCEDURE — 36415 COLL VENOUS BLD VENIPUNCTURE: CPT

## 2024-10-10 PROCEDURE — 84443 ASSAY THYROID STIM HORMONE: CPT

## 2024-10-10 NOTE — TELEPHONE ENCOUNTER
Patient called in stating she went to have her labs drawn this morning and the orders had .  Patient aware we will place new lab orders so she can have drawn.

## 2024-10-10 NOTE — TELEPHONE ENCOUNTER
Patient has apt with Dr. Harris and went to lab to get bloodwork done.  [facility] calling stating they are doing the tests for routine bloodwork just need orders put in chart.      Thank you

## 2024-10-14 ENCOUNTER — TELEPHONE (OUTPATIENT)
Dept: HEMATOLOGY ONCOLOGY | Facility: CLINIC | Age: 64
End: 2024-10-14

## 2024-10-14 DIAGNOSIS — M85.852 OSTEOPENIA OF LEFT HIP: ICD-10-CM

## 2024-10-14 DIAGNOSIS — Z51.11 ENCOUNTER FOR CHEMOTHERAPY MANAGEMENT: Primary | ICD-10-CM

## 2024-10-14 RX ORDER — EPINEPHRINE 0.3 MG/.3ML
0.3 INJECTION SUBCUTANEOUS ONCE
Qty: 0.6 ML | Refills: 0 | Status: SHIPPED | OUTPATIENT
Start: 2024-10-14 | End: 2024-10-15

## 2024-10-14 NOTE — TELEPHONE ENCOUNTER
Antonietta from [facility] called refill line and stated they received the orders for the pt's Prolia but they still need the  orders for the epi pen. It can be faxed to [phone]

## 2024-10-14 NOTE — TELEPHONE ENCOUNTER
General Adult HPI





- General


Chief complaint: Allergic Reaction


Stated complaint: Rash, bee sting


Time Seen by Provider: 07/11/20 23:49


Source: patient, RN notes reviewed, old records reviewed


Mode of arrival: ambulatory


Limitations: no limitations





- History of Present Illness


Initial comments: 





28-year-old male patient presented for evaluation bee sting.  Patient reports 1 

hour prior to presentation he was stung on the right ankle.  Denies any prior 

ALLERGIC reactions.  Patient reports that he took 50mg of Benadryl prior to 

arrival.  He denies any shortness of breath and sensation of throat closure 

facial swelling. Denies any other complaints. 





Systemic: Pt denies fatigue, fever/chills. Pt denies weakness, night sweats, 

weight loss. 


Neuro: Pt denies headache, visual disturbances, syncope or pre-syncope.


HEENT: Pt denies ocular discharge or irritation, otalgia, rhinorrhea, 

pharyngitis or notable lymphadenopathy. 


Cardiopulmonary: Pt denies chest pain, SOB, heart palpitations, dyspnea on 

exertion.  


Abdominal/GI: Pt denies abdominal pain, n/v/d. 


: Pt denies dysuria, burning w/ urination, frequency/urgency. Denies new onset

urinary or bowel incontinence.  


MSK: Pt denies myalgia, loss of strength or function in extremities. 


Neuro: Pt denies new onset weakness, paresthesias. 








- Related Data


                                  Previous Rx's











 Medication  Instructions  Recorded


 


Cephalexin [Keflex] 500 mg PO Q6HR #40 cap 09/01/15


 


Clindamycin HCl 300 mg PO Q6HR #40 cap 09/02/15


 


Sulfamethox-Tmp 800-160Mg [Bactrim 1 each PO Q12HR #20 tab 09/02/15





-160 mg]  


 


EPINEPHrine (Auto Inject) [Epipen] 0.3 mg IM ONCE PRN #2 pen 07/12/20


 


predniSONE 50 mg PO DAILY #4 tab 07/12/20











                                    Allergies











Allergy/AdvReac Type Severity Reaction Status Date / Time


 


venom-honey bee Allergy  Rash/Hives Verified 09/02/15 12:04





[bee venom (honey bee)]     














Review of Systems


ROS Statement: 


Those systems with pertinent positive or pertinent negative responses have been 

documented in the HPI.





ROS Other: All systems not noted in ROS Statement are negative.





Past Medical History


Past Medical History: No Reported History


History of Any Multi-Drug Resistant Organisms: None Reported


Past Surgical History: No Surgical Hx Reported


Past Psychological History: No Psychological Hx Reported


Smoking Status: Current every day smoker


Past Alcohol Use History: Rare


Past Drug Use History: None Reported





General Exam





- General Exam Comments


Initial Comments: 





Constitutional: NAD, AOX3, Pt has pleasant affect. 


HEENT: NC/AT, trachea midline, neck supple, no lymphadenopathy. Posterior 

pharynx non erythematous, without exudates. External ears appear normal, without

discharge. Mucous membranes moist. Eyes PERRLA, EOM intact. There is no scleral 

icterus. No pallor noted. 


Cardiopulmonary: RRR, no murmurs, rubs or gallops, no JVD noted. Lungs CTAB in 

anterior and posterior fields. No peripheral edema. 


Abdominal exam: Abdomen soft and non-distended. Abdomen non-tender to palpation 

in all 4 quadrants. Bowel sounds active in LLQ. No hepatosplenomegaly. No 

ecchymosis


Neuro: CN II-XII grossly intact. No nuchal rigidity. No raccon eyes, no li 

sign, no hemotympanum. No cervical spinal tenderness. 


MSK: Full active ROM in upper and lower extremities, 5/5 stregnth. 


Derm: Hives noted on upper and lower extremities.  Anterior abdomen.











Limitations: no limitations





Course


                                   Vital Signs











  07/11/20 07/11/20 07/12/20





  23:40 23:44 01:08


 


Temperature 98.2 F  


 


Pulse Rate 80  86


 


Respiratory 16  17





Rate   


 


Blood Pressure  139/96 122/77


 


O2 Sat by Pulse 97  99





Oximetry   














Medical Decision Making





- Medical Decision Making








28-year-old male patient presented for evaluation bee sting.  Patient reports 1 

hour prior to presentation he was stung on the right ankle.  Denies any prior 

ALLERGIC reactions.  Patient reports that he took 50mg of Benadryl prior to 

arrival.  She denies any shortness of breath and sensation of throat closure 

facial swelling.  Denies any other complaints. Pt VSS, afebrile. Physical exam 

displayed: Hives noted on upper and lower extremities.  Anterior abdomen.  

Patient was administered steroids and Pepcid.  Hives mostly resolved.  Patient 

will be discharged with burst steroid treatmen and will be prescribed epi pen. 

Patient will follow up with PCP tomorrow and will return to ED if condition 

worsens. Case discussed with Dr. Israel. 














Disposition


Clinical Impression: 


 Bee sting, Allergic reaction





Disposition: HOME SELF-CARE


Condition: Stable


Instructions (If sedation given, give patient instructions):  Insect Bite or 

Sting (ED), General Allergic Reaction (ED)


Additional Instructions: 


Follow-up with primary care provider tomorrow.  Take steroids as directed. Use 

epi pen for emergency anaphylaxis only. Return to ER if condition worsens in any

way.


Prescriptions: 


EPINEPHrine (Auto Inject) [Epipen] 0.3 mg IM ONCE PRN #2 pen


 PRN Reason: Anaphylaxis


predniSONE 50 mg PO DAILY #4 tab


Is patient prescribed a controlled substance at d/c from ED?: No


Referrals: 


Finn Leigh MD [Primary Care Provider] - 1-2 days Orders faxed as requested.  Fax confirmation receipt received.

## 2024-10-14 NOTE — TELEPHONE ENCOUNTER
beckie called the RX Refill Line. Message is being forwarded to the office.     [facility] called stating they have reached out previously regarding patients prolia injection. She is due 10/30/24, requesting patient to have updated labs if necessary as well as to please have orders placed for prolia injection and epinephrine to be sent over to than soon. Please fax orders over to [phone] [facility]

## 2024-10-15 ENCOUNTER — OFFICE VISIT (OUTPATIENT)
Dept: INTERNAL MEDICINE CLINIC | Facility: CLINIC | Age: 64
End: 2024-10-15
Payer: COMMERCIAL

## 2024-10-15 VITALS
OXYGEN SATURATION: 100 % | BODY MASS INDEX: 29.75 KG/M2 | DIASTOLIC BLOOD PRESSURE: 82 MMHG | SYSTOLIC BLOOD PRESSURE: 136 MMHG | HEIGHT: 63 IN | WEIGHT: 167.9 LBS | HEART RATE: 78 BPM

## 2024-10-15 DIAGNOSIS — Z23 NEED FOR INFLUENZA VACCINATION: ICD-10-CM

## 2024-10-15 DIAGNOSIS — B37.2 CANDIDAL DERMATITIS: ICD-10-CM

## 2024-10-15 DIAGNOSIS — H61.23 BILATERAL IMPACTED CERUMEN: ICD-10-CM

## 2024-10-15 DIAGNOSIS — R74.8 LOW SERUM HIGH DENSITY LIPOPROTEIN (HDL): ICD-10-CM

## 2024-10-15 DIAGNOSIS — R73.01 IMPAIRED FASTING GLUCOSE: Primary | ICD-10-CM

## 2024-10-15 DIAGNOSIS — B36.9 FUNGAL RASH OF TORSO: ICD-10-CM

## 2024-10-15 DIAGNOSIS — E55.9 VITAMIN D DEFICIENCY: ICD-10-CM

## 2024-10-15 PROCEDURE — 90471 IMMUNIZATION ADMIN: CPT | Performed by: FAMILY MEDICINE

## 2024-10-15 PROCEDURE — 90673 RIV3 VACCINE NO PRESERV IM: CPT | Performed by: FAMILY MEDICINE

## 2024-10-15 PROCEDURE — 99396 PREV VISIT EST AGE 40-64: CPT | Performed by: FAMILY MEDICINE

## 2024-10-15 RX ORDER — NYSTATIN 100000 [USP'U]/G
POWDER TOPICAL 2 TIMES DAILY
Qty: 60 G | Refills: 3 | Status: SHIPPED | OUTPATIENT
Start: 2024-10-15

## 2024-10-15 RX ORDER — FLUCONAZOLE 150 MG/1
150 TABLET ORAL DAILY
Qty: 3 TABLET | Refills: 1 | Status: SHIPPED | OUTPATIENT
Start: 2024-10-15 | End: 2024-10-18

## 2024-10-15 NOTE — ASSESSMENT & PLAN NOTE
Orders:    fluconazole (DIFLUCAN) 150 mg tablet; Take 1 tablet (150 mg total) by mouth daily for 3 days    CBC and differential; Future

## 2024-10-15 NOTE — PROGRESS NOTES
Adult Annual Physical  Name: Ina Morejon      : 1960      MRN: [id number]  Encounter Provider: Genesis Harris MD  Encounter Date: 10/15/2024   Encounter department: [facility]    Assessment & Plan  Impaired fasting glucose    Orders:    CBC and differential; Future    Hemoglobin A1C; Future    Low serum high density lipoprotein (HDL)    Orders:    CBC and differential; Future    Comprehensive metabolic panel; Future    Lipid panel; Future    TSH, 3rd generation; Future    Vitamin D deficiency    Orders:    CBC and differential; Future    Vitamin D 25 hydroxy; Future    Candidal dermatitis    Orders:    fluconazole (DIFLUCAN) 150 mg tablet; Take 1 tablet (150 mg total) by mouth daily for 3 days    CBC and differential; Future    Fungal rash of torso    Orders:    nystatin (MYCOSTATIN) powder; Apply topically 2 (two) times a day    CBC and differential; Future    Need for influenza vaccination    Orders:    influenza vaccine, recombinant, PF, 0.5 mL IM (Flublok)    Bilateral impacted cerumen    Orders:    Ambulatory Referral to Otolaryngology; Future    Orders and recommendations as noted above.  Reviewed recent specialist notes with her.  Discussed the recurrent issues with the candidal infection under the breasts.  Prescription given for the Diflucan.  May also use the nystatin powder.  Advised her to call or follow-up if the symptoms persist or worsen.  Follow-up with the plastic surgeon as planned to discuss the breast asymmetry.  Blood sugar relatively stable.  Continue to watch diet.  Continue to be as active as possible.  Reviewed previous lipid panel.  HDL was slightly low.  Discussed increasing activity level as well as adding fish oil/omega-3 fatty acids.  Continue with vitamin D supplementation.  Continue with bone densities every 2 years.  Continue with the Boniva.  She does have some decreased hearing and bilateral cerumen impaction.  Referral given to ENT.  Flu  shot given today.  Will have her follow-up in about 6 to 12 months or sooner if needed.    Immunizations and preventive care screenings were discussed with patient today. Appropriate education was printed on patient's after visit summary.    Counseling:  Alcohol/drug use: discussed moderation in alcohol intake, the recommendations for healthy alcohol use, and avoidance of illicit drug use.  Dental Health: discussed importance of regular tooth brushing, flossing, and dental visits.  Injury prevention: discussed safety/seat belts, safety helmets, smoke detectors, carbon monoxide detectors, and smoking near bedding or upholstery.  Sexual health: discussed sexually transmitted diseases, partner selection, use of condoms, avoidance of unintended pregnancy, and contraceptive alternatives.  Exercise: the importance of regular exercise/physical activity was discussed. Recommend exercise 3-5 times per week for at least 30 minutes.       Depression Screening and Follow-up Plan: Patient was screened for depression during today's encounter. They screened negative with a PHQ-2 score of 0.        History of Present Illness     Adult Annual Physical:  Patient presents for annual physical. She presents for annual wellness visit.  Has generally been doing well.  She has been following up with specialists.  Did follow-up with the breast specialist and is going to see plastic surgery regarding the breast asymmetry.  She is uncertain whether she would proceed with any plastic surgery but is considering this.  Has had recurrent issues with yeast infections under the breasts.  This will wax and wane but never seems to resolve completely recently.  Has been trying to watch her diet more closely.  Eats very little red meat.  Has been taking biotin to help with hair and nail growth but is uncertain whether it is working.  Did switch from Prolia to Boniva.  Prolia became cost prohibitive.  Has noticed some decreased hearing and is wondering  whether she has wax in her ears..     Diet and Physical Activity:  - Diet/Nutrition: well balanced diet, limited junk food and low fat diet.  - Exercise: walking.    Depression Screening:  - PHQ-2 Score: 0    General Health:  - Sleep: sleeps well.  - Hearing: decreased hearing bilateral ears.  - Vision: goes for regular eye exams.  - Dental: regular dental visits.    /GYN Health:    - Menopause: postmenopausal.     Review of Systems   Constitutional:  Negative for activity change, appetite change, chills and fever.   HENT:  Positive for hearing loss. Negative for congestion and rhinorrhea.    Eyes:  Negative for visual disturbance.   Respiratory:  Negative for chest tightness and shortness of breath.    Cardiovascular:  Negative for chest pain and palpitations.   Gastrointestinal:  Negative for abdominal pain, blood in stool, diarrhea, nausea and vomiting.   Endocrine: Negative for polydipsia, polyphagia and polyuria.   Genitourinary:  Negative for dysuria, frequency and urgency.   Musculoskeletal:  Negative for gait problem.   Skin:  Positive for rash. Negative for color change.   Neurological:  Negative for dizziness and headaches.   Hematological:  Does not bruise/bleed easily.   Psychiatric/Behavioral:  Negative for confusion and sleep disturbance. The patient is not nervous/anxious.      Medical History Reviewed by provider this encounter:       Past Medical History   Past Medical History:   Diagnosis Date    Acute cystitis 03/02/2018    Asthma     has not been treated for seveeral years  exercise induced    BRCA1 negative     Breast cancer (HCC) 10/2020    left sided    Cancer (HCC)     breast  9/20    Cellulitis of left upper extremity 02/23/2021    Chemotherapy induced neutropenia (HCC) 10/30/2020    Exercise-induced asthma 11/03/2016    Heart murmur 2015    History of chemotherapy 11/2020    Hx of radiation therapy 03/2021    Urinary tract infection      Past Surgical History:   Procedure Laterality Date     BREAST CYST EXCISION Left     benign    BREAST CYST EXCISION Left     benign    BREAST SURGERY      CHOLECYSTECTOMY      HYSTERECTOMY      IR PORT PLACEMENT  10/26/2020    IR PORT REMOVAL  2/4/2021    MAMMO NEEDLE LOCALIZATION LEFT (ALL INC) Left 10/2/2020    MOUTH SURGERY      MT MASTECTOMY PARTIAL Left 10/2/2020    Procedure: BREAST NEEDLE LOCALIZED LUMPECTOMY, SENTINEL LYMPH NODE BIOPSY (Bracketed U/S guided needle loc @ 8:00, INJECT AT 1045);  Surgeon: Ti Magana MD;  Location:  MAIN OR;  Service: General    TUBAL LIGATION      US GUIDED BREAST BIOPSY LEFT COMPLETE Left 9/11/2020    WISDOM TOOTH EXTRACTION       Family History   Problem Relation Age of Onset    Hypertension Mother     Heart disease Mother         cardiac disorder    Breast cancer Mother         dx age early 20's    Glaucoma Mother     Dementia Mother     Coronary artery disease Mother     Cancer Mother     Hypertension Father     Heart disease Father         cardiac disorder    Cancer Father     No Known Problems Sister     Multiple sclerosis Daughter     Melanoma Daughter     Autoimmune disease Daughter     No Known Problems Maternal Grandmother     No Known Problems Paternal Grandmother     No Known Problems Son     No Known Problems Paternal Aunt      Current Outpatient Medications on File Prior to Visit   Medication Sig Dispense Refill    anastrozole (ARIMIDEX) 1 mg tablet Take 1 tablet (1 mg total) by mouth daily 90 tablet 3    ascorbic acid (VITAMIN C) 250 mg tablet Take 250 mg by mouth daily      BIOTIN PO Take by mouth      Black Pepper-Turmeric (Turmeric Curcumin) 5-1000 MG CAPS Take by mouth      calcium carbonate (TUMS) 500 mg chewable tablet Chew 1 tablet daily      ibandronate (BONIVA) 150 MG tablet Take 1 tablet (150 mg total) by mouth every 30 (thirty) days 3 tablet 3    VITAMIN D, ERGOCALCIFEROL, PO Take by mouth 3 days a week      [DISCONTINUED] nystatin (MYCOSTATIN) powder Apply topically 2 (two) times a day 60 g 1     [DISCONTINUED] denosumab (PROLIA) 60 mg/mL Inject 1 mL (60 mg total) under the skin once for 1 dose Every 6 months 1 mL 0    [DISCONTINUED] EPINEPHrine (EPIPEN) 0.3 mg/0.3 mL SOAJ Inject 0.3 mL (0.3 mg total) into a muscle once for 1 dose As needed for home prolia administration 0.6 mL 0    [DISCONTINUED] estradiol (ESTRACE) 0.5 MG tablet Take 1 tablet by mouth daily      [DISCONTINUED] fluconazole (DIFLUCAN) 150 mg tablet take 1 tablet by mouth as a one time dose      [DISCONTINUED] triamcinolone (KENALOG) 0.1 % cream Apply topically 2 (two) times a day 30 g 1     No current facility-administered medications on file prior to visit.     Allergies   Allergen Reactions    Ofloxacin Hives    Penicillins Hives    Vancomycin Hives     Pt asked to update her chart. 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     Chlorhexidine Rash      Current Outpatient Medications on File Prior to Visit   Medication Sig Dispense Refill    anastrozole (ARIMIDEX) 1 mg tablet Take 1 tablet (1 mg total) by mouth daily 90 tablet 3    ascorbic acid (VITAMIN C) 250 mg tablet Take 250 mg by mouth daily      BIOTIN PO Take by mouth      Black Pepper-Turmeric (Turmeric Curcumin) 5-1000 MG CAPS Take by mouth      calcium carbonate (TUMS) 500 mg chewable tablet Chew 1 tablet daily      ibandronate (BONIVA) 150 MG tablet Take 1 tablet (150 mg total) by mouth every 30 (thirty) days 3 tablet 3    VITAMIN D, ERGOCALCIFEROL, PO Take by mouth 3 days a week      [DISCONTINUED] nystatin (MYCOSTATIN) powder Apply topically 2 (two) times a day 60 g 1    [DISCONTINUED] denosumab (PROLIA) 60 mg/mL Inject 1 mL (60 mg total) under the skin once for 1 dose Every 6 months 1 mL 0    [DISCONTINUED] EPINEPHrine (EPIPEN) 0.3 mg/0.3 mL SOAJ Inject 0.3 mL (0.3 mg total) into a muscle once for 1 dose As needed for home prolia administration 0.6 mL 0    [DISCONTINUED] estradiol (ESTRACE) 0.5 MG tablet Take 1 tablet by mouth daily    "   [DISCONTINUED] fluconazole (DIFLUCAN) 150 mg tablet take 1 tablet by mouth as a one time dose      [DISCONTINUED] triamcinolone (KENALOG) 0.1 % cream Apply topically 2 (two) times a day 30 g 1     No current facility-administered medications on file prior to visit.      Social History     Tobacco Use    Smoking status: Never    Smokeless tobacco: Never   Vaping Use    Vaping status: Never Used   Substance and Sexual Activity    Alcohol use: No    Drug use: No    Sexual activity: Not Currently     Partners: Male     Birth control/protection: Female Sterilization     Comment:  x 9 years, [place]       Objective     /82 (BP Location: Right arm, Patient Position: Sitting, Cuff Size: Large)   Pulse 78   Ht 5' 3\" (1.6 m)   Wt 76.2 kg (167 lb 14.4 oz)   LMP  (LMP Unknown)   SpO2 100%   BMI 29.74 kg/m²     Physical Exam  Vitals and nursing note reviewed.   Constitutional:       General: She is not in acute distress.     Appearance: She is well-developed and well-groomed.   HENT:      Head: Normocephalic and atraumatic.      Comments: Cerumen impaction bilaterally  Eyes:      General:         Right eye: No discharge.         Left eye: No discharge.      Conjunctiva/sclera: Conjunctivae normal.      Pupils: Pupils are equal, round, and reactive to light.   Neck:      Vascular: No carotid bruit.   Cardiovascular:      Rate and Rhythm: Normal rate and regular rhythm.      Heart sounds: Normal heart sounds. No murmur heard.     No friction rub. No gallop.   Pulmonary:      Effort: No respiratory distress.      Breath sounds: No wheezing or rales.   Abdominal:      General: Bowel sounds are normal. There is no distension.      Tenderness: There is no abdominal tenderness.   Lymphadenopathy:      Cervical: No cervical adenopathy.   Skin:     General: Skin is warm and dry.      Comments: Erythematous rash with satellite areas under both breasts   Neurological:      Mental Status: She is alert and oriented to " person, place, and time.   Psychiatric:         Mood and Affect: Mood and affect normal.         Speech: Speech normal.         Behavior: Behavior normal. Behavior is cooperative.         Cognition and Memory: Cognition and memory normal.

## 2024-11-14 PROBLEM — H61.23 BILATERAL IMPACTED CERUMEN: Status: RESOLVED | Noted: 2024-10-15 | Resolved: 2024-11-14

## 2024-11-19 DIAGNOSIS — C50.412 PRIMARY CANCER OF UPPER OUTER QUADRANT OF LEFT FEMALE BREAST (HCC): ICD-10-CM

## 2024-11-19 DIAGNOSIS — M85.852 OSTEOPENIA OF LEFT HIP: ICD-10-CM

## 2024-11-19 RX ORDER — IBANDRONATE SODIUM 150 MG/1
150 TABLET, FILM COATED ORAL
Qty: 3 TABLET | Refills: 0 | Status: SHIPPED | OUTPATIENT
Start: 2024-11-19

## 2024-11-19 NOTE — TELEPHONE ENCOUNTER
Reason for call:   [x] Refill   [] Prior Auth  [] Other:     Office:   [] PCP/Provider -   [x] Specialty/Provider - Hem/Onc    Medication: anastrozole (ARIMIDEX) 1 mg tablet     Dose/Frequency: Take 1 tablet (1 mg total) by mouth daily     Quantity: 90    Pharmacy: [id number] - [place] - [address] [place] [address]     Does the patient have enough for 3 days?   [x] Yes   [] No - Send as HP to POD

## 2024-11-20 RX ORDER — ANASTROZOLE 1 MG/1
1 TABLET ORAL DAILY
Qty: 90 TABLET | Refills: 3 | Status: SHIPPED | OUTPATIENT
Start: 2024-11-20

## 2025-01-17 ENCOUNTER — RESULTS FOLLOW-UP (OUTPATIENT)
Dept: URGENT CARE | Facility: CLINIC | Age: 65
End: 2025-01-17

## 2025-01-17 ENCOUNTER — APPOINTMENT (OUTPATIENT)
Dept: RADIOLOGY | Facility: CLINIC | Age: 65
End: 2025-01-17
Payer: COMMERCIAL

## 2025-01-17 ENCOUNTER — TELEPHONE (OUTPATIENT)
Age: 65
End: 2025-01-17

## 2025-01-17 ENCOUNTER — OFFICE VISIT (OUTPATIENT)
Dept: URGENT CARE | Facility: CLINIC | Age: 65
End: 2025-01-17
Payer: COMMERCIAL

## 2025-01-17 VITALS
HEART RATE: 91 BPM | RESPIRATION RATE: 20 BRPM | DIASTOLIC BLOOD PRESSURE: 78 MMHG | OXYGEN SATURATION: 98 % | TEMPERATURE: 99.4 F | SYSTOLIC BLOOD PRESSURE: 138 MMHG

## 2025-01-17 DIAGNOSIS — R05.1 ACUTE COUGH: ICD-10-CM

## 2025-01-17 DIAGNOSIS — J18.9 PNEUMONIA OF LEFT LOWER LOBE DUE TO INFECTIOUS ORGANISM: Primary | ICD-10-CM

## 2025-01-17 DIAGNOSIS — Z85.3 HISTORY OF BREAST CANCER: ICD-10-CM

## 2025-01-17 PROCEDURE — 99214 OFFICE O/P EST MOD 30 MIN: CPT | Performed by: NURSE PRACTITIONER

## 2025-01-17 PROCEDURE — 71046 X-RAY EXAM CHEST 2 VIEWS: CPT

## 2025-01-17 PROCEDURE — 87636 SARSCOV2 & INF A&B AMP PRB: CPT | Performed by: NURSE PRACTITIONER

## 2025-01-17 PROCEDURE — S9088 SERVICES PROVIDED IN URGENT: HCPCS | Performed by: NURSE PRACTITIONER

## 2025-01-17 RX ORDER — ALBUTEROL SULFATE 90 UG/1
2 INHALANT RESPIRATORY (INHALATION) EVERY 6 HOURS PRN
Qty: 8.5 G | Refills: 0 | Status: SHIPPED | OUTPATIENT
Start: 2025-01-17

## 2025-01-17 RX ORDER — BIOTIN 10 MG
TABLET ORAL
COMMUNITY

## 2025-01-17 RX ORDER — AZITHROMYCIN 250 MG/1
TABLET, FILM COATED ORAL
Qty: 6 TABLET | Refills: 0 | Status: SHIPPED | OUTPATIENT
Start: 2025-01-17 | End: 2025-01-21 | Stop reason: SDUPTHER

## 2025-01-17 NOTE — RESULT ENCOUNTER NOTE
IMPRESSION:   Patchy left lower lobe opacity, concerning for developing pneumonia.    Pt was prescribed azithromycin and Albuterol MDI   PNA was seen and dx at time of OV.    LM for pt to review mychart as results are as discussed in office.  Pt called back while typing note - she is aware.      Please have your office call patient for follow up

## 2025-01-17 NOTE — PROGRESS NOTES
[facility]        NAME: Ina Morejon is a 64 y.o. female  : 1960    MRN: [id number]  DATE: 2025  TIME: 2:07 PM    Assessment and Plan   Pneumonia of left lower lobe due to infectious organism [J18.9]  1. Pneumonia of left lower lobe due to infectious organism  albuterol (ProAir HFA) 90 mcg/act inhaler    azithromycin (ZITHROMAX) 250 mg tablet      2. Acute cough  XR chest pa and lateral    Covid/Flu- Office Collect Normal    Covid/Flu- Office Collect Normal    albuterol (ProAir HFA) 90 mcg/act inhaler    azithromycin (ZITHROMAX) 250 mg tablet      3. History of breast cancer  albuterol (ProAir HFA) 90 mcg/act inhaler    azithromycin (ZITHROMAX) 250 mg tablet            Patient Instructions       Follow up with PCP in 3-5 days.  Proceed to  ER if symptoms worsen.    If tests have been performed at [facility], our office will contact you with results if changes need to be made to the care plan discussed with you at the visit.  You can review your full results on [facility] MyChart.    Your xray was preliminarily read by your provider.  A radiologist will read the xray and you will be notified if it is abnormal.      You are to take the azithromycin as prescribed.   Use the albuterol MDI as needed for wheezing or shortness of breath.  Run a cool mist humidifier, saline nasal spray.  Take plain liquid robitussin or liquid mucinex.   Drink plenty of water    Follow up with your PCP in 3-5 days  Go to the ED if symptoms worsen         Chief Complaint     Chief Complaint   Patient presents with    Cough     Chest congestion ,    Fatigue    Cold Like Symptoms     Body aches 4 days          History of Present Illness       This is a 64 year old female who comes to [facility] with c/o cough x 4 days, low grade fever 99.4. She states she feels the cough is at the center of her chest.  She states some yellow phlegm. Denies n/v/d.  She states she has been flu vaccinated.  She is currently on  arimidex for breast cancer.  She states she has only been taking tylenol for her symptoms.  She wasn't sure what to take for cough medication.  States had asthma as a child. She has not contacted her PCP.  PMH is listed and reviewed.        Cough  Associated symptoms include a fever and myalgias.   Fatigue  Associated symptoms include congestion, coughing, fatigue, a fever and myalgias.       Review of Systems   Review of Systems   Constitutional:  Positive for fatigue and fever.   HENT:  Positive for congestion.    Eyes: Negative.    Respiratory:  Positive for cough.    Cardiovascular: Negative.    Gastrointestinal: Negative.    Endocrine: Negative.    Genitourinary: Negative.    Musculoskeletal:  Positive for myalgias.   Skin: Negative.    Allergic/Immunologic: Negative.    Neurological: Negative.    Hematological: Negative.    Psychiatric/Behavioral: Negative.           Current Medications       Current Outpatient Medications:     albuterol (ProAir HFA) 90 mcg/act inhaler, Inhale 2 puffs every 6 (six) hours as needed for wheezing or shortness of breath, Disp: 8.5 g, Rfl: 0    anastrozole (ARIMIDEX) 1 mg tablet, Take 1 tablet (1 mg total) by mouth daily, Disp: 90 tablet, Rfl: 3    ascorbic acid (VITAMIN C) 250 mg tablet, Take 250 mg by mouth daily, Disp: , Rfl:     azithromycin (ZITHROMAX) 250 mg tablet, Take 2 tablets today then 1 tablet daily x 4 days, Disp: 6 tablet, Rfl: 0    Biotin 10 MG TABS, Take by mouth, Disp: , Rfl:     calcium carbonate (TUMS) 500 mg chewable tablet, Chew 1 tablet daily, Disp: , Rfl:     ibandronate (BONIVA) 150 MG tablet, Take 1 tablet (150 mg total) by mouth every 30 (thirty) days, Disp: 3 tablet, Rfl: 0    VITAMIN D, ERGOCALCIFEROL, PO, Take by mouth 3 days a week, Disp: , Rfl:     BIOTIN PO, Take by mouth (Patient not taking: Reported on 11/8/2024), Disp: , Rfl:     Black Pepper-Turmeric (Turmeric Curcumin) 5-1000 MG CAPS, Take by mouth (Patient not taking: Reported on 11/8/2024),  Disp: , Rfl:     nystatin (MYCOSTATIN) powder, Apply topically 2 (two) times a day (Patient not taking: Reported on 1/17/2025), Disp: 60 g, Rfl: 3    Current Allergies     Allergies as of 01/17/2025 - Reviewed 01/17/2025   Allergen Reaction Noted    Ofloxacin Hives 03/23/2016    Penicillins Hives 03/23/2016    Vancomycin Hives 03/08/2021    Chlorhexidine Rash 10/31/2020            The following portions of the patient's history were reviewed and updated as appropriate: allergies, current medications, past family history, past medical history, past social history, past surgical history and problem list.     Past Medical History:   Diagnosis Date    Acute cystitis 03/02/2018    Asthma     has not been treated for seveeral years  exercise induced    BRCA1 negative     Breast cancer (HCC) 10/2020    left sided    Cancer (HCC)     breast  9/20    Cellulitis of left upper extremity 02/23/2021    Chemotherapy induced neutropenia (HCC) 10/30/2020    Exercise-induced asthma 11/03/2016    Heart murmur 2015    History of chemotherapy 11/2020    Hx of radiation therapy 03/2021    Urinary tract infection        Past Surgical History:   Procedure Laterality Date    BREAST CYST EXCISION Left     benign    BREAST CYST EXCISION Left     benign    BREAST SURGERY      CHOLECYSTECTOMY      HYSTERECTOMY      IR PORT PLACEMENT  10/26/2020    IR PORT REMOVAL  2/4/2021    MAMMO NEEDLE LOCALIZATION LEFT (ALL INC) Left 10/2/2020    MOUTH SURGERY      SC MASTECTOMY PARTIAL Left 10/2/2020    Procedure: BREAST NEEDLE LOCALIZED LUMPECTOMY, SENTINEL LYMPH NODE BIOPSY (Bracketed U/S guided needle loc @ 8:00, INJECT AT 1045);  Surgeon: Ti Magana MD;  Location:  MAIN OR;  Service: General    TUBAL LIGATION      US GUIDED BREAST BIOPSY LEFT COMPLETE Left 9/11/2020    WISDOM TOOTH EXTRACTION         Family History   Problem Relation Age of Onset    Hypertension Mother     Heart disease Mother         cardiac disorder    Breast cancer Mother          dx age early 20's    Glaucoma Mother     Dementia Mother     Coronary artery disease Mother     Cancer Mother     Hypertension Father     Heart disease Father         cardiac disorder    Cancer Father     No Known Problems Sister     Multiple sclerosis Daughter     Melanoma Daughter     Autoimmune disease Daughter     No Known Problems Maternal Grandmother     No Known Problems Paternal Grandmother     No Known Problems Son     No Known Problems Paternal Aunt          Medications have been verified.        Objective   /78 (BP Location: Right arm) Comment (BP Location): manually  Pulse 91   Temp 99.4 °F (37.4 °C)   Resp 20   LMP  (LMP Unknown)   SpO2 98%   No LMP recorded (lmp unknown). Patient has had a hysterectomy.       Physical Exam     Physical Exam  Vitals and nursing note reviewed.   Constitutional:       General: She is not in acute distress.     Appearance: Normal appearance. She is normal weight. She is not ill-appearing, toxic-appearing or diaphoretic.   HENT:      Head: Normocephalic and atraumatic.      Right Ear: Tympanic membrane and ear canal normal.      Left Ear: Tympanic membrane and ear canal normal.      Nose: Congestion present. No rhinorrhea.      Mouth/Throat:      Mouth: Mucous membranes are moist.      Pharynx: No oropharyngeal exudate or posterior oropharyngeal erythema.      Comments: Injected   Eyes:      Extraocular Movements: Extraocular movements intact.   Cardiovascular:      Rate and Rhythm: Normal rate and regular rhythm.      Pulses: Normal pulses.      Heart sounds: Normal heart sounds. No murmur heard.  Pulmonary:      Effort: Pulmonary effort is normal.      Breath sounds: Wheezing present.      Comments: Very faint scattered wheezes through out with decreased breath sounds   Musculoskeletal:         General: Normal range of motion.      Cervical back: Normal range of motion and neck supple.   Skin:     General: Skin is warm and dry.      Capillary Refill:  Capillary refill takes less than 2 seconds.   Neurological:      General: No focal deficit present.      Mental Status: She is alert and oriented to person, place, and time.   Psychiatric:         Mood and Affect: Mood normal.         Behavior: Behavior normal.         Thought Content: Thought content normal.         Judgment: Judgment normal.             Preliminary reading CXR  ? LLL consolidation  Waiting on rad read

## 2025-01-17 NOTE — TELEPHONE ENCOUNTER
Patient called to reschedule her consult appointment with Dr Burkett.    Currently scheduled for 1/22/25 with Dr Burkett in the [place] office at 10am and will not be able to make it.    Please reach out to patient to reschedule.    Ref Ti Magana. Discuss symmetry options from lumpectomy in 2020/21

## 2025-01-17 NOTE — PATIENT INSTRUCTIONS
Your xray was preliminarily read by your provider.  A radiologist will read the xray and you will be notified if it is abnormal.      You are to take the azithromycin as prescribed.   Use the albuterol MDI as needed for wheezing or shortness of breath.  Run a cool mist humidifier, saline nasal spray.  Take plain liquid robitussin or liquid mucinex.   Drink plenty of water    Follow up with your PCP in 3-5 days  Go to the ED if symptoms worsen

## 2025-01-21 ENCOUNTER — TELEPHONE (OUTPATIENT)
Age: 65
End: 2025-01-21

## 2025-01-21 DIAGNOSIS — R05.1 ACUTE COUGH: ICD-10-CM

## 2025-01-21 DIAGNOSIS — Z85.3 HISTORY OF BREAST CANCER: ICD-10-CM

## 2025-01-21 DIAGNOSIS — J18.9 PNEUMONIA OF LEFT LOWER LOBE DUE TO INFECTIOUS ORGANISM: ICD-10-CM

## 2025-01-21 RX ORDER — AZITHROMYCIN 250 MG/1
TABLET, FILM COATED ORAL
Qty: 6 TABLET | Refills: 0 | Status: SHIPPED | OUTPATIENT
Start: 2025-01-21 | End: 2025-01-25

## 2025-01-21 NOTE — TELEPHONE ENCOUNTER
Patient went to urgent care on 1/17 and was giving Z-pack  Finished the meds , but shreyas has a cough and wheezing would like to get another RX for z-pack

## 2025-02-04 ENCOUNTER — APPOINTMENT (OUTPATIENT)
Dept: URBAN - NONMETROPOLITAN AREA CLINIC 4 | Facility: CLINIC | Age: 65
Setting detail: DERMATOLOGY
End: 2025-02-04

## 2025-02-04 DIAGNOSIS — L40.0 PSORIASIS VULGARIS: ICD-10-CM | Status: INADEQUATELY CONTROLLED

## 2025-02-04 DIAGNOSIS — L82.0 INFLAMED SEBORRHEIC KERATOSIS: ICD-10-CM | Status: STABLE

## 2025-02-04 DIAGNOSIS — L57.8 OTHER SKIN CHANGES DUE TO CHRONIC EXPOSURE TO NONIONIZING RADIATION: ICD-10-CM

## 2025-02-04 DIAGNOSIS — D22 MELANOCYTIC NEVI: ICD-10-CM

## 2025-02-04 DIAGNOSIS — L91.8 OTHER HYPERTROPHIC DISORDERS OF THE SKIN: ICD-10-CM | Status: STABLE

## 2025-02-04 DIAGNOSIS — D18.0 HEMANGIOMA: ICD-10-CM

## 2025-02-04 DIAGNOSIS — L82.1 OTHER SEBORRHEIC KERATOSIS: ICD-10-CM

## 2025-02-04 DIAGNOSIS — Z85.828 PERSONAL HISTORY OF OTHER MALIGNANT NEOPLASM OF SKIN: ICD-10-CM | Status: RESOLVED

## 2025-02-04 DIAGNOSIS — L81.4 OTHER MELANIN HYPERPIGMENTATION: ICD-10-CM

## 2025-02-04 PROBLEM — D22.62 MELANOCYTIC NEVI OF LEFT UPPER LIMB, INCLUDING SHOULDER: Status: ACTIVE | Noted: 2025-02-04

## 2025-02-04 PROBLEM — D18.01 HEMANGIOMA OF SKIN AND SUBCUTANEOUS TISSUE: Status: ACTIVE | Noted: 2025-02-04

## 2025-02-04 PROBLEM — D22.5 MELANOCYTIC NEVI OF TRUNK: Status: ACTIVE | Noted: 2025-02-04

## 2025-02-04 PROCEDURE — 17110 DESTRUCTION B9 LES UP TO 14: CPT

## 2025-02-04 PROCEDURE — ? COUNSELING

## 2025-02-04 PROCEDURE — 99214 OFFICE O/P EST MOD 30 MIN: CPT | Mod: 25

## 2025-02-04 PROCEDURE — ? SUNSCREEN RECOMMENDATIONS

## 2025-02-04 PROCEDURE — ? FULL BODY SKIN EXAM

## 2025-02-04 PROCEDURE — ? TREATMENT REGIMEN

## 2025-02-04 PROCEDURE — ? LIQUID NITROGEN

## 2025-02-04 PROCEDURE — ? OTHER

## 2025-02-04 PROCEDURE — ? PRESCRIPTION MEDICATION MANAGEMENT

## 2025-02-04 PROCEDURE — ? PRESCRIPTION

## 2025-02-04 RX ORDER — HYDROCORTISONE 25 MG/G
2.5% CREAM TOPICAL BID
Qty: 30 | Refills: 2 | Status: ERX | COMMUNITY
Start: 2025-02-04

## 2025-02-04 RX ORDER — BETAMETHASONE DIPROPIONATE 0.5 MG/G
0.05% CREAM TOPICAL BID
Qty: 45 | Refills: 3 | Status: ERX | COMMUNITY
Start: 2025-02-04

## 2025-02-04 RX ADMIN — BETAMETHASONE DIPROPIONATE 0.05%: 0.5 CREAM TOPICAL at 00:00

## 2025-02-04 RX ADMIN — HYDROCORTISONE 2.5%: 25 CREAM TOPICAL at 00:00

## 2025-02-04 ASSESSMENT — LOCATION DETAILED DESCRIPTION DERM
LOCATION DETAILED: LEFT ANTERIOR PROXIMAL THIGH
LOCATION DETAILED: EPIGASTRIC SKIN
LOCATION DETAILED: LEFT DISTAL CALF
LOCATION DETAILED: LEFT INFERIOR UPPER BACK
LOCATION DETAILED: RIGHT LATERAL TRAPEZIAL NECK
LOCATION DETAILED: RIGHT ANTERIOR PROXIMAL UPPER ARM
LOCATION DETAILED: LEFT MID-UPPER BACK
LOCATION DETAILED: RIGHT ANTERIOR PROXIMAL THIGH
LOCATION DETAILED: LEFT MEDIAL TRAPEZIAL NECK
LOCATION DETAILED: LEFT MEDIAL SUPERIOR CHEST
LOCATION DETAILED: LEFT SUPERIOR UPPER BACK
LOCATION DETAILED: SUPERIOR THORACIC SPINE
LOCATION DETAILED: LEFT DISTAL POSTERIOR THIGH
LOCATION DETAILED: LEFT CLAVICULAR SKIN
LOCATION DETAILED: LEFT PROXIMAL DORSAL FOREARM
LOCATION DETAILED: RIGHT INFERIOR MEDIAL FOREHEAD
LOCATION DETAILED: LEFT MEDIAL UPPER BACK
LOCATION DETAILED: RIGHT DISTAL POSTERIOR THIGH
LOCATION DETAILED: PERIUMBILICAL SKIN
LOCATION DETAILED: LEFT PROXIMAL POSTERIOR UPPER ARM
LOCATION DETAILED: RIGHT INFERIOR ANTERIOR NECK
LOCATION DETAILED: LEFT PROXIMAL PRETIBIAL REGION
LOCATION DETAILED: RIGHT SUPERIOR FOREHEAD
LOCATION DETAILED: RIGHT PROXIMAL PRETIBIAL REGION
LOCATION DETAILED: LEFT SUPERIOR LATERAL MIDBACK
LOCATION DETAILED: RIGHT SUPERIOR LATERAL UPPER BACK
LOCATION DETAILED: RIGHT LATERAL MANDIBULAR CHEEK
LOCATION DETAILED: RIGHT MID-UPPER BACK

## 2025-02-04 ASSESSMENT — PAIN INTENSITY VAS
HOW INTENSE IS YOUR PAIN 0 BEING NO PAIN, 10 BEING THE MOST SEVERE PAIN POSSIBLE?: 1/10 PAIN
HOW INTENSE IS YOUR PAIN 0 BEING NO PAIN, 10 BEING THE MOST SEVERE PAIN POSSIBLE?: NO PAIN

## 2025-02-04 ASSESSMENT — LOCATION SIMPLE DESCRIPTION DERM
LOCATION SIMPLE: CHEST
LOCATION SIMPLE: RIGHT THIGH
LOCATION SIMPLE: LEFT PRETIBIAL REGION
LOCATION SIMPLE: LEFT CALF
LOCATION SIMPLE: LEFT CLAVICULAR SKIN
LOCATION SIMPLE: LEFT LOWER BACK
LOCATION SIMPLE: POSTERIOR NECK
LOCATION SIMPLE: RIGHT BACK
LOCATION SIMPLE: RIGHT UPPER ARM
LOCATION SIMPLE: RIGHT PRETIBIAL REGION
LOCATION SIMPLE: UPPER BACK
LOCATION SIMPLE: RIGHT ANTERIOR NECK
LOCATION SIMPLE: LEFT POSTERIOR UPPER ARM
LOCATION SIMPLE: LEFT THIGH
LOCATION SIMPLE: LEFT UPPER BACK
LOCATION SIMPLE: LEFT POSTERIOR THIGH
LOCATION SIMPLE: RIGHT POSTERIOR THIGH
LOCATION SIMPLE: RIGHT FOREHEAD
LOCATION SIMPLE: RIGHT CHEEK
LOCATION SIMPLE: RIGHT UPPER BACK
LOCATION SIMPLE: LEFT FOREARM
LOCATION SIMPLE: ABDOMEN

## 2025-02-04 ASSESSMENT — LOCATION ZONE DERM
LOCATION ZONE: NECK
LOCATION ZONE: FACE
LOCATION ZONE: LEG
LOCATION ZONE: ARM
LOCATION ZONE: TRUNK

## 2025-02-04 ASSESSMENT — PGA PSORIASIS: PGA PSORIASIS 2020: MILD

## 2025-02-04 ASSESSMENT — ITCH NUMERIC RATING SCALE: HOW SEVERE IS YOUR ITCHING?: 0

## 2025-02-04 NOTE — PROCEDURE: OTHER
Note Text (......Xxx Chief Complaint.): This diagnosis correlates with the
Render Risk Assessment In Note?: no
Detail Level: Zone
Other (Free Text): Treated via punch excision by Dr. Abdi ([facility]) greater than 5 years ago.

## 2025-02-04 NOTE — HPI: EVALUATION OF SKIN LESION(S)
What Type Of Note Output Would You Prefer (Optional)?: Standard Output
Hpi Title: Evaluation of Skin Lesions
How Severe Are Your Spot(S)?: mild
Have Your Spot(S) Been Treated In The Past?: has been treated
When Was It Treated?: 10-5-23

## 2025-02-04 NOTE — PROCEDURE: PRESCRIPTION MEDICATION MANAGEMENT
Initiate Treatment: Hydrocortisone 2.5% cream to the AA on the right forehead BID, betamethasone Dipropionate 0.05% cream BID to the AA on legs.
Render In Strict Bullet Format?: No
Plan: Will follow up in 8 weeks.
Detail Level: Zone
21-Dec-2019 15:48

## 2025-02-04 NOTE — HPI: SKIN LESIONS
How Severe Is Your Skin Lesion?: mild
[id number]: treated_been_treated
Is This A New Presentation, Or A Follow-Up?: Growths
When Was It Treated?: 10-5-2023

## 2025-02-04 NOTE — PROCEDURE: LIQUID NITROGEN
Add 52 Modifier (Optional): no
Show Applicator Variable?: Yes
Medical Necessity Information: It is in your best interest to select a reason for this procedure from the list below. All of these items fulfill various CMS LCD requirements except the new and changing color options.
Consent: The patient's consent was obtained including but not limited to risks of crusting, scabbing, blistering, scarring, darker or lighter pigmentary change, recurrence, incomplete removal and infection.
Number Of Freeze-Thaw Cycles: 3 freeze-thaw cycles
Application Tool (Optional): Cry-AC
Medical Necessity Clause: This procedure was medically necessary because the lesions that were treated were:
Duration Of Freeze Thaw-Cycle (Seconds): 3
Detail Level: Detailed
Post-Care Instructions: I reviewed with the patient in detail post-care instructions. Patient is to wear sunprotection, and avoid picking at any of the treated lesions. Pt may apply Vaseline to crusted or scabbing areas.
Spray Paint Text: The liquid nitrogen was applied to the skin utilizing a spray paint frosting technique.
Principal Discharge DX:	Chest pain  Goal:	Patient remains hemodynamically stable.  Instructions for follow-up, activity and diet:	HOME CARE INSTRUCTIONS  For the next few days, avoid physical activities that bring on chest pain. Continue physical activities as directed.  Do not smoke.  Avoid drinking alcohol.   Only take over-the-counter or prescription medicine for pain, discomfort, or fever as directed by your caregiver.  Follow your caregiver's suggestions for further testing if your chest pain does not go away.  Keep any follow-up appointments you made. If you do not go to an appointment, you could develop lasting (chronic) problems with pain. If there is any problem keeping an appointment, you must call to reschedule.   SEEK MEDICAL CARE IF:  You think you are having problems from the medicine you are taking. Read your medicine instructions carefully.  Your chest pain does not go away, even after treatment.  You develop a rash with blisters on your chest.  SEEK IMMEDIATE MEDICAL CARE IF:  You have increased chest pain or pain that spreads to your arm, neck, jaw, back, or abdomen.   You develop shortness of breath, an increasing cough, or you are coughing up blood.  You have severe back or abdominal pain, feel nauseous, or vomit.  You develop severe weakness, fainting, or chills.  You have a fever.  THIS IS AN EMERGENCY. Do not wait to see if the pain will go away. Get medical help at once. Call your local emergency services . Do not drive yourself to the hospital.  Secondary Diagnosis:	Essential hypertension  Instructions for follow-up, activity and diet:	Low salt diet  Activity as tolerated.  Take all medication as prescribed.  Follow up with your medical doctor for routine blood pressure monitoring at your next visit.  Notify your doctor if you have any of the following symptoms:   Dizziness, Lightheadedness, Blurry vision, Headache, Chest pain, Shortness of breath  Secondary Diagnosis:	Depression, unspecified depression type  Instructions for follow-up, activity and diet:	Continue with current medication.  Secondary Diagnosis:	Hyperlipidemia  Instructions for follow-up, activity and diet:	Continue with current medication.

## 2025-02-05 ENCOUNTER — CONSULT (OUTPATIENT)
Dept: PLASTIC SURGERY | Facility: CLINIC | Age: 65
End: 2025-02-05
Payer: COMMERCIAL

## 2025-02-05 VITALS
TEMPERATURE: 98 F | OXYGEN SATURATION: 98 % | HEIGHT: 63 IN | DIASTOLIC BLOOD PRESSURE: 82 MMHG | SYSTOLIC BLOOD PRESSURE: 132 MMHG | WEIGHT: 165 LBS | BODY MASS INDEX: 29.23 KG/M2 | HEART RATE: 85 BPM | RESPIRATION RATE: 20 BRPM

## 2025-02-05 DIAGNOSIS — N62 MACROMASTIA: ICD-10-CM

## 2025-02-05 DIAGNOSIS — C50.412 PRIMARY CANCER OF UPPER OUTER QUADRANT OF LEFT FEMALE BREAST (HCC): Primary | ICD-10-CM

## 2025-02-05 PROCEDURE — 99204 OFFICE O/P NEW MOD 45 MIN: CPT | Performed by: PLASTIC SURGERY

## 2025-02-05 NOTE — PROGRESS NOTES
Assessment & Plan   63yo female with breast asymmetry s/p lumpectomy with symptomatic macromasia1  1.) Patient is a excellent candidate for breast reduction surgery for symmetry  2.) Because of the constellation and chronicity of her symptoms I feel this is a medically necessary procedure particularly with her history of lumpectomy  3.) Pictures taken will inquire as to insurance authorization  4.) I would estimate a 750g reduction for this patient    Discussion- Discussion-- I discussed with patient the risks, benefits, alternatives of breast reduction surgery for symmetry as the patient has significant symptincluding risk of bleeding, infection, scarring, poor wound healing, demonstrating an underlying structures, need for further surgical need for multiple procedures, I discussed with her the risk of loss of nipple, nipple necrosis, partial nipple necrosis, seroma, DVT, change in nipple sensation, asymmetry, poor aesthetic result, need for further surgery, need for revision, high risk of poor wound healing at the triple point, the effect on breast-feeding, I discussed the patient's breasts are within the limits of normal asymmetry now and will likely be asymmetric post surgery within normal limits, I discussed with her the possibility for the need for re-reduction over time, we discussed the expected hospital stay, the expected recovery time, I discussed with her the use of drains, the use of candi non DME negative pressure wound therapy dressings.   Because of the constellation and chronicity of her symptoms I believe that this is a medically necessary surgery, particularly given the significant asymmetry resultant from her previous lumpectomy.  I would estimate a 750g reduction for this patient.  Pictures and diagrams were used in the patient's understanding, all the patient's questions were answered to her satisfaction, pictures taken, will inquire as to insurance authorization.    Counseling dominated visit:  Total counseling time 35 minutes total visit time 45 minutes including documentation, review of her chart and coordination of care        Subjective   Patient ID: Ina Morejon is a 64 y.o. female.    Vitals:    02/05/25 1258   BP: 132/82   Pulse: 85   Resp: 20   Temp: 98 °F (36.7 °C)   SpO2: 98%     HPI  Patient is a very pleasant 64 male who is here today to discuss possible breast symmetry procedure.  Patient had a lumpectomy approximately 3 years ago and subsequent radiation on the left side, this is left her with significant breast asymmetry with her right breast being much larger than her left.  The patient states that she had previously considered breast reduction surgery, she has symptoms compatible with symptomatic macromastia.  The patient has had large breasts for her entire life, however they have increased in size over time.  She currently complains of neck pain, back pain, shoulder pain, grooving from her bra strap, she does report rashes, she has tried multiple over-the-counter as well as prescription treatments without any relief of her symptoms.  She has a history of breast cancer, she is non smoker, she has not felt any recent lumps or masses in her breasts, she does not take steroids or blood thinners.  She is up to date with her mammograms.        The following portions of the patient's history were reviewed and updated as appropriate: allergies, current medications, past family history, past medical history, past social history, past surgical history, and problem list.    Review of Systems   Musculoskeletal:  Positive for back pain, neck pain and neck stiffness.   Skin:  Positive for rash and wound.       Objective   Physical Exam   Constitutional  She appears well-developed and well-nourished.     Eyes  Pupils are equal, round, and reactive to light. System normal.     General -             Right: Right eye extraocular movements are normal.             Left: Left eye extraocular movements are  normal.       Skin    She has acute and chronic inflammation and irritation under her breast but no acute infection.       Psychiatric  She has a normal mood and affect. Her behavior is normal. Judgment and thought content normal.       Bilateral breasts- large volumetric asymmetry with left breast much smaller than right with radiation changes upper outer quadrant of her left breast, no palpable masses or adenopathy.  Bilateral grade III ptosis with large pendulous breasts  Measurements- R- SN 30 NIMF 12                             L- SN 26 NIMF10 NN 22          Past Medical History:   Diagnosis Date    Acute cystitis 03/02/2018    Asthma     has not been treated for seveeral years  exercise induced    BRCA1 negative     Breast cancer (HCC) 10/2020    left sided    Cancer (HCC)     breast  9/20    Cellulitis of left upper extremity 02/23/2021    Chemotherapy induced neutropenia (HCC) 10/30/2020    Exercise-induced asthma 11/03/2016    Heart murmur 2015    History of chemotherapy 11/2020    Hx of radiation therapy 03/2021    Urinary tract infection      Past Surgical History:   Procedure Laterality Date    BREAST CYST EXCISION Left     benign    BREAST CYST EXCISION Left     benign    BREAST SURGERY      CHOLECYSTECTOMY      HYSTERECTOMY      IR PORT PLACEMENT  10/26/2020    IR PORT REMOVAL  2/4/2021    MAMMO NEEDLE LOCALIZATION LEFT (ALL INC) Left 10/2/2020    MOUTH SURGERY      [place] MASTECTOMY PARTIAL Left 10/2/2020    Procedure: BREAST NEEDLE LOCALIZED LUMPECTOMY, SENTINEL LYMPH NODE BIOPSY (Bracketed U/S guided needle loc @ 8:00, INJECT AT 1045);  Surgeon: Ti Magana MD;  Location: [facility] OR;  Service: General    TUBAL LIGATION      US GUIDED BREAST BIOPSY LEFT COMPLETE Left 9/11/2020    WISDOM TOOTH EXTRACTION       Current Outpatient Medications   Medication Instructions    albuterol (ProAir HFA) 90 mcg/act inhaler 2 puffs, Inhalation, Every 6 hours PRN    anastrozole (ARIMIDEX) 1 mg, Oral, Daily     ascorbic acid (VITAMIN C) 250 mg, Daily    Biotin 10 MG TABS Take by mouth    BIOTIN PO Take by mouth    Black Pepper-Turmeric (Turmeric Curcumin) 5-1000 MG CAPS Take by mouth    calcium carbonate (TUMS) 500 mg chewable tablet 1 tablet, Daily    ibandronate (BONIVA) 150 mg, Oral, Every 30 days    nystatin (MYCOSTATIN) powder Topical, 2 times daily    VITAMIN D, ERGOCALCIFEROL, PO Take by mouth 3 days a week       Social History     Social History Narrative    Daily caffeine consumption, 1 serving a day    Exercises 3-4 times per week     x 8 years     Social History     Tobacco Use   Smoking Status Never   Smokeless Tobacco Never

## 2025-02-12 ENCOUNTER — TELEPHONE (OUTPATIENT)
Dept: PLASTIC SURGERY | Facility: CLINIC | Age: 65
End: 2025-02-12

## 2025-02-12 ENCOUNTER — PREP FOR PROCEDURE (OUTPATIENT)
Dept: PLASTIC SURGERY | Facility: CLINIC | Age: 65
End: 2025-02-12

## 2025-02-12 DIAGNOSIS — Z85.3 PERSONAL HISTORY OF BREAST CANCER: Primary | ICD-10-CM

## 2025-02-12 NOTE — TELEPHONE ENCOUNTER
Recieved call from Patient asking for an update regarding insurance approval for procedure with Dr. Burkett and any next steps needed.     Bina C[facility]: Please call patient back with any updates regarding insurance for procedure/next steps. Phone: [phone]

## 2025-02-13 ENCOUNTER — OFFICE VISIT (OUTPATIENT)
Dept: LAB | Facility: HOSPITAL | Age: 65
End: 2025-02-13
Payer: COMMERCIAL

## 2025-02-13 ENCOUNTER — APPOINTMENT (OUTPATIENT)
Dept: LAB | Facility: HOSPITAL | Age: 65
End: 2025-02-13
Payer: COMMERCIAL

## 2025-02-13 ENCOUNTER — TELEPHONE (OUTPATIENT)
Age: 65
End: 2025-02-13

## 2025-02-13 DIAGNOSIS — Z85.3 PERSONAL HISTORY OF BREAST CANCER: ICD-10-CM

## 2025-02-13 DIAGNOSIS — M85.852 OSTEOPENIA OF LEFT HIP: ICD-10-CM

## 2025-02-13 LAB
ANION GAP SERPL CALCULATED.3IONS-SCNC: 7 MMOL/L (ref 4–13)
BASOPHILS # BLD AUTO: 0.04 THOUSANDS/ÂΜL (ref 0–0.1)
BASOPHILS NFR BLD AUTO: 1 % (ref 0–1)
BUN SERPL-MCNC: 14 MG/DL (ref 5–25)
CALCIUM SERPL-MCNC: 9.9 MG/DL (ref 8.4–10.2)
CHLORIDE SERPL-SCNC: 105 MMOL/L (ref 96–108)
CO2 SERPL-SCNC: 29 MMOL/L (ref 21–32)
CREAT SERPL-MCNC: 0.79 MG/DL (ref 0.6–1.3)
EOSINOPHIL # BLD AUTO: 0.11 THOUSAND/ÂΜL (ref 0–0.61)
EOSINOPHIL NFR BLD AUTO: 2 % (ref 0–6)
ERYTHROCYTE [DISTWIDTH] IN BLOOD BY AUTOMATED COUNT: 14 % (ref 11.6–15.1)
GFR SERPL CREATININE-BSD FRML MDRD: 79 ML/MIN/1.73SQ M
GLUCOSE SERPL-MCNC: 97 MG/DL (ref 65–140)
HCT VFR BLD AUTO: 40.1 % (ref 34.8–46.1)
HGB BLD-MCNC: 13 G/DL (ref 11.5–15.4)
IMM GRANULOCYTES # BLD AUTO: 0.01 THOUSAND/UL (ref 0–0.2)
IMM GRANULOCYTES NFR BLD AUTO: 0 % (ref 0–2)
LYMPHOCYTES # BLD AUTO: 1.89 THOUSANDS/ÂΜL (ref 0.6–4.47)
LYMPHOCYTES NFR BLD AUTO: 27 % (ref 14–44)
MCH RBC QN AUTO: 29.3 PG (ref 26.8–34.3)
MCHC RBC AUTO-ENTMCNC: 32.4 G/DL (ref 31.4–37.4)
MCV RBC AUTO: 91 FL (ref 82–98)
MONOCYTES # BLD AUTO: 0.57 THOUSAND/ÂΜL (ref 0.17–1.22)
MONOCYTES NFR BLD AUTO: 8 % (ref 4–12)
NEUTROPHILS # BLD AUTO: 4.27 THOUSANDS/ÂΜL (ref 1.85–7.62)
NEUTS SEG NFR BLD AUTO: 62 % (ref 43–75)
NRBC BLD AUTO-RTO: 0 /100 WBCS
PLATELET # BLD AUTO: 209 THOUSANDS/UL (ref 149–390)
PMV BLD AUTO: 9 FL (ref 8.9–12.7)
POTASSIUM SERPL-SCNC: 4.3 MMOL/L (ref 3.5–5.3)
RBC # BLD AUTO: 4.43 MILLION/UL (ref 3.81–5.12)
SODIUM SERPL-SCNC: 141 MMOL/L (ref 135–147)
WBC # BLD AUTO: 6.89 THOUSAND/UL (ref 4.31–10.16)

## 2025-02-13 PROCEDURE — 93005 ELECTROCARDIOGRAM TRACING: CPT

## 2025-02-13 PROCEDURE — 36415 COLL VENOUS BLD VENIPUNCTURE: CPT

## 2025-02-13 PROCEDURE — 85025 COMPLETE CBC W/AUTO DIFF WBC: CPT

## 2025-02-13 PROCEDURE — 80048 BASIC METABOLIC PNL TOTAL CA: CPT

## 2025-02-13 NOTE — TELEPHONE ENCOUNTER
Placed call to patient.    I told patient that she can go anywhere within [facility] for her EKG.    I also gave her the number to Central Scheduling. [phone].    Patient verbalized understanding.

## 2025-02-13 NOTE — TELEPHONE ENCOUNTER
Received call from patient stating she does not know where to go for her EKG    Please call patient at [phone].

## 2025-02-14 LAB
ATRIAL RATE: 64 BPM
P AXIS: 24 DEGREES
PR INTERVAL: 150 MS
QRS AXIS: 6 DEGREES
QRSD INTERVAL: 66 MS
QT INTERVAL: 410 MS
QTC INTERVAL: 423 MS
T WAVE AXIS: 23 DEGREES
VENTRICULAR RATE: 64 BPM

## 2025-02-14 PROCEDURE — 93010 ELECTROCARDIOGRAM REPORT: CPT | Performed by: INTERNAL MEDICINE

## 2025-02-14 RX ORDER — IBANDRONATE SODIUM 150 MG/1
150 TABLET, FILM COATED ORAL
Qty: 3 TABLET | Refills: 0 | Status: SHIPPED | OUTPATIENT
Start: 2025-02-14

## 2025-02-19 ENCOUNTER — OFFICE VISIT (OUTPATIENT)
Dept: PLASTIC SURGERY | Facility: CLINIC | Age: 65
End: 2025-02-19

## 2025-02-19 VITALS
BODY MASS INDEX: 29.59 KG/M2 | WEIGHT: 167 LBS | HEART RATE: 94 BPM | TEMPERATURE: 97.5 F | RESPIRATION RATE: 20 BRPM | HEIGHT: 63 IN | OXYGEN SATURATION: 99 % | SYSTOLIC BLOOD PRESSURE: 136 MMHG | DIASTOLIC BLOOD PRESSURE: 70 MMHG

## 2025-02-19 DIAGNOSIS — Z01.818 PREOP EXAMINATION: Primary | ICD-10-CM

## 2025-02-19 PROCEDURE — PREOP: Performed by: PHYSICIAN ASSISTANT

## 2025-02-19 NOTE — PROGRESS NOTES
Patient ID: Ina Morejon is a 64 y.o. female.    Vitals:    02/19/25 1452   BP: 136/70   Pulse: 94   Resp: 20   Temp: 97.5 °F (36.4 °C)   SpO2: 99%       HPI Patient is a very pleasant 64 female who is here today for preoperative H&P for bilateral breast reduction surgery on 3/7/25. Patient had a lumpectomy approximately 3 years ago and subsequent radiation on the left side, this has left her with a significant breast asymmetry with her right breast being much larger than her left.  The patient states that she had previously considered breast reduction surgery, she has symptoms compatible with symptomatic macromastia.  The patient has had large breasts for her entire life, however they have increased in size over time.  She currently complains of neck pain, back pain, shoulder pain, grooving from her bra strap, she does report rashes, she has tried multiple over-the-counter as well as prescription treatments without any relief of her symptoms.    She has a history of breast cancer, she is non smoker, she has not felt any recent lumps or masses in her breasts, she does not take steroids or blood thinners.  She is up to date with her mammograms. Denies a h/o blood clots. She takes arimidex.     The following portions of the patient's history were reviewed and updated as appropriate: allergies, current medications, past family history, past medical history, past social history, past surgical history, and problem list.    Review of Systems   Musculoskeletal:  Positive for back pain, neck pain and neck stiffness.   Skin:  Positive for rash and wound.       Objective   Physical Exam   Vitals reviewed.  Constitutional  She appears well-developed and well-nourished.     Eyes  Pupils are equal, round, and reactive to light. System normal.     General -             Right: Right eye extraocular movements are normal.             Left: Left eye extraocular movements are normal.     Cardiovascular: Normal rate.      Pulmonary/Chest  Effort normal.     Skin  Skin is warm and dry.   She has acute and chronic inflammation and irritation under her breast but no acute infection.       Psychiatric  She has a normal mood and affect. Her behavior is normal. Judgment and thought content normal.       Bilateral breasts- large volumetric asymmetry with left breast much smaller than right with radiation changes upper outer quadrant of her left breast, no palpable masses or adenopathy.  Bilateral grade III ptosis with large pendulous breasts  Measurements- R- SN 30 NIMF 12                             L- SN 26 NIMF10 NN 22          Past Medical History:   Diagnosis Date    Acute cystitis 03/02/2018    Asthma     has not been treated for seveeral years  exercise induced    BRCA1 negative     Breast cancer (HCC) 10/2020    left sided    Cancer (HCC)     breast  9/20    Cellulitis of left upper extremity 02/23/2021    Chemotherapy induced neutropenia (HCC) 10/30/2020    Exercise-induced asthma 11/03/2016    Heart murmur 2015    History of chemotherapy 11/2020    Hx of radiation therapy 03/2021    Urinary tract infection      Patient Active Problem List   Diagnosis    Right hip pain    Vaginal atrophy    BMI 26.0-26.9,adult    Vitamin D deficiency    History of Meniere's disease    Exercise-induced asthma    Impaired fasting glucose    Low serum high density lipoprotein (HDL)    Menopausal symptoms    Nevus, atypical    Systolic murmur    Benign paroxysmal vertigo    At high risk for breast cancer    Introital dyspareunia    KEIKO (stress urinary incontinence, female)    Osteopenia    Primary cancer of upper outer quadrant of left female breast (HCC)    Inflamed seborrheic keratosis    Long term current use of selective estrogen receptor modulators (SERMs)    Dermatitis    History of recurrent UTIs    Weight gain    Candidal dermatitis     Past Surgical History:   Procedure Laterality Date    BREAST CYST EXCISION Left     benign    BREAST CYST  EXCISION Left     benign    BREAST SURGERY      CHOLECYSTECTOMY      HYSTERECTOMY      IR PORT PLACEMENT  10/26/2020    IR PORT REMOVAL  2/4/2021    MAMMO NEEDLE LOCALIZATION LEFT (ALL INC) Left 10/2/2020    MOUTH SURGERY      [place] MASTECTOMY PARTIAL Left 10/2/2020    Procedure: BREAST NEEDLE LOCALIZED LUMPECTOMY, SENTINEL LYMPH NODE BIOPSY (Bracketed U/S guided needle loc @ 8:00, INJECT AT 1045);  Surgeon: Ti Magana MD;  Location:  MAIN OR;  Service: General    TUBAL LIGATION      US GUIDED BREAST BIOPSY LEFT COMPLETE Left 9/11/2020    WISDOM TOOTH EXTRACTION       Current Outpatient Medications   Medication Instructions    albuterol (ProAir HFA) 90 mcg/act inhaler 2 puffs, Inhalation, Every 6 hours PRN    anastrozole (ARIMIDEX) 1 mg, Oral, Daily    ascorbic acid (VITAMIN C) 250 mg, Daily    Biotin 10 MG TABS Take by mouth    BIOTIN PO Take by mouth    Black Pepper-Turmeric (Turmeric Curcumin) 5-1000 MG CAPS Take by mouth    calcium carbonate (TUMS) 500 mg chewable tablet 1 tablet, Daily    ibandronate (BONIVA) 150 mg, Oral, Every 30 days    nystatin (MYCOSTATIN) powder Topical, 2 times daily    VITAMIN D, ERGOCALCIFEROL, PO Take by mouth 3 days a week       Social History     Social History Narrative    Daily caffeine consumption, 1 serving a day    Exercises 3-4 times per week     x 8 years     Social History     Tobacco Use   Smoking Status Never   Smokeless Tobacco Never       Assessment & Plan 63yo female with breast asymmetry s/p left breast lumpectomy with symptomatic macromastia    Hold Arimidex     EKG WNL  CXR reviewed   Mammo WNL 10/1/24  Labs reviewed    Today I discussed the surgery in detail with the patient regarding the expected length of surgery, the expected hospital stay, and the expected recovery time. We discussed the potential risks and complications such as bleeding, infection, scarring, hematoma, seroma, asymmetry, contour deformity, skin necrosis, nipple necrosis, damage to  surrounding structures, wound healing complications, and need for additional procedures. Discussed higher risk of wound healing complications of left radiated breast. The use of drains and VAISHALI dressing were discussed. Postoperative activity restrictions and medications were discussed. The patient understands and agrees to the plan of care and does not have any additional questions at this time. Informed consent was obtained today for the upcoming surgery.        Criss Wilson PA-C  Plastic & Reconstructive Surgery

## 2025-02-22 DIAGNOSIS — M85.852 OSTEOPENIA OF LEFT HIP: ICD-10-CM

## 2025-02-24 DIAGNOSIS — M85.852 OSTEOPENIA OF LEFT HIP: ICD-10-CM

## 2025-02-24 RX ORDER — IBANDRONATE SODIUM 150 MG/1
150 TABLET, FILM COATED ORAL
Qty: 3 TABLET | Refills: 0 | OUTPATIENT
Start: 2025-02-24

## 2025-02-24 RX ORDER — IBANDRONATE SODIUM 150 MG/1
150 TABLET, FILM COATED ORAL
Qty: 3 TABLET | Refills: 0 | Status: CANCELLED | OUTPATIENT
Start: 2025-02-24

## 2025-02-25 RX ORDER — BETAMETHASONE DIPROPIONATE 0.5 MG/G
CREAM TOPICAL 2 TIMES DAILY
COMMUNITY

## 2025-02-25 RX ORDER — HYDROCORTISONE 25 MG/G
CREAM TOPICAL 2 TIMES DAILY
COMMUNITY

## 2025-02-26 NOTE — H&P
Progress Notes  Criss Wilson PA-C (Physician Assistant)  Plastic Surgery  Expand All Collapse All     Patient ID: Ina Morejon is a 64 y.o. female.         Vitals:     02/19/25 1452   BP: 136/70   Pulse: 94   Resp: 20   Temp: 97.5 °F (36.4 °C)   SpO2: 99%         HPI Patient is a very pleasant 64 female who is here today for preoperative H&P for bilateral breast reduction surgery on 3/7/25. Patient had a lumpectomy approximately 3 years ago and subsequent radiation on the left side, this has left her with a significant breast asymmetry with her right breast being much larger than her left.  The patient states that she had previously considered breast reduction surgery, she has symptoms compatible with symptomatic macromastia.  The patient has had large breasts for her entire life, however they have increased in size over time.  She currently complains of neck pain, back pain, shoulder pain, grooving from her bra strap, she does report rashes, she has tried multiple over-the-counter as well as prescription treatments without any relief of her symptoms.    She has a history of breast cancer, she is non smoker, she has not felt any recent lumps or masses in her breasts, she does not take steroids or blood thinners.  She is up to date with her mammograms. Denies a h/o blood clots. She takes arimidex.      The following portions of the patient's history were reviewed and updated as appropriate: allergies, current medications, past family history, past medical history, past social history, past surgical history, and problem list.     Review of Systems   Musculoskeletal:  Positive for back pain, neck pain and neck stiffness.   Skin:  Positive for rash and wound.         Objective  Physical Exam   Vitals reviewed.  Constitutional  She appears well-developed and well-nourished.      Eyes  Pupils are equal, round, and reactive to light. System normal.      General -             Right: Right eye extraocular  movements are normal.             Left: Left eye extraocular movements are normal.      Cardiovascular: Normal rate.      Pulmonary/Chest  Effort normal.      Skin  Skin is warm and dry.   She has acute and chronic inflammation and irritation under her breast but no acute infection.        Psychiatric  She has a normal mood and affect. Her behavior is normal. Judgment and thought content normal.         Bilateral breasts- large volumetric asymmetry with left breast much smaller than right with radiation changes upper outer quadrant of her left breast, no palpable masses or adenopathy.  Bilateral grade III ptosis with large pendulous breasts  Measurements- R- SN 30 NIMF 12                             L- SN 26 NIMF10 NN 22             Medical History        Past Medical History:   Diagnosis Date    Acute cystitis 03/02/2018    Asthma       has not been treated for seveeral years  exercise induced    BRCA1 negative      Breast cancer (HCC) 10/2020     left sided    Cancer (HCC)       breast  9/20    Cellulitis of left upper extremity 02/23/2021    Chemotherapy induced neutropenia (HCC) 10/30/2020    Exercise-induced asthma 11/03/2016    Heart murmur 2015    History of chemotherapy 11/2020    Hx of radiation therapy 03/2021    Urinary tract infection           Problem List       Patient Active Problem List   Diagnosis    Right hip pain    Vaginal atrophy    BMI 26.0-26.9,adult    Vitamin D deficiency    History of Meniere's disease    Exercise-induced asthma    Impaired fasting glucose    Low serum high density lipoprotein (HDL)    Menopausal symptoms    Nevus, atypical    Systolic murmur    Benign paroxysmal vertigo    At high risk for breast cancer    Introital dyspareunia    KEIKO (stress urinary incontinence, female)    Osteopenia    Primary cancer of upper outer quadrant of left female breast (HCC)    Inflamed seborrheic keratosis    Long term current use of selective estrogen receptor modulators (SERMs)     Dermatitis    History of recurrent UTIs    Weight gain    Candidal dermatitis         Surgical History         Past Surgical History:   Procedure Laterality Date    BREAST CYST EXCISION Left       benign    BREAST CYST EXCISION Left       benign    BREAST SURGERY        CHOLECYSTECTOMY        HYSTERECTOMY        IR PORT PLACEMENT   10/26/2020    IR PORT REMOVAL   2/4/2021    MAMMO NEEDLE LOCALIZATION LEFT (ALL INC) Left 10/2/2020    MOUTH SURGERY        [place] MASTECTOMY PARTIAL Left 10/2/2020     Procedure: BREAST NEEDLE LOCALIZED LUMPECTOMY, SENTINEL LYMPH NODE BIOPSY (Bracketed U/S guided needle loc @ 8:00, INJECT AT 1045);  Surgeon: Ti Magana MD;  Location:  MAIN OR;  Service: General    TUBAL LIGATION        US GUIDED BREAST BIOPSY LEFT COMPLETE Left 9/11/2020    WISDOM TOOTH EXTRACTION                  Current Outpatient Medications   Medication Instructions    albuterol (ProAir HFA) 90 mcg/act inhaler 2 puffs, Inhalation, Every 6 hours PRN    anastrozole (ARIMIDEX) 1 mg, Oral, Daily    ascorbic acid (VITAMIN C) 250 mg, Daily    Biotin 10 MG TABS Take by mouth    BIOTIN PO Take by mouth    Black Pepper-Turmeric (Turmeric Curcumin) 5-1000 MG CAPS Take by mouth    calcium carbonate (TUMS) 500 mg chewable tablet 1 tablet, Daily    ibandronate (BONIVA) 150 mg, Oral, Every 30 days    nystatin (MYCOSTATIN) powder Topical, 2 times daily    VITAMIN D, ERGOCALCIFEROL, PO Take by mouth 3 days a week         Social History          Social History Narrative     Daily caffeine consumption, 1 serving a day     Exercises 3-4 times per week      x 8 years      Tobacco Use History   Social History          Tobacco Use   Smoking Status Never   Smokeless Tobacco Never            Assessment & Plan 63yo female with breast asymmetry s/p left breast lumpectomy with symptomatic macromastia     Hold Arimidex      EKG WNL  CXR reviewed   Mammo WNL 10/1/24  Labs reviewed     Today I discussed the surgery in detail with the  patient regarding the expected length of surgery, the expected hospital stay, and the expected recovery time. We discussed the potential risks and complications such as bleeding, infection, scarring, hematoma, seroma, asymmetry, contour deformity, skin necrosis, nipple necrosis, damage to surrounding structures, wound healing complications, and need for additional procedures. Discussed higher risk of wound healing complications of left radiated breast. The use of drains and VAISHALI dressing were discussed. Postoperative activity restrictions and medications were discussed. The patient understands and agrees to the plan of care and does not have any additional questions at this time. Informed consent was obtained today for the upcoming surgery.        Criss Wilson PA-C  Plastic & Reconstructive Surgery

## 2025-03-04 NOTE — PRE-PROCEDURE INSTRUCTIONS
Pre-Surgery Instructions:   Medication Instructions    albuterol (ProAir HFA) 90 mcg/act inhaler Uses PRN- OK to take day of surgery    anastrozole (ARIMIDEX) 1 mg tablet Instructions provided by MD- Kallie x 1 week per Dr. Burkett, last dose was 2/28    ascorbic acid (VITAMIN C) 250 mg tablet Hold day of surgery.    Biotin 10 MG TABS Stop taking 7 days prior to surgery.    calcium carbonate (TUMS) 500 mg chewable tablet Hold day of surgery.    ibandronate (BONIVA) 150 MG tablet Takes monthly, last dose was 3/1    VITAMIN D, ERGOCALCIFEROL, PO Hold day of surgery.   Medication instructions for day of surgery reviewed. Please take all instructed medications with only a sip of water.       You will receive a call one business day prior to surgery with an arrival time and hospital directions. If your surgery is scheduled on a Monday, the hospital will be calling you on the Friday prior to your surgery. If you have not heard from anyone by 8pm, please call the hospital supervisor through the hospital  at [phone]. ([place] [phone] or [place] [phone]).    Do not eat or drink anything after midnight the night before your surgery, including candy, mints, lifesavers, or chewing gum. Do not drink alcohol 24hrs before your surgery. Try not to smoke at least 24hrs before your surgery.       Follow the pre surgery showering instructions as listed in the “My Surgical Experience Booklet” or otherwise provided by your surgeon's office. Do not use a blade to shave the surgical area 1 week before surgery. It is okay to use a clean electric clippers up to 24 hours before surgery. Do not apply any lotions, creams, including makeup, cologne, deodorant, or perfumes after showering on the day of your surgery. Do not use dry shampoo, hair spray, hair gel, or any type of hair products.     No contact lenses, eye make-up, or artificial eyelashes. Remove nail polish, including gel polish, and any artificial, gel, or  acrylic nails if possible. Remove all jewelry including rings and body piercing jewelry.     Wear causal clothing that is easy to take on and off. Consider your type of surgery.    Keep any valuables, jewelry, piercings at home. Please bring any specially ordered equipment (sling, braces) if indicated.    Arrange for a responsible person to drive you to and from the hospital on the day of your surgery. Please confirm the visitor policy for the day of your procedure when you receive your phone call with an arrival time.     Call the surgeon's office with any new illnesses, exposures, or additional questions prior to surgery.    Please reference your “My Surgical Experience Booklet” for additional information to prepare for your upcoming surgery.

## 2025-03-06 ENCOUNTER — ANESTHESIA EVENT (OUTPATIENT)
Dept: PERIOP | Facility: HOSPITAL | Age: 65
End: 2025-03-06
Payer: COMMERCIAL

## 2025-03-07 ENCOUNTER — ANESTHESIA (OUTPATIENT)
Dept: PERIOP | Facility: HOSPITAL | Age: 65
End: 2025-03-07
Payer: COMMERCIAL

## 2025-03-07 ENCOUNTER — HOSPITAL ENCOUNTER (OUTPATIENT)
Facility: HOSPITAL | Age: 65
Setting detail: OUTPATIENT SURGERY
Discharge: HOME | End: 2025-03-07
Attending: PLASTIC SURGERY | Admitting: PLASTIC SURGERY
Payer: COMMERCIAL

## 2025-03-07 VITALS
DIASTOLIC BLOOD PRESSURE: 68 MMHG | OXYGEN SATURATION: 97 % | HEIGHT: 63 IN | TEMPERATURE: 97.6 F | SYSTOLIC BLOOD PRESSURE: 128 MMHG | RESPIRATION RATE: 16 BRPM | WEIGHT: 167 LBS | BODY MASS INDEX: 29.59 KG/M2 | HEART RATE: 84 BPM

## 2025-03-07 DIAGNOSIS — Z85.3 PERSONAL HISTORY OF BREAST CANCER: ICD-10-CM

## 2025-03-07 PROCEDURE — 88342 IMHCHEM/IMCYTCHM 1ST ANTB: CPT | Performed by: PATHOLOGY

## 2025-03-07 PROCEDURE — 88305 TISSUE EXAM BY PATHOLOGIST: CPT | Performed by: PATHOLOGY

## 2025-03-07 PROCEDURE — 19318 BREAST REDUCTION: CPT | Performed by: PHYSICIAN ASSISTANT

## 2025-03-07 PROCEDURE — 19318 BREAST REDUCTION: CPT | Performed by: PLASTIC SURGERY

## 2025-03-07 PROCEDURE — 88341 IMHCHEM/IMCYTCHM EA ADD ANTB: CPT | Performed by: PATHOLOGY

## 2025-03-07 PROCEDURE — NC001 PR NO CHARGE: Performed by: PLASTIC SURGERY

## 2025-03-07 RX ORDER — HYDROMORPHONE HCL/PF 1 MG/ML
0.5 SYRINGE (ML) INJECTION
Status: DISCONTINUED | OUTPATIENT
Start: 2025-03-07 | End: 2025-03-07 | Stop reason: HOSPADM

## 2025-03-07 RX ORDER — HYDROMORPHONE HCL/PF 1 MG/ML
SYRINGE (ML) INJECTION AS NEEDED
Status: DISCONTINUED | OUTPATIENT
Start: 2025-03-07 | End: 2025-03-07

## 2025-03-07 RX ORDER — ACETAMINOPHEN 500 MG
1000 TABLET ORAL EVERY 6 HOURS
Qty: 56 TABLET | Refills: 0 | Status: SHIPPED | OUTPATIENT
Start: 2025-03-07 | End: 2025-03-14

## 2025-03-07 RX ORDER — GABAPENTIN 300 MG/1
300 CAPSULE ORAL 3 TIMES DAILY
Qty: 21 CAPSULE | Refills: 0 | Status: SHIPPED | OUTPATIENT
Start: 2025-03-07 | End: 2025-03-14

## 2025-03-07 RX ORDER — ONDANSETRON 2 MG/ML
INJECTION INTRAMUSCULAR; INTRAVENOUS AS NEEDED
Status: DISCONTINUED | OUTPATIENT
Start: 2025-03-07 | End: 2025-03-07

## 2025-03-07 RX ORDER — LIDOCAINE HYDROCHLORIDE 20 MG/ML
INJECTION, SOLUTION EPIDURAL; INFILTRATION; INTRACAUDAL; PERINEURAL AS NEEDED
Status: DISCONTINUED | OUTPATIENT
Start: 2025-03-07 | End: 2025-03-07

## 2025-03-07 RX ORDER — GLYCOPYRROLATE 0.2 MG/ML
INJECTION INTRAMUSCULAR; INTRAVENOUS AS NEEDED
Status: DISCONTINUED | OUTPATIENT
Start: 2025-03-07 | End: 2025-03-07

## 2025-03-07 RX ORDER — FENTANYL CITRATE 50 UG/ML
INJECTION, SOLUTION INTRAMUSCULAR; INTRAVENOUS AS NEEDED
Status: DISCONTINUED | OUTPATIENT
Start: 2025-03-07 | End: 2025-03-07

## 2025-03-07 RX ORDER — MIDAZOLAM HYDROCHLORIDE 2 MG/2ML
INJECTION, SOLUTION INTRAMUSCULAR; INTRAVENOUS AS NEEDED
Status: DISCONTINUED | OUTPATIENT
Start: 2025-03-07 | End: 2025-03-07

## 2025-03-07 RX ORDER — SODIUM CHLORIDE, SODIUM LACTATE, POTASSIUM CHLORIDE, CALCIUM CHLORIDE 600; 310; 30; 20 MG/100ML; MG/100ML; MG/100ML; MG/100ML
INJECTION, SOLUTION INTRAVENOUS CONTINUOUS PRN
Status: DISCONTINUED | OUTPATIENT
Start: 2025-03-07 | End: 2025-03-07

## 2025-03-07 RX ORDER — KETAMINE HCL IN NACL, ISO-OSM 100MG/10ML
SYRINGE (ML) INJECTION AS NEEDED
Status: DISCONTINUED | OUTPATIENT
Start: 2025-03-07 | End: 2025-03-07

## 2025-03-07 RX ORDER — ACETAMINOPHEN 10 MG/ML
1000 INJECTION, SOLUTION INTRAVENOUS ONCE
Status: COMPLETED | OUTPATIENT
Start: 2025-03-07 | End: 2025-03-07

## 2025-03-07 RX ORDER — PROMETHAZINE HYDROCHLORIDE 25 MG/ML
6.25 INJECTION, SOLUTION INTRAMUSCULAR; INTRAVENOUS ONCE AS NEEDED
Status: DISCONTINUED | OUTPATIENT
Start: 2025-03-07 | End: 2025-03-07 | Stop reason: HOSPADM

## 2025-03-07 RX ORDER — FENTANYL CITRATE 50 UG/ML
INJECTION, SOLUTION INTRAMUSCULAR; INTRAVENOUS
Status: DISCONTINUED
Start: 2025-03-07 | End: 2025-03-07 | Stop reason: HOSPADM

## 2025-03-07 RX ORDER — FENTANYL CITRATE/PF 50 MCG/ML
50 SYRINGE (ML) INJECTION
Status: DISCONTINUED | OUTPATIENT
Start: 2025-03-07 | End: 2025-03-07 | Stop reason: HOSPADM

## 2025-03-07 RX ORDER — SODIUM CHLORIDE 9 MG/ML
125 INJECTION, SOLUTION INTRAVENOUS CONTINUOUS
Status: DISCONTINUED | OUTPATIENT
Start: 2025-03-07 | End: 2025-03-07 | Stop reason: HOSPADM

## 2025-03-07 RX ORDER — ONDANSETRON 2 MG/ML
4 INJECTION INTRAMUSCULAR; INTRAVENOUS ONCE
Status: COMPLETED | OUTPATIENT
Start: 2025-03-07 | End: 2025-03-07

## 2025-03-07 RX ORDER — ROCURONIUM BROMIDE 10 MG/ML
INJECTION, SOLUTION INTRAVENOUS AS NEEDED
Status: DISCONTINUED | OUTPATIENT
Start: 2025-03-07 | End: 2025-03-07

## 2025-03-07 RX ORDER — CLINDAMYCIN HYDROCHLORIDE 300 MG/1
300 CAPSULE ORAL 3 TIMES DAILY
Qty: 21 CAPSULE | Refills: 0 | Status: SHIPPED | OUTPATIENT
Start: 2025-03-07 | End: 2025-03-14

## 2025-03-07 RX ORDER — ENOXAPARIN SODIUM 100 MG/ML
40 INJECTION SUBCUTANEOUS DAILY
Qty: 2.8 ML | Refills: 0 | Status: SHIPPED | OUTPATIENT
Start: 2025-03-07 | End: 2025-03-14

## 2025-03-07 RX ORDER — ENOXAPARIN SODIUM 100 MG/ML
40 INJECTION SUBCUTANEOUS ONCE
Status: COMPLETED | OUTPATIENT
Start: 2025-03-07 | End: 2025-03-07

## 2025-03-07 RX ORDER — MAGNESIUM HYDROXIDE 1200 MG/15ML
LIQUID ORAL AS NEEDED
Status: DISCONTINUED | OUTPATIENT
Start: 2025-03-07 | End: 2025-03-07 | Stop reason: HOSPADM

## 2025-03-07 RX ORDER — CYCLOBENZAPRINE HCL 10 MG
10 TABLET ORAL 3 TIMES DAILY
Qty: 21 TABLET | Refills: 0 | Status: SHIPPED | OUTPATIENT
Start: 2025-03-07 | End: 2025-03-14

## 2025-03-07 RX ORDER — OXYCODONE HYDROCHLORIDE 5 MG/1
5 TABLET ORAL EVERY 4 HOURS PRN
Status: DISCONTINUED | OUTPATIENT
Start: 2025-03-07 | End: 2025-03-07 | Stop reason: HOSPADM

## 2025-03-07 RX ORDER — CLINDAMYCIN PHOSPHATE 900 MG/50ML
900 INJECTION, SOLUTION INTRAVENOUS ONCE
Status: COMPLETED | OUTPATIENT
Start: 2025-03-07 | End: 2025-03-07

## 2025-03-07 RX ORDER — GABAPENTIN 300 MG/1
300 CAPSULE ORAL ONCE
Status: COMPLETED | OUTPATIENT
Start: 2025-03-07 | End: 2025-03-07

## 2025-03-07 RX ORDER — PHENYLEPHRINE HCL IN 0.9% NACL 1 MG/10 ML
SYRINGE (ML) INTRAVENOUS AS NEEDED
Status: DISCONTINUED | OUTPATIENT
Start: 2025-03-07 | End: 2025-03-07

## 2025-03-07 RX ORDER — ACETAMINOPHEN 325 MG/1
975 TABLET ORAL ONCE
Status: COMPLETED | OUTPATIENT
Start: 2025-03-07 | End: 2025-03-07

## 2025-03-07 RX ORDER — OXYCODONE HYDROCHLORIDE 5 MG/1
5 TABLET ORAL EVERY 6 HOURS PRN
Qty: 10 TABLET | Refills: 0 | Status: SHIPPED | OUTPATIENT
Start: 2025-03-07

## 2025-03-07 RX ORDER — DEXAMETHASONE SODIUM PHOSPHATE 10 MG/ML
INJECTION, SOLUTION INTRAMUSCULAR; INTRAVENOUS AS NEEDED
Status: DISCONTINUED | OUTPATIENT
Start: 2025-03-07 | End: 2025-03-07

## 2025-03-07 RX ORDER — PROPOFOL 10 MG/ML
INJECTION, EMULSION INTRAVENOUS AS NEEDED
Status: DISCONTINUED | OUTPATIENT
Start: 2025-03-07 | End: 2025-03-07

## 2025-03-07 RX ORDER — TRANEXAMIC ACID 10 MG/ML
INJECTION, SOLUTION INTRAVENOUS AS NEEDED
Status: DISCONTINUED | OUTPATIENT
Start: 2025-03-07 | End: 2025-03-07

## 2025-03-07 RX ADMIN — ENOXAPARIN SODIUM 40 MG: 40 INJECTION SUBCUTANEOUS at 07:03

## 2025-03-07 RX ADMIN — FENTANYL CITRATE 50 MCG: 50 INJECTION INTRAMUSCULAR; INTRAVENOUS at 12:07

## 2025-03-07 RX ADMIN — ACETAMINOPHEN 975 MG: 325 TABLET ORAL at 07:00

## 2025-03-07 RX ADMIN — MIDAZOLAM 2 MG: 1 INJECTION INTRAMUSCULAR; INTRAVENOUS at 07:40

## 2025-03-07 RX ADMIN — ROCURONIUM BROMIDE 50 MG: 10 INJECTION, SOLUTION INTRAVENOUS at 07:48

## 2025-03-07 RX ADMIN — LIDOCAINE HYDROCHLORIDE 100 MG: 20 INJECTION, SOLUTION EPIDURAL; INFILTRATION; INTRACAUDAL at 07:48

## 2025-03-07 RX ADMIN — OXYCODONE HYDROCHLORIDE 5 MG: 5 TABLET ORAL at 13:02

## 2025-03-07 RX ADMIN — SODIUM CHLORIDE, SODIUM LACTATE, POTASSIUM CHLORIDE, AND CALCIUM CHLORIDE: .6; .31; .03; .02 INJECTION, SOLUTION INTRAVENOUS at 09:38

## 2025-03-07 RX ADMIN — TRANEXAMIC ACID 1000 MG: 10 INJECTION, SOLUTION INTRAVENOUS at 09:04

## 2025-03-07 RX ADMIN — SODIUM CHLORIDE 125 ML/HR: 0.9 INJECTION, SOLUTION INTRAVENOUS at 07:13

## 2025-03-07 RX ADMIN — HYDROMORPHONE HYDROCHLORIDE 0.5 MG: 1 INJECTION, SOLUTION INTRAMUSCULAR; INTRAVENOUS; SUBCUTANEOUS at 09:49

## 2025-03-07 RX ADMIN — DEXAMETHASONE SODIUM PHOSPHATE 10 MG: 10 INJECTION, SOLUTION INTRAMUSCULAR; INTRAVENOUS at 07:48

## 2025-03-07 RX ADMIN — CLINDAMYCIN PHOSPHATE 900 MG: 900 INJECTION, SOLUTION INTRAVENOUS at 07:55

## 2025-03-07 RX ADMIN — PROPOFOL 160 MG: 10 INJECTION, EMULSION INTRAVENOUS at 07:48

## 2025-03-07 RX ADMIN — ONDANSETRON 4 MG: 2 INJECTION INTRAMUSCULAR; INTRAVENOUS at 10:43

## 2025-03-07 RX ADMIN — Medication 20 MG: at 10:41

## 2025-03-07 RX ADMIN — GABAPENTIN 300 MG: 300 CAPSULE ORAL at 07:01

## 2025-03-07 RX ADMIN — Medication 50 MCG: at 09:14

## 2025-03-07 RX ADMIN — Medication 30 MG: at 09:01

## 2025-03-07 RX ADMIN — Medication 50 MCG: at 10:43

## 2025-03-07 RX ADMIN — ACETAMINOPHEN 1000 MG: 10 INJECTION INTRAVENOUS at 13:06

## 2025-03-07 RX ADMIN — Medication 50 MCG: at 10:27

## 2025-03-07 RX ADMIN — FENTANYL CITRATE 100 MCG: 50 INJECTION INTRAMUSCULAR; INTRAVENOUS at 07:48

## 2025-03-07 RX ADMIN — PROPOFOL 60 MCG/KG/MIN: 10 INJECTION, EMULSION INTRAVENOUS at 07:57

## 2025-03-07 RX ADMIN — FENTANYL CITRATE 50 MCG: 50 INJECTION INTRAMUSCULAR; INTRAVENOUS at 12:38

## 2025-03-07 RX ADMIN — ONDANSETRON 4 MG: 2 INJECTION INTRAMUSCULAR; INTRAVENOUS at 14:47

## 2025-03-07 RX ADMIN — Medication 50 MCG: at 09:12

## 2025-03-07 RX ADMIN — GLYCOPYRROLATE 0.1 MG: 0.2 INJECTION, SOLUTION INTRAMUSCULAR; INTRAVENOUS at 08:42

## 2025-03-07 RX ADMIN — Medication 100 MCG: at 07:55

## 2025-03-07 RX ADMIN — HYDROMORPHONE HYDROCHLORIDE 0.5 MG: 1 INJECTION, SOLUTION INTRAMUSCULAR; INTRAVENOUS; SUBCUTANEOUS at 08:20

## 2025-03-07 NOTE — OP NOTE
OPERATIVE REPORT  PATIENT NAME: Ina Morejon    :  1960  MRN: [id number]  Pt Location: [facility] ROOM 02    SURGERY DATE: 3/7/2025    Surgeons and Role:     * Crescencio Burkett MD - Primary     * Criss Wilson PA-C - Assisting, no qualified resident available    Preop Diagnosis:  Personal history of breast cancer [Z85.3] with significant breast asymmetry s/p left lumpectomy with radiation  Macromastia  Chronic neck, back and shoulder pain with chronic rashes refractory to medical managment      Post-Op Diagnosis Codes:     * Personal history of breast cancer [Z85.3]  As above      Procedure(s):  Bilateral - BILATERAL BREAST REDUCTION FOR SYMMETRY S/P LUMPECTOMY with inferior pedicle, bilateral pectoralis block with exparel, bilateral application of VAISHALI non dme negative pressure wound therapy dressing, each less than 50cmsq, total less than 50cmsq    Specimen(s):  ID Type Source Tests Collected by Time Destination   1 : see comments Tissue Breast, Right TISSUE EXAM Crescencio Burkett MD 3/7/2025  9:19 AM    2 : see comments Tissue Breast, Left TISSUE EXAM Crescencio Bukrett MD 3/7/2025  9:54 AM    3 : Left breast lumpectomy scar Tissue Breast, Left TISSUE EXAM Crescencio Burkett MD 3/7/2025 10:17 AM        Estimated Blood Loss:   Minimal    Drains:  Closed/Suction Drain Right Breast Bulb 15 Fr. (Active)   Number of days: 0       Closed/Suction Drain Left Breast Bulb 15 Fr. (Active)   Number of days: 0       Anesthesia Type:   General    Operative Indications:  Personal history of breast cancer [Z85.3]  As above    Operative Findings:  Dictated      Complications:   None    Procedure and Technique:        Indication is operative details.  Ms. May is a 64-year-old female known to me for previous consultation for symptomatic macromastia and significant breast asymmetry resultant from a lumpectomy and radiation to the left breast.  We had discussed the risks, benefits, alternatives and options  of breast reduction.  I discussed with patient the risks, benefits, alternatives of breast reduction surgery including risk of bleeding, infection, scarring, poor wound healing, demonstrating an underlying structures, need for further surgical need for multiple procedures, I discussed with her the risk of loss of nipple, nipple necrosis, partial nipple necrosis, seroma, DVT, change in nipple sensation, asymmetry, poor aesthetic result, need for further surgery, need for revision, high risk of poor wound healing at the triple point, the effect on breast-feeding, I discussed the patient's breasts are within the limits of normal asymmetry now and will likely be asymmetric post surgery within normal limits, I discussed with her the possibility for the need for re-reduction over time, we discussed the expected hospital stay, the expected recovery time, I discussed with her the use of drains, the use of candi non DME negative pressure wound therapy dressings.   Because of the constellation chronicity of her symptoms I believe that this was a medically necessary procedure.  We discussed that she was high risk for wound healing complication given her history of radiation as well as loss of the nipple areolar complex.  All the patient's questions were answered to her satisfaction, informed consent was obtained and signed and the patient was brought to the hospital as an outpatient on elective basis have the procedure performed.    She is brought to the preoperative holding area where she was given preoperative antibiotics, Lovenox, and her SCDs were activated.  She was then brought to the OR identified verbally, by site, and by armband on the operating room table prior to the induction of anesthesia.  After the induction of anesthesia she was prepped and draped in the standard surgical fashion, time-out was performed the surgical site identified.    At that time the procedure began on the right side where the Whipple fernandez had  been previously marked in the holding area.  The new nipple position was marked at 20  cm, with 8 cm limbs.  It was determined that a 8 cm inferior pedicle would be used, at this time the Whipple pattern was incised down to the dermis with a 15 blade.  A round hole pattern and a 38 mm cookie cutter was used to rachell the new areolar reduction.  Once this was performed a towel clamp was placed on the superior portion of the proposed excision pattern.  The breast was retracted towards the ceiling and a Penrose was used as a tourniquet around the breast and clamped in place with a Debbie clamp.  Once this was performed the entire inferior pedicle was de-epithelialized with a combination of a 15 blade, a 10 blade, and scissors.  Once the pedicle was de-epithelialized.  Bovie electrocautery was then used to begin the dissection, dissection was started at the limbs keeping a 1.5 cm cuff of fat on the flaps.  Bovie electrocautery was used to deepen the dissection.  Allis clamps were placed nontraumatically on the flap in order to keep the associated fat and dermis at the triple point once this was performed dissection was continued down to the chest wall taking special care and attention to the thickness of the flaps keep consistent and to ensure adequate thickness for proper blood supply.  Once the chest wall was reached dissection was.  The level of pectoralis fascia.  At this time the lateral portion of the Wise pattern excision was incised with the Bovie electrocautery, the lateral triangular shaped area tissue was excised using Bovie electrocautery taking care to not retract pedicle so as to avoid undermining the blood supply.  Once this was resected was handed off for specimen, attention was then turned to the medial portion of the chest wall that was also the skin and down to the level of the fat.  The superior portion of the Whipple pattern was then also excised with Bovie electrocautery.  At this time the pedicle was  inspected, a medial portion of the pedicle was marked for excision in order to retain shape at the medial pole.  This was also excised and sent for specimen.  The wound was then copiously irrigated and the 1st pass and hemostasis was obtained with Bovie electrocautery.  Using a combination of Bovie electrocautery and scissor dissection an area of bulk was taken off the posterior portion of the pedicle.  Once this was done the pedicle had adequate shape and contour, the nipple was intact vascularly.  Attention was then taken to the flap that was minimally undermined so as to maintain upper pole projection.  Allis clamps were used to debulk the flaps with combination of sharp dissection and Bovie electrocautery again maintaining adequate thickness to flaps.  The in the area with irrigated and Bovie a cautery was used to obtain meticulous hemostasis.  At this point a 15 Danish Renard drain was placed, and ex parole pectoral block was performed under direct visualization.  The wound was then temporarily closed in the exact same fashion the procedure was performed on the left side.  Should be noted that the right  side was significantly larger than the left .  The excision on the right side totaled 660  g. Attention was then turned to the left side where the exact same fashion the procedures performed.  However on the left side, the pedicle and skin flaps were intentionally left on the thicker/bulky side in order to maintain adequate blood supply.307  The excision measured 307 g on the left side.  At that time the wound temporary closed and the patient was sat up to assess for symmetry.  It was determined the patient had excellent shape and symmetry, the patient was then sent back in the supine position and the wounds were closed with a combination of 3-0 Monocryl in the deep dermal layer, 3-0 Stratafix in the deep dermal and subcuticular layer.  Once the closure was performed the vertical and horizontal limbs of the Wise  pattern the new nipple position was marked.  A to rachell the new nipple to in at the area of the most projection of the breast which was also proximally 5 cm from the inferior mammary fold to the lower border of the areola.  Once this was performed a suture was placed in the sternal notch in order to assess for symmetry, once the nipple position was marked for symmetry it was excised with a 15 blade for starting on the right side, and Bovie electrocautery was used to obtain hemostasis.  It was then excised on the left side as well.  Once this was performed the nipple was inset with combination of 3-0 Monocryl in a deep dermal layer and Stratafix in the deep dermal and subcuticular layer.  Patient was once again sat up, the patient excellent shape and symmetry.  At this time the drains were sutured into place, because of the high risk of wound healing complication at the triple point, a candi non DME negative pressure wound therapy dressing was applied.  The patient tolerated the procedure well without complication, she has an extubated and sent to PACU or we will monitor her status, she will likely be discharged today.  Criss Chavez, PAC assistance was necessary as no qualified resident was available, assistant was necessary for flap elevation, retraction, exposure given the patient's body habitus and wound closure.           I was present for the entire procedure.    Patient Disposition:  PACU              SIGNATURE: Crescencio Burkett MD  DATE: March 7, 2025  TIME: 11:18 AM

## 2025-03-07 NOTE — PROGRESS NOTES
Patient a bit drowsy and with underlying nausea.  Did vomit 100cc and medicated.  Patient very insists on going home.  States  is comfortable with these surgeries, drains and dressings.

## 2025-03-07 NOTE — NURSING NOTE
"Detailed discharge instructions given to patient and .  Pt's  states he is comfortable with all \"this stuff\" because he's had 20 surgeries.  Pt's  refused return demonstration of CECILY draining, and also insisting on going home.  I emphatically instructed him on recording the CECILY amounts for each side individually and he nodded he would do so.  He is also Aware that his wife has underlying nausea and he still wants to take her home.  I encouraged him to have addition help to get into the house because she is a bit wobbly and for safety would be better to have 2 people to get up the few steps into her home. Detailed pain management reviewed inclusive of times for next dose.  Did not want instructions on Lovenox for the next morning.  He is \"comfortable\" with that as well.  Also told him it would be best if he had another person to  RX.   "

## 2025-03-07 NOTE — H&P
Progress Notes  Criss Wilson PA-C (Physician Assistant)  Plastic Surgery  Expand All Collapse All     Patient ID: Ina Morejon is a 64 y.o. female.         Vitals:     02/19/25 1452   BP: 136/70   Pulse: 94   Resp: 20   Temp: 97.5 °F (36.4 °C)   SpO2: 99%         HPI Patient is a very pleasant 64 female who is here today for preoperative H&P for bilateral breast reduction surgery on 3/7/25. Patient had a lumpectomy approximately 3 years ago and subsequent radiation on the left side, this has left her with a significant breast asymmetry with her right breast being much larger than her left.  The patient states that she had previously considered breast reduction surgery, she has symptoms compatible with symptomatic macromastia.  The patient has had large breasts for her entire life, however they have increased in size over time.  She currently complains of neck pain, back pain, shoulder pain, grooving from her bra strap, she does report rashes, she has tried multiple over-the-counter as well as prescription treatments without any relief of her symptoms.    She has a history of breast cancer, she is non smoker, she has not felt any recent lumps or masses in her breasts, she does not take steroids or blood thinners.  She is up to date with her mammograms. Denies a h/o blood clots. She takes arimidex.      The following portions of the patient's history were reviewed and updated as appropriate: allergies, current medications, past family history, past medical history, past social history, past surgical history, and problem list.     Review of Systems   Musculoskeletal:  Positive for back pain, neck pain and neck stiffness.   Skin:  Positive for rash and wound.         Objective  Physical Exam   Vitals reviewed.  Constitutional  She appears well-developed and well-nourished.      Eyes  Pupils are equal, round, and reactive to light. System normal.      General -             Right: Right eye extraocular  movements are normal.             Left: Left eye extraocular movements are normal.      Cardiovascular: Normal rate.      Pulmonary/Chest  Effort normal.      Skin  Skin is warm and dry.   She has acute and chronic inflammation and irritation under her breast but no acute infection.        Psychiatric  She has a normal mood and affect. Her behavior is normal. Judgment and thought content normal.         Bilateral breasts- large volumetric asymmetry with left breast much smaller than right with radiation changes upper outer quadrant of her left breast, no palpable masses or adenopathy.  Bilateral grade III ptosis with large pendulous breasts  Measurements- R- SN 30 NIMF 12                             L- SN 26 NIMF10 NN 22             Medical History        Past Medical History:   Diagnosis Date    Acute cystitis 03/02/2018    Asthma       has not been treated for seveeral years  exercise induced    BRCA1 negative      Breast cancer (HCC) 10/2020     left sided    Cancer (HCC)       breast  9/20    Cellulitis of left upper extremity 02/23/2021    Chemotherapy induced neutropenia (HCC) 10/30/2020    Exercise-induced asthma 11/03/2016    Heart murmur 2015    History of chemotherapy 11/2020    Hx of radiation therapy 03/2021    Urinary tract infection           Problem List       Patient Active Problem List   Diagnosis    Right hip pain    Vaginal atrophy    BMI 26.0-26.9,adult    Vitamin D deficiency    History of Meniere's disease    Exercise-induced asthma    Impaired fasting glucose    Low serum high density lipoprotein (HDL)    Menopausal symptoms    Nevus, atypical    Systolic murmur    Benign paroxysmal vertigo    At high risk for breast cancer    Introital dyspareunia    KEIKO (stress urinary incontinence, female)    Osteopenia    Primary cancer of upper outer quadrant of left female breast (HCC)    Inflamed seborrheic keratosis    Long term current use of selective estrogen receptor modulators (SERMs)     Dermatitis    History of recurrent UTIs    Weight gain    Candidal dermatitis         Surgical History         Past Surgical History:   Procedure Laterality Date    BREAST CYST EXCISION Left       benign    BREAST CYST EXCISION Left       benign    BREAST SURGERY        CHOLECYSTECTOMY        HYSTERECTOMY        IR PORT PLACEMENT   10/26/2020    IR PORT REMOVAL   2/4/2021    MAMMO NEEDLE LOCALIZATION LEFT (ALL INC) Left 10/2/2020    MOUTH SURGERY        PA MASTECTOMY PARTIAL Left 10/2/2020     Procedure: BREAST NEEDLE LOCALIZED LUMPECTOMY, SENTINEL LYMPH NODE BIOPSY (Bracketed U/S guided needle loc @ 8:00, INJECT AT 1045);  Surgeon: Ti Magana MD;  Location:  MAIN OR;  Service: General    TUBAL LIGATION        US GUIDED BREAST BIOPSY LEFT COMPLETE Left 9/11/2020    WISDOM TOOTH EXTRACTION                  Current Outpatient Medications   Medication Instructions    albuterol (ProAir HFA) 90 mcg/act inhaler 2 puffs, Inhalation, Every 6 hours PRN    anastrozole (ARIMIDEX) 1 mg, Oral, Daily    ascorbic acid (VITAMIN C) 250 mg, Daily    Biotin 10 MG TABS Take by mouth    BIOTIN PO Take by mouth    Black Pepper-Turmeric (Turmeric Curcumin) 5-1000 MG CAPS Take by mouth    calcium carbonate (TUMS) 500 mg chewable tablet 1 tablet, Daily    ibandronate (BONIVA) 150 mg, Oral, Every 30 days    nystatin (MYCOSTATIN) powder Topical, 2 times daily    VITAMIN D, ERGOCALCIFEROL, PO Take by mouth 3 days a week         Social History          Social History Narrative     Daily caffeine consumption, 1 serving a day     Exercises 3-4 times per week      x 8 years      Tobacco Use History   Social History          Tobacco Use   Smoking Status Never   Smokeless Tobacco Never            Assessment & Plan 65yo female with breast asymmetry s/p left breast lumpectomy with symptomatic macromastia     Hold Arimidex      EKG WNL  CXR reviewed   Mammo WNL 10/1/24  Labs reviewed     Today I discussed the surgery in detail with the  patient regarding the expected length of surgery, the expected hospital stay, and the expected recovery time. We discussed the potential risks and complications such as bleeding, infection, scarring, hematoma, seroma, asymmetry, contour deformity, skin necrosis, nipple necrosis, damage to surrounding structures, wound healing complications, and need for additional procedures. Discussed higher risk of wound healing complications of left radiated breast. The use of drains and VAISHALI dressing were discussed. Postoperative activity restrictions and medications were discussed. The patient understands and agrees to the plan of care and does not have any additional questions at this time. Informed consent was obtained today for the upcoming surgery.        Criss Wilson PA-C  Plastic & Reconstructive Surgery    Discussed the risks, benefits and alternatives as above.  All her questions answered to her satisfaction.  Informed consent obtained and signed.  Will proceed to OR as scheduled.

## 2025-03-07 NOTE — ANESTHESIA PREPROCEDURE EVALUATION
Procedure:  BILATERAL BREAST REDUCTION FOR SYMMETRY S/P LUMPECTOMY (Bilateral: Breast)    Relevant Problems   ANESTHESIA (within normal limits)      CARDIO   (+) Systolic murmur   (-) NAYAK (dyspnea on exertion)      GYN   (+) Primary cancer of upper outer quadrant of left female breast (HCC) (S/p lumpectomy 2022 and radiation)      NEURO/PSYCH   (-) CVA (cerebral vascular accident) (HCC)   (-) Seizures (HCC)      PULMONARY   (+) Exercise-induced asthma (Well-controlled, rare inhaler use)      TTE (2016):   - LVEF 60%, trace MR, trace AR, trace TR, ID    Physical Exam    Airway    Mallampati score: II  TM Distance: >3 FB  Neck ROM: full     Dental   No notable dental hx     Cardiovascular  Cardiovascular exam normal    Pulmonary  Pulmonary exam normal     Other Findings  post-pubertal.      Anesthesia Plan  ASA Score- 2     Anesthesia Type- general with ASA Monitors.         Additional Monitors:     Airway Plan: ETT.           Plan Factors-Exercise tolerance (METS): >4 METS.    Chart reviewed. EKG reviewed. Imaging results reviewed. Existing labs reviewed. Patient summary reviewed.    Patient is not a current smoker.      Obstructive sleep apnea risk education given perioperatively.        Induction- intravenous.    Postoperative Plan- Plan for postoperative opioid use. Planned trial extubation        Informed Consent- Anesthetic plan and risks discussed with patient.  I personally reviewed this patient with the CRNA. Discussed and agreed on the Anesthesia Plan with the CRNA..      NPO Status:  Vitals Value Taken Time   Date of last liquid 03/07/25 03/07/25 0655   Time of last liquid 0530 03/07/25 0655   Date of last solid 03/06/25 03/07/25 0655   Time of last solid 1800 03/07/25 0655

## 2025-03-07 NOTE — ANESTHESIA POSTPROCEDURE EVALUATION
Post-Op Assessment Note    CV Status:  Stable  Pain Score: 5    Pain management: satisfactory to patient    Multimodal analgesia used between 6 hours prior to anesthesia start to PACU discharge    Mental Status:  Alert and awake   Hydration Status:  Euvolemic   PONV Controlled:  Controlled   Airway Patency:  Patent     Post Op Vitals Reviewed: Yes    No anethesia notable event occurred.    Staff: Anesthesiologist           Last Filed PACU Vitals:  Vitals Value Taken Time   Temp 97.6 °F (36.4 °C) 03/07/25 1145   Pulse 69 03/07/25 1258   /58 03/07/25 1250   Resp 15 03/07/25 1258   SpO2 100 % 03/07/25 1258   Vitals shown include unfiled device data.    Modified Zackary:     Vitals Value Taken Time   Activity 2 03/07/25 1250   Respiration 2 03/07/25 1250   Circulation 2 03/07/25 1250   Consciousness 1 03/07/25 1250   Oxygen Saturation 1 03/07/25 1250     Modified Zackary Score: 8

## 2025-03-07 NOTE — DISCHARGE INSTR - AVS FIRST PAGE
Breast Reduction Discharge Instructions:     Keep VAISHALI dressings on until seen at your follow up appointment.   If the light is blinking green, the battery pack is functioning properly. Buzzing is normal.   If the light blinks orange, hit the orange button to re-establish the seal, this will usually correct the problem, if the light continues to blink orange, the dressing will still continue to function.  Call the office if the dressing becomes completely saturated.  Do NOT get VAISHALI dressings wet.    2.         Milk the drain tubing using an alcohol wipe and empty the bulbs every 8 hours or as necessary as shown (uncap, drain fluid, record amount, discard, squeeze air out, recap). Keep track of total output from each drain per every 24 hours.   The drains will typically stay in 1-2 weeks post-operatively.  NO showering with drains in place.     3. Keep surgical bra on at all times, ok to place ABD pads between VAISHALI dressings and bra for comfort, ok to open bra for comfort while in bed for short breaks.    4. No ice or heat to breasts.      5. No pushing, pulling, or lifting more than 10lbs, repetitive arm motions, or strenuous activity for 4-6 weeks post-operatively.         All Surgeries     You must be off all pain medications and have a clear field of vision before driving  For the next 24 hours:  Do not sign any legal documents or operate machinery.  Have a responsible adult help you.  Take it easy & rest.  Clear liquids first, if no nausea, progress to soft diet, and then regular diet as tolerated.  Take medications as ordered, and do not take any pain medication on an empty stomach. Once pain meds are finished you may alternate Tylenol & Advil as directed for pain  Make sure to take all antibiotics until completion  If lovenox or heparin was prescribed, use as directed until completion  Avoid alcoholic beverages.  Resume any prior medications at home unless otherwise instructed by Dr. Burkett.  Call   [facility] at [phone] -office,  Obvious bleeding, excessive swelling, and tenderness  Hardness of face on palpation  Swelling & hardness at eyelids.  Fever over 101.0°   Shortness of breath, severe calf or thigh pain.  Redness, odor, or pus at the wound {some oozing is normal from incision sites and may continue for several days ABD pads or feminine pads can be used for absorption )  Persistent vomiting or pain that is not relieved by your medication.  To make your follow-up appointment , ask a question, or report a problem

## 2025-03-07 NOTE — PROGRESS NOTES
Patient marked in pre-operative holding area.  Reviewed markings with patient and the planned excision/reconstruction.  All her questions were answered to her satisfaction, will proceed to OR as scheduled.     no

## 2025-03-11 ENCOUNTER — OFFICE VISIT (OUTPATIENT)
Age: 65
End: 2025-03-11

## 2025-03-11 VITALS
BODY MASS INDEX: 29.59 KG/M2 | WEIGHT: 167 LBS | HEART RATE: 85 BPM | DIASTOLIC BLOOD PRESSURE: 87 MMHG | OXYGEN SATURATION: 99 % | SYSTOLIC BLOOD PRESSURE: 152 MMHG | TEMPERATURE: 98.8 F | HEIGHT: 63 IN

## 2025-03-11 DIAGNOSIS — Z48.89 ENCOUNTER FOR FOLLOW-UP CARE INVOLVING PLASTIC SURGERY: Primary | ICD-10-CM

## 2025-03-11 PROCEDURE — 99024 POSTOP FOLLOW-UP VISIT: CPT | Performed by: PHYSICIAN ASSISTANT

## 2025-03-11 NOTE — PROGRESS NOTES
Plastic Surgery Follow Up Note:     HPI: The patient is a 64-year-old female presenting for a postoperative follow-up status post breast reduction for symmetry status post left lumpectomy.  The patient had surgery on 3/7/2025.  She is doing well overall today, she denies any significant pain, fever, chills, or bleeding.  The patient reports that both of her drains are putting out more than 30 cc daily.  She has been resting, wearing surgical bra, and taking postoperative medications as prescribed.  She has no concerns today. She is pleased with her results.     Past Medical History:   Diagnosis Date    Acute cystitis 03/02/2018    Asthma     has not been treated for seveeral years  exercise induced    BRCA1 negative     Breast cancer (HCC) 10/2020    left sided    Cancer (HCC)     breast  9/20    Cellulitis of left upper extremity 02/23/2021    Chemotherapy induced neutropenia (HCC) 10/30/2020    Exercise-induced asthma 11/03/2016    Heart murmur 2015    History of chemotherapy 11/2020    Hx of radiation therapy 03/2021    Urinary tract infection        Patient Active Problem List   Diagnosis    Right hip pain    Vaginal atrophy    BMI 26.0-26.9,adult    Vitamin D deficiency    History of Meniere's disease    Exercise-induced asthma    Impaired fasting glucose    Low serum high density lipoprotein (HDL)    Menopausal symptoms    Nevus, atypical    Systolic murmur    Benign paroxysmal vertigo    At high risk for breast cancer    Introital dyspareunia    KEIKO (stress urinary incontinence, female)    Osteopenia    Primary cancer of upper outer quadrant of left female breast (HCC)    Inflamed seborrheic keratosis    Long term current use of selective estrogen receptor modulators (SERMs)    Dermatitis    History of recurrent UTIs    Weight gain    Candidal dermatitis         Current Outpatient Medications:     acetaminophen (TYLENOL) 500 mg tablet, Take 2 tablets (1,000 mg total) by mouth every 6 (six) hours for 7 days,  Disp: 56 tablet, Rfl: 0    albuterol (ProAir HFA) 90 mcg/act inhaler, Inhale 2 puffs every 6 (six) hours as needed for wheezing or shortness of breath, Disp: 8.5 g, Rfl: 0    anastrozole (ARIMIDEX) 1 mg tablet, Take 1 tablet (1 mg total) by mouth daily, Disp: 90 tablet, Rfl: 3    ascorbic acid (VITAMIN C) 250 mg tablet, Take 250 mg by mouth daily, Disp: , Rfl:     betamethasone dipropionate (DIPROSONE) 0.05 % cream, Apply topically 2 (two) times a day .  Apply a thin film to affected area on the right side of forehead twice daily., Disp: , Rfl:     calcium carbonate (TUMS) 500 mg chewable tablet, Chew 1 tablet daily, Disp: , Rfl:     clindamycin (CLEOCIN) 300 MG capsule, Take 1 capsule (300 mg total) by mouth 3 (three) times a day for 7 days, Disp: 21 capsule, Rfl: 0    cyclobenzaprine (FLEXERIL) 10 mg tablet, Take 1 tablet (10 mg total) by mouth 3 (three) times a day for 7 days, Disp: 21 tablet, Rfl: 0    enoxaparin (Lovenox) 40 mg/0.4 mL, Inject 0.4 mL (40 mg total) under the skin in the morning for 7 days, Disp: 2.8 mL, Rfl: 0    gabapentin (Neurontin) 300 mg capsule, Take 1 capsule (300 mg total) by mouth 3 (three) times a day for 7 days, Disp: 21 capsule, Rfl: 0    hydrocortisone 2.5 % cream, Apply topically 2 (two) times a day, Disp: , Rfl:     ibandronate (BONIVA) 150 MG tablet, TAKE 1 TABLET ONCE A MONTH, Disp: 3 tablet, Rfl: 0    nystatin (MYCOSTATIN) powder, Apply topically 2 (two) times a day, Disp: 60 g, Rfl: 3    oxyCODONE (Roxicodone) 5 immediate release tablet, Take 1 tablet (5 mg total) by mouth every 6 (six) hours as needed for severe pain Max Daily Amount: 20 mg, Disp: 10 tablet, Rfl: 0    VITAMIN D, ERGOCALCIFEROL, PO, Take by mouth 3 days a week, Disp: , Rfl:     Past Surgical History:   Procedure Laterality Date    BREAST CYST EXCISION Left     benign    BREAST CYST EXCISION Left     benign    BREAST SURGERY      CHOLECYSTECTOMY      HYSTERECTOMY      IR PORT PLACEMENT  10/26/2020    IR PORT  REMOVAL  2/4/2021    MAMMO NEEDLE LOCALIZATION LEFT (ALL INC) Left 10/2/2020    MOUTH SURGERY      AL BREAST REDUCTION Bilateral 3/7/2025    Procedure: BILATERAL BREAST REDUCTION FOR SYMMETRY S/P LUMPECTOMY;  Surgeon: Crescencio Burkett MD;  Location: [facility];  Service: Plastics    AL MASTECTOMY PARTIAL Left 10/2/2020    Procedure: BREAST NEEDLE LOCALIZED LUMPECTOMY, SENTINEL LYMPH NODE BIOPSY (Bracketed U/S guided needle loc @ 8:00, INJECT AT 1045);  Surgeon: Ti Magana MD;  Location:  MAIN OR;  Service: General    TUBAL LIGATION      US GUIDED BREAST BIOPSY LEFT COMPLETE Left 9/11/2020    WISDOM TOOTH EXTRACTION         Social History     Tobacco Use    Smoking status: Never     Passive exposure: Never    Smokeless tobacco: Never   Substance Use Topics    Alcohol use: No       Objective:  Vitals:    03/11/25 1406   BP: 152/87   Pulse: 85   Temp: 98.8 °F (37.1 °C)   SpO2: 99%       Physical Exam:  General: NAD, alert, cooperative    Breasts: VAISHALI dressings removed without difficulty.  Bilateral incisions are clean, dry, and intact.  There is no evidence of nipple necrosis.  There is no evidence of hematoma or seroma formation.  There is no surrounding erythema, wound breakdown, drainage, or signs of infection.  Expected amount of postoperative edema and ecchymosis, within normal limits.  Bilateral drains intact and patent with serosanguineous output.        Assessment: 64-year-old female status post bilateral breast reduction status post left lumpectomy    Plan:  The patient is healing and recovering well from surgery, no signs of infection or hematoma. Continue drain care.   The patient may shower tomorrow then begin twice daily application of antibiotic ointment to her incisions.  Wipe drains with alcohol. Pad with ABDs as needed.  Surgical bra at all times.  Continue activity restrictions around-the-clock.  RTO 1 week for recheck.       Criss Wilson PA-C  Plastic & Reconstructive Surgery

## 2025-03-12 PROCEDURE — 88305 TISSUE EXAM BY PATHOLOGIST: CPT | Performed by: PATHOLOGY

## 2025-03-12 PROCEDURE — 88342 IMHCHEM/IMCYTCHM 1ST ANTB: CPT | Performed by: PATHOLOGY

## 2025-03-12 PROCEDURE — 88341 IMHCHEM/IMCYTCHM EA ADD ANTB: CPT | Performed by: PATHOLOGY

## 2025-03-18 ENCOUNTER — OFFICE VISIT (OUTPATIENT)
Age: 65
End: 2025-03-18

## 2025-03-18 VITALS
HEIGHT: 63 IN | OXYGEN SATURATION: 100 % | HEART RATE: 87 BPM | WEIGHT: 167 LBS | BODY MASS INDEX: 29.59 KG/M2 | SYSTOLIC BLOOD PRESSURE: 143 MMHG | DIASTOLIC BLOOD PRESSURE: 98 MMHG | TEMPERATURE: 98.2 F

## 2025-03-18 DIAGNOSIS — Z48.89 ENCOUNTER FOR FOLLOW-UP CARE INVOLVING PLASTIC SURGERY: Primary | ICD-10-CM

## 2025-03-18 PROCEDURE — 99024 POSTOP FOLLOW-UP VISIT: CPT | Performed by: PHYSICIAN ASSISTANT

## 2025-03-18 NOTE — PROGRESS NOTES
Plastic Surgery Follow Up Note:     HPI: The patient is a 64-year-old female presenting for a postoperative follow-up status post breast reduction for symmetry status post left lumpectomy.  The patient had surgery on 3/7/2025.  She is doing well overall today, she denies any significant pain, fever, chills, wounds, or bleeding. Does report itching b/l. The patient reports that both of her drains are putting out less than 30 cc daily.  She has been resting, wearing surgical bra, and applying ointment to her incisions.  She has no concerns today. She is pleased with her results.     Past Medical History:   Diagnosis Date    Acute cystitis 03/02/2018    Asthma     has not been treated for seveeral years  exercise induced    BRCA1 negative     Breast cancer (HCC) 10/2020    left sided    Cancer (HCC)     breast  9/20    Cellulitis of left upper extremity 02/23/2021    Chemotherapy induced neutropenia (HCC) 10/30/2020    Exercise-induced asthma 11/03/2016    Heart murmur 2015    History of chemotherapy 11/2020    Hx of radiation therapy 03/2021    Urinary tract infection      Patient Active Problem List   Diagnosis    Right hip pain    Vaginal atrophy    BMI 26.0-26.9,adult    Vitamin D deficiency    History of Meniere's disease    Exercise-induced asthma    Impaired fasting glucose    Low serum high density lipoprotein (HDL)    Menopausal symptoms    Nevus, atypical    Systolic murmur    Benign paroxysmal vertigo    At high risk for breast cancer    Introital dyspareunia    KEIKO (stress urinary incontinence, female)    Osteopenia    Primary cancer of upper outer quadrant of left female breast (HCC)    Inflamed seborrheic keratosis    Long term current use of selective estrogen receptor modulators (SERMs)    Dermatitis    History of recurrent UTIs    Weight gain    Candidal dermatitis       Current Outpatient Medications:     albuterol (ProAir HFA) 90 mcg/act inhaler, Inhale 2 puffs every 6 (six) hours as needed for  wheezing or shortness of breath, Disp: 8.5 g, Rfl: 0    anastrozole (ARIMIDEX) 1 mg tablet, Take 1 tablet (1 mg total) by mouth daily, Disp: 90 tablet, Rfl: 3    ascorbic acid (VITAMIN C) 250 mg tablet, Take 250 mg by mouth daily, Disp: , Rfl:     betamethasone dipropionate (DIPROSONE) 0.05 % cream, Apply topically 2 (two) times a day .  Apply a thin film to affected area on the right side of forehead twice daily., Disp: , Rfl:     calcium carbonate (TUMS) 500 mg chewable tablet, Chew 1 tablet daily, Disp: , Rfl:     hydrocortisone 2.5 % cream, Apply topically 2 (two) times a day, Disp: , Rfl:     ibandronate (BONIVA) 150 MG tablet, TAKE 1 TABLET ONCE A MONTH, Disp: 3 tablet, Rfl: 0    nystatin (MYCOSTATIN) powder, Apply topically 2 (two) times a day, Disp: 60 g, Rfl: 3    oxyCODONE (Roxicodone) 5 immediate release tablet, Take 1 tablet (5 mg total) by mouth every 6 (six) hours as needed for severe pain Max Daily Amount: 20 mg, Disp: 10 tablet, Rfl: 0    VITAMIN D, ERGOCALCIFEROL, PO, Take by mouth 3 days a week, Disp: , Rfl:     cyclobenzaprine (FLEXERIL) 10 mg tablet, Take 1 tablet (10 mg total) by mouth 3 (three) times a day for 7 days, Disp: 21 tablet, Rfl: 0    enoxaparin (Lovenox) 40 mg/0.4 mL, Inject 0.4 mL (40 mg total) under the skin in the morning for 7 days, Disp: 2.8 mL, Rfl: 0    gabapentin (Neurontin) 300 mg capsule, Take 1 capsule (300 mg total) by mouth 3 (three) times a day for 7 days, Disp: 21 capsule, Rfl: 0    Past Surgical History:   Procedure Laterality Date    BREAST CYST EXCISION Left     benign    BREAST CYST EXCISION Left     benign    BREAST SURGERY      CHOLECYSTECTOMY      HYSTERECTOMY      IR PORT PLACEMENT  10/26/2020    IR PORT REMOVAL  2/4/2021    MAMMO NEEDLE LOCALIZATION LEFT (ALL INC) Left 10/2/2020    MOUTH SURGERY      GA BREAST REDUCTION Bilateral 3/7/2025    Procedure: BILATERAL BREAST REDUCTION FOR SYMMETRY S/P LUMPECTOMY;  Surgeon: Crescencio Burkett MD;  Location: [facility]  OR;  Service: Plastics    CT MASTECTOMY PARTIAL Left 10/2/2020    Procedure: BREAST NEEDLE LOCALIZED LUMPECTOMY, SENTINEL LYMPH NODE BIOPSY (Bracketed U/S guided needle loc @ 8:00, INJECT AT 1045);  Surgeon: Ti Magana MD;  Location:  MAIN OR;  Service: General    TUBAL LIGATION      US GUIDED BREAST BIOPSY LEFT COMPLETE Left 9/11/2020    WISDOM TOOTH EXTRACTION       Social History     Tobacco Use    Smoking status: Never     Passive exposure: Never    Smokeless tobacco: Never   Substance Use Topics    Alcohol use: No     Objective:  Vitals:    03/18/25 0926   BP: 143/98   Pulse: 87   Temp: 98.2 °F (36.8 °C)   SpO2: 100%     Physical Exam:  General: NAD, alert, cooperative    Breasts: Bilateral incisions are clean, dry, and intact.  There is no evidence of nipple necrosis.  There is no evidence of hematoma or seroma formation.  There is no surrounding erythema, wound breakdown, drainage, or signs of infection.  Expected amount of postoperative edema and ecchymosis, within normal limits.  Bilateral drains intact and patent with serosanguineous output.  Drains removed b/l.      Assessment: 64-year-old female status post bilateral breast reduction status post left lumpectomy      Plan:  The patient is healing and recovering well from surgery, no signs of infection or hematoma.   The patient may continue twice daily application of ointment to her incisions. Surgical bra at all times.  Continue activity restrictions around-the-clock.    RTO 2 weeks for recheck.   Does not want any refills.           Criss Wilson PA-C  Plastic & Reconstructive Surgery

## 2025-04-02 ENCOUNTER — OFFICE VISIT (OUTPATIENT)
Dept: PLASTIC SURGERY | Facility: CLINIC | Age: 65
End: 2025-04-02

## 2025-04-02 VITALS
HEART RATE: 82 BPM | TEMPERATURE: 98.1 F | OXYGEN SATURATION: 98 % | DIASTOLIC BLOOD PRESSURE: 80 MMHG | SYSTOLIC BLOOD PRESSURE: 138 MMHG | HEIGHT: 63 IN | WEIGHT: 167 LBS | RESPIRATION RATE: 20 BRPM | BODY MASS INDEX: 29.59 KG/M2

## 2025-04-02 DIAGNOSIS — Z48.89 ENCOUNTER FOR FOLLOW-UP CARE INVOLVING PLASTIC SURGERY: Primary | ICD-10-CM

## 2025-04-02 PROCEDURE — 99024 POSTOP FOLLOW-UP VISIT: CPT | Performed by: PHYSICIAN ASSISTANT

## 2025-04-02 NOTE — PROGRESS NOTES
Plastic Surgery Follow Up Note:     HPI: The patient is a 64-year-old female presenting for a postoperative follow-up status post breast reduction for symmetry status post left lumpectomy.  The patient had surgery on 3/7/2025.  She is doing well overall today, she denies any significant pain, fever, chills, wounds, or bleeding. She has been resting, wearing surgical bra, and applying ointment to her incisions. She reports some sensitivity under right breast and residual swelling, but notes it is improving.     Past Medical History:   Diagnosis Date    Acute cystitis 03/02/2018    Asthma     has not been treated for seveeral years  exercise induced    BRCA1 negative     Breast cancer (HCC) 10/2020    left sided    Cancer (HCC)     breast  9/20    Cellulitis of left upper extremity 02/23/2021    Chemotherapy induced neutropenia (HCC) 10/30/2020    Exercise-induced asthma 11/03/2016    Heart murmur 2015    History of chemotherapy 11/2020    Hx of radiation therapy 03/2021    Urinary tract infection      Patient Active Problem List   Diagnosis    Right hip pain    Vaginal atrophy    BMI 26.0-26.9,adult    Vitamin D deficiency    History of Meniere's disease    Exercise-induced asthma    Impaired fasting glucose    Low serum high density lipoprotein (HDL)    Menopausal symptoms    Nevus, atypical    Systolic murmur    Benign paroxysmal vertigo    At high risk for breast cancer    Introital dyspareunia    KEIKO (stress urinary incontinence, female)    Osteopenia    Primary cancer of upper outer quadrant of left female breast (HCC)    Inflamed seborrheic keratosis    Long term current use of selective estrogen receptor modulators (SERMs)    Dermatitis    History of recurrent UTIs    Weight gain    Candidal dermatitis       Current Outpatient Medications:     albuterol (ProAir HFA) 90 mcg/act inhaler, Inhale 2 puffs every 6 (six) hours as needed for wheezing or shortness of breath, Disp: 8.5 g, Rfl: 0    anastrozole  (ARIMIDEX) 1 mg tablet, Take 1 tablet (1 mg total) by mouth daily, Disp: 90 tablet, Rfl: 3    ascorbic acid (VITAMIN C) 250 mg tablet, Take 250 mg by mouth daily, Disp: , Rfl:     betamethasone dipropionate (DIPROSONE) 0.05 % cream, Apply topically 2 (two) times a day .  Apply a thin film to affected area on the right side of forehead twice daily., Disp: , Rfl:     calcium carbonate (TUMS) 500 mg chewable tablet, Chew 1 tablet daily, Disp: , Rfl:     hydrocortisone 2.5 % cream, Apply topically 2 (two) times a day, Disp: , Rfl:     ibandronate (BONIVA) 150 MG tablet, TAKE 1 TABLET ONCE A MONTH, Disp: 3 tablet, Rfl: 0    nystatin (MYCOSTATIN) powder, Apply topically 2 (two) times a day, Disp: 60 g, Rfl: 3    oxyCODONE (Roxicodone) 5 immediate release tablet, Take 1 tablet (5 mg total) by mouth every 6 (six) hours as needed for severe pain Max Daily Amount: 20 mg, Disp: 10 tablet, Rfl: 0    VITAMIN D, ERGOCALCIFEROL, PO, Take by mouth 3 days a week, Disp: , Rfl:     cyclobenzaprine (FLEXERIL) 10 mg tablet, Take 1 tablet (10 mg total) by mouth 3 (three) times a day for 7 days, Disp: 21 tablet, Rfl: 0    enoxaparin (Lovenox) 40 mg/0.4 mL, Inject 0.4 mL (40 mg total) under the skin in the morning for 7 days, Disp: 2.8 mL, Rfl: 0    gabapentin (Neurontin) 300 mg capsule, Take 1 capsule (300 mg total) by mouth 3 (three) times a day for 7 days, Disp: 21 capsule, Rfl: 0    Past Surgical History:   Procedure Laterality Date    BREAST CYST EXCISION Left     benign    BREAST CYST EXCISION Left     benign    BREAST SURGERY      CHOLECYSTECTOMY      HYSTERECTOMY      IR PORT PLACEMENT  10/26/2020    IR PORT REMOVAL  2/4/2021    MAMMO NEEDLE LOCALIZATION LEFT (ALL INC) Left 10/2/2020    MOUTH SURGERY      CO BREAST REDUCTION Bilateral 3/7/2025    Procedure: BILATERAL BREAST REDUCTION FOR SYMMETRY S/P LUMPECTOMY;  Surgeon: Crescencio Burkett MD;  Location: [facility];  Service: Plastics    CO MASTECTOMY PARTIAL Left 10/2/2020     Procedure: BREAST NEEDLE LOCALIZED LUMPECTOMY, SENTINEL LYMPH NODE BIOPSY (Bracketed U/S guided needle loc @ 8:00, INJECT AT 1045);  Surgeon: Ti Magana MD;  Location:  MAIN OR;  Service: General    TUBAL LIGATION      US GUIDED BREAST BIOPSY LEFT COMPLETE Left 9/11/2020    WISDOM TOOTH EXTRACTION       Social History     Tobacco Use    Smoking status: Never     Passive exposure: Never    Smokeless tobacco: Never   Substance Use Topics    Alcohol use: No     Objective:  Vitals:    04/02/25 0830   BP: 138/80   Pulse: 82   Resp: 20   Temp: 98.1 °F (36.7 °C)   SpO2: 98%     Physical Exam:  General: NAD, alert, cooperative    Breasts: Bilateral incisions are clean, dry, and intact.  There is no evidence of nipple necrosis.  There is no evidence of hematoma or seroma formation.  There is no surrounding erythema, wound breakdown, drainage, or signs of infection.  Expected amount of postoperative edema, within normal limits.        Assessment: 64-year-old female status post bilateral breast reduction status post left lumpectomy      Plan:  The patient is healing and recovering well from surgery, no signs of infection or hematoma.   She may begin twice daily scar massage with cocoa butter and Vit E.   Recommend supportive bra during the day, and activity AT.  RTO 6-8 weeks for recheck.   We also discussed healing expectations today.        Criss Wilson PA-C  Plastic & Reconstructive Surgery

## 2025-04-08 ENCOUNTER — TELEPHONE (OUTPATIENT)
Dept: HEMATOLOGY ONCOLOGY | Facility: CLINIC | Age: 65
End: 2025-04-08

## 2025-04-08 NOTE — TELEPHONE ENCOUNTER
Spoke with patient and rescheduled her appointment with dr eason from 05/22/2025 to 06/12/2025 @ 10:40.

## 2025-05-07 ENCOUNTER — APPOINTMENT (OUTPATIENT)
Age: 65
End: 2025-05-07
Payer: COMMERCIAL

## 2025-05-07 DIAGNOSIS — M85.852 OSTEOPENIA OF LEFT HIP: ICD-10-CM

## 2025-05-07 DIAGNOSIS — Z79.811 AROMATASE INHIBITOR USE: ICD-10-CM

## 2025-05-07 DIAGNOSIS — C50.412 PRIMARY CANCER OF UPPER OUTER QUADRANT OF LEFT FEMALE BREAST (HCC): ICD-10-CM

## 2025-05-07 LAB
25(OH)D3 SERPL-MCNC: 43.7 NG/ML (ref 30–100)
ALBUMIN SERPL BCG-MCNC: 4.1 G/DL (ref 3.5–5)
ALP SERPL-CCNC: 135 U/L (ref 34–104)
ALT SERPL W P-5'-P-CCNC: 14 U/L (ref 7–52)
ANION GAP SERPL CALCULATED.3IONS-SCNC: 9 MMOL/L (ref 4–13)
AST SERPL W P-5'-P-CCNC: 14 U/L (ref 13–39)
BASOPHILS # BLD AUTO: 0.06 THOUSANDS/ÂΜL (ref 0–0.1)
BASOPHILS NFR BLD AUTO: 1 % (ref 0–1)
BILIRUB SERPL-MCNC: 0.73 MG/DL (ref 0.2–1)
BUN SERPL-MCNC: 12 MG/DL (ref 5–25)
CALCIUM SERPL-MCNC: 9.7 MG/DL (ref 8.4–10.2)
CHLORIDE SERPL-SCNC: 105 MMOL/L (ref 96–108)
CO2 SERPL-SCNC: 29 MMOL/L (ref 21–32)
CREAT SERPL-MCNC: 0.73 MG/DL (ref 0.6–1.3)
EOSINOPHIL # BLD AUTO: 0.27 THOUSAND/ÂΜL (ref 0–0.61)
EOSINOPHIL NFR BLD AUTO: 4 % (ref 0–6)
ERYTHROCYTE [DISTWIDTH] IN BLOOD BY AUTOMATED COUNT: 13.2 % (ref 11.6–15.1)
GFR SERPL CREATININE-BSD FRML MDRD: 87 ML/MIN/1.73SQ M
GLUCOSE P FAST SERPL-MCNC: 106 MG/DL (ref 65–99)
HCT VFR BLD AUTO: 38.3 % (ref 34.8–46.1)
HGB BLD-MCNC: 12.5 G/DL (ref 11.5–15.4)
IMM GRANULOCYTES # BLD AUTO: 0.02 THOUSAND/UL (ref 0–0.2)
IMM GRANULOCYTES NFR BLD AUTO: 0 % (ref 0–2)
LYMPHOCYTES # BLD AUTO: 1.83 THOUSANDS/ÂΜL (ref 0.6–4.47)
LYMPHOCYTES NFR BLD AUTO: 26 % (ref 14–44)
MAGNESIUM SERPL-MCNC: 2.2 MG/DL (ref 1.9–2.7)
MCH RBC QN AUTO: 28.9 PG (ref 26.8–34.3)
MCHC RBC AUTO-ENTMCNC: 32.6 G/DL (ref 31.4–37.4)
MCV RBC AUTO: 89 FL (ref 82–98)
MONOCYTES # BLD AUTO: 0.46 THOUSAND/ÂΜL (ref 0.17–1.22)
MONOCYTES NFR BLD AUTO: 7 % (ref 4–12)
NEUTROPHILS # BLD AUTO: 4.3 THOUSANDS/ÂΜL (ref 1.85–7.62)
NEUTS SEG NFR BLD AUTO: 62 % (ref 43–75)
NRBC BLD AUTO-RTO: 0 /100 WBCS
PLATELET # BLD AUTO: 256 THOUSANDS/UL (ref 149–390)
PMV BLD AUTO: 9.4 FL (ref 8.9–12.7)
POTASSIUM SERPL-SCNC: 4.3 MMOL/L (ref 3.5–5.3)
PROT SERPL-MCNC: 6.9 G/DL (ref 6.4–8.4)
RBC # BLD AUTO: 4.32 MILLION/UL (ref 3.81–5.12)
SODIUM SERPL-SCNC: 143 MMOL/L (ref 135–147)
WBC # BLD AUTO: 6.94 THOUSAND/UL (ref 4.31–10.16)

## 2025-05-07 PROCEDURE — 83735 ASSAY OF MAGNESIUM: CPT

## 2025-05-07 PROCEDURE — 82306 VITAMIN D 25 HYDROXY: CPT

## 2025-05-07 PROCEDURE — 80053 COMPREHEN METABOLIC PANEL: CPT

## 2025-05-07 PROCEDURE — 36415 COLL VENOUS BLD VENIPUNCTURE: CPT

## 2025-05-07 PROCEDURE — 85025 COMPLETE CBC W/AUTO DIFF WBC: CPT

## 2025-05-13 ENCOUNTER — OFFICE VISIT (OUTPATIENT)
Dept: HEMATOLOGY ONCOLOGY | Facility: CLINIC | Age: 65
End: 2025-05-13
Payer: COMMERCIAL

## 2025-05-13 VITALS
HEART RATE: 75 BPM | WEIGHT: 163 LBS | RESPIRATION RATE: 18 BRPM | OXYGEN SATURATION: 98 % | BODY MASS INDEX: 28.88 KG/M2 | TEMPERATURE: 97.8 F | HEIGHT: 63 IN | DIASTOLIC BLOOD PRESSURE: 74 MMHG | SYSTOLIC BLOOD PRESSURE: 124 MMHG

## 2025-05-13 DIAGNOSIS — M85.852 OSTEOPENIA OF LEFT HIP: ICD-10-CM

## 2025-05-13 DIAGNOSIS — C50.412 PRIMARY CANCER OF UPPER OUTER QUADRANT OF LEFT FEMALE BREAST (HCC): ICD-10-CM

## 2025-05-13 PROCEDURE — 99214 OFFICE O/P EST MOD 30 MIN: CPT | Performed by: INTERNAL MEDICINE

## 2025-05-13 RX ORDER — IBANDRONATE SODIUM 150 MG/1
150 TABLET, FILM COATED ORAL
Qty: 3 TABLET | Refills: 3 | Status: SHIPPED | OUTPATIENT
Start: 2025-05-13

## 2025-05-13 RX ORDER — ANASTROZOLE 1 MG/1
1 TABLET ORAL DAILY
Qty: 90 TABLET | Refills: 3 | Status: SHIPPED | OUTPATIENT
Start: 2025-05-13

## 2025-05-13 RX ORDER — BIOTIN 10000 MCG
CAPSULE ORAL
COMMUNITY

## 2025-05-13 NOTE — ASSESSMENT & PLAN NOTE
She will be due for another bone density scan around September of this year.  She was encouraged to continue with vitamin D supplements and exercise regularly.  Orders:    ibandronate (BONIVA) 150 MG tablet; Take 1 tablet (150 mg total) by mouth every 30 (thirty) days    DXA bone density spine hip and pelvis; Future

## 2025-05-13 NOTE — PROGRESS NOTES
Name: Ina Morejon      : 1960      MRN: [id number]  Encounter Provider: Hardeep Stacy MD  Encounter Date: 2025   Encounter department: [facility] [place]  :  Assessment & Plan  Osteopenia of left hip  She will be due for another bone density scan around September of this year.  She was encouraged to continue with vitamin D supplements and exercise regularly.  Orders:    ibandronate (BONIVA) 150 MG tablet; Take 1 tablet (150 mg total) by mouth every 30 (thirty) days    DXA bone density spine hip and pelvis; Future    Primary cancer of upper outer quadrant of left female breast (HCC)  Status post left lumpectomy on 10/2/2020, stage IIb, ER positive, AZ positive, HER2/janel negative, status post 4 cycles of adjuvant chemotherapy with TC which was completed on 2021.  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     The patient is tolerating the anastrozole better than the letrozole which she is taking on a daily basis without interruption.  She seems to be up-to-date on screening mammogram which is going to be due again around the end of September of this year.  We did discuss pursuing a breast index test to see if she would benefit from endocrine therapy beyond 5 years.    Orders:    anastrozole (ARIMIDEX) 1 mg tablet; Take 1 tablet (1 mg total) by mouth daily    CBC and differential; Future    Comprehensive metabolic panel; Future    Magnesium; Future    Vitamin D 25 hydroxy; Future    Comprehensive metabolic panel; Future        No follow-ups on file.    History of Present Illness   Chief Complaint   Patient presents with    Follow-up   The patient came today for a follow-up visit.  She is tolerating the anastrozole without significant side effects.  Blood work from 2025 was reviewed with the patient which showed normal CBC and CMP.  She seems to be concerned about her mildly elevated alk phos.  Vitamin D 43.7.  Oncology History   Cancer  Staging   Primary cancer of upper outer quadrant of left female breast (HCC)  Staging form: Breast, AJCC 8th Edition  - Clinical stage from 10/2/2020: Stage IIB (cT2, cN1(sn), cM0, G3, ER+, AZ+, HER2-) - Signed by Ti Magana MD on 10/9/2020  Stage prefix: Initial diagnosis  Method of lymph node assessment: [place] lymph node biopsy  Nuclear grade: G3  Mitotic count score: Score 3  Percentage of tumor with tubule formation: 10  Tubule formation score: Score 3  Scarff-Bloom-Cevallos score: 9  Histologic grading system: 3 grade system  Laterality: Left  Tumor size (mm): 21  Lymph-vascular invasion (LVI): LVI present/identified, NOS  Specimen type: Excision  National guidelines used in treatment planning: Yes  Type of national guideline used in treatment planning: NCCN  Oncology History Overview Note   Patient with a family history of breast cancer in her mother. She had two previous benign breast biopsies. She was going for yearly mammograms and most recent in August showed focal asymmetry in the upper outer posterior left breast. A biopsy on 9/11/20 revealed left invasive ductal breast carcinoma. She saw General Surgeon, Dr. Magana in September for surgical evaluation.      8/12/20 Screening Mammogram bilateral w 3d & cad   IMPRESSION:  1. Upper outer posterior left breast focal asymmetry.  Diagnostic mammography, with possible ultrasound, recommended.  2. Stable right mammogram.     9/4/20 Mammo diagnostic left w 3d & cad   US breast left limited (diagnostic)   FINDINGS:   LEFT  1) MASS  Mammo diagnostic left w 3d & cad: There is a 43 mm transverse x 26 mm AP x 23 mm craniocaudal high density, irregularly shaped mass seen in the upper outer quadrant of the left breast at 1 o'clock in the posterior depth.  There is a possible satellite lesions slightly inferior to the mass.  If measured as 1 finding, it measures 32 mm craniocaudal.  The mass correlates with the prior mammogram finding.   US breast left  limited (diagnostic): There is and irregularly shaped, hypoechoic mass with indistinct margins with shadowing seen in the upper outer quadrant of the left breast at 1 o'clock in the posterior depth.  The mass measures at least 23 x 11 x 10 mm and the size may be underestimated on ultrasound.  The mass correlates with the prior mammogram finding.  This is highly suspicious and ultrasound-guided core needle biopsy is recommended.    Targeted ultrasound of the left axilla was performed.  At least 1 morphologically benign lymph node was visualized.  No morphologically abnormal lymph nodes were visualized.     ASSESSMENT/BI-RADS CATEGORY:  Left: 4C - High Suspicion for Malignancy  Overall: 4 - Suspicious     9/11/20 Left breast US guided biopsy, at 1:00 12cmfn  Invasive mammary carcinoma of no special type Grade 3  ER/NH 90-95% positive, HER2 negative  Lymphovascular invasion present     9/28/20 Breast working group recommended treatment plan: Lumpectomy with SLNB followed by adjuvant radiation therapy. Genetic testing consult.     10/2/20 Left breast Lumpectomy  Invasive breast carcinoma of no special type, 21 mm focus  Grade 3  DCIS present in 2 out of 17 blocks.   Margins negative for invasive carcinoma, 1 mm from the closest posterior margin  Negative for DCIS 1 mm from the closest anterior margin  Lymph-vascular invasion present     Lymph nodes:  Left sentinel lymph node excision: 1 lymph node positive for carcinoma  Left axillary LN excision: 1 reactive lymph node     Primary cancer of upper outer quadrant of left female breast (HCC)   9/2020 Initial Diagnosis    Primary cancer of upper outer quadrant of left female breast (HCC)  Stage IIB     9/11/2020 Biopsy    Breast, left at 1:00 12cmfn, US guided biopsy, 5 passes:  - Invasive mammary carcinoma of no special type (ductal, not otherwise specified).    -- [place] histologic grade 3 of 3 (total score: 8 of 9)       -- Invasive carcinoma involves 5 of 5  submitted core biopsies, max. Dimension= 14 mm.    -- Estrogen, Progesterone 90-95% positive HER2 negative  - Lymphovascular invasion: Present.     10/2/2020 Surgery    A. Breast, Left, Lumpectomy:  - Invasive breast carcinoma of no special type (ductal NST/invasive ductal carcinoma), 21 mm focus.   * [place] grade 3 of 3 (total score: 9 of 9)   * Ductal carcinoma in situ (DCIS): Present in 2 out of 17 blocks.    -- [place], solid and cribriform architectural pattern, nuclear grade 3 of 3.    * Margins: Negative for invasive carcinoma, 1 mm from the closest posterior margin; Negative for DCIS, 1 mm from the closest anterior margin.   * Skin: Negative for carcinoma.   * Skeletal muscle: Negative for carcinoma.   * Lymph-vascular invasion: Present.    * Pathologic stage (AJCC 8th ed.): pT2, pN1a (sn).      B. [place] Lymph Node, Left # 1, Excision:  - One lymph node positive for carcinoma (1/1).     C. Axillary contents, Left, Excision:  - One reactive lymph node (0/1).     10/2/2020 -  Cancer Staged    Staging form: Breast, AJCC 8th Edition  - Clinical stage from 10/2/2020: Stage IIB (cT2, cN1(sn), cM0, G3, ER+, OH+, HER2-) - Signed by Ti Magana MD on 10/9/2020  Stage prefix: Initial diagnosis  Laterality: Left  Tumor size (mm): 21  Method of lymph node assessment: [place] lymph node biopsy  Nuclear grade: G3  Mitotic count score: Score 3  Percentage of tumor with tubule formation: 10  Tubule formation score: Score 3  Scarff-Bloom-Cevallos score: 9  Histologic grading system: 3 grade system  Lymph-vascular invasion (LVI): LVI present/identified, NOS  Specimen type: Excision  National guidelines used in treatment planning: Yes  Type of national guideline used in treatment planning: NCCN       11/4/2020 - 1/26/2021 Chemotherapy    Completed 4 cycles of adjuvant Taxotere plus Cytoxan   pegfilgrastim (NEULASTA) subcutaneous injection 6 mg, 6 mg, Subcutaneous, Once, 3 of 3 cycles  Administration: 6 mg  (11/5/2020), 6 mg (11/25/2020), 6 mg (1/6/2021)  pegfilgrastim (NEULASTA ONPRO) subcutaneous injection kit 6 mg, 6 mg, Subcutaneous, Once, 1 of 1 cycle  Administration: 6 mg (12/15/2020)  cyclophosphamide (CYTOXAN) 1,068 mg in sodium chloride 0.9 % 250 mL IVPB, 600 mg/m2 = 1,068 mg, Intravenous, Once, 4 of 4 cycles  Administration: 1,068 mg (11/4/2020), 1,068 mg (11/24/2020), 1,068 mg (12/15/2020), 1,068 mg (1/5/2021)  DOCEtaxel (TAXOTERE) 140 mg in sodium chloride 0.9 % 250 mL chemo infusion, 133.6 mg, Intravenous, Once, 4 of 4 cycles  Administration: 140 mg (11/4/2020), 140 mg (11/24/2020), 140 mg (12/15/2020), 140 mg (1/5/2021)       11/2020 Genetic Testing    Patient has genetic testing done for breast cancer.  Results revealed patient has the following mutation(s):  CHEK2 c.470T>C ([id number]), Heterozygous     2/9/2021 -  Hormone Therapy    Started anastrozole  Was also started on Prolia injections due to pre-existing osteopenia          Review of Systems   Constitutional:  Negative for chills and fever.   HENT:  Negative for ear pain and sore throat.    Eyes:  Negative for pain and visual disturbance.   Respiratory:  Negative for cough and shortness of breath.    Cardiovascular:  Negative for chest pain and palpitations.   Gastrointestinal:  Negative for abdominal pain and vomiting.   Genitourinary:  Negative for dysuria and hematuria.   Musculoskeletal:  Positive for arthralgias. Negative for back pain.   Skin:  Negative for color change and rash.   Neurological:  Negative for seizures and syncope.   All other systems reviewed and are negative.    Medical History Reviewed by provider this encounter:     .  Current Outpatient Medications on File Prior to Visit   Medication Sig Dispense Refill    ascorbic acid (VITAMIN C) 250 mg tablet Take 250 mg by mouth daily      betamethasone dipropionate (DIPROSONE) 0.05 % cream Apply topically 2 (two) times a day .  Apply a thin film to affected area on the right side  of forehead twice daily.      Biotin 10 MG CAPS Take by mouth      calcium carbonate (TUMS) 500 mg chewable tablet Chew 1 tablet daily      Collagen-Vitamin C-Biotin (COLLAGEN PO) Take by mouth      hydrocortisone 2.5 % cream Apply topically 2 (two) times a day      VITAMIN D, ERGOCALCIFEROL, PO Take by mouth 3 days a week      [DISCONTINUED] anastrozole (ARIMIDEX) 1 mg tablet Take 1 tablet (1 mg total) by mouth daily 90 tablet 3    [DISCONTINUED] ibandronate (BONIVA) 150 MG tablet TAKE 1 TABLET ONCE A MONTH 3 tablet 0    albuterol (ProAir HFA) 90 mcg/act inhaler Inhale 2 puffs every 6 (six) hours as needed for wheezing or shortness of breath (Patient not taking: Reported on 5/13/2025) 8.5 g 0    cyclobenzaprine (FLEXERIL) 10 mg tablet Take 1 tablet (10 mg total) by mouth 3 (three) times a day for 7 days (Patient not taking: Reported on 5/13/2025) 21 tablet 0    enoxaparin (Lovenox) 40 mg/0.4 mL Inject 0.4 mL (40 mg total) under the skin in the morning for 7 days (Patient not taking: Reported on 5/13/2025) 2.8 mL 0    gabapentin (Neurontin) 300 mg capsule Take 1 capsule (300 mg total) by mouth 3 (three) times a day for 7 days (Patient not taking: Reported on 5/13/2025) 21 capsule 0    nystatin (MYCOSTATIN) powder Apply topically 2 (two) times a day (Patient not taking: Reported on 5/13/2025) 60 g 3    oxyCODONE (Roxicodone) 5 immediate release tablet Take 1 tablet (5 mg total) by mouth every 6 (six) hours as needed for severe pain Max Daily Amount: 20 mg (Patient not taking: Reported on 5/13/2025) 10 tablet 0    [DISCONTINUED] estradiol (ESTRACE) 0.5 MG tablet Take 1 tablet by mouth daily       No current facility-administered medications on file prior to visit.      Social History     Tobacco Use    Smoking status: Never     Passive exposure: Never    Smokeless tobacco: Never   Vaping Use    Vaping status: Never Used   Substance and Sexual Activity    Alcohol use: No    Drug use: No    Sexual activity: Not  "Currently     Partners: Male     Birth control/protection: Female Sterilization     Comment:  x 9 years, Ollie         Objective   /74 (BP Location: Right arm, Patient Position: Sitting, Cuff Size: Adult)   Pulse 75   Temp 97.8 °F (36.6 °C)   Resp 18   Ht 5' 3\" (1.6 m)   Wt 73.9 kg (163 lb)   LMP  (LMP Unknown)   SpO2 98%   BMI 28.87 kg/m²     Pain Screening:  Pain Score: 0-No pain  ECOG   0  Physical Exam  Constitutional:       General: She is not in acute distress.     Appearance: She is well-developed. She is not diaphoretic.   HENT:      Head: Normocephalic and atraumatic.      Nose: Nose normal.   Eyes:      General: No scleral icterus.        Right eye: No discharge.         Left eye: No discharge.      Conjunctiva/sclera: Conjunctivae normal.      Pupils: Pupils are equal, round, and reactive to light.   Neck:      Thyroid: No thyromegaly.      Vascular: No JVD.      Trachea: No tracheal deviation.   Cardiovascular:      Rate and Rhythm: Normal rate and regular rhythm.      Heart sounds: Normal heart sounds. No murmur heard.     No friction rub.   Pulmonary:      Effort: Pulmonary effort is normal. No respiratory distress.      Breath sounds: Normal breath sounds. No stridor. No wheezing or rales.   Chest:      Chest wall: No tenderness.   Abdominal:      General: There is no distension.      Palpations: Abdomen is soft. There is no hepatomegaly or splenomegaly.      Tenderness: There is no abdominal tenderness. There is no guarding or rebound.   Musculoskeletal:         General: No tenderness or deformity. Normal range of motion.      Cervical back: Normal range of motion and neck supple.   Lymphadenopathy:      Cervical: No cervical adenopathy.   Skin:     General: Skin is warm and dry.      Coloration: Skin is not pale.      Findings: No erythema or rash.   Neurological:      Mental Status: She is alert and oriented to person, place, and time.      Cranial Nerves: No cranial nerve " deficit.      Coordination: Coordination normal.      Deep Tendon Reflexes: Reflexes are normal and symmetric.   Psychiatric:         Behavior: Behavior normal.         Thought Content: Thought content normal.         Judgment: Judgment normal.         Labs: I have reviewed the following labs:  Lab Results   Component Value Date/Time    WBC 6.94 05/07/2025 07:05 AM    RBC 4.32 05/07/2025 07:05 AM    Hemoglobin 12.5 05/07/2025 07:05 AM    Hematocrit 38.3 05/07/2025 07:05 AM    MCV 89 05/07/2025 07:05 AM    MCH 28.9 05/07/2025 07:05 AM    RDW 13.2 05/07/2025 07:05 AM    Platelets 256 05/07/2025 07:05 AM    Segmented % 62 05/07/2025 07:05 AM    Lymphocytes % 26 05/07/2025 07:05 AM    Monocytes % 7 05/07/2025 07:05 AM    Eosinophils Relative 4 05/07/2025 07:05 AM    Basophils Relative 1 05/07/2025 07:05 AM    Immature Grans % 0 05/07/2025 07:05 AM    Absolute Neutrophils 4.30 05/07/2025 07:05 AM     Lab Results   Component Value Date/Time    Potassium 4.3 05/07/2025 07:05 AM    Chloride 105 05/07/2025 07:05 AM    CO2 29 05/07/2025 07:05 AM    BUN 12 05/07/2025 07:05 AM    Creatinine 0.73 05/07/2025 07:05 AM    Glucose, Fasting 106 (H) 05/07/2025 07:05 AM    Calcium 9.7 05/07/2025 07:05 AM    AST 14 05/07/2025 07:05 AM    ALT 14 05/07/2025 07:05 AM    Alkaline Phosphatase 135 (H) 05/07/2025 07:05 AM    Total Protein 6.9 05/07/2025 07:05 AM    Albumin 4.1 05/07/2025 07:05 AM    Total Bilirubin 0.73 05/07/2025 07:05 AM    eGFR 87 05/07/2025 07:05 AM     Lab Results   Component Value Date/Time    WBC 6.94 05/07/2025 07:05 AM    RBC 4.32 05/07/2025 07:05 AM    Hemoglobin 12.5 05/07/2025 07:05 AM    Hematocrit 38.3 05/07/2025 07:05 AM    MCV 89 05/07/2025 07:05 AM    MCH 28.9 05/07/2025 07:05 AM    RDW 13.2 05/07/2025 07:05 AM    Platelets 256 05/07/2025 07:05 AM    Segmented % 62 05/07/2025 07:05 AM    Lymphocytes % 26 05/07/2025 07:05 AM    Monocytes % 7 05/07/2025 07:05 AM    Eosinophils Relative 4 05/07/2025 07:05 AM     "Basophils Relative 1 05/07/2025 07:05 AM    Immature Grans % 0 05/07/2025 07:05 AM    Absolute Neutrophils 4.30 05/07/2025 07:05 AM      Lab Results   Component Value Date/Time    Sodium 143 05/07/2025 07:05 AM    Potassium 4.3 05/07/2025 07:05 AM    Chloride 105 05/07/2025 07:05 AM    CO2 29 05/07/2025 07:05 AM    ANION GAP 9 05/07/2025 07:05 AM    BUN 12 05/07/2025 07:05 AM    Creatinine 0.73 05/07/2025 07:05 AM    Glucose 97 02/13/2025 10:00 AM    Glucose, Fasting 106 (H) 05/07/2025 07:05 AM    Calcium 9.7 05/07/2025 07:05 AM    AST 14 05/07/2025 07:05 AM    ALT 14 05/07/2025 07:05 AM    Alkaline Phosphatase 135 (H) 05/07/2025 07:05 AM    Total Protein 6.9 05/07/2025 07:05 AM    Albumin 4.1 05/07/2025 07:05 AM    Total Bilirubin 0.73 05/07/2025 07:05 AM    eGFR 87 05/07/2025 07:05 AM      No results found for: \"IRON\", \"CONCFE\", \"FERRITIN\", \"[id number]\", \"FOLATE\", \"COPPER\", \"EPOREFLAB\", \"ERYTHROPRO\", \"ESR\", \"CRP\", \"HIVAGAB\", \"HEPATITIS\"   Results for orders placed or performed in visit on 05/07/25   CBC and differential   Result Value Ref Range    WBC 6.94 4.31 - 10.16 Thousand/uL    RBC 4.32 3.81 - 5.12 Million/uL    Hemoglobin 12.5 11.5 - 15.4 g/dL    Hematocrit 38.3 34.8 - 46.1 %    MCV 89 82 - 98 fL    MCH 28.9 26.8 - 34.3 pg    MCHC 32.6 31.4 - 37.4 g/dL    RDW 13.2 11.6 - 15.1 %    MPV 9.4 8.9 - 12.7 fL    Platelets 256 149 - 390 Thousands/uL    nRBC 0 /100 WBCs    Segmented % 62 43 - 75 %    Immature Grans % 0 0 - 2 %    Lymphocytes % 26 14 - 44 %    Monocytes % 7 4 - 12 %    Eosinophils Relative 4 0 - 6 %    Basophils Relative 1 0 - 1 %    Absolute Neutrophils 4.30 1.85 - 7.62 Thousands/µL    Absolute Immature Grans 0.02 0.00 - 0.20 Thousand/uL    Absolute Lymphocytes 1.83 0.60 - 4.47 Thousands/µL    Absolute Monocytes 0.46 0.17 - 1.22 Thousand/µL    Eosinophils Absolute 0.27 0.00 - 0.61 Thousand/µL    Basophils Absolute 0.06 0.00 - 0.10 Thousands/µL   Comprehensive metabolic panel   Result Value Ref Range "    Sodium 143 135 - 147 mmol/L    Potassium 4.3 3.5 - 5.3 mmol/L    Chloride 105 96 - 108 mmol/L    CO2 29 21 - 32 mmol/L    ANION GAP 9 4 - 13 mmol/L    BUN 12 5 - 25 mg/dL    Creatinine 0.73 0.60 - 1.30 mg/dL    Glucose, Fasting 106 (H) 65 - 99 mg/dL    Calcium 9.7 8.4 - 10.2 mg/dL    AST 14 13 - 39 U/L    ALT 14 7 - 52 U/L    Alkaline Phosphatase 135 (H) 34 - 104 U/L    Total Protein 6.9 6.4 - 8.4 g/dL    Albumin 4.1 3.5 - 5.0 g/dL    Total Bilirubin 0.73 0.20 - 1.00 mg/dL    eGFR 87 ml/min/1.73sq m   Result Value Ref Range    Magnesium 2.2 1.9 - 2.7 mg/dL   Vitamin D 25 hydroxy   Result Value Ref Range    Vit D, 25-Hydroxy 43.7 30.0 - 100.0 ng/mL

## 2025-05-13 NOTE — ASSESSMENT & PLAN NOTE
Status post left lumpectomy on 10/2/2020, stage IIb, ER positive, [place] positive, HER2/janel negative, status post 4 cycles of adjuvant chemotherapy with TC which was completed on 1/26/2021.  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     The patient is tolerating the anastrozole better than the letrozole which she is taking on a daily basis without interruption.  She seems to be up-to-date on screening mammogram which is going to be due again around the end of September of this year.  We did discuss pursuing a breast index test to see if she would benefit from endocrine therapy beyond 5 years.    Orders:    anastrozole (ARIMIDEX) 1 mg tablet; Take 1 tablet (1 mg total) by mouth daily    CBC and differential; Future    Comprehensive metabolic panel; Future    Magnesium; Future    Vitamin D 25 hydroxy; Future    Comprehensive metabolic panel; Future

## 2025-05-19 ENCOUNTER — TELEPHONE (OUTPATIENT)
Age: 65
End: 2025-05-19

## 2025-05-19 NOTE — TELEPHONE ENCOUNTER
Provider: Dr. Tawanna Jamison from [employer] called in. He reports they received a breast index form and are requesting we also send patient's demographics face sheet and insurance card. I confirmed with him we would get those faxed over to: [phone]. He has no additional questions or concerns at this time.

## 2025-05-21 ENCOUNTER — OFFICE VISIT (OUTPATIENT)
Dept: PLASTIC SURGERY | Facility: CLINIC | Age: 65
End: 2025-05-21

## 2025-05-21 VITALS
RESPIRATION RATE: 20 BRPM | BODY MASS INDEX: 28.88 KG/M2 | HEART RATE: 80 BPM | SYSTOLIC BLOOD PRESSURE: 120 MMHG | DIASTOLIC BLOOD PRESSURE: 70 MMHG | OXYGEN SATURATION: 99 % | WEIGHT: 163 LBS | TEMPERATURE: 98.2 F | HEIGHT: 63 IN

## 2025-05-21 DIAGNOSIS — Z48.89 ENCOUNTER FOR FOLLOW-UP CARE INVOLVING PLASTIC SURGERY: Primary | ICD-10-CM

## 2025-05-21 PROCEDURE — 99024 POSTOP FOLLOW-UP VISIT: CPT | Performed by: PHYSICIAN ASSISTANT

## 2025-05-21 NOTE — PROGRESS NOTES
History of Present Illness: The patient is a 64 y.o.  year-old female  who presents to the office for postoperative follow-up status post breast reduction for symmetry status post left lumpectomy. The patient had surgery on 3/7/2025 with Dr. Burkett.     Patient reports doing overall well. She denies fever, chills, pain, wounds, or bleeding. She notes note L breast feeling firmer than R, h/o radiation to left chest. Has been applying cocoa butter to incisions BID and wearing a sports bra. Patient inquiring about skin tag removal from L neck.     Past Medical History:   Diagnosis Date    Acute cystitis 03/02/2018    Asthma     has not been treated for seveeral years  exercise induced    BRCA1 negative     Breast cancer (HCC) 10/2020    left sided    Cancer (HCC)     breast  9/20    Cellulitis of left upper extremity 02/23/2021    Chemotherapy induced neutropenia (HCC) 10/30/2020    Exercise-induced asthma 11/03/2016    Heart murmur 2015    History of chemotherapy 11/2020    Hx of radiation therapy 03/2021    Urinary tract infection           Review of Systems  Constitutional: Denies fevers, chills or pain.  Skin: Denies any warmth, erythema, edema, bleeding, signs of infection, or drainage.     Physical Exam    General: Alert and oriented, pleasant and cooperative.    Breast: Scar tissue present L breast > right,  consistent with radiation changes. Surgical incisions are clean, dry, and intact. Incisions are healing nicely with no evidence of wounds, erythema, drainage, or signs of infection. No evidence of nipple necrosis, hematoma, or seroma formation.     Skin: Multiple skin tags present L neck.       Assessment and Plan:  The patient is an 64 y.o.  year-old female who presents to the office for post-operative visit s/p bilateral breast reduction for symmetry s/p lumpectomy on 3/7/2025 by Dr. Burkett.     -At today's visit patient is healing nicely.   -Educated patient on scar tissue healing after  radiation.  -Continue applying cocoa butter BID.  -Continue wearing supportive bra during activity, no restrictions at this time.   -Discussed we can make a separate procedure visit to address skin tags. Patient prefers to see derm. Will be seeing them in June.   -The patient is to return 2-3 weeks for 3 month post-op visit.   -The patient is to call the office with any questions or concerns. All of the patient's questions were answered at this time and they agree with the plan of care.      Ilsa Barroso PA-C  [facility]

## 2025-05-22 ENCOUNTER — LAB REQUISITION (OUTPATIENT)
Dept: LAB | Facility: HOSPITAL | Age: 65
End: 2025-05-22

## 2025-05-22 DIAGNOSIS — R92.8 OTHER ABNORMAL AND INCONCLUSIVE FINDINGS ON DIAGNOSTIC IMAGING OF BREAST: ICD-10-CM

## 2025-06-12 ENCOUNTER — APPOINTMENT (OUTPATIENT)
Dept: URBAN - NONMETROPOLITAN AREA CLINIC 4 | Facility: CLINIC | Age: 65
Setting detail: DERMATOLOGY
End: 2025-06-12

## 2025-06-12 DIAGNOSIS — L40.0 PSORIASIS VULGARIS: ICD-10-CM

## 2025-06-12 PROCEDURE — ? COUNSELING

## 2025-06-12 PROCEDURE — ? PRESCRIPTION

## 2025-06-12 PROCEDURE — ? PRESCRIPTION MEDICATION MANAGEMENT

## 2025-06-12 PROCEDURE — ? PHOTO-DOCUMENTATION

## 2025-06-12 PROCEDURE — ? FULL BODY SKIN EXAM - DECLINED

## 2025-06-12 PROCEDURE — ? PSORIATIC EPIDEMIOLOGY SCREENING TOOL (PEST)

## 2025-06-12 RX ORDER — TAPINAROF 10 MG/1000MG
1% CREAM TOPICAL QD
Qty: 60 | Refills: 3 | Status: ERX | COMMUNITY
Start: 2025-06-12

## 2025-06-12 RX ADMIN — TAPINAROF 1%: 10 CREAM TOPICAL at 00:00

## 2025-06-12 ASSESSMENT — LOCATION SIMPLE DESCRIPTION DERM
LOCATION SIMPLE: ABDOMEN
LOCATION SIMPLE: RIGHT UPPER BACK
LOCATION SIMPLE: LEFT THIGH
LOCATION SIMPLE: RIGHT UPPER ARM
LOCATION SIMPLE: RIGHT THIGH
LOCATION SIMPLE: LEFT UPPER ARM
LOCATION SIMPLE: RIGHT CHEEK
LOCATION SIMPLE: LEFT CHEEK

## 2025-06-12 ASSESSMENT — LOCATION DETAILED DESCRIPTION DERM
LOCATION DETAILED: LEFT ANTERIOR DISTAL UPPER ARM
LOCATION DETAILED: EPIGASTRIC SKIN
LOCATION DETAILED: RIGHT INFERIOR MEDIAL UPPER BACK
LOCATION DETAILED: RIGHT INFERIOR CENTRAL MALAR CHEEK
LOCATION DETAILED: LEFT CENTRAL MALAR CHEEK
LOCATION DETAILED: LEFT ANTERIOR DISTAL THIGH
LOCATION DETAILED: RIGHT ANTERIOR DISTAL UPPER ARM
LOCATION DETAILED: RIGHT ANTERIOR DISTAL THIGH

## 2025-06-12 ASSESSMENT — LOCATION ZONE DERM
LOCATION ZONE: LEG
LOCATION ZONE: TRUNK
LOCATION ZONE: FACE
LOCATION ZONE: ARM

## 2025-06-12 ASSESSMENT — BSA PSORIASIS: % BODY COVERED IN PSORIASIS: 8

## 2025-06-12 ASSESSMENT — ITCH NUMERIC RATING SCALE: HOW SEVERE IS YOUR ITCHING?: 5

## 2025-06-12 ASSESSMENT — PGA PSORIASIS: PGA PSORIASIS 2020: MODERATE

## 2025-06-12 NOTE — PROCEDURE: PSORIATIC EPIDEMIOLOGY SCREENING TOOL (PEST)
Have You Ever Had A Finger Or Toe That Was Completely Swollen And Painful For No Apparent Reason?: No
Positive Screening Text: A score of 3 or greater is considered a positive PEST score.
Detail Level: Simple
Negative Screening Text: A score of less than 3 is considered a negative PEST score.

## 2025-06-12 NOTE — PROCEDURE: PRESCRIPTION MEDICATION MANAGEMENT
Discontinue Regimen: Betamethasone 0.05% cream, and Hydrocortisone 2.5% cream.
Render In Strict Bullet Format?: No
Initiate Treatment: Vtama 1% cream QD
Detail Level: Zone

## 2025-06-12 NOTE — PROCEDURE: FULL BODY SKIN EXAM - DECLINED
Detail Level: Simple
Instructions: This plan will send the code FBSD to the PM system.  DO NOT or CHANGE the price.
Price (Do Not Change): 0.00
Body Of Note (Please Add Your Own Text Here): Patient will have full body exam in the future

## 2025-06-13 LAB — SCAN RESULT: NORMAL

## 2025-06-26 ENCOUNTER — APPOINTMENT (OUTPATIENT)
Age: 65
End: 2025-06-26
Attending: INTERNAL MEDICINE
Payer: COMMERCIAL

## 2025-06-26 DIAGNOSIS — C50.412 PRIMARY CANCER OF UPPER OUTER QUADRANT OF LEFT FEMALE BREAST (HCC): ICD-10-CM

## 2025-06-26 LAB
ALBUMIN SERPL BCG-MCNC: 4.3 G/DL (ref 3.5–5)
ALP SERPL-CCNC: 145 U/L (ref 34–104)
ALT SERPL W P-5'-P-CCNC: 14 U/L (ref 7–52)
ANION GAP SERPL CALCULATED.3IONS-SCNC: 11 MMOL/L (ref 4–13)
AST SERPL W P-5'-P-CCNC: 19 U/L (ref 13–39)
BILIRUB SERPL-MCNC: 0.74 MG/DL (ref 0.2–1)
BUN SERPL-MCNC: 15 MG/DL (ref 5–25)
CALCIUM SERPL-MCNC: 9.6 MG/DL (ref 8.4–10.2)
CHLORIDE SERPL-SCNC: 105 MMOL/L (ref 96–108)
CO2 SERPL-SCNC: 26 MMOL/L (ref 21–32)
CREAT SERPL-MCNC: 0.72 MG/DL (ref 0.6–1.3)
GFR SERPL CREATININE-BSD FRML MDRD: 89 ML/MIN/1.73SQ M
GLUCOSE SERPL-MCNC: 122 MG/DL (ref 65–140)
POTASSIUM SERPL-SCNC: 4.2 MMOL/L (ref 3.5–5.3)
PROT SERPL-MCNC: 6.9 G/DL (ref 6.4–8.4)
SODIUM SERPL-SCNC: 142 MMOL/L (ref 135–147)

## 2025-06-26 PROCEDURE — 80053 COMPREHEN METABOLIC PANEL: CPT

## 2025-06-26 PROCEDURE — 36415 COLL VENOUS BLD VENIPUNCTURE: CPT

## 2025-07-01 ENCOUNTER — TELEPHONE (OUTPATIENT)
Age: 65
End: 2025-07-01

## 2025-07-01 DIAGNOSIS — R74.8 ELEVATED ALKALINE PHOSPHATASE LEVEL: Primary | ICD-10-CM

## 2025-07-01 NOTE — TELEPHONE ENCOUNTER
Patient called, asking if Dr Stacy has gotten a chance to review her blood work and specifically review the Alkaline results, as she is concerned of how high the numbers are

## 2025-07-01 NOTE — TELEPHONE ENCOUNTER
Phoned Pat and had to leave a voice message that Dr Stacy did review her lab results and he is not sure why her Alk Phos is elevated but he placed an order for a Gamma GT level to be checked and once we have those results we will get back to her.

## 2025-07-02 ENCOUNTER — APPOINTMENT (OUTPATIENT)
Age: 65
End: 2025-07-02
Attending: INTERNAL MEDICINE
Payer: COMMERCIAL

## 2025-07-02 DIAGNOSIS — R74.8 ELEVATED ALKALINE PHOSPHATASE LEVEL: ICD-10-CM

## 2025-07-02 LAB — GGT SERPL-CCNC: 18 U/L (ref 9–64)

## 2025-07-02 PROCEDURE — 82977 ASSAY OF GGT: CPT

## 2025-07-02 PROCEDURE — 36415 COLL VENOUS BLD VENIPUNCTURE: CPT

## 2025-07-03 ENCOUNTER — TELEPHONE (OUTPATIENT)
Age: 65
End: 2025-07-03

## 2025-07-03 DIAGNOSIS — C50.412 PRIMARY CANCER OF UPPER OUTER QUADRANT OF LEFT FEMALE BREAST (HCC): ICD-10-CM

## 2025-07-03 DIAGNOSIS — R74.8 ELEVATED ALKALINE PHOSPHATASE LEVEL: Primary | ICD-10-CM

## 2025-07-03 NOTE — TELEPHONE ENCOUNTER
Lab reviewed with Dr Stacy.   Recommending bone scan.    Returned call to patient.  Patient agreeable.  Patient will schedule bone scan.  Provided central scheduling number.  Not additional questions

## 2025-07-03 NOTE — TELEPHONE ENCOUNTER
Received a phone call from patient.  Patient requesting to talk to Dr. Stacy regarding lab results.  Please call patient to discuss.

## 2025-07-18 ENCOUNTER — RESULTS FOLLOW-UP (OUTPATIENT)
Dept: HEMATOLOGY ONCOLOGY | Facility: CLINIC | Age: 65
End: 2025-07-18

## 2025-07-18 ENCOUNTER — HOSPITAL ENCOUNTER (OUTPATIENT)
Dept: NUCLEAR MEDICINE | Facility: HOSPITAL | Age: 65
End: 2025-07-18
Attending: INTERNAL MEDICINE
Payer: COMMERCIAL

## 2025-07-18 ENCOUNTER — TELEPHONE (OUTPATIENT)
Age: 65
End: 2025-07-18

## 2025-07-18 DIAGNOSIS — C50.412 PRIMARY CANCER OF UPPER OUTER QUADRANT OF LEFT FEMALE BREAST (HCC): ICD-10-CM

## 2025-07-18 DIAGNOSIS — R74.8 ELEVATED ALKALINE PHOSPHATASE LEVEL: ICD-10-CM

## 2025-07-18 DIAGNOSIS — C50.412 PRIMARY CANCER OF UPPER OUTER QUADRANT OF LEFT FEMALE BREAST (HCC): Primary | ICD-10-CM

## 2025-07-18 PROCEDURE — A9503 TC99M MEDRONATE: HCPCS

## 2025-07-18 PROCEDURE — 78306 BONE IMAGING WHOLE BODY: CPT

## 2025-07-18 NOTE — TELEPHONE ENCOUNTER
Phoned pt to review the results of the bone scan and the recommendation for a pet Scan to be ordered. Pt has never had a Pet scan in the past and lives in [place] so please schedule at [facility].         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  ----- Message -----  From: Interface, Radiology Results In  Sent: 7/18/2025   4:08 PM EDT  To: Hardeep Stacy MD

## 2025-07-18 NOTE — TELEPHONE ENCOUNTER
Received a phone call from patient.  Patient inquiring about bone scan results.  Please call patient to discuss.

## 2025-07-21 NOTE — TELEPHONE ENCOUNTER
Call received by Ina,     Patient will like a call back from provider to go over bone scan results. What's to know why additional testing is needed and if her cancer came back. Per patient she also call CS and scheduled PET CT for 8/8 as that's the next available.     Please call patient.     Thanks!

## 2025-08-01 ENCOUNTER — TELEPHONE (OUTPATIENT)
Age: 65
End: 2025-08-01

## 2025-08-08 ENCOUNTER — HOSPITAL ENCOUNTER (OUTPATIENT)
Dept: RADIOLOGY | Age: 65
Discharge: HOME | End: 2025-08-08
Attending: INTERNAL MEDICINE
Payer: COMMERCIAL

## 2025-08-08 DIAGNOSIS — C50.412 PRIMARY CANCER OF UPPER OUTER QUADRANT OF LEFT FEMALE BREAST (HCC): ICD-10-CM

## 2025-08-08 LAB — GLUCOSE SERPL-MCNC: 110 MG/DL (ref 65–140)

## 2025-08-08 PROCEDURE — 78815 PET IMAGE W/CT SKULL-THIGH: CPT

## 2025-08-08 PROCEDURE — A9552 F18 FDG: HCPCS

## 2025-08-08 PROCEDURE — 82948 REAGENT STRIP/BLOOD GLUCOSE: CPT

## 2025-08-13 ENCOUNTER — TELEPHONE (OUTPATIENT)
Age: 65
End: 2025-08-13

## 2025-08-14 ENCOUNTER — APPOINTMENT (OUTPATIENT)
Dept: URBAN - NONMETROPOLITAN AREA CLINIC 4 | Facility: CLINIC | Age: 65
Setting detail: DERMATOLOGY
End: 2025-08-14

## 2025-08-14 DIAGNOSIS — L82.0 INFLAMED SEBORRHEIC KERATOSIS: ICD-10-CM

## 2025-08-14 PROCEDURE — ? LIQUID NITROGEN

## 2025-08-14 PROCEDURE — ? COUNSELING

## 2025-08-14 ASSESSMENT — LOCATION DETAILED DESCRIPTION DERM
LOCATION DETAILED: LEFT PROXIMAL DORSAL FOREARM
LOCATION DETAILED: RIGHT DISTAL PRETIBIAL REGION
LOCATION DETAILED: LEFT CENTRAL LATERAL NECK
LOCATION DETAILED: LEFT INFERIOR LATERAL NECK
LOCATION DETAILED: RIGHT CENTRAL LATERAL NECK

## 2025-08-14 ASSESSMENT — LOCATION SIMPLE DESCRIPTION DERM
LOCATION SIMPLE: RIGHT PRETIBIAL REGION
LOCATION SIMPLE: NECK
LOCATION SIMPLE: LEFT FOREARM

## 2025-08-14 ASSESSMENT — LOCATION ZONE DERM
LOCATION ZONE: ARM
LOCATION ZONE: LEG
LOCATION ZONE: NECK

## 2025-08-14 ASSESSMENT — PAIN INTENSITY VAS: HOW INTENSE IS YOUR PAIN 0 BEING NO PAIN, 10 BEING THE MOST SEVERE PAIN POSSIBLE?: 1/10 PAIN

## 2025-08-20 ENCOUNTER — OFFICE VISIT (OUTPATIENT)
Dept: PLASTIC SURGERY | Facility: CLINIC | Age: 65
End: 2025-08-20
Payer: COMMERCIAL

## 2025-08-20 VITALS
HEIGHT: 63 IN | SYSTOLIC BLOOD PRESSURE: 116 MMHG | BODY MASS INDEX: 28.88 KG/M2 | RESPIRATION RATE: 20 BRPM | DIASTOLIC BLOOD PRESSURE: 70 MMHG | HEART RATE: 71 BPM | TEMPERATURE: 98 F | OXYGEN SATURATION: 99 % | WEIGHT: 163 LBS

## 2025-08-20 DIAGNOSIS — Z48.89 ENCOUNTER FOR FOLLOW-UP CARE INVOLVING PLASTIC SURGERY: Primary | ICD-10-CM

## 2025-08-20 PROCEDURE — 99214 OFFICE O/P EST MOD 30 MIN: CPT | Performed by: PHYSICIAN ASSISTANT

## (undated) DEVICE — ANTIBACTERIAL UNDYED BRAIDED (POLYGLACTIN 910), SYNTHETIC ABSORBABLE SUTURE: Brand: COATED VICRYL

## (undated) DEVICE — MICRO HVTSA, 0.5G AND HVTSA SOURCEMARK PRODUCT CODE M1206 AND M1206-01: Brand: EXOFIN MICRO HVTSA, 0.5G

## (undated) DEVICE — PROXIMATE SKIN STAPLERS (35 WIDE) CONTAINS 35 STAINLESS STEEL STAPLES (FIXED HEAD): Brand: PROXIMATE

## (undated) DEVICE — SYRINGE 10ML LL

## (undated) DEVICE — SUT MONOCRYL 4-0 PS-2 27 IN Y426H

## (undated) DEVICE — LAPAROTOMY SPONGE - RF AND X-RAY DETECTABLE PRE-WASHED: Brand: SITUATE

## (undated) DEVICE — NEEDLE 25G X 1 1/2

## (undated) DEVICE — SYRINGE 20ML LL

## (undated) DEVICE — PICO 7 SINGLE 10X20CM: Brand: PICO™ 7

## (undated) DEVICE — HARMONIC FOCUS SHEARS 9CM LENGTH + ADAPTIVE TISSUE TECHNOLOGY FOR USE WITH BLUE HAND PIECE ONLY: Brand: HARMONIC FOCUS

## (undated) DEVICE — GLOVE INDICATOR PI UNDERGLOVE SZ 7.5 BLUE

## (undated) DEVICE — SUT STRATAFIX SPIRAL PLUS 3-0 PS-2 30 X 30 CM SXMP2B408

## (undated) DEVICE — ADHESIVE SKIN HIGH VISCOSITY EXOFIN 1ML

## (undated) DEVICE — JACKSON-PRATT 100CC BULB RESERVOIR: Brand: CARDINAL HEALTH

## (undated) DEVICE — INTENDED FOR TISSUE SEPARATION, AND OTHER PROCEDURES THAT REQUIRE A SHARP SURGICAL BLADE TO PUNCTURE OR CUT.: Brand: BARD-PARKER ® CARBON RIB-BACK BLADES

## (undated) DEVICE — PREMIUM DRY TRAY LF: Brand: MEDLINE INDUSTRIES, INC.

## (undated) DEVICE — SPONGE STICK WITH PVP-I: Brand: KENDALL

## (undated) DEVICE — HALF SHEET: Brand: CONVERTORS

## (undated) DEVICE — JP CHAN DRN SIL HUBLESS 15FR W/TRO: Brand: CARDINAL HEALTH

## (undated) DEVICE — DRESSING MEPILEX AG BORDER 4 X 4 IN

## (undated) DEVICE — SINGLE PORT MANIFOLD: Brand: NEPTUNE 2

## (undated) DEVICE — ACE WRAP 6 IN UNSTERILE

## (undated) DEVICE — BUCKET PLASTER DISPOSABLE

## (undated) DEVICE — STERILE MUSCLE FLAP PACK: Brand: CARDINAL HEALTH

## (undated) DEVICE — CUP MEDICINE 1OZ 5000/CS 50/PLT: Brand: MEDEGEN MEDICAL PRODUCTS, LLC

## (undated) DEVICE — GLOVE INDICATOR PI UNDERGLOVE SZ 7 BLUE

## (undated) DEVICE — NEPTUNE E-SEP SMOKE EVACUATION PENCIL, COATED, 70MM BLADE, PUSH BUTTON SWITCH: Brand: NEPTUNE E-SEP

## (undated) DEVICE — DRAPE PROBE NEO-PROBE/ULTRASOUND

## (undated) DEVICE — TUBING SUCTION 5MM X 12 FT

## (undated) DEVICE — SUT MONOCRYL 3-0 SH 27 IN Y416H

## (undated) DEVICE — 4-PORT MANIFOLD: Brand: NEPTUNE 2

## (undated) DEVICE — PENCIL ROCKER SWITCH CAUTERY HAND CONTROL

## (undated) DEVICE — POV-IOD SOLUTION 4OZ BT

## (undated) DEVICE — CHLORAPREP HI-LITE 26ML ORANGE

## (undated) DEVICE — SUT VICRYL 3-0 SH 27 IN J416H

## (undated) DEVICE — GLOVE SRG BIOGEL 7

## (undated) DEVICE — INVIEW CLEAR LEGGINGS: Brand: CONVERTORS

## (undated) DEVICE — SKIN MARKER DUAL TIP WITH RULER CAP, FLEXIBLE RULER AND LABELS: Brand: DEVON

## (undated) DEVICE — PICO 7 SINGLE 10X30CM: Brand: PICO™ 7

## (undated) DEVICE — MARGIN MARKER SET

## (undated) DEVICE — SMITH & NEPHEW CEM MIX BOWL                                    W/SPATULA DISPOSABLE

## (undated) DEVICE — STRL PENROSE DRAIN 18" X 1/2": Brand: CARDINAL HEALTH

## (undated) DEVICE — BETHLEHEM UNIVERSAL MINOR GEN: Brand: CARDINAL HEALTH

## (undated) DEVICE — SUT VICRYL PLUS 0 CT-1 27IN VCPP31D

## (undated) DEVICE — GLOVE SRG BIOGEL 7.5

## (undated) DEVICE — POOLE SUCTION INSTRUMENT WITH REMOVABLE SHEATH AND PREATTACHED 6' (1.8 M) CLEAR PLASTIC TUBING: Brand: POOLE

## (undated) DEVICE — SUT PDS II 2-0 SH 18IN Z775D

## (undated) DEVICE — DRAPE TOWEL: Brand: CONVERTORS

## (undated) DEVICE — SUT SILK 2-0 SH 30 IN K833H

## (undated) DEVICE — SUT VICRYL 2-0 REEL 54 IN J286G

## (undated) DEVICE — ELECTRODE BLADE MOD E-Z CLEAN 2.5IN 6.4CM -0012M

## (undated) DEVICE — SUT ETHILON 2-0 FS 18 IN 664H

## (undated) DEVICE — ABDOMINAL PAD: Brand: DERMACEA